# Patient Record
Sex: MALE | Race: WHITE | Employment: OTHER | URBAN - NONMETROPOLITAN AREA
[De-identification: names, ages, dates, MRNs, and addresses within clinical notes are randomized per-mention and may not be internally consistent; named-entity substitution may affect disease eponyms.]

---

## 2017-01-06 ENCOUNTER — ANESTHESIA EVENT (OUTPATIENT)
Dept: OPERATING ROOM | Age: 77
DRG: 467 | End: 2017-01-06
Payer: MEDICARE

## 2017-01-06 ENCOUNTER — ANESTHESIA (OUTPATIENT)
Dept: OPERATING ROOM | Age: 77
DRG: 467 | End: 2017-01-06
Payer: MEDICARE

## 2017-01-06 ENCOUNTER — APPOINTMENT (OUTPATIENT)
Dept: GENERAL RADIOLOGY | Age: 77
DRG: 467 | End: 2017-01-06
Attending: ORTHOPAEDIC SURGERY
Payer: MEDICARE

## 2017-01-06 VITALS
OXYGEN SATURATION: 91 % | SYSTOLIC BLOOD PRESSURE: 167 MMHG | RESPIRATION RATE: 2 BRPM | TEMPERATURE: 98 F | DIASTOLIC BLOOD PRESSURE: 68 MMHG

## 2017-01-06 PROCEDURE — 2580000003 HC RX 258: Performed by: ORTHOPAEDIC SURGERY

## 2017-01-06 PROCEDURE — 6360000002 HC RX W HCPCS: Performed by: NURSE ANESTHETIST, CERTIFIED REGISTERED

## 2017-01-06 PROCEDURE — 6360000002 HC RX W HCPCS: Performed by: ORTHOPAEDIC SURGERY

## 2017-01-06 PROCEDURE — 2500000003 HC RX 250 WO HCPCS: Performed by: NURSE ANESTHETIST, CERTIFIED REGISTERED

## 2017-01-06 PROCEDURE — 73560 X-RAY EXAM OF KNEE 1 OR 2: CPT

## 2017-01-06 RX ORDER — FENTANYL CITRATE 50 UG/ML
INJECTION, SOLUTION INTRAMUSCULAR; INTRAVENOUS PRN
Status: DISCONTINUED | OUTPATIENT
Start: 2017-01-06 | End: 2017-01-06 | Stop reason: SDUPTHER

## 2017-01-06 RX ORDER — ONDANSETRON 2 MG/ML
INJECTION INTRAMUSCULAR; INTRAVENOUS PRN
Status: DISCONTINUED | OUTPATIENT
Start: 2017-01-06 | End: 2017-01-06 | Stop reason: SDUPTHER

## 2017-01-06 RX ORDER — ROCURONIUM BROMIDE 10 MG/ML
INJECTION, SOLUTION INTRAVENOUS PRN
Status: DISCONTINUED | OUTPATIENT
Start: 2017-01-06 | End: 2017-01-06 | Stop reason: SDUPTHER

## 2017-01-06 RX ORDER — MIDAZOLAM HYDROCHLORIDE 1 MG/ML
INJECTION INTRAMUSCULAR; INTRAVENOUS PRN
Status: DISCONTINUED | OUTPATIENT
Start: 2017-01-06 | End: 2017-01-06 | Stop reason: SDUPTHER

## 2017-01-06 RX ORDER — LIDOCAINE HYDROCHLORIDE 10 MG/ML
INJECTION, SOLUTION INFILTRATION; PERINEURAL PRN
Status: DISCONTINUED | OUTPATIENT
Start: 2017-01-06 | End: 2017-01-06 | Stop reason: SDUPTHER

## 2017-01-06 RX ORDER — GLYCOPYRROLATE 0.2 MG/ML
INJECTION INTRAMUSCULAR; INTRAVENOUS PRN
Status: DISCONTINUED | OUTPATIENT
Start: 2017-01-06 | End: 2017-01-06 | Stop reason: SDUPTHER

## 2017-01-06 RX ORDER — PROPOFOL 10 MG/ML
INJECTION, EMULSION INTRAVENOUS PRN
Status: DISCONTINUED | OUTPATIENT
Start: 2017-01-06 | End: 2017-01-06 | Stop reason: SDUPTHER

## 2017-01-06 RX ORDER — TRANEXAMIC ACID 100 MG/ML
INJECTION, SOLUTION INTRAVENOUS PRN
Status: DISCONTINUED | OUTPATIENT
Start: 2017-01-06 | End: 2017-01-06 | Stop reason: SDUPTHER

## 2017-01-06 RX ADMIN — VANCOMYCIN HYDROCHLORIDE 1 G: 1 INJECTION, SOLUTION INTRAVENOUS at 08:03

## 2017-01-06 RX ADMIN — SODIUM CHLORIDE, SODIUM LACTATE, POTASSIUM CHLORIDE, AND CALCIUM CHLORIDE: 600; 310; 30; 20 INJECTION, SOLUTION INTRAVENOUS at 09:30

## 2017-01-06 RX ADMIN — FENTANYL CITRATE 50 MCG: 50 INJECTION INTRAMUSCULAR; INTRAVENOUS at 10:00

## 2017-01-06 RX ADMIN — NEOSTIGMINE METHYLSULFATE 2.5 MG: 1 INJECTION, SOLUTION INTRAMUSCULAR; INTRAVENOUS; SUBCUTANEOUS at 11:37

## 2017-01-06 RX ADMIN — HYDROMORPHONE HYDROCHLORIDE 0.25 MG: 1 INJECTION, SOLUTION INTRAMUSCULAR; INTRAVENOUS; SUBCUTANEOUS at 11:28

## 2017-01-06 RX ADMIN — LIDOCAINE HYDROCHLORIDE 5 ML: 10 INJECTION, SOLUTION INFILTRATION; PERINEURAL at 08:12

## 2017-01-06 RX ADMIN — ROCURONIUM BROMIDE 50 MG: 10 INJECTION INTRAVENOUS at 08:12

## 2017-01-06 RX ADMIN — HYDROMORPHONE HYDROCHLORIDE 0.25 MG: 1 INJECTION, SOLUTION INTRAMUSCULAR; INTRAVENOUS; SUBCUTANEOUS at 11:31

## 2017-01-06 RX ADMIN — MIDAZOLAM HYDROCHLORIDE 1 MG: 1 INJECTION, SOLUTION INTRAMUSCULAR; INTRAVENOUS at 08:04

## 2017-01-06 RX ADMIN — GLYCOPYRROLATE 0.4 MG: 0.2 INJECTION, SOLUTION INTRAMUSCULAR; INTRAVENOUS at 11:37

## 2017-01-06 RX ADMIN — TRANEXAMIC ACID 1000 MG: 100 INJECTION, SOLUTION INTRAVENOUS at 10:43

## 2017-01-06 RX ADMIN — ROCURONIUM BROMIDE 10 MG: 10 INJECTION INTRAVENOUS at 08:55

## 2017-01-06 RX ADMIN — TRANEXAMIC ACID 1000 MG: 100 INJECTION, SOLUTION INTRAVENOUS at 11:10

## 2017-01-06 RX ADMIN — FENTANYL CITRATE 50 MCG: 50 INJECTION INTRAMUSCULAR; INTRAVENOUS at 10:25

## 2017-01-06 RX ADMIN — ONDANSETRON HYDROCHLORIDE 4 MG: 2 INJECTION, SOLUTION INTRAVENOUS at 11:12

## 2017-01-06 RX ADMIN — PROPOFOL 140 MG: 10 INJECTION, EMULSION INTRAVENOUS at 08:12

## 2017-01-06 RX ADMIN — FENTANYL CITRATE 75 MCG: 50 INJECTION INTRAMUSCULAR; INTRAVENOUS at 08:11

## 2017-01-06 RX ADMIN — ROCURONIUM BROMIDE 10 MG: 10 INJECTION INTRAVENOUS at 10:01

## 2017-01-06 RX ADMIN — ROCURONIUM BROMIDE 10 MG: 10 INJECTION INTRAVENOUS at 10:40

## 2017-01-06 RX ADMIN — FENTANYL CITRATE 25 MCG: 50 INJECTION INTRAMUSCULAR; INTRAVENOUS at 09:13

## 2017-01-08 PROBLEM — J40 BRONCHITIS: Status: ACTIVE | Noted: 2017-01-08

## 2017-01-09 ENCOUNTER — APPOINTMENT (OUTPATIENT)
Dept: GENERAL RADIOLOGY | Age: 77
DRG: 467 | End: 2017-01-09
Attending: ORTHOPAEDIC SURGERY
Payer: MEDICARE

## 2017-01-09 PROCEDURE — 71020 XR CHEST STANDARD TWO VW: CPT

## 2017-03-06 RX ORDER — NITROGLYCERIN 0.4 MG/1
0.4 TABLET SUBLINGUAL EVERY 5 MIN PRN
Qty: 25 TABLET | Refills: 3 | Status: SHIPPED | OUTPATIENT
Start: 2017-03-06

## 2017-10-16 ENCOUNTER — HOSPITAL ENCOUNTER (EMERGENCY)
Age: 77
Discharge: LEFT BEFORE DISCHARGE | End: 2017-10-16
Payer: MEDICARE

## 2017-10-16 VITALS
BODY MASS INDEX: 27.4 KG/M2 | RESPIRATION RATE: 18 BRPM | HEART RATE: 65 BPM | SYSTOLIC BLOOD PRESSURE: 170 MMHG | TEMPERATURE: 98.2 F | DIASTOLIC BLOOD PRESSURE: 62 MMHG | WEIGHT: 185 LBS | HEIGHT: 69 IN | OXYGEN SATURATION: 96 %

## 2017-10-16 PROCEDURE — 4500000002 HC ER NO CHARGE

## 2017-10-16 ASSESSMENT — PAIN DESCRIPTION - LOCATION: LOCATION: ABDOMEN

## 2017-10-16 ASSESSMENT — PAIN SCALES - GENERAL: PAINLEVEL_OUTOF10: 7

## 2017-11-04 ENCOUNTER — APPOINTMENT (OUTPATIENT)
Dept: CT IMAGING | Age: 77
End: 2017-11-04
Payer: MEDICARE

## 2017-11-04 ENCOUNTER — HOSPITAL ENCOUNTER (EMERGENCY)
Age: 77
Discharge: HOME | End: 2017-11-04
Attending: EMERGENCY MEDICINE
Payer: MEDICARE

## 2017-11-04 VITALS
BODY MASS INDEX: 28.14 KG/M2 | OXYGEN SATURATION: 96 % | HEIGHT: 69 IN | HEART RATE: 61 BPM | SYSTOLIC BLOOD PRESSURE: 145 MMHG | RESPIRATION RATE: 15 BRPM | WEIGHT: 190 LBS | TEMPERATURE: 97.9 F | DIASTOLIC BLOOD PRESSURE: 61 MMHG

## 2017-11-04 DIAGNOSIS — L89.159 DECUBITUS ULCER OF SACRAL REGION, UNSPECIFIED ULCER STAGE: ICD-10-CM

## 2017-11-04 DIAGNOSIS — K52.9 COLITIS: ICD-10-CM

## 2017-11-04 DIAGNOSIS — K55.9 ISCHEMIC COLITIS (HCC): Primary | ICD-10-CM

## 2017-11-04 DIAGNOSIS — Z95.1 HX OF CABG: ICD-10-CM

## 2017-11-04 DIAGNOSIS — N18.9 CHRONIC RENAL IMPAIRMENT, UNSPECIFIED CKD STAGE: ICD-10-CM

## 2017-11-04 DIAGNOSIS — I71.40 ABDOMINAL AORTIC ANEURYSM (AAA) WITHOUT RUPTURE: ICD-10-CM

## 2017-11-04 LAB
ALBUMIN SERPL-MCNC: 3.1 G/DL (ref 3.5–5.2)
ALP BLD-CCNC: 86 U/L (ref 40–130)
ALT SERPL-CCNC: 11 U/L (ref 5–41)
ANION GAP SERPL CALCULATED.3IONS-SCNC: 9 MMOL/L (ref 7–19)
AST SERPL-CCNC: 18 U/L (ref 5–40)
BASOPHILS ABSOLUTE: 0.1 K/UL (ref 0–0.2)
BASOPHILS RELATIVE PERCENT: 0.5 % (ref 0–1)
BILIRUB SERPL-MCNC: <0.2 MG/DL (ref 0.2–1.2)
BUN BLDV-MCNC: 36 MG/DL (ref 8–23)
CALCIUM SERPL-MCNC: 8.7 MG/DL (ref 8.8–10.2)
CHLORIDE BLD-SCNC: 106 MMOL/L (ref 98–111)
CO2: 28 MMOL/L (ref 22–29)
CREAT SERPL-MCNC: 1.9 MG/DL (ref 0.5–1.2)
EOSINOPHILS ABSOLUTE: 0.7 K/UL (ref 0–0.6)
EOSINOPHILS RELATIVE PERCENT: 4.7 % (ref 0–5)
GFR NON-AFRICAN AMERICAN: 34
GLUCOSE BLD-MCNC: 132 MG/DL (ref 74–109)
HCT VFR BLD CALC: 38.8 % (ref 42–52)
HEMOGLOBIN: 12.6 G/DL (ref 14–18)
LACTIC ACID: 0.7 MMOL/L (ref 0.5–1.9)
LIPASE: 16 U/L (ref 13–60)
LYMPHOCYTES ABSOLUTE: 4.8 K/UL (ref 1.1–4.5)
LYMPHOCYTES RELATIVE PERCENT: 30.3 % (ref 20–40)
MCH RBC QN AUTO: 32.8 PG (ref 27–31)
MCHC RBC AUTO-ENTMCNC: 32.5 G/DL (ref 33–37)
MCV RBC AUTO: 101 FL (ref 80–94)
MONOCYTES ABSOLUTE: 1.5 K/UL (ref 0–0.9)
MONOCYTES RELATIVE PERCENT: 9.2 % (ref 0–10)
NEUTROPHILS ABSOLUTE: 8.7 K/UL (ref 1.5–7.5)
NEUTROPHILS RELATIVE PERCENT: 54.9 % (ref 50–65)
PDW BLD-RTO: 14.4 % (ref 11.5–14.5)
PLATELET # BLD: 250 K/UL (ref 130–400)
PMV BLD AUTO: 10.9 FL (ref 9.4–12.4)
POTASSIUM SERPL-SCNC: 5.3 MMOL/L (ref 3.5–5)
RBC # BLD: 3.84 M/UL (ref 4.7–6.1)
SODIUM BLD-SCNC: 143 MMOL/L (ref 136–145)
TOTAL PROTEIN: 7.5 G/DL (ref 6.6–8.7)
WBC # BLD: 15.9 K/UL (ref 4.8–10.8)

## 2017-11-04 PROCEDURE — 36415 COLL VENOUS BLD VENIPUNCTURE: CPT

## 2017-11-04 PROCEDURE — 99284 EMERGENCY DEPT VISIT MOD MDM: CPT

## 2017-11-04 PROCEDURE — 2580000003 HC RX 258: Performed by: EMERGENCY MEDICINE

## 2017-11-04 PROCEDURE — 80053 COMPREHEN METABOLIC PANEL: CPT

## 2017-11-04 PROCEDURE — 99284 EMERGENCY DEPT VISIT MOD MDM: CPT | Performed by: EMERGENCY MEDICINE

## 2017-11-04 PROCEDURE — 85025 COMPLETE CBC W/AUTO DIFF WBC: CPT

## 2017-11-04 PROCEDURE — 83690 ASSAY OF LIPASE: CPT

## 2017-11-04 PROCEDURE — 74176 CT ABD & PELVIS W/O CONTRAST: CPT

## 2017-11-04 PROCEDURE — 93005 ELECTROCARDIOGRAM TRACING: CPT

## 2017-11-04 PROCEDURE — 83605 ASSAY OF LACTIC ACID: CPT

## 2017-11-04 RX ORDER — TACROLIMUS 1 MG/G
OINTMENT TOPICAL 2 TIMES DAILY
COMMUNITY
End: 2019-07-19

## 2017-11-04 RX ORDER — METRONIDAZOLE 500 MG/1
500 TABLET ORAL 3 TIMES DAILY
Qty: 30 TABLET | Refills: 0 | Status: SHIPPED | OUTPATIENT
Start: 2017-11-04 | End: 2017-11-14

## 2017-11-04 RX ORDER — CIPROFLOXACIN 500 MG/1
500 TABLET, FILM COATED ORAL 2 TIMES DAILY
Qty: 20 TABLET | Refills: 0 | Status: SHIPPED | OUTPATIENT
Start: 2017-11-04 | End: 2017-11-14

## 2017-11-04 RX ORDER — 0.9 % SODIUM CHLORIDE 0.9 %
1000 INTRAVENOUS SOLUTION INTRAVENOUS ONCE
Status: COMPLETED | OUTPATIENT
Start: 2017-11-04 | End: 2017-11-04

## 2017-11-04 RX ORDER — HYOSCYAMINE SULFATE 0.125 MG
125 TABLET ORAL EVERY 4 HOURS PRN
Status: ON HOLD | COMMUNITY
End: 2022-07-14 | Stop reason: HOSPADM

## 2017-11-04 RX ADMIN — SODIUM CHLORIDE 1000 ML: 9 INJECTION, SOLUTION INTRAVENOUS at 15:00

## 2017-11-04 ASSESSMENT — ENCOUNTER SYMPTOMS
SHORTNESS OF BREATH: 0
VOMITING: 0
COUGH: 0
ABDOMINAL PAIN: 1
ABDOMINAL DISTENTION: 1

## 2017-11-04 ASSESSMENT — PAIN DESCRIPTION - PAIN TYPE: TYPE: ACUTE PAIN

## 2017-11-04 ASSESSMENT — PAIN DESCRIPTION - LOCATION: LOCATION: ABDOMEN

## 2017-11-04 ASSESSMENT — PAIN SCALES - GENERAL
PAINLEVEL_OUTOF10: 5
PAINLEVEL_OUTOF10: 5
PAINLEVEL_OUTOF10: 9

## 2017-11-04 NOTE — ED NOTES
Patient is resting comfortably with no distress noted at this time. Patient has call light within reach and is encouraged to ask any questions or call for any assistance. All questions are entertained, encouraged, and answered to the best of my ability. Patient is watching TV.      Anjel Goldsmith RN  11/04/17 5291

## 2017-11-04 NOTE — ED NOTES
Patient states that he had gotten up to use bathroom, but did not obtain urine specimen. Will try again when patient states he can again.      Tex Noriega RN  11/04/17 1132

## 2017-11-04 NOTE — ED NOTES
Assessment:  Airway intact. Pt respirations even and unlabored, skin color within normal limits. Skin is warm and dry. Pt has stage 2 skin sore to coccyx. Bowel sounds within normal limits. Abdomen soft and tender to epigastric region  Alert and oriented x 4. Pt is in no acute distress. No edema noted to extremities. Call light within reach, pt gowned. Pt on monitor and alarms on.        Verónica Canas RN  11/04/17 2131

## 2017-11-04 NOTE — ED PROVIDER NOTES
[facility]  eMERGENCY dEPARTMENT eNCOUnter      Pt Name: Alesha Ochoa  MRN: [id number]  [place] 1940  Date of evaluation: 11/4/2017  Provider: Nelson Garcia MD    CHIEF COMPLAINT       Chief Complaint   Patient presents with    Abdominal Pain     patient states that he has epigastric pain. Onset has been intermittent for one month. He states that he has a hiatal hernia. Pain rated 9 out of 10    Skin Ulcer     above gluteal fold         HISTORY OF PRESENT ILLNESS   (Location/Symptom, Timing/Onset, Context/Setting, Quality, Duration, Modifying Factors, Severity)  Note limiting factors. Alesha Ochoa is a 76 y.o. male who presents to the emergency department With abdominal pain. He states it's 9 out of 10 intermittent and has been ongoing on and off times one month. He has a hiatal hernia per prior report. The patient states he came to the ER about 2-3 weeks ago while he was waiting to be seen in the waiting room his pain went away so he went home. He has not had a workup for his abdominal pain. 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 The patient feels bloated. He complains of epigastric pain. The pain comes and goes. Does not seem to affect his eating. The patient also complains of a skin ulcer in his gluteal cleft where he's had issues with this times one year is been putting ointment on it however it bleeds sometimes. The patient is not bedbound. Patient denies any fevers. He has no blood in his stool. He has normal bowel movements today. His pain came on suddenly this morning around 5 AM. In his abdomen. The history is provided by the patient. Nursing Notes were reviewed. REVIEW OF SYSTEMS    (2-9 systems for level 4, 10 or more for level 5)     Review of Systems   Constitutional: Negative for fever. Respiratory: Negative for cough and shortness of breath. Cardiovascular: Negative for chest pain.    Gastrointestinal: Left cath   [facility]ul [facility] DIAGNOSTIC CARDIAC CATH LAB PROCEDURE  05/15/91, [address]    Left cath, SARABIA/Martiniquais left ventric., selective coronary arteriography.  PTCA  05/16/91, [address]    PTCA to circumflex marginal branch.  REVISION TOTAL KNEE ARTHROPLASTY Right 1/6/2017    KNEE TOTAL ARTHROPLASTY REVISION performed by Valentino Bouche, MD at [facility] ARTHROPLASTY  4/2012    Left  knee          CURRENT MEDICATIONS       Previous Medications    AMLODIPINE (NORVASC) 5 MG TABLET    Take 5 mg by mouth daily. ASPIRIN 81 MG EC TABLET    Take 81 mg by mouth nightly     CLOPIDOGREL (PLAVIX) 75 MG TABLET    Take 1 tablet by mouth daily Start after Xarelto done    FUROSEMIDE (LASIX) 40 MG TABLET    Take 40 mg by mouth daily    GABAPENTIN (NEURONTIN) 300 MG CAPSULE    Take 300 mg by mouth nightly    HYOSCYAMINE (ANASPAZ;LEVSIN) 125 MCG TABLET    Take 125 mcg by mouth every 4 hours as needed for Cramping    LEVOTHYROXINE (SYNTHROID) 125 MCG TABLET    Take 125 mcg by mouth nightly     LOSARTAN (COZAAR) 100 MG TABLET    Take 100 mg by mouth daily. MAGNESIUM CITRATE PO    Take by mouth    MULTIPLE VITAMINS-MINERALS (CENTRUM SILVER PO)    Take  by mouth. MUPIROCIN (BACTROBAN) 2 % NASAL OINTMENT    by Nasal route as needed Take by Nasal route 2 times daily. NITROGLYCERIN (NITROSTAT) 0.4 MG SL TABLET    Place 1 tablet under the tongue every 5 minutes as needed for Chest pain    NYSTATIN-TRIAMCINOLONE (MYCOLOG II) 325146-9.1 UNIT/GM-% CREAM    Apply topically 4 times daily Apply topically 4 times daily. OMEPRAZOLE (PRILOSEC) 40 MG CAPSULE    Take 40 mg by mouth daily    RIVAROXABAN (XARELTO) 10 MG TABS TABLET    Take 1 tablet by mouth daily for 10 days    SIMVASTATIN (ZOCOR) 20 MG TABLET    Take 20 mg by mouth nightly. TACROLIMUS (PROTOPIC) 0.1 % OINTMENT    Apply topically 2 times daily Apply topically 2 times daily.     TAMSULOSIN (FLOMAX) 0.4 MG CAPSULE    Take 0.4 mg by mouth RESULTS     EKG: All EKG's are interpreted by the Emergency Department Physician who either signs or Co-signs this chart in the absence of a cardiologist.    EKG sinus rhythm rate 59 acute changes. RADIOLOGY:   Non-plain film images such as CT, Ultrasound and MRI are read by the radiologist. Plain radiographic images are visualized and preliminarily interpreted by the emergency physician with the below findings:        Interpretation per the Radiologist below, if available at the time of this note:    CT ABDOMEN PELVIS WO IV CONTRAST Additional Contrast? Oral   Final Result   1. Circumferential wall thickening is identified along a fairly long   segment of colon from the mid descending colon down to the mixed   sigmoid colon. There is limited evaluation for bowel ischemia without   IV contrast although there does appear to be a mild pericolonic fat   stranding along this segment. This could reflect infectious or perhaps   ischemic colitis. An SMA bypass is poorly evaluated on this   noncontrast exam. Please correlate for additional evidence of ischemic   colitis. 2. Fusiform aneurysmal dilation of the infrarenal aorta up to 4 cm.    The results of this exam were discussed with Dr. Yanely Meyers on 11/4/2017   at 1421 hours   Signed by Dr Guanako Grider on 11/4/2017 2:26 PM            ED BEDSIDE ULTRASOUND:   Performed by ED Physician - none    LABS:  Labs Reviewed   CBC WITH AUTO DIFFERENTIAL - Abnormal; Notable for the following:        Result Value    WBC 15.9 (*)     RBC 3.84 (*)     Hemoglobin 12.6 (*)     Hematocrit 38.8 (*)     .0 (*)     MCH 32.8 (*)     MCHC 32.5 (*)     Neutrophils # 8.7 (*)     Lymphocytes # 4.8 (*)     Monocytes # 1.50 (*)     Eosinophils # 0.70 (*)     All other components within normal limits   COMPREHENSIVE METABOLIC PANEL - Abnormal; Notable for the following:     Potassium 5.3 (*)     Glucose 132 (*)     BUN 36 (*)     CREATININE 1.9 (*)     GFR Non- 34 (*) CT scan was concerning. I discussed this at length with Dr. Elizabeth Ruiz of vascular surgery and Dr. Demond Antonio of GI. They both agree that this likely is some ischemic colitis treated with anabiotic's. I started Cipro and Flagyl. The patient is pain-free. If his pain worsens and comes back he should return to the ER which I discussed with him the patient if worsens with just need a colectomy. There really is no other treatment from the vascular surgery perspective aced on my discussions with Dr. Elizabeth Ruiz. He started on aspirin and Plavix. The patient and I discussed this. He would like to go home he does not have any pain issues he feels better there is no reason for admission at this time. His creatinine appears that his baseline which was 1.9 in January. The patient's case was discussed with on-call for his primary care physician Dr. Umang Baker. Dr. Kiarra Pacheco will leave a message so the patient in follow-up next week in the office. Again the patient has only had one other episode of abdominal pain 2 weeks ago he does not appear in with eating postprandial or otherwise. The patient has no bloody stool or diarrhea. The patient really was most concerned about his sacral wound as well but been ongoing for some time I think he can follow up with wound care and his primary care physician. There is no bleeding from it. Based on all conversations there is no utility in doing a CT scan with IV contrast to worsen his kidney function because it would not change the plan based on vascular surgery recommendations. Again case discussed at length with multiple subspecialists. Patient pain-free would like to go home started on anabiotic's. As above. Return  precautions discussed.        Amount and/or Complexity of Data Reviewed  Clinical lab tests: ordered and reviewed  Tests in the radiology section of CPT®: ordered and reviewed  Discuss the patient with other providers: yes  Independent visualization of images, tracings, or specimens: yes    Patient Progress  Patient progress: improved        CONSULTS:  None    PROCEDURES:  Unless otherwise noted below, none     Procedures    FINAL IMPRESSION      1. Ischemic colitis ([address]    2. Colitis    3. Decubitus ulcer of sacral region, unspecified ulcer stage    4. Chronic renal impairment, unspecified CKD stage    5. Hx of CABG    6.  Abdominal aortic aneurysm (AAA) without rupture [facility])          DISPOSITION/PLAN   DISPOSITION Decision to Discharge    PATIENT REFERRED TO:  Isaias Loza, [address]  [phone]    Schedule an appointment as soon as possible for a visit   call on MON to schedule an appointment    [facility]  [address] [facility] [address] [phone]  Schedule an appointment as soon as possible for a visit in 1 week  for eval of you buttocks wounds, also discuss with your doctor      DISCHARGE MEDICATIONS:  New Prescriptions    CIPROFLOXACIN (CIPRO) 500 MG TABLET    Take 1 tablet by mouth 2 times daily for 10 days    METRONIDAZOLE (FLAGYL) 500 MG TABLET    Take 1 tablet by mouth 3 times daily for 10 days          (Please note that portions of this note were completed with a voice recognition program.  Efforts were made to edit the dictations but occasionally words are mis-transcribed.)    Bess Allen MD (electronically signed)  Attending Emergency Physician          Bess Allen MD  11/04/17 3105

## 2017-11-06 LAB
EKG P AXIS: 6 DEGREES
EKG P-R INTERVAL: 144 MS
EKG Q-T INTERVAL: 442 MS
EKG QRS DURATION: 100 MS
EKG QTC CALCULATION (BAZETT): 441 MS
EKG T AXIS: 65 DEGREES

## 2017-11-13 RX ORDER — NITROGLYCERIN 0.4 MG/1
0.4 TABLET SUBLINGUAL
COMMUNITY

## 2017-11-13 RX ORDER — SIMVASTATIN 20 MG
20 TABLET ORAL NIGHTLY
COMMUNITY

## 2017-11-13 RX ORDER — TAMSULOSIN HYDROCHLORIDE 0.4 MG/1
1 CAPSULE ORAL NIGHTLY
COMMUNITY

## 2017-11-13 RX ORDER — MULTIPLE VITAMINS W/ MINERALS TAB 9MG-400MCG
1 TAB ORAL DAILY
COMMUNITY

## 2017-11-13 RX ORDER — GABAPENTIN 300 MG/1
300 CAPSULE ORAL NIGHTLY
COMMUNITY

## 2017-11-13 RX ORDER — LOSARTAN POTASSIUM 100 MG/1
100 TABLET ORAL DAILY
COMMUNITY

## 2017-11-13 RX ORDER — OMEPRAZOLE 40 MG/1
40 CAPSULE, DELAYED RELEASE ORAL DAILY
COMMUNITY
End: 2018-01-10 | Stop reason: ALTCHOICE

## 2017-11-13 RX ORDER — AMLODIPINE BESYLATE 5 MG/1
5 TABLET ORAL DAILY
COMMUNITY

## 2017-11-13 RX ORDER — CLOPIDOGREL BISULFATE 75 MG/1
75 TABLET ORAL DAILY
COMMUNITY

## 2017-11-13 RX ORDER — ASPIRIN 81 MG/1
81 TABLET ORAL DAILY
COMMUNITY

## 2017-11-13 RX ORDER — LEVOTHYROXINE SODIUM 0.12 MG/1
125 TABLET ORAL DAILY
COMMUNITY

## 2017-11-14 ENCOUNTER — HOSPITAL ENCOUNTER (OUTPATIENT)
Dept: WOUND CARE | Age: 77
Discharge: HOME | End: 2017-11-14
Payer: MEDICARE

## 2017-11-14 VITALS
BODY MASS INDEX: 28.14 KG/M2 | RESPIRATION RATE: 16 BRPM | DIASTOLIC BLOOD PRESSURE: 60 MMHG | SYSTOLIC BLOOD PRESSURE: 130 MMHG | TEMPERATURE: 97.8 F | HEART RATE: 56 BPM | WEIGHT: 190 LBS | HEIGHT: 69 IN

## 2017-11-14 DIAGNOSIS — L89.152 DECUBITUS ULCER OF SACRAL REGION, STAGE 2 (HCC): Chronic | ICD-10-CM

## 2017-11-14 PROCEDURE — 99203 OFFICE O/P NEW LOW 30 MIN: CPT | Performed by: SURGERY

## 2017-11-14 PROCEDURE — 99213 OFFICE O/P EST LOW 20 MIN: CPT

## 2017-11-14 NOTE — PROGRESS NOTES
Review of Systems    Constitutional - no significant activity change, appetite change, or unexpected weight change. No fever or chills. HENT - no significant rhinorrhea or epistaxis. Eyes - no sudden vision change or amaurosis. Respiratory - no significant shortness of breath, wheezing, or cough  Cardiovascular - no chest pain or palpitations. He has had some dizziness recently for which he has been going to the chiropracter  Gastrointestinal - no abdominal swelling or pain. No blood in stool. Genitourinary - No difficulty urinating, dysuria or hematuria  Musculoskeletal - denies back pain, gait disturbance, or myalgia. Skin - no color change, rash, pallor; see HPI for wound  Neurologic - no facial asymmetry, or light headedness. No seizures. Hematologic - no easy bruising or excessive bleeding. Psychiatric - no severe anxiety or nervousness. No confusion. Elvia Larios Physical Exam    /60   Pulse 56   Temp 97.8 °F (36.6 °C) (Temporal)   Resp 16   Ht 5' 9\" (1.753 m)   Wt 190 lb (86.2 kg)   BMI 28.06 kg/m²     Constitutional - well developed, well nourished. No diaphoresis or acute distress. HENT - head normocephalic, atraumatic  Eyes - conjunctiva normal.  EOMS normal.   Neck- ROM appears normal, no tracheal deviation. Cardiovascular - Regular rate and rhythm. Heart sounds are normal.  Extremities - Radial pulses are 2+ to palpation bilaterally. No cyanosis or clubbing. Trace LE edema bilaterally  Pulmonary - effort appears normal.  No respiratory distress. GI - Abdomen - soft, non tender, non-distended. Genitourinary - deferred. Musculoskeletal - ROM appears normal. Moves all 4 extremities equally  Neurologic - alert and oriented X 3. Physiologic. Skin - warm and dry. See nurses's notes and photos for further description  Psychiatric - mood, affect, and behavior appear normal.  Judgment and thought processes appear normal.       Assessment    1.  Decubitus ulcer of sacral region, stage 2          Plan    Discussed with the patient that this appears to be quite superficial. I recommended that he not use the other creams, and we will have him use a barrier cream such as ariel cream. I have also recommended that he off load as much as possible, and would recommend that he use a memory foam cushion to sit in any of his chairs, and avoid any cushions with cutouts.       Recommend no smoking  Offloading instructions given

## 2017-11-28 ENCOUNTER — HOSPITAL ENCOUNTER (OUTPATIENT)
Dept: WOUND CARE | Age: 77
Discharge: HOME | End: 2017-11-28
Payer: MEDICARE

## 2017-11-28 VITALS
SYSTOLIC BLOOD PRESSURE: 132 MMHG | RESPIRATION RATE: 16 BRPM | BODY MASS INDEX: 28.14 KG/M2 | TEMPERATURE: 97.2 F | HEIGHT: 69 IN | WEIGHT: 190 LBS | HEART RATE: 54 BPM | DIASTOLIC BLOOD PRESSURE: 65 MMHG

## 2017-11-28 PROCEDURE — 99213 OFFICE O/P EST LOW 20 MIN: CPT

## 2017-11-28 PROCEDURE — 99212 OFFICE O/P EST SF 10 MIN: CPT | Performed by: SURGERY

## 2017-11-28 RX ORDER — DICLOFENAC SODIUM 75 MG/1
75 TABLET, DELAYED RELEASE ORAL 2 TIMES DAILY
COMMUNITY
End: 2017-12-01

## 2017-11-28 RX ORDER — GLYCOPYRROLATE 1 MG/1
1 TABLET ORAL 3 TIMES DAILY
Status: ON HOLD | COMMUNITY
End: 2022-07-14 | Stop reason: HOSPADM

## 2017-11-28 ASSESSMENT — PAIN DESCRIPTION - ORIENTATION: ORIENTATION: LEFT

## 2017-11-28 ASSESSMENT — PAIN DESCRIPTION - LOCATION: LOCATION: LEG

## 2017-11-28 ASSESSMENT — PAIN DESCRIPTION - PROGRESSION: CLINICAL_PROGRESSION: NOT CHANGED

## 2017-11-28 ASSESSMENT — PAIN DESCRIPTION - DESCRIPTORS: DESCRIPTORS: ACHING

## 2017-11-28 ASSESSMENT — PAIN SCALES - GENERAL: PAINLEVEL_OUTOF10: 2

## 2017-11-28 ASSESSMENT — PAIN DESCRIPTION - PAIN TYPE: TYPE: ACUTE PAIN

## 2017-11-28 ASSESSMENT — PAIN DESCRIPTION - ONSET: ONSET: ON-GOING

## 2017-11-28 NOTE — PROGRESS NOTES
Wound Care Center   Progress Note and Procedure Note      Atul Peoples  AGE: 76 y.o. GENDER: male  : 1940  TODAY'S DATE:  2017    Subjective:        HISTORY of PRESENT ILLNESS GRACIELA Turcios is a 76 y.o. male who presents today for wound evaluation. Wound Type:pressure  Wound Location:coccyx  Modifying factors:diabetes, chronic pressure and shear force    Patient Active Problem List   Diagnosis Code    ASHD (arteriosclerotic heart disease) I25.10    Hypertension I10    Hyperlipidemia E78.5    COPD (chronic obstructive pulmonary disease) ([facility]) J44.9    History of cerebrovascular disease Z86.79    Obesity E66.9    AAA (abdominal aortic aneurysm) ([facility]) I71.4    S/P TKR (total knee replacement) Z96.659    Diabetes mellitus ([facility]) E11.9    GERD (gastroesophageal reflux disease) K21.9    Primary osteoarthritis of knee M17.10    HH (hiatus hernia) K44.9    Renal insufficiency N28.9    CAD (coronary artery disease) I25.10    S/P CABG x 4 Z95.1    Status post coronary artery stent placement Z95.5    Bronchitis J40    Decubitus ulcer of sacral region, stage 2 L89.152       ALLERGIES    Allergies   Allergen Reactions    Pcn [Penicillins]     Tetracyclines & Related     Vibramycin [Doxycycline Calcium]            Objective:      /65   Pulse 54   Temp 97.2 °F (36.2 °C) (Temporal)   Resp 16   Ht 5' 9\" (1.753 m)   Wt 190 lb (86.2 kg)   BMI 28.06 kg/m²     Post Debridement Measurements and Assessment:  Pressure Ulcer 17 Coccyx Mid Wound 1.  coccyx (Active)   Mariajsoe-wound Assessment Dry; Intact 2017 10:51 AM   Mariajose-Wound Texture Localized edema 2017 10:51 AM   Mariajose-Wound Moisture Dry 2017 10:51 AM   Mariajose-Wound Color Pink 2017 10:51 AM   Pressure Ulcer Staging Stage II 2017 10:51 AM   Non-staged Wound Description Not applicable  36:27 AM   Wound Assessment Pink;Red 2017 10:51 AM   Shape irregular 2017 10:51 AM   Wound

## 2017-12-01 ENCOUNTER — HOSPITAL ENCOUNTER (OUTPATIENT)
Dept: PREADMISSION TESTING | Age: 77
Discharge: HOME | End: 2017-12-01
Payer: MEDICARE

## 2017-12-01 VITALS — HEIGHT: 69 IN | WEIGHT: 195 LBS | BODY MASS INDEX: 28.88 KG/M2

## 2017-12-01 LAB
ANION GAP SERPL CALCULATED.3IONS-SCNC: 13 MMOL/L (ref 7–19)
BASOPHILS ABSOLUTE: 0.1 K/UL (ref 0–0.2)
BASOPHILS RELATIVE PERCENT: 0.6 % (ref 0–1)
BUN BLDV-MCNC: 31 MG/DL (ref 8–23)
CALCIUM SERPL-MCNC: 8.8 MG/DL (ref 8.8–10.2)
CHLORIDE BLD-SCNC: 107 MMOL/L (ref 98–111)
CO2: 24 MMOL/L (ref 22–29)
CREAT SERPL-MCNC: 2.2 MG/DL (ref 0.5–1.2)
EOSINOPHILS ABSOLUTE: 1 K/UL (ref 0–0.6)
EOSINOPHILS RELATIVE PERCENT: 7.6 % (ref 0–5)
GFR NON-AFRICAN AMERICAN: 29
GLUCOSE BLD-MCNC: 78 MG/DL (ref 74–109)
HCT VFR BLD CALC: 34.1 % (ref 42–52)
HEMOGLOBIN: 10.8 G/DL (ref 14–18)
LYMPHOCYTES ABSOLUTE: 3.5 K/UL (ref 1.1–4.5)
LYMPHOCYTES RELATIVE PERCENT: 27.2 % (ref 20–40)
MCH RBC QN AUTO: 32.5 PG (ref 27–31)
MCHC RBC AUTO-ENTMCNC: 31.7 G/DL (ref 33–37)
MCV RBC AUTO: 102.7 FL (ref 80–94)
MONOCYTES ABSOLUTE: 1.3 K/UL (ref 0–0.9)
MONOCYTES RELATIVE PERCENT: 10.3 % (ref 0–10)
NEUTROPHILS ABSOLUTE: 6.8 K/UL (ref 1.5–7.5)
NEUTROPHILS RELATIVE PERCENT: 53 % (ref 50–65)
PDW BLD-RTO: 15.3 % (ref 11.5–14.5)
PLATELET # BLD: 248 K/UL (ref 130–400)
PMV BLD AUTO: 11.1 FL (ref 9.4–12.4)
POTASSIUM SERPL-SCNC: 5.6 MMOL/L (ref 3.5–5)
RBC # BLD: 3.32 M/UL (ref 4.7–6.1)
SODIUM BLD-SCNC: 144 MMOL/L (ref 136–145)
WBC # BLD: 12.8 K/UL (ref 4.8–10.8)

## 2017-12-01 PROCEDURE — 80048 BASIC METABOLIC PNL TOTAL CA: CPT

## 2017-12-01 PROCEDURE — 85025 COMPLETE CBC W/AUTO DIFF WBC: CPT

## 2017-12-12 ENCOUNTER — HOSPITAL ENCOUNTER (OUTPATIENT)
Dept: WOUND CARE | Age: 77
Discharge: HOME | End: 2017-12-12
Payer: MEDICARE

## 2017-12-12 VITALS
HEIGHT: 69 IN | HEART RATE: 55 BPM | RESPIRATION RATE: 16 BRPM | WEIGHT: 195 LBS | TEMPERATURE: 97.5 F | SYSTOLIC BLOOD PRESSURE: 130 MMHG | DIASTOLIC BLOOD PRESSURE: 62 MMHG | BODY MASS INDEX: 28.88 KG/M2

## 2017-12-12 DIAGNOSIS — E66.3 OVERWEIGHT (BMI 25.0-29.9): Chronic | ICD-10-CM

## 2017-12-12 PROCEDURE — 99212 OFFICE O/P EST SF 10 MIN: CPT

## 2017-12-12 PROCEDURE — 99212 OFFICE O/P EST SF 10 MIN: CPT | Performed by: SURGERY

## 2017-12-12 ASSESSMENT — PAIN DESCRIPTION - PAIN TYPE: TYPE: ACUTE PAIN

## 2017-12-12 ASSESSMENT — PAIN DESCRIPTION - DESCRIPTORS: DESCRIPTORS: ACHING

## 2017-12-12 ASSESSMENT — PAIN DESCRIPTION - LOCATION: LOCATION: COCCYX

## 2017-12-12 ASSESSMENT — PAIN SCALES - GENERAL: PAINLEVEL_OUTOF10: 2

## 2017-12-12 ASSESSMENT — PAIN DESCRIPTION - PROGRESSION: CLINICAL_PROGRESSION: NOT CHANGED

## 2017-12-12 ASSESSMENT — PAIN DESCRIPTION - ONSET: ONSET: ON-GOING

## 2017-12-12 NOTE — PLAN OF CARE
Problem: Pain:  Goal: Pain level will decrease  Pain level will decrease   Outcome: Ongoing    Goal: Control of acute pain  Control of acute pain   Outcome: Ongoing    Goal: Control of chronic pain  Control of chronic pain   Outcome: Ongoing      Problem: Wound:  Goal: Will show signs of wound healing; wound closure and no evidence of infection  Will show signs of wound healing; wound closure and no evidence of infection   Outcome: Ongoing      Problem: Pressure Ulcer:  Goal: Signs of wound healing will improve  Signs of wound healing will improve   Outcome: Ongoing    Goal: Absence of new pressure ulcer  Absence of new pressure ulcer   Outcome: Ongoing    Goal: Will show no infection signs and symptoms  Will show no infection signs and symptoms   Outcome: Ongoing

## 2017-12-12 NOTE — PROGRESS NOTES
Wound Care Center   Progress Note and Procedure Note      Atul Peoples  AGE: 76 y.o. GENDER: male  : 1940  TODAY'S DATE:  2017    Subjective:        HISTORY of PRESENT ILLNESS GRACIELA Das is a 76 y.o. male who presents today for wound evaluation. Wound Type:pressure  Wound Location:coccyx  Modifying factors:diabetes, chronic pressure, decreased mobility and shear force    Patient Active Problem List   Diagnosis Code    ASHD (arteriosclerotic heart disease) I25.10    Hypertension I10    Hyperlipidemia E78.5    COPD (chronic obstructive pulmonary disease) ([facility]) J44.9    History of cerebrovascular disease Z80.78    AAA (abdominal aortic aneurysm) ([facility]) I71.4    S/P TKR (total knee replacement) Z96.659    Diabetes mellitus ([facility]) E11.9    GERD (gastroesophageal reflux disease) K21.9    Primary osteoarthritis of knee M17.10    HH (hiatus hernia) K44.9    Renal insufficiency N28.9    CAD (coronary artery disease) I25.10    S/P CABG x 4 Z95.1    Status post coronary artery stent placement Z95.5    Bronchitis J40    Decubitus ulcer of sacral region, stage 2 L89.152    Overweight (BMI 25.0-29. 9) E66.3       ALLERGIES    Allergies   Allergen Reactions    Pcn [Penicillins] Anaphylaxis    Tetracyclines & Related     Vibramycin [Doxycycline Calcium]            Objective:      /62   Pulse 55   Temp 97.5 °F (36.4 °C) (Temporal)   Resp 16   Ht 5' 9\" (1.753 m)   Wt 195 lb (88.5 kg)   BMI 28.80 kg/m²     Post Debridement Measurements and Assessment:  Pressure Ulcer 17 Coccyx Mid Wound 1.  coccyx (Active)   Mariajose-wound Assessment Dry; Intact 2017 10:19 AM   Mariajose-Wound Texture Localized edema;Scarring 2017 10:19 AM   Mariajose-Wound Moisture Dry 2017 10:19 AM   Mariajose-Wound Color Pink 2017 10:19 AM   Pressure Ulcer Staging Stage II 2017 10:19 AM   Non-staged Wound Description Not applicable  27:13 AM   Wound Assessment Pink;Red 12/12/2017 10:19 AM   Shape irregular 12/12/2017 10:19 AM   Wound Length (cm) 2.2 cm 12/12/2017 10:19 AM   Wound Width (cm) 1 cm 12/12/2017 10:19 AM   Wound Depth (cm)  0.1 12/12/2017 10:19 AM   Calculated Wound Size (cm^2) (l*w) 2.2 cm^2 12/12/2017 10:19 AM   Change in Wound Size % (l*w) 78 12/12/2017 10:19 AM   Dressing Changed Changed/New 11/28/2017 11:35 AM   Wound Cleansed Rinsed/Irrigated with saline 12/12/2017 10:19 AM   Necrotic Type Yellow Fibrin/Slough 12/12/2017 10:19 AM   Necrotic Amount Small: 1-33% 12/12/2017 10:19 AM   Granulation Quality Pink 12/12/2017 10:19 AM   Drainage Amount Moderate 12/12/2017 10:19 AM   Drainage Description Serosanguinous 12/12/2017 10:19 AM   Odor None 12/12/2017 10:19 AM   Epithelialization None present 12/12/2017 10:19 AM   Distance Tunneling (cm) 0 cm 12/12/2017 10:19 AM   Tunneling Position ___ O'Clock 0 12/12/2017 10:19 AM   Tunneling Maxium Distance (cm) 0 12/12/2017 10:19 AM   Undermining Starts ___ O'Clock 0 12/12/2017 10:19 AM   Undermining Ends___ O'Clock 0 12/12/2017 10:19 AM   Undermining Maxium Distance (cm) 0 12/12/2017 10:19 AM   [place]%Wound Bed 50 12/12/2017 10:19 AM   Red%Wound Bed 50 12/12/2017 10:19 AM   Yellow%Wound Bed 0 12/12/2017 10:19 AM   Black%Wound Bed 0 12/12/2017 10:19 AM   Purple%Wound Bed 0 12/12/2017 10:19 AM   Margins Attached edges 12/12/2017 10:19 AM   Debridement per physician None 12/12/2017 10:41 AM   Time out No 12/12/2017 10:41 AM   Procedural Pain 0 12/12/2017 10:41 AM   Post procedural Pain 0 12/12/2017 10:41 AM   Number of days: 28      The patients pain isPain Level: 2 Pain Type: Acute pain. Wound is has moderately improved.         Assessment:      Patient Active Problem List   Diagnosis    ASHD (arteriosclerotic heart disease)    Hypertension    Hyperlipidemia    COPD (chronic obstructive pulmonary disease) ([address]    History of cerebrovascular disease    [facility])    S/P TKR (total knee replacement)    [facility]    GERD (gastroesophageal reflux disease)    Primary osteoarthritis of knee    HH (hiatus hernia)    Renal insufficiency    CAD (coronary artery disease)    S/P CABG x 4    Status post coronary artery stent placement    Bronchitis    Decubitus ulcer of sacral region, stage 2    Overweight (BMI 25.0-29. 9)          Plan:          Plan for wound - Dress per physician order  Treatment:     Compression : No   Offloading : Yes   Dressing : see AVS   Additional Therapy : none     1. Discussed appropriate home care of this wound. Wound redressed. 2. Patient instructions were given. 3. Follow up: 2 week(s). Mr. Copeland Reason bottom is looking better. Continue with current dressing, and follow up in 2 weeks. Discharge Instructions       Visit Discharge/Physician Orders    Discharge condition: Stable     Discharge to: Home     Left via:Private automobile     Accompanied by:  n/a     ECF/HHA:      Dressing Orders: Coccyx:  Wash with soap and water. Apply normal saline moistened Aquacell AG to wound bed. Secure with Mepilex border. Change every other day. Treatment Orders:   Avoid Pressure to wound site. Turn frequently (turn at least every two hours when in bed)  While in chair reposition every 30 minutes    Continue Protein rich diet (unless restricted by your physician)  Take Multivitamin daily    Please use memory foam cushion in chair        [facility] followup visit ____2 weeks_________________________  (Please note your next appointment above and if you are unable to keep, kindly give a 24 hour notice. Thank you.)        If you experience any of the following, please call the [facility] [address] during business hours:    * Increase in Pain  * Temperature over 101  * Increase in drainage from your wound  * Drainage with a foul odor  * Bleeding  * Increase in swelling  * Need for compression bandage changes due to slippage, breakthrough drainage.     If you need medical attention outside of the business hours of the [address] [place] [address] Road please contact your PCP or go to the nearest emergency room.              Electronically signed by Anatoly Fraire MD on 12/12/2017 at 10:46 AM

## 2017-12-14 ENCOUNTER — PREP FOR PROCEDURE (OUTPATIENT)
Dept: ORTHOPEDIC SURGERY | Age: 77
End: 2017-12-14

## 2017-12-14 DIAGNOSIS — Z01.818 PRE-OP EVALUATION: Primary | ICD-10-CM

## 2017-12-14 RX ORDER — SODIUM CHLORIDE 0.9 % (FLUSH) 0.9 %
10 SYRINGE (ML) INJECTION EVERY 12 HOURS SCHEDULED
Status: CANCELLED | OUTPATIENT
Start: 2017-12-14

## 2017-12-14 RX ORDER — SODIUM CHLORIDE, SODIUM LACTATE, POTASSIUM CHLORIDE, CALCIUM CHLORIDE 600; 310; 30; 20 MG/100ML; MG/100ML; MG/100ML; MG/100ML
INJECTION, SOLUTION INTRAVENOUS CONTINUOUS
Status: CANCELLED | OUTPATIENT
Start: 2017-12-14

## 2017-12-14 RX ORDER — SODIUM CHLORIDE 0.9 % (FLUSH) 0.9 %
10 SYRINGE (ML) INJECTION PRN
Status: CANCELLED | OUTPATIENT
Start: 2017-12-14

## 2017-12-15 ENCOUNTER — ANESTHESIA (OUTPATIENT)
Dept: OPERATING ROOM | Age: 77
End: 2017-12-15
Payer: MEDICARE

## 2017-12-15 ENCOUNTER — ANESTHESIA EVENT (OUTPATIENT)
Dept: OPERATING ROOM | Age: 77
End: 2017-12-15
Payer: MEDICARE

## 2017-12-15 ENCOUNTER — HOSPITAL ENCOUNTER (OUTPATIENT)
Age: 77
LOS: 1 days | Discharge: HOME | End: 2017-12-15
Attending: ORTHOPAEDIC SURGERY | Admitting: ORTHOPAEDIC SURGERY
Payer: MEDICARE

## 2017-12-15 VITALS
DIASTOLIC BLOOD PRESSURE: 58 MMHG | OXYGEN SATURATION: 100 % | TEMPERATURE: 95.8 F | SYSTOLIC BLOOD PRESSURE: 121 MMHG | RESPIRATION RATE: 16 BRPM

## 2017-12-15 VITALS
BODY MASS INDEX: 28.88 KG/M2 | SYSTOLIC BLOOD PRESSURE: 134 MMHG | HEIGHT: 69 IN | WEIGHT: 195 LBS | RESPIRATION RATE: 18 BRPM | TEMPERATURE: 97.3 F | HEART RATE: 46 BPM | DIASTOLIC BLOOD PRESSURE: 59 MMHG | OXYGEN SATURATION: 95 %

## 2017-12-15 PROBLEM — S83.011A LATERAL SUBLUXATION OF RIGHT PATELLA: Status: ACTIVE | Noted: 2017-12-15

## 2017-12-15 LAB
ABO/RH: NORMAL
ANION GAP SERPL CALCULATED.3IONS-SCNC: 9 MMOL/L (ref 7–19)
ANTIBODY SCREEN: NORMAL
BUN BLDV-MCNC: 33 MG/DL (ref 8–23)
CALCIUM SERPL-MCNC: 8.8 MG/DL (ref 8.8–10.2)
CHLORIDE BLD-SCNC: 105 MMOL/L (ref 98–111)
CO2: 28 MMOL/L (ref 22–29)
CREAT SERPL-MCNC: 1.5 MG/DL (ref 0.5–1.2)
GFR NON-AFRICAN AMERICAN: 45
GLUCOSE BLD-MCNC: 88 MG/DL (ref 74–109)
POTASSIUM SERPL-SCNC: 4.5 MMOL/L (ref 3.5–4.9)
SODIUM BLD-SCNC: 142 MMOL/L (ref 136–145)

## 2017-12-15 PROCEDURE — 93005 ELECTROCARDIOGRAM TRACING: CPT

## 2017-12-15 PROCEDURE — 2720000001 HC MISC SURG SUPPLY STERILE $51-500: Performed by: ORTHOPAEDIC SURGERY

## 2017-12-15 PROCEDURE — 3600000004 HC SURGERY LEVEL 4 BASE: Performed by: ORTHOPAEDIC SURGERY

## 2017-12-15 PROCEDURE — 3700000000 HC ANESTHESIA ATTENDED CARE: Performed by: ORTHOPAEDIC SURGERY

## 2017-12-15 PROCEDURE — 7100000010 HC PHASE II RECOVERY - FIRST 15 MIN: Performed by: ORTHOPAEDIC SURGERY

## 2017-12-15 PROCEDURE — 7100000011 HC PHASE II RECOVERY - ADDTL 15 MIN: Performed by: ORTHOPAEDIC SURGERY

## 2017-12-15 PROCEDURE — 2500000003 HC RX 250 WO HCPCS: Performed by: ORTHOPAEDIC SURGERY

## 2017-12-15 PROCEDURE — 6360000002 HC RX W HCPCS: Performed by: ANESTHESIOLOGY

## 2017-12-15 PROCEDURE — 6370000000 HC RX 637 (ALT 250 FOR IP): Performed by: ORTHOPAEDIC SURGERY

## 2017-12-15 PROCEDURE — 6360000002 HC RX W HCPCS: Performed by: NURSE ANESTHETIST, CERTIFIED REGISTERED

## 2017-12-15 PROCEDURE — 3700000001 HC ADD 15 MINUTES (ANESTHESIA): Performed by: ORTHOPAEDIC SURGERY

## 2017-12-15 PROCEDURE — 86850 RBC ANTIBODY SCREEN: CPT

## 2017-12-15 PROCEDURE — 6360000002 HC RX W HCPCS: Performed by: ORTHOPAEDIC SURGERY

## 2017-12-15 PROCEDURE — 86900 BLOOD TYPING SEROLOGIC ABO: CPT

## 2017-12-15 PROCEDURE — 2500000003 HC RX 250 WO HCPCS: Performed by: NURSE ANESTHETIST, CERTIFIED REGISTERED

## 2017-12-15 PROCEDURE — 7100000001 HC PACU RECOVERY - ADDTL 15 MIN: Performed by: ORTHOPAEDIC SURGERY

## 2017-12-15 PROCEDURE — 86901 BLOOD TYPING SEROLOGIC RH(D): CPT

## 2017-12-15 PROCEDURE — 2720000000 HC MISC SURG SUPPLY STERILE $0-50: Performed by: ORTHOPAEDIC SURGERY

## 2017-12-15 PROCEDURE — 80048 BASIC METABOLIC PNL TOTAL CA: CPT

## 2017-12-15 PROCEDURE — 2580000003 HC RX 258: Performed by: ORTHOPAEDIC SURGERY

## 2017-12-15 PROCEDURE — 7100000000 HC PACU RECOVERY - FIRST 15 MIN: Performed by: ORTHOPAEDIC SURGERY

## 2017-12-15 PROCEDURE — 3600000014 HC SURGERY LEVEL 4 ADDTL 15MIN: Performed by: ORTHOPAEDIC SURGERY

## 2017-12-15 RX ORDER — SODIUM CHLORIDE 0.9 % (FLUSH) 0.9 %
10 SYRINGE (ML) INJECTION EVERY 12 HOURS SCHEDULED
Status: DISCONTINUED | OUTPATIENT
Start: 2017-12-15 | End: 2017-12-15 | Stop reason: HOSPADM

## 2017-12-15 RX ORDER — MORPHINE SULFATE 1 MG/ML
2 INJECTION, SOLUTION EPIDURAL; INTRATHECAL; INTRAVENOUS EVERY 5 MIN PRN
Status: DISCONTINUED | OUTPATIENT
Start: 2017-12-15 | End: 2017-12-15 | Stop reason: HOSPADM

## 2017-12-15 RX ORDER — FENTANYL CITRATE 50 UG/ML
50 INJECTION, SOLUTION INTRAMUSCULAR; INTRAVENOUS
Status: DISCONTINUED | OUTPATIENT
Start: 2017-12-15 | End: 2017-12-15 | Stop reason: HOSPADM

## 2017-12-15 RX ORDER — SODIUM CHLORIDE 0.9 % (FLUSH) 0.9 %
10 SYRINGE (ML) INJECTION PRN
Status: DISCONTINUED | OUTPATIENT
Start: 2017-12-15 | End: 2017-12-15 | Stop reason: HOSPADM

## 2017-12-15 RX ORDER — METOCLOPRAMIDE HYDROCHLORIDE 5 MG/ML
10 INJECTION INTRAMUSCULAR; INTRAVENOUS
Status: DISCONTINUED | OUTPATIENT
Start: 2017-12-15 | End: 2017-12-15 | Stop reason: HOSPADM

## 2017-12-15 RX ORDER — MEPERIDINE HYDROCHLORIDE 50 MG/ML
12.5 INJECTION INTRAMUSCULAR; INTRAVENOUS; SUBCUTANEOUS EVERY 5 MIN PRN
Status: DISCONTINUED | OUTPATIENT
Start: 2017-12-15 | End: 2017-12-15 | Stop reason: HOSPADM

## 2017-12-15 RX ORDER — LIDOCAINE HYDROCHLORIDE 10 MG/ML
INJECTION, SOLUTION INFILTRATION; PERINEURAL PRN
Status: DISCONTINUED | OUTPATIENT
Start: 2017-12-15 | End: 2017-12-15 | Stop reason: SDUPTHER

## 2017-12-15 RX ORDER — SUCCINYLCHOLINE CHLORIDE 20 MG/ML
INJECTION INTRAMUSCULAR; INTRAVENOUS PRN
Status: DISCONTINUED | OUTPATIENT
Start: 2017-12-15 | End: 2017-12-15 | Stop reason: SDUPTHER

## 2017-12-15 RX ORDER — ONDANSETRON 2 MG/ML
INJECTION INTRAMUSCULAR; INTRAVENOUS PRN
Status: DISCONTINUED | OUTPATIENT
Start: 2017-12-15 | End: 2017-12-15 | Stop reason: SDUPTHER

## 2017-12-15 RX ORDER — SODIUM CHLORIDE, SODIUM LACTATE, POTASSIUM CHLORIDE, CALCIUM CHLORIDE 600; 310; 30; 20 MG/100ML; MG/100ML; MG/100ML; MG/100ML
INJECTION, SOLUTION INTRAVENOUS CONTINUOUS
Status: DISCONTINUED | OUTPATIENT
Start: 2017-12-15 | End: 2017-12-15 | Stop reason: HOSPADM

## 2017-12-15 RX ORDER — EPHEDRINE SULFATE 50 MG/ML
INJECTION, SOLUTION INTRAVENOUS PRN
Status: DISCONTINUED | OUTPATIENT
Start: 2017-12-15 | End: 2017-12-15 | Stop reason: SDUPTHER

## 2017-12-15 RX ORDER — HYDROCODONE BITARTRATE AND ACETAMINOPHEN 5; 325 MG/1; MG/1
2 TABLET ORAL PRN
Status: COMPLETED | OUTPATIENT
Start: 2017-12-15 | End: 2017-12-15

## 2017-12-15 RX ORDER — LABETALOL HYDROCHLORIDE 5 MG/ML
5 INJECTION, SOLUTION INTRAVENOUS EVERY 10 MIN PRN
Status: DISCONTINUED | OUTPATIENT
Start: 2017-12-15 | End: 2017-12-15 | Stop reason: HOSPADM

## 2017-12-15 RX ORDER — ROCURONIUM BROMIDE 10 MG/ML
INJECTION, SOLUTION INTRAVENOUS PRN
Status: DISCONTINUED | OUTPATIENT
Start: 2017-12-15 | End: 2017-12-15 | Stop reason: SDUPTHER

## 2017-12-15 RX ORDER — MIDAZOLAM HYDROCHLORIDE 1 MG/ML
2 INJECTION INTRAMUSCULAR; INTRAVENOUS
Status: DISCONTINUED | OUTPATIENT
Start: 2017-12-15 | End: 2017-12-15 | Stop reason: HOSPADM

## 2017-12-15 RX ORDER — GLYCOPYRROLATE 0.2 MG/ML
INJECTION INTRAMUSCULAR; INTRAVENOUS PRN
Status: DISCONTINUED | OUTPATIENT
Start: 2017-12-15 | End: 2017-12-15 | Stop reason: SDUPTHER

## 2017-12-15 RX ORDER — HYDROCODONE BITARTRATE AND ACETAMINOPHEN 5; 325 MG/1; MG/1
1 TABLET ORAL PRN
Status: COMPLETED | OUTPATIENT
Start: 2017-12-15 | End: 2017-12-15

## 2017-12-15 RX ORDER — SODIUM CHLORIDE 9 MG/ML
INJECTION, SOLUTION INTRAVENOUS CONTINUOUS
Status: DISCONTINUED | OUTPATIENT
Start: 2017-12-15 | End: 2017-12-15 | Stop reason: HOSPADM

## 2017-12-15 RX ORDER — PROPOFOL 10 MG/ML
INJECTION, EMULSION INTRAVENOUS PRN
Status: DISCONTINUED | OUTPATIENT
Start: 2017-12-15 | End: 2017-12-15 | Stop reason: SDUPTHER

## 2017-12-15 RX ORDER — HYDRALAZINE HYDROCHLORIDE 20 MG/ML
5 INJECTION INTRAMUSCULAR; INTRAVENOUS EVERY 10 MIN PRN
Status: DISCONTINUED | OUTPATIENT
Start: 2017-12-15 | End: 2017-12-15 | Stop reason: HOSPADM

## 2017-12-15 RX ORDER — ENALAPRILAT 2.5 MG/2ML
1.25 INJECTION INTRAVENOUS
Status: DISCONTINUED | OUTPATIENT
Start: 2017-12-15 | End: 2017-12-15 | Stop reason: HOSPADM

## 2017-12-15 RX ORDER — LIDOCAINE HYDROCHLORIDE 10 MG/ML
1 INJECTION, SOLUTION EPIDURAL; INFILTRATION; INTRACAUDAL; PERINEURAL
Status: DISCONTINUED | OUTPATIENT
Start: 2017-12-15 | End: 2017-12-15 | Stop reason: HOSPADM

## 2017-12-15 RX ORDER — HYDROCODONE BITARTRATE AND ACETAMINOPHEN 10; 325 MG/1; MG/1
1 TABLET ORAL EVERY 4 HOURS PRN
Qty: 90 TABLET | Refills: 0 | Status: SHIPPED | OUTPATIENT
Start: 2017-12-15 | End: 2017-12-22

## 2017-12-15 RX ORDER — MORPHINE SULFATE 1 MG/ML
4 INJECTION, SOLUTION EPIDURAL; INTRATHECAL; INTRAVENOUS EVERY 5 MIN PRN
Status: DISCONTINUED | OUTPATIENT
Start: 2017-12-15 | End: 2017-12-15 | Stop reason: HOSPADM

## 2017-12-15 RX ORDER — DIPHENHYDRAMINE HYDROCHLORIDE 50 MG/ML
12.5 INJECTION INTRAMUSCULAR; INTRAVENOUS
Status: DISCONTINUED | OUTPATIENT
Start: 2017-12-15 | End: 2017-12-15 | Stop reason: HOSPADM

## 2017-12-15 RX ORDER — PROMETHAZINE HYDROCHLORIDE 25 MG/ML
6.25 INJECTION, SOLUTION INTRAMUSCULAR; INTRAVENOUS
Status: DISCONTINUED | OUTPATIENT
Start: 2017-12-15 | End: 2017-12-15 | Stop reason: HOSPADM

## 2017-12-15 RX ORDER — BUPIVACAINE HYDROCHLORIDE 2.5 MG/ML
INJECTION, SOLUTION INFILTRATION; PERINEURAL PRN
Status: DISCONTINUED | OUTPATIENT
Start: 2017-12-15 | End: 2017-12-15 | Stop reason: HOSPADM

## 2017-12-15 RX ORDER — DEXAMETHASONE SODIUM PHOSPHATE 10 MG/ML
INJECTION INTRAMUSCULAR; INTRAVENOUS PRN
Status: DISCONTINUED | OUTPATIENT
Start: 2017-12-15 | End: 2017-12-15 | Stop reason: SDUPTHER

## 2017-12-15 RX ORDER — FENTANYL CITRATE 50 UG/ML
25 INJECTION, SOLUTION INTRAMUSCULAR; INTRAVENOUS
Status: DISCONTINUED | OUTPATIENT
Start: 2017-12-15 | End: 2017-12-15 | Stop reason: HOSPADM

## 2017-12-15 RX ADMIN — LIDOCAINE HYDROCHLORIDE 50 MG: 10 INJECTION, SOLUTION INFILTRATION; PERINEURAL at 10:38

## 2017-12-15 RX ADMIN — FENTANYL CITRATE 50 MCG: 50 INJECTION, SOLUTION INTRAMUSCULAR; INTRAVENOUS at 10:38

## 2017-12-15 RX ADMIN — SUCCINYLCHOLINE CHLORIDE 100 MG: 20 INJECTION, SOLUTION INTRAMUSCULAR; INTRAVENOUS; PARENTERAL at 10:38

## 2017-12-15 RX ADMIN — EPHEDRINE SULFATE 5 MG: 50 INJECTION, SOLUTION INTRAMUSCULAR; INTRAVENOUS; SUBCUTANEOUS at 11:07

## 2017-12-15 RX ADMIN — EPHEDRINE SULFATE 5 MG: 50 INJECTION, SOLUTION INTRAMUSCULAR; INTRAVENOUS; SUBCUTANEOUS at 10:56

## 2017-12-15 RX ADMIN — SODIUM CHLORIDE, SODIUM LACTATE, POTASSIUM CHLORIDE, AND CALCIUM CHLORIDE: 600; 310; 30; 20 INJECTION, SOLUTION INTRAVENOUS at 10:45

## 2017-12-15 RX ADMIN — DEXAMETHASONE SODIUM PHOSPHATE 10 MG: 10 INJECTION INTRAMUSCULAR; INTRAVENOUS at 12:11

## 2017-12-15 RX ADMIN — GLYCOPYRROLATE 0.2 MG: 0.2 INJECTION, SOLUTION INTRAMUSCULAR; INTRAVENOUS at 11:28

## 2017-12-15 RX ADMIN — CEFAZOLIN 2 G: 1 INJECTION, POWDER, FOR SOLUTION INTRAMUSCULAR; INTRAVENOUS; PARENTERAL at 10:53

## 2017-12-15 RX ADMIN — ONDANSETRON HYDROCHLORIDE 4 MG: 2 SOLUTION INTRAMUSCULAR; INTRAVENOUS at 12:11

## 2017-12-15 RX ADMIN — SODIUM CHLORIDE, SODIUM LACTATE, POTASSIUM CHLORIDE, AND CALCIUM CHLORIDE: 600; 310; 30; 20 INJECTION, SOLUTION INTRAVENOUS at 10:34

## 2017-12-15 RX ADMIN — PROPOFOL 100 MG: 10 INJECTION, EMULSION INTRAVENOUS at 10:38

## 2017-12-15 RX ADMIN — SODIUM CHLORIDE, SODIUM LACTATE, POTASSIUM CHLORIDE, AND CALCIUM CHLORIDE: 600; 310; 30; 20 INJECTION, SOLUTION INTRAVENOUS at 08:45

## 2017-12-15 RX ADMIN — ROCURONIUM BROMIDE 10 MG: 10 INJECTION INTRAVENOUS at 10:38

## 2017-12-15 RX ADMIN — DEXAMETHASONE SODIUM PHOSPHATE 10 MG: 10 INJECTION INTRAMUSCULAR; INTRAVENOUS at 10:38

## 2017-12-15 RX ADMIN — HYDROCODONE BITARTRATE AND ACETAMINOPHEN 1 TABLET: 5; 325 TABLET ORAL at 14:05

## 2017-12-15 ASSESSMENT — PAIN SCALES - GENERAL
PAINLEVEL_OUTOF10: 5
PAINLEVEL_OUTOF10: 0
PAINLEVEL_OUTOF10: 0
PAINLEVEL_OUTOF10: 5

## 2017-12-15 ASSESSMENT — PAIN DESCRIPTION - PAIN TYPE
TYPE: SURGICAL PAIN
TYPE: SURGICAL PAIN

## 2017-12-15 ASSESSMENT — ENCOUNTER SYMPTOMS: SHORTNESS OF BREATH: 0

## 2017-12-15 ASSESSMENT — PAIN DESCRIPTION - LOCATION: LOCATION: KNEE

## 2017-12-15 ASSESSMENT — LIFESTYLE VARIABLES: SMOKING_STATUS: 0

## 2017-12-15 ASSESSMENT — PAIN - FUNCTIONAL ASSESSMENT: PAIN_FUNCTIONAL_ASSESSMENT: 0-10

## 2017-12-15 ASSESSMENT — PAIN DESCRIPTION - ORIENTATION: ORIENTATION: RIGHT

## 2017-12-15 NOTE — ANESTHESIA PRE PROCEDURE
Pulmonary: breath sounds clear to auscultation  (+) COPD:      (-) shortness of breath, sleep apnea and not a current smoker                           Cardiovascular:  Exercise tolerance: good (>4 METS),   (+) hypertension:, CAD:, CABG/stent (CABG x 4 in ): no interval change,     (-) pacemaker, valvular problems/murmurs, dysrhythmias and  EAST      Rhythm: regular  Rate: normal           Beta Blocker:  Not on Beta Blocker      ROS comment: EK BPM  Sinus rhythm  Within normal limits as previously  Comparison Summary: No significant change  Summary: Normal ECG     Neuro/Psych:   (+) CVA (Stroke in --> no problems since): no interval change,    (-) seizures           GI/Hepatic/Renal:   (+) hiatal hernia, GERD: well controlled, renal disease (pre op Cr 2.2): CRI and no interval change,      (-) liver disease       Endo/Other:    (+) Type II DM, , blood dyscrasia (ASA and Plavix stopped 1 week ago)::., electrolyte abnormalities (Hyperkalemia 5.6 on pre op labs, repeat this am), .    (-) hypothyroidism               Abdominal:         (-) obese Abdomen: soft. Vascular:   + PVD, aortic or cerebral (AAA open repair  with splenectomy), .  - DVT and PE. Anesthesia Plan      regional and general     ASA 3     (Repeat bmp this am.)  Induction: intravenous. MIPS: Prophylactic antiemetics administered. Anesthetic plan and risks discussed with patient. Use of blood products discussed with patient whom consented to blood products.                    Neri Birmingham DO   12/15/2017

## 2017-12-15 NOTE — OP NOTE
identified. Patient did not have a patellar component in place. Arthroscopic camera was removed from the anterior lateral arthoscopic portal and placed into the anterior medial arthroscopic portal. An arthroscopic electrocautery unit and arthroscopic shaver and arthroscopic high-speed bur were intermittently introduced through the anterior medial arthoscopic portal. Arthroscopic lateral release and arthroplasty of the patellofemoral joint was carried out. Patella was found to be centrally located within the femoral trochlea of the femoral component right knee status post lateral release and arthroplasty. [employer] photographs are taken throughout the procedure to confirmed both diagnosis and completion of procedure. Knee was drained of irrigation fluid. Arthroscopic interest removed in their entirety. Arthroscopic portals reapproximated with surgical staples. Local anesthetic was injected into the arthroscopic portals and into the right knee joint for postoperative pain management. Sterile dressings were applied. Tourniquet was deflated. And patient was transported to the recovery room in stable condition. Plan:  Weightbearing as tolerated right lower extremity type activities with walker and/or crutches assistance. Follow-up in the office proximal May 2 weeks.     Electronically signed by Pan Sun DO on 12/15/2017 at 12:26 PM

## 2017-12-15 NOTE — H&P
[facility]) Pre-Operative History and Physical    Patient Name: Shelton Rubalcava  : 1940        Chief complaint: Right knee pain    History of Present Illness:   76 y.o. male seen and evaluated in the office with complaints of right knee pain status post right total knee replacement arthroplasty    Radiographic and clinical evaluation reveals lateral tracking patella/arthrofibrosis status post right total knee replacement arthroplasty    Past Medical Hisotry:          Diagnosis Date    AAA (abdominal aortic aneurysm) ([address]     ASHD (arteriosclerotic heart disease)     S/P coronary intervention followed by CBG    CAD (coronary artery disease)     Severe triple-vessel    CAD (coronary artery disease) 2015    COPD (chronic obstructive pulmonary disease) (HCC)     With tobacco abuse    GERD (gastroesophageal reflux disease)     HH (hiatus hernia)     History of cerebrovascular disease     Hyperlipidemia     PCP, Dr. Jayce Edwards manages cholesterol.  Hypertension     Obesity     Osteoarthritis     Renal insufficiency     S/P CABG x 3     S/P CABG x 4 2015 by Dr. Alverto Welch S/P TKR (total knee replacement)     RIGHT       Past Surgical History:          Procedure Laterality Date    ABDOMINAL AORTIC ANEURYSM REPAIR, OPEN      ADENOIDECTOMY      APPENDECTOMY      CARDIOVASCULAR STRESS TEST  09, [facility]    Stress ECho    CARDIOVASCULAR STRESS TEST  06, [facility]    Adenosine thallium treadmill    CHOLECYSTECTOMY      CORONARY ARTERY BYPASS GRAFT  06, [place]    CABG x 4 with sequential FAYE to diagonal, LAD, SVG, to obtuse marginal, posterior descending endoscopic vein harvesting.  DIAGNOSTIC CARDIAC CATH LAB PROCEDURE  06, [facility]    Left cath, arteriography of bilateral native internal mammary arteries.  DIAGNOSTIC CARDIAC CATH LAB PROCEDURE  03????, [facility]    Left cath, selective CA, primary stent placement two circumflex marginal branches.    [address] CATH LAB PROCEDURE  02/24/1993, [facility]    Left cath    DIAGNOSTIC CARDIAC CATH LAB PROCEDURE  05/15/91, [facility]    Left cath, SARABIA/[place] left ventric., selective coronary arteriography.  PTCA  05/16/91, [facility]    PTCA to circumflex marginal branch.  REVISION TOTAL KNEE ARTHROPLASTY Right 1/6/2017    KNEE TOTAL ARTHROPLASTY REVISION performed by Jaime Walters MD at [place] [address]  4/2012    Left  knee        Medications:      Prior to Admission medications    Medication Sig Start Date End Date Taking? Authorizing Provider   glycopyrrolate (ROBINUL) 1 MG tablet Take 1 mg by mouth 3 times daily   Yes Historical Provider, MD   hyoscyamine (ANASPAZ;LEVSIN) 125 MCG tablet Take 125 mcg by mouth every 4 hours as needed for Cramping   Yes Historical Provider, MD   nystatin-triamcinolone (MYCOLOG II) 292511-7.9 UNIT/GM-% cream Apply topically 4 times daily Apply topically 4 times daily. Yes Historical Provider, MD   tacrolimus (PROTOPIC) 0.1 % ointment Apply topically 2 times daily Apply topically 2 times daily. Yes Historical Provider, MD   clopidogrel (PLAVIX) 75 MG tablet Take 1 tablet by mouth daily Start after Xarelto done 1/10/17  Yes Tal Cuevas MD   gabapentin (NEURONTIN) 300 MG capsule Take 300 mg by mouth nightly   Yes Historical Provider, MD   omeprazole (PRILOSEC) 40 MG capsule Take 40 mg by mouth daily   Yes Historical Provider, MD   losartan (COZAAR) 100 MG tablet Take 100 mg by mouth daily. 12/9/14  Yes Historical Provider, MD   Multiple Vitamins-Minerals (CENTRUM SILVER PO) Take  by mouth. Yes Historical Provider, MD   amlodipine (NORVASC) 5 MG tablet Take 5 mg by mouth daily. Yes Historical Provider, MD   tamsulosin (FLOMAX) 0.4 MG capsule Take 0.4 mg by mouth daily.      Yes Historical Provider, MD   aspirin 81 MG EC tablet Take 81 mg by mouth nightly    Yes Historical Provider, MD   simvastatin (ZOCOR) 20 MG tablet Take 20 mg by mouth

## 2017-12-15 NOTE — CONSULTS
Consults       Referring Provider: Dr. Eva Chino  Reason for Consultation: Medical management    Patient Care Team:  Vannesa Cleveland MD as PCP - General (Family Medicine)  YA Carpio MD (Cardiology)    Chief complaint knee pain    Subjective . History of present illness: The patient presents to the orthopedic service for TKA. They have failed outpatient conservative treatment of NSAIDS, muscle relaxer, physical therapy and opioid pain meds. The pain is affecting their activities of daily living and they have chosen to undergo surgical correction. We have been consulted in the perioperative period due to the fact their Primary Care Provider does not attend here at [facility]. The postoperative pain is as expected. There are no other participating or relieving factors noted.       REVIEW OF SYSTEMS:    CONSTITUTIONAL:  Negative for anorexia, chills, fevers, night sweats and weight loss  EYES:  negative for eye dryness, icterus and redness  HEENT:   negative for dental problems, epistaxis, facial trauma and thrush  RESPIRATORY:  negative for chest tightness, cough, dyspnea on exertion, pneumonia and sputum  CARDIOVASCULAR: negative for chest pain, dyspnea, exertional chest pressure/discomfort, irregular heart beat, palpitations, paroxysmal nocturnal dyspnea and syncope  GASTROINTESTINAL:  negative for abdominal pain, hematemesis, jaundice, melena and rectal bleeding  MUSCULOSKELETAL:  negative for muscle weakness, myalgias and neck pain  NEUROLOGICAL:   negative for dizziness, headaches, seizures, speech problems, tremors and vertigo  INTEGUMENT: negative for pruritus, rash, skin color change and skin lesion(s)   A Full 14 point review of systems is negative outside those listed above and in the HPI      History    Past Medical History:   Diagnosis Date    AAA (abdominal aortic aneurysm) ([address]     ASHD (arteriosclerotic heart disease)     S/P coronary intervention followed by CBG    CAD (coronary artery disease)     Severe triple-vessel    CAD (coronary artery disease) 2/4/2015    COPD (chronic obstructive pulmonary disease) (HCC)     With tobacco abuse    GERD (gastroesophageal reflux disease)     HH (hiatus hernia)     History of cerebrovascular disease     Hyperlipidemia     PCP, Dr. Rodney Hu manages cholesterol.  Hypertension     Obesity     Osteoarthritis     Renal insufficiency     S/P CABG x 3     S/P CABG x 4 2/4/2015 12/1/06 by Dr. Ric Martinez S/P TKR (total knee replacement)     RIGHT     Past Surgical History:   Procedure Laterality Date    ABDOMINAL AORTIC ANEURYSM REPAIR, OPEN      ADENOIDECTOMY      APPENDECTOMY      CARDIOVASCULAR STRESS TEST  12/07/09, [facility]    Stress ECho    CARDIOVASCULAR STRESS TEST  11/02/06, [facility]    Adenosine thallium treadmill    CHOLECYSTECTOMY      CORONARY ARTERY BYPASS GRAFT  12/01/06, [place]    CABG x 4 with sequential FAYE to diagonal, LAD, SVG, to obtuse marginal, posterior descending endoscopic vein harvesting.  DIAGNOSTIC CARDIAC CATH LAB PROCEDURE  11/14/06, [facility]    Left cath, arteriography of bilateral native internal mammary arteries.  DIAGNOSTIC CARDIAC CATH LAB PROCEDURE  06/27/03????, [facility]    Left cath, selective CA, primary stent placement two circumflex marginal branches.  DIAGNOSTIC CARDIAC CATH LAB PROCEDURE  02/24/1993, [facility]    Left cath    DIAGNOSTIC CARDIAC CATH LAB PROCEDURE  05/15/91, [facility]    Left cath, SARABIA/[place] left ventric., selective coronary arteriography.  PTCA  05/16/91, [facility]    PTCA to circumflex marginal branch.     REVISION TOTAL KNEE ARTHROPLASTY Right 1/6/2017    KNEE TOTAL ARTHROPLASTY REVISION performed by Fabby Bone MD at [facility] TOTAL KNEE ARTHROPLASTY  4/2012    Left  knee      Family History   Problem Relation Age of Onset    Heart Disease Other     Heart Disease Mother      Social History   Substance Use Topics    Smoking status: Former Smoker There is no rebound and no guarding. Musculoskeletal: Normal range of motion. no edema or tenderness. Post-op changes noted  Neurological:  alert and oriented to person, place, and time. normal reflexes. No focal deficits  Skin: Skin is warm and dry. No new rashes appreciated. Results Review:   I reviewed the patient's new imaging results and agree with the interpretation. Principal Problem:    Lateral subluxation of right patella    Anemia post-op expected-check iron, B12,and folate. Replenish as needed. Transfuse at acceptable levels depending on clinical judgement and comorbidities. Recheck in AM    Constipation-start with Miralax 1 capful BID until BM, then decrease to 1 x a day,then can step up to Amitizia 24 mcg po BID which can be used for opiod induced constipation,and ultimately end up with Relistor 12 mcg sub Q q 48 hours to block the effect of narcotics on the gut. GERD-exacerbated by pain meds and anesthesia, will add PPI as needed and can step up to Carafate 1 gm po q AC and qhs. Blood sugars noted. Explained to patient the need for better control to optimize the healing process. Reviewed home medication regimen, IV fluids, and dietary intake over the last 24 hours to help determine the etiology of the hyperglycemia. Adjustments made and discussed with staff, see orders for further details.       I discussed the patients findings and my recommendations with patient/family, staff and the Orthopaedic team.      Milady Soriano  12/15/17  12:55 PM

## 2017-12-26 ENCOUNTER — HOSPITAL ENCOUNTER (OUTPATIENT)
Dept: WOUND CARE | Age: 77
Discharge: HOME | End: 2017-12-26
Payer: MEDICARE

## 2017-12-26 VITALS
RESPIRATION RATE: 18 BRPM | HEIGHT: 69 IN | DIASTOLIC BLOOD PRESSURE: 55 MMHG | HEART RATE: 64 BPM | SYSTOLIC BLOOD PRESSURE: 139 MMHG | WEIGHT: 195 LBS | TEMPERATURE: 99.2 F | BODY MASS INDEX: 28.88 KG/M2

## 2017-12-26 PROCEDURE — 99211 OFF/OP EST MAY X REQ PHY/QHP: CPT | Performed by: SURGERY

## 2017-12-26 PROCEDURE — 99212 OFFICE O/P EST SF 10 MIN: CPT

## 2017-12-26 RX ORDER — BUMETANIDE 1 MG/1
1 TABLET ORAL DAILY
COMMUNITY

## 2017-12-26 ASSESSMENT — PAIN DESCRIPTION - DESCRIPTORS: DESCRIPTORS: ACHING

## 2017-12-26 ASSESSMENT — PAIN DESCRIPTION - PROGRESSION: CLINICAL_PROGRESSION: NOT CHANGED

## 2017-12-26 ASSESSMENT — PAIN DESCRIPTION - PAIN TYPE: TYPE: SURGICAL PAIN

## 2017-12-26 ASSESSMENT — PAIN DESCRIPTION - ORIENTATION: ORIENTATION: RIGHT

## 2017-12-26 ASSESSMENT — PAIN DESCRIPTION - ONSET: ONSET: ON-GOING

## 2017-12-26 ASSESSMENT — PAIN DESCRIPTION - LOCATION: LOCATION: KNEE

## 2017-12-26 ASSESSMENT — PAIN SCALES - GENERAL: PAINLEVEL_OUTOF10: 5

## 2017-12-26 NOTE — PROGRESS NOTES
[facility] Care Center   Progress Note and Procedure Note      Atul Peoples  AGE: 76 y.o. GENDER: male  : 1940  TODAY'S DATE:  2017    Subjective:        HISTORY of PRESENT ILLNESS HPI   Maria C Linares is a 76 y.o. male who presents today for wound evaluation. Wound Type:pressure  Wound Location:coccyx  Modifying factors:diabetes and overweight    Patient Active Problem List   Diagnosis Code    ASHD (arteriosclerotic heart disease) I25.10    Hypertension I10    Hyperlipidemia E78.5    COPD (chronic obstructive pulmonary disease) [address] J44.9    History of cerebrovascular disease Z80.78    AAA (abdominal aortic aneurysm) ([facility]) I71.4    S/P TKR (total knee replacement) Z96.659    Diabetes mellitus ([facility]) E11.9    GERD (gastroesophageal reflux disease) K21.9    Primary osteoarthritis of knee M17.10    HH (hiatus hernia) K44.9    Renal insufficiency N28.9    CAD (coronary artery disease) I25.10    S/P CABG x 4 Z95.1    Status post coronary artery stent placement Z95.5    Bronchitis J40    Decubitus ulcer of sacral region, stage 2 L89.152    Overweight (BMI 25.0-29. 9) E66.3    Lateral subluxation of right patella S83.011A       ALLERGIES    Allergies   Allergen Reactions    Pcn [Penicillins] Anaphylaxis    Tetracyclines & Related     Vibramycin [Doxycycline Calcium]            Objective:      BP (!) 139/55   Pulse 64   Temp 99.2 °F (37.3 °C) (Temporal)   Resp 18   Ht 5' 9\" (1.753 m)   Wt 195 lb (88.5 kg)   BMI 28.80 kg/m²     Post Debridement Measurements and Assessment:  Pressure Ulcer 17 Coccyx Mid Wound 1.  coccyx (Active)   Mariajose-wound Assessment Dry; Intact 2017 10:19 AM   Mariajose-Wound Texture Localized edema;Scarring 2017 10:19 AM   Mariajose-Wound Moisture Dry 2017 10:19 AM   Mariajose-Wound Color Pink 2017  9:01 AM   Pressure Ulcer Staging Stage II 2017 10:19 AM   Non-staged Wound Description Not applicable  33:22 AM   Wound Assessment Epithelialization 12/26/2017  9:01 AM   Shape irregular 12/12/2017 10:19 AM   Wound Length (cm) 0 cm 12/26/2017  9:01 AM   Wound Width (cm) 0 cm 12/26/2017  9:01 AM   Wound Depth (cm)  0 12/26/2017  9:01 AM   Calculated Wound Size (cm^2) (l*w) 0 cm^2 12/26/2017  9:01 AM   Change in Wound Size % (l*w) 100 12/26/2017  9:01 AM   Dressing Changed Changed/New 12/12/2017 11:03 AM   Wound Cleansed Rinsed/Irrigated with saline 12/12/2017 10:19 AM   Necrotic Type Yellow Fibrin/Slough 12/12/2017 10:19 AM   Necrotic Amount Small: 1-33% 12/12/2017 10:19 AM   Granulation Quality Pink 12/12/2017 10:19 AM   Drainage Amount Moderate 12/12/2017 10:19 AM   Drainage Description Serosanguinous 12/12/2017 10:19 AM   Odor None 12/12/2017 10:19 AM   Epithelialization Large:% 12/26/2017  9:01 AM   Distance Tunneling (cm) 0 cm 12/12/2017 10:19 AM   Tunneling Position ___ O'Clock 0 12/12/2017 10:19 AM   Tunneling Maxium Distance (cm) 0 12/12/2017 10:19 AM   Undermining Starts ___ O'Clock 0 12/12/2017 10:19 AM   Undermining Ends___ O'Clock 0 12/12/2017 10:19 AM   Undermining Maxium Distance (cm) 0 12/12/2017 10:19 AM   [place]%Wound Bed 100 12/26/2017  9:01 AM   Red%Wound Bed 50 12/12/2017 10:19 AM   Yellow%Wound Bed 0 12/12/2017 10:19 AM   Black%Wound Bed 0 12/12/2017 10:19 AM   Purple%Wound Bed 0 12/12/2017 10:19 AM   Margins Attached edges 12/12/2017 10:19 AM   Debridement per physician None 12/26/2017  9:38 AM   Time out No 12/26/2017  9:38 AM   Procedural Pain 0 12/26/2017  9:38 AM   Post procedural Pain 0 12/26/2017  9:38 AM   Number of days: 42       Incision 12/15/17 Leg Right (Active)   Drainage Amount None 12/15/2017  1:19 PM   Odor None 12/15/2017 12:43 PM   Dressing/Treatment Ace Wrap 12/15/2017  1:19 PM   Dressing Status Clean;Dry; Intact 12/15/2017  1:19 PM   Number of days: 10      The patients pain isPain Level: 5 Pain Type: Surgical pain. Wound is healed.         Assessment:      Patient Active Problem List   Diagnosis    ASHD (arteriosclerotic heart disease)    Hypertension    Hyperlipidemia    COPD (chronic obstructive pulmonary disease) (HCC)    History of cerebrovascular disease    AAA (abdominal aortic aneurysm) (HCC)    S/P TKR (total knee replacement)    Diabetes mellitus (HCC)    GERD (gastroesophageal reflux disease)    Primary osteoarthritis of knee    HH (hiatus hernia)    Renal insufficiency    CAD (coronary artery disease)    S/P CABG x 4    Status post coronary artery stent placement    Bronchitis    Decubitus ulcer of sacral region, stage 2    Overweight (BMI 25.0-29. 9)    Lateral subluxation of right patella          Plan:          Plan for wound - Dress per physician order  Treatment:     Compression : No   Offloading : Yes   Dressing : see AVS   Additional Therapy : none     1. Discussed appropriate home care of this wound. Wound redressed. 2. Patient instructions were given. 3. Follow up: as needed    Mr. Rosie Du is doing well. His wound is healed up. I discussed with him the need for continuing to care for this new skin as it is very delicate. I encouraged him to continue sitting on his memory foam cushion, and to now use the barrier cream to try and help protect the skin. He is to follow up as needed. Discharge Instructions       Visit Discharge/Physician Orders    Discharge condition: Stable    Discharge to: Home    Left via:Private automobile    Accompanied by:  n/a    ECF/HHA: none    Dressing Orders:   Coccyx:  Wash with soap and water. Apply ANGELA cream wound bed. Apply twice daily and after bowel movements.        Treatment Orders:   Avoid Pressure to wound site.   Turn frequently (turn at least every two hours when in bed)  While in chair reposition every 30 minutes     Continue Protein rich diet (unless restricted by your physician)  Take Multivitamin daily     Please use memory foam cushion in chair      [address] [place] [address],[address] followup visit ____Call if needed_________________________  (Please note your next appointment above and if you are unable to keep, kindly give a 24 hour notice. Thank you.)          If you experience any of the following, please call the [facility] during business hours:    * Increase in Pain  * Temperature over 101  * Increase in drainage from your wound  * Drainage with a foul odor  * Bleeding  * Increase in swelling  * Need for compression bandage changes due to slippage, breakthrough drainage. If you need medical attention outside of the business hours of the [facility] please contact your PCP or go to the nearest emergency room.              Electronically signed by Papi Springer MD on 12/26/2017 at 9:40 AM

## 2017-12-26 NOTE — PLAN OF CARE
Problem: Pain:  Goal: Pain level will decrease  Pain level will decrease   Outcome: Completed Date Met: 12/26/17    Goal: Control of acute pain  Control of acute pain   Outcome: Completed Date Met: 12/26/17    Goal: Control of chronic pain  Control of chronic pain   Outcome: Completed Date Met: 12/26/17

## 2018-01-10 ENCOUNTER — OFFICE VISIT (OUTPATIENT)
Dept: GASTROENTEROLOGY | Facility: CLINIC | Age: 78
End: 2018-01-10

## 2018-01-10 VITALS
HEART RATE: 62 BPM | BODY MASS INDEX: 26.77 KG/M2 | DIASTOLIC BLOOD PRESSURE: 54 MMHG | SYSTOLIC BLOOD PRESSURE: 162 MMHG | HEIGHT: 70 IN | WEIGHT: 187 LBS | TEMPERATURE: 96.5 F | OXYGEN SATURATION: 98 %

## 2018-01-10 DIAGNOSIS — D68.9 COAGULOPATHY (HCC): ICD-10-CM

## 2018-01-10 DIAGNOSIS — Z12.11 ENCOUNTER FOR SCREENING FOR MALIGNANT NEOPLASM OF COLON: Primary | ICD-10-CM

## 2018-01-10 DIAGNOSIS — I10 ESSENTIAL HYPERTENSION: ICD-10-CM

## 2018-01-10 PROCEDURE — S0260 H&P FOR SURGERY: HCPCS | Performed by: NURSE PRACTITIONER

## 2018-01-10 RX ORDER — PANTOPRAZOLE SODIUM 40 MG/1
40 TABLET, DELAYED RELEASE ORAL DAILY
COMMUNITY
End: 2018-06-08

## 2018-01-10 RX ORDER — CLOTRIMAZOLE AND BETAMETHASONE DIPROPIONATE 10; .64 MG/G; MG/G
1 CREAM TOPICAL 2 TIMES DAILY
COMMUNITY

## 2018-01-10 RX ORDER — HYOSCYAMINE SULFATE 0.125 MG
0.12 TABLET ORAL EVERY 4 HOURS PRN
COMMUNITY

## 2018-01-10 RX ORDER — NYSTATIN AND TRIAMCINOLONE ACETONIDE 100000; 1 [USP'U]/G; MG/G
1 OINTMENT TOPICAL 2 TIMES DAILY
COMMUNITY

## 2018-01-10 RX ORDER — GLYCOPYRROLATE 1 MG/1
1 TABLET ORAL 3 TIMES DAILY
COMMUNITY

## 2018-01-10 RX ORDER — POLYETHYLENE GLYCOL 3350, SODIUM CHLORIDE, SODIUM BICARBONATE, POTASSIUM CHLORIDE 420; 11.2; 5.72; 1.48 G/4L; G/4L; G/4L; G/4L
4000 POWDER, FOR SOLUTION ORAL SEE ADMIN INSTRUCTIONS
Qty: 4000 ML | Refills: 0 | Status: SHIPPED | OUTPATIENT
Start: 2018-01-10 | End: 2018-06-08

## 2018-01-10 RX ORDER — BUMETANIDE 1 MG/1
1 TABLET ORAL DAILY
COMMUNITY

## 2018-01-10 NOTE — PROGRESS NOTES
"      [facility]    Primary Physician Dayo Feldman MD    1/10/2018    Сергей Agarwal   1940      Chief Complaint   Patient presents with   • Colonoscopy       Subjective     HPI    Сергей Agarwal is a 77 y.o. male who presents as a referral for preventative maintenance. He has no complaints of nausea or vomiting. No change in bowels. No wt loss. No BRBPR. No melena. No abdominal pain.  Last colonoscopy was \" 2007 \" states no  Colon polyps.  .  The patient does not  have history of colon polyps. The patient does not  have history of colon cancer.   There is no  family history of colon polyps. There  Is no  family history of colon cancer.   He is on plavix.     Past Medical History:   Diagnosis Date   • Arthritis    • CAD (coronary artery disease)    • CKD (chronic kidney disease)    • CVA (cerebral vascular accident)    • CVD (cardiovascular disease)    • Diabetes mellitus    • Disease of thyroid gland    • Hyperlipidemia    • Hypertension    • Peptic ulcer    • Renal insufficiency        Past Surgical History:   Procedure Laterality Date   • APPENDECTOMY     • CHOLECYSTECTOMY     • CORONARY ARTERY BYPASS GRAFT     • OTHER SURGICAL HISTORY      aneurysm x 2   • REPLACEMENT TOTAL KNEE     • SPLENECTOMY     • TONSILLECTOMY         Outpatient Prescriptions Marked as Taking for the 1/10/18 encounter (Office Visit) with KATHE Caldera   Medication Sig Dispense Refill   • amLODIPine (NORVASC) 5 MG tablet Take 5 mg by mouth Daily.     • aspirin 81 MG EC tablet Take 81 mg by mouth Daily.     • bumetanide (BUMEX) 1 MG tablet Take 1 mg by mouth Daily.     • clopidogrel (PLAVIX) 75 MG tablet Take 75 mg by mouth Daily.     • clotrimazole-betamethasone (LOTRISONE) 1-0.05 % cream Apply  topically 2 (Two) Times a Day.     • gabapentin (NEURONTIN) 300 MG capsule Take 300 mg by mouth Every Night.     • glycopyrrolate (ROBINUL) 1 MG tablet Take 1 mg by mouth 3 (Three) Times a Day.     • " hyoscyamine (ANASPAZ,LEVSIN) 0.125 MG tablet Take 0.125 mg by mouth Every 4 (Four) Hours As Needed for Cramping.     • levothyroxine (SYNTHROID, LEVOTHROID) 125 MCG tablet Take 125 mcg by mouth Daily.     • losartan (COZAAR) 100 MG tablet Take 100 mg by mouth Daily.     • Multiple Vitamins-Minerals (MULTIVITAMIN WITH MINERALS) tablet tablet Take 1 tablet by mouth Daily.     • nitroglycerin (NITROSTAT) 0.4 MG SL tablet Place 0.4 mg under the tongue Every 5 (Five) Minutes As Needed for Chest Pain. Take no more than 3 doses in 15 minutes.     • nystatin-triamcinolone (MYCOLOG) 232139-6.1 UNIT/GM-% ointment Apply  topically 2 (Two) Times a Day.     • pantoprazole (PROTONIX) 40 MG EC tablet Take 40 mg by mouth Daily.     • simvastatin (ZOCOR) 20 MG tablet Take 20 mg by mouth Every Night.     • tamsulosin (FLOMAX) 0.4 MG capsule 24 hr capsule Take 1 capsule by mouth Every Night.     • [DISCONTINUED] omeprazole (priLOSEC) 40 MG capsule Take 40 mg by mouth Daily.         Allergies   Allergen Reactions   • Azithromycin      All mycins   • Doxycycline    • Doxycycline Calcium    • Penicillins    • Tetracyclines & Related        Social History     Social History   • Marital status:      Spouse name: N/A   • Number of children: N/A   • Years of education: N/A     Occupational History   • Not on file.     Social History Main Topics   • Smoking status: Former Smoker   • Smokeless tobacco: Never Used   • Alcohol use No   • Drug use: No   • Sexual activity: Defer     Other Topics Concern   • Not on file     Social History Narrative       Family History   Problem Relation Age of Onset   • Colon cancer Neg Hx    • Colon polyps Neg Hx        Review of Systems   Constitutional: Negative for appetite change, chills, fatigue, fever and unexpected weight change.   HENT: Negative for sore throat and trouble swallowing.    Respiratory: Negative for cough, chest tightness, shortness of breath and wheezing.    Cardiovascular: Negative  for chest pain and palpitations.   Gastrointestinal: Negative for abdominal distention, abdominal pain, anal bleeding, blood in stool, constipation, diarrhea, nausea, rectal pain and vomiting.        As mentioned in hpi   Genitourinary: Negative for difficulty urinating and hematuria.   Musculoskeletal: Negative for arthralgias and back pain.        Right knee pain    Skin: Negative for color change and rash.   Neurological: Negative for dizziness, seizures, syncope, light-headedness and headaches.   Psychiatric/Behavioral: Negative for confusion. The patient is not nervous/anxious.        Objective     Vitals:    01/10/18 1436   BP: 162/54   Pulse: 62   Temp: 96.5 °F (35.8 °C)   SpO2: 98%     Last 2 weights    01/10/18  1436   Weight: 84.8 kg (187 lb)     Body mass index is 27.22 kg/(m^2).    Physical Exam   Constitutional: He is oriented to person, place, and time. He appears well-developed and well-nourished. No distress.   HENT:   Head: Normocephalic and atraumatic.   Eyes: EOM are normal. No scleral icterus.   Neck: Neck supple. No JVD present.   Cardiovascular: Normal rate, regular rhythm and normal heart sounds.    Pulmonary/Chest: Effort normal and breath sounds normal. No stridor.   Abdominal: Soft. Bowel sounds are normal. He exhibits no distension and no mass. There is no tenderness. There is no rebound and no guarding.   Musculoskeletal: He exhibits no edema.   Neurological: He is alert and oriented to person, place, and time.   Skin: Skin is warm and dry. No rash noted.   Psychiatric: He has a normal mood and affect. His behavior is normal.   Vitals reviewed.      Imaging Results (most recent)     None          Assessment/Plan     Сергей was seen today for colonoscopy.    Diagnoses and all orders for this visit:    Encounter for screening for malignant neoplasm of colon  -     Case Request; Standing  -     Case Request    Coagulopathy  Comments:  plavix , for h/o cardiac stent.     Essential  hypertension    Other orders  -     Implement Anesthesia Orders Day of Procedure; Standing  -     Obtain Informed Consent; Standing  -     polyethylene glycol-electrolytes (NULYTELY WITH FLAVOR PACKS) 420 g solution; Take 4,000 mL by mouth See Admin Instructions.          Plan for colonoscopy.  The patient was advised to take any blood pressure or heart  medications the morning of  procedure if that is when he/she normally takes.  He is on Plavix for history of coronary stents but has not seen a cardiologist in over 2 years.  Will send coag clearance  sheet to his PCP.  He is still having problems with his right knee and may have to undergo additional surgeries, he would like to schedule his colonoscopy out a couple of months and if he is still not ready at that time then we can postpone.          COLONOSCOPY WITH ANESTHESIA (N/A)  All risks, benefits, alternatives, and indications of colonoscopy procedure have been discussed with the patient. Risks to include perforation of the colon requiring possible surgery or colostomy, risk of bleeding from biopsies or removal of colon tissue, possibility of missing a colon polyp or cancer, or adverse drug reaction.  Benefits to include the diagnosis and management of disease of the colon and rectum. Alternatives to include barium enema, radiographic evaluation, lab testing or no intervention. Pt verbalizes understanding and agrees.       The patient was advised on the risks of stopping blood thinners for a procedure.  The risks discussed included the risk of developing myocardial infarction, CVA, embolus, clogging of the arteries or stents, etc.  We discussed the potential consequences of the risks discussed.  The benefits of stopping as well as the alternatives were discussed as well.   Patient is to hold their anticoagulation medication per the direction of their prescribing physician, Dr Bernal.    A letter will be sent to prescribing This is to prevent any risk or  complication from bleeding intra and post procedure. If they develop bleeding post procedure they are to go the emergency department for further evaluation and treatment immediately.                 KATHE Best      EMR Dragon/transcription disclaimer:  Much of this encounter note is electronic transcription/translation of spoken language to printed text.  The electronic translation of spoken language may be erroneous, or at times, nonsensical words or phrases may be inadvertently transcribed.  Although I have reviewed the note for such errors, some may still exist.

## 2018-01-11 ENCOUNTER — TELEPHONE (OUTPATIENT)
Dept: GASTROENTEROLOGY | Facility: CLINIC | Age: 78
End: 2018-01-11

## 2018-01-11 RX ORDER — HYDROCODONE BITARTRATE AND ACETAMINOPHEN 10; 325 MG/1; MG/1
1 TABLET ORAL EVERY 4 HOURS PRN
COMMUNITY
End: 2018-06-08

## 2018-01-11 NOTE — TELEPHONE ENCOUNTER
PT called with his pain med information.  Hydrocodine-Acetaminophion   One every 4 hours as needed.    Also, inquiring about his last colonoscopy several years ago.

## 2018-01-25 ENCOUNTER — TELEPHONE (OUTPATIENT)
Dept: GASTROENTEROLOGY | Facility: CLINIC | Age: 78
End: 2018-01-25

## 2018-01-26 PROBLEM — Z12.11 ENCOUNTER FOR SCREENING FOR MALIGNANT NEOPLASM OF COLON: Status: ACTIVE | Noted: 2018-01-26

## 2018-03-08 ENCOUNTER — TELEPHONE (OUTPATIENT)
Dept: GASTROENTEROLOGY | Facility: CLINIC | Age: 78
End: 2018-03-08

## 2018-03-08 NOTE — TELEPHONE ENCOUNTER
PT phoned and said he wasn't going to have his procedure done.  Told me to cancel it and be sure and tell you. PT is scheduled for 3-21-18

## 2018-03-15 ENCOUNTER — HOSPITAL ENCOUNTER (OUTPATIENT)
Dept: PREADMISSION TESTING | Age: 78
Discharge: HOME | End: 2018-03-19
Payer: MEDICARE

## 2018-03-15 VITALS — HEIGHT: 70 IN | BODY MASS INDEX: 26.77 KG/M2 | WEIGHT: 187 LBS

## 2018-03-15 LAB
ANION GAP SERPL CALCULATED.3IONS-SCNC: 11 MMOL/L (ref 7–19)
APTT: 30.7 SEC (ref 26–36.2)
BASOPHILS ABSOLUTE: 0.1 K/UL (ref 0–0.2)
BASOPHILS RELATIVE PERCENT: 0.8 % (ref 0–1)
BUN BLDV-MCNC: 23 MG/DL (ref 8–23)
CALCIUM SERPL-MCNC: 8.8 MG/DL (ref 8.8–10.2)
CHLORIDE BLD-SCNC: 104 MMOL/L (ref 98–111)
CO2: 26 MMOL/L (ref 22–29)
CREAT SERPL-MCNC: 1.3 MG/DL (ref 0.5–1.2)
EKG P AXIS: 17 DEGREES
EKG P-R INTERVAL: 182 MS
EKG Q-T INTERVAL: 408 MS
EKG QRS DURATION: 104 MS
EKG QTC CALCULATION (BAZETT): 415 MS
EKG T AXIS: 60 DEGREES
EOSINOPHILS ABSOLUTE: 0.6 K/UL (ref 0–0.6)
EOSINOPHILS RELATIVE PERCENT: 5.4 % (ref 0–5)
GFR NON-AFRICAN AMERICAN: 53
GLUCOSE BLD-MCNC: 97 MG/DL (ref 74–109)
HCT VFR BLD CALC: 38.4 % (ref 42–52)
HEMOGLOBIN: 12.7 G/DL (ref 14–18)
INR BLD: 1.1 (ref 0.88–1.18)
LYMPHOCYTES ABSOLUTE: 4.2 K/UL (ref 1.1–4.5)
LYMPHOCYTES RELATIVE PERCENT: 38.2 % (ref 20–40)
MCH RBC QN AUTO: 31.5 PG (ref 27–31)
MCHC RBC AUTO-ENTMCNC: 33.1 G/DL (ref 33–37)
MCV RBC AUTO: 95.3 FL (ref 80–94)
MONOCYTES ABSOLUTE: 1.3 K/UL (ref 0–0.9)
MONOCYTES RELATIVE PERCENT: 11.8 % (ref 0–10)
NEUTROPHILS ABSOLUTE: 4.8 K/UL (ref 1.5–7.5)
NEUTROPHILS RELATIVE PERCENT: 43.4 % (ref 50–65)
PDW BLD-RTO: 15.3 % (ref 11.5–14.5)
PLATELET # BLD: 208 K/UL (ref 130–400)
PMV BLD AUTO: 11.5 FL (ref 9.4–12.4)
POTASSIUM SERPL-SCNC: 4.3 MMOL/L (ref 3.5–5)
PROTHROMBIN TIME: 14.1 SEC (ref 12–14.6)
RBC # BLD: 4.03 M/UL (ref 4.7–6.1)
SODIUM BLD-SCNC: 141 MMOL/L (ref 136–145)
WBC # BLD: 11 K/UL (ref 4.8–10.8)

## 2018-03-15 PROCEDURE — 87070 CULTURE OTHR SPECIMN AEROBIC: CPT

## 2018-03-15 PROCEDURE — 85610 PROTHROMBIN TIME: CPT

## 2018-03-15 PROCEDURE — 85730 THROMBOPLASTIN TIME PARTIAL: CPT

## 2018-03-15 PROCEDURE — 93005 ELECTROCARDIOGRAM TRACING: CPT

## 2018-03-15 PROCEDURE — 80048 BASIC METABOLIC PNL TOTAL CA: CPT

## 2018-03-15 PROCEDURE — 85025 COMPLETE CBC W/AUTO DIFF WBC: CPT

## 2018-03-16 ENCOUNTER — HOSPITAL ENCOUNTER (OUTPATIENT)
Dept: CT IMAGING | Age: 78
Discharge: HOME | End: 2018-03-16
Payer: MEDICARE

## 2018-03-16 DIAGNOSIS — R10.11 ABDOMINAL PAIN, RIGHT UPPER QUADRANT: ICD-10-CM

## 2018-03-16 LAB
GFR NON-AFRICAN AMERICAN: 45
MRSA CULTURE ONLY: NORMAL
PERFORMED ON: ABNORMAL
POC CREATININE: 1.5 MG/DL (ref 0.3–1.3)
POC SAMPLE TYPE: ABNORMAL

## 2018-03-16 PROCEDURE — 82565 ASSAY OF CREATININE: CPT

## 2018-03-16 PROCEDURE — 6360000004 HC RX CONTRAST MEDICATION: Performed by: FAMILY MEDICINE

## 2018-03-16 PROCEDURE — 74177 CT ABD & PELVIS W/CONTRAST: CPT

## 2018-03-16 RX ADMIN — IOPAMIDOL 90 ML: 755 INJECTION, SOLUTION INTRAVENOUS at 09:22

## 2018-04-05 ENCOUNTER — ANESTHESIA EVENT (OUTPATIENT)
Dept: OPERATING ROOM | Age: 78
DRG: 468 | End: 2018-04-05
Payer: MEDICARE

## 2018-04-06 ENCOUNTER — HOSPITAL ENCOUNTER (INPATIENT)
Age: 78
LOS: 1 days | Discharge: HOME HEALTH | DRG: 468 | End: 2018-04-07
Attending: ORTHOPAEDIC SURGERY | Admitting: ORTHOPAEDIC SURGERY
Payer: MEDICARE

## 2018-04-06 ENCOUNTER — ANESTHESIA (OUTPATIENT)
Dept: OPERATING ROOM | Age: 78
DRG: 468 | End: 2018-04-06
Payer: MEDICARE

## 2018-04-06 VITALS
OXYGEN SATURATION: 100 % | RESPIRATION RATE: 41 BRPM | DIASTOLIC BLOOD PRESSURE: 39 MMHG | SYSTOLIC BLOOD PRESSURE: 121 MMHG | TEMPERATURE: 98 F

## 2018-04-06 PROBLEM — M22.2X9 PATELLOFEMORAL DISORDER: Status: ACTIVE | Noted: 2018-04-06

## 2018-04-06 PROBLEM — M22.2X1 PATELLOFEMORAL DISORDER OF RIGHT KNEE: Status: ACTIVE | Noted: 2018-04-06

## 2018-04-06 LAB
ABO/RH: NORMAL
ANTIBODY SCREEN: NORMAL

## 2018-04-06 PROCEDURE — G8988 SELF CARE GOAL STATUS: HCPCS

## 2018-04-06 PROCEDURE — 86850 RBC ANTIBODY SCREEN: CPT

## 2018-04-06 PROCEDURE — 7100000000 HC PACU RECOVERY - FIRST 15 MIN: Performed by: ORTHOPAEDIC SURGERY

## 2018-04-06 PROCEDURE — 7100000001 HC PACU RECOVERY - ADDTL 15 MIN: Performed by: ORTHOPAEDIC SURGERY

## 2018-04-06 PROCEDURE — 94664 DEMO&/EVAL PT USE INHALER: CPT

## 2018-04-06 PROCEDURE — 6360000002 HC RX W HCPCS: Performed by: ANESTHESIOLOGY

## 2018-04-06 PROCEDURE — 2580000003 HC RX 258: Performed by: ORTHOPAEDIC SURGERY

## 2018-04-06 PROCEDURE — 0SRC0J9 REPLACEMENT OF RIGHT KNEE JOINT WITH SYNTHETIC SUBSTITUTE, CEMENTED, OPEN APPROACH: ICD-10-PCS | Performed by: ORTHOPAEDIC SURGERY

## 2018-04-06 PROCEDURE — 6360000002 HC RX W HCPCS: Performed by: ORTHOPAEDIC SURGERY

## 2018-04-06 PROCEDURE — 3700000000 HC ANESTHESIA ATTENDED CARE: Performed by: ORTHOPAEDIC SURGERY

## 2018-04-06 PROCEDURE — 1210000000 HC MED SURG R&B

## 2018-04-06 PROCEDURE — 97161 PT EVAL LOW COMPLEX 20 MIN: CPT

## 2018-04-06 PROCEDURE — 6370000000 HC RX 637 (ALT 250 FOR IP): Performed by: ORTHOPAEDIC SURGERY

## 2018-04-06 PROCEDURE — 6360000002 HC RX W HCPCS: Performed by: NURSE ANESTHETIST, CERTIFIED REGISTERED

## 2018-04-06 PROCEDURE — C1713 ANCHOR/SCREW BN/BN,TIS/BN: HCPCS | Performed by: ORTHOPAEDIC SURGERY

## 2018-04-06 PROCEDURE — 2700000000 HC OXYGEN THERAPY PER DAY

## 2018-04-06 PROCEDURE — G8978 MOBILITY CURRENT STATUS: HCPCS

## 2018-04-06 PROCEDURE — 2720000003 HC MISC SUTURE/STAPLES/RELOADS/ETC: Performed by: ORTHOPAEDIC SURGERY

## 2018-04-06 PROCEDURE — 86900 BLOOD TYPING SEROLOGIC ABO: CPT

## 2018-04-06 PROCEDURE — 2500000003 HC RX 250 WO HCPCS: Performed by: NURSE ANESTHETIST, CERTIFIED REGISTERED

## 2018-04-06 PROCEDURE — 0SPC0JC REMOVAL OF SYNTHETIC SUBSTITUTE FROM RIGHT KNEE JOINT, PATELLAR SURFACE, OPEN APPROACH: ICD-10-PCS | Performed by: ORTHOPAEDIC SURGERY

## 2018-04-06 PROCEDURE — 36415 COLL VENOUS BLD VENIPUNCTURE: CPT

## 2018-04-06 PROCEDURE — 6360000002 HC RX W HCPCS

## 2018-04-06 PROCEDURE — 2720000001 HC MISC SURG SUPPLY STERILE $51-500: Performed by: ORTHOPAEDIC SURGERY

## 2018-04-06 PROCEDURE — 86901 BLOOD TYPING SEROLOGIC RH(D): CPT

## 2018-04-06 PROCEDURE — G8987 SELF CARE CURRENT STATUS: HCPCS

## 2018-04-06 PROCEDURE — C1776 JOINT DEVICE (IMPLANTABLE): HCPCS | Performed by: ORTHOPAEDIC SURGERY

## 2018-04-06 PROCEDURE — 3700000001 HC ADD 15 MINUTES (ANESTHESIA): Performed by: ORTHOPAEDIC SURGERY

## 2018-04-06 PROCEDURE — 97165 OT EVAL LOW COMPLEX 30 MIN: CPT

## 2018-04-06 PROCEDURE — 3600000005 HC SURGERY LEVEL 5 BASE: Performed by: ORTHOPAEDIC SURGERY

## 2018-04-06 PROCEDURE — 2500000003 HC RX 250 WO HCPCS: Performed by: ORTHOPAEDIC SURGERY

## 2018-04-06 PROCEDURE — 3600000015 HC SURGERY LEVEL 5 ADDTL 15MIN: Performed by: ORTHOPAEDIC SURGERY

## 2018-04-06 PROCEDURE — 76942 ECHO GUIDE FOR BIOPSY: CPT | Performed by: NURSE ANESTHETIST, CERTIFIED REGISTERED

## 2018-04-06 PROCEDURE — G8979 MOBILITY GOAL STATUS: HCPCS

## 2018-04-06 DEVICE — COMPONENT PAT DIA38MM DST POLY OVL DOME 3 PEG NP CEM MOD: Type: IMPLANTABLE DEVICE | Site: KNEE | Status: FUNCTIONAL

## 2018-04-06 DEVICE — DISCONTINUED USE 416978 CEMENT PALACOS R SING DOSE 40GR: Type: IMPLANTABLE DEVICE | Site: KNEE | Status: FUNCTIONAL

## 2018-04-06 RX ORDER — CLINDAMYCIN PHOSPHATE 900 MG/50ML
900 INJECTION INTRAVENOUS EVERY 8 HOURS
Status: COMPLETED | OUTPATIENT
Start: 2018-04-06 | End: 2018-04-06

## 2018-04-06 RX ORDER — LEVOTHYROXINE SODIUM 0.12 MG/1
125 TABLET ORAL NIGHTLY
Status: DISCONTINUED | OUTPATIENT
Start: 2018-04-07 | End: 2018-04-07 | Stop reason: HOSPADM

## 2018-04-06 RX ORDER — LOSARTAN POTASSIUM 100 MG/1
100 TABLET ORAL DAILY
Status: DISCONTINUED | OUTPATIENT
Start: 2018-04-06 | End: 2018-04-07 | Stop reason: HOSPADM

## 2018-04-06 RX ORDER — PROMETHAZINE HYDROCHLORIDE 25 MG/ML
6.25 INJECTION, SOLUTION INTRAMUSCULAR; INTRAVENOUS
Status: DISCONTINUED | OUTPATIENT
Start: 2018-04-06 | End: 2018-04-06 | Stop reason: HOSPADM

## 2018-04-06 RX ORDER — PROPOFOL 10 MG/ML
INJECTION, EMULSION INTRAVENOUS PRN
Status: DISCONTINUED | OUTPATIENT
Start: 2018-04-06 | End: 2018-04-06 | Stop reason: SDUPTHER

## 2018-04-06 RX ORDER — FENTANYL CITRATE 50 UG/ML
25 INJECTION, SOLUTION INTRAMUSCULAR; INTRAVENOUS
Status: DISCONTINUED | OUTPATIENT
Start: 2018-04-06 | End: 2018-04-06 | Stop reason: HOSPADM

## 2018-04-06 RX ORDER — SODIUM CHLORIDE, SODIUM LACTATE, POTASSIUM CHLORIDE, CALCIUM CHLORIDE 600; 310; 30; 20 MG/100ML; MG/100ML; MG/100ML; MG/100ML
INJECTION, SOLUTION INTRAVENOUS CONTINUOUS
Status: DISCONTINUED | OUTPATIENT
Start: 2018-04-06 | End: 2018-04-06

## 2018-04-06 RX ORDER — DIPHENHYDRAMINE HYDROCHLORIDE 50 MG/ML
12.5 INJECTION INTRAMUSCULAR; INTRAVENOUS
Status: DISCONTINUED | OUTPATIENT
Start: 2018-04-06 | End: 2018-04-06 | Stop reason: HOSPADM

## 2018-04-06 RX ORDER — SODIUM CHLORIDE, SODIUM LACTATE, POTASSIUM CHLORIDE, CALCIUM CHLORIDE 600; 310; 30; 20 MG/100ML; MG/100ML; MG/100ML; MG/100ML
INJECTION, SOLUTION INTRAVENOUS CONTINUOUS
Status: DISCONTINUED | OUTPATIENT
Start: 2018-04-06 | End: 2018-04-07 | Stop reason: HOSPADM

## 2018-04-06 RX ORDER — ENALAPRILAT 2.5 MG/2ML
1.25 INJECTION INTRAVENOUS
Status: DISCONTINUED | OUTPATIENT
Start: 2018-04-06 | End: 2018-04-06 | Stop reason: HOSPADM

## 2018-04-06 RX ORDER — ROCURONIUM BROMIDE 10 MG/ML
INJECTION, SOLUTION INTRAVENOUS PRN
Status: DISCONTINUED | OUTPATIENT
Start: 2018-04-06 | End: 2018-04-06 | Stop reason: SDUPTHER

## 2018-04-06 RX ORDER — NITROGLYCERIN 0.4 MG/1
0.4 TABLET SUBLINGUAL EVERY 5 MIN PRN
Status: DISCONTINUED | OUTPATIENT
Start: 2018-04-06 | End: 2018-04-07 | Stop reason: HOSPADM

## 2018-04-06 RX ORDER — LABETALOL HYDROCHLORIDE 5 MG/ML
5 INJECTION, SOLUTION INTRAVENOUS EVERY 10 MIN PRN
Status: DISCONTINUED | OUTPATIENT
Start: 2018-04-06 | End: 2018-04-06 | Stop reason: HOSPADM

## 2018-04-06 RX ORDER — SODIUM CHLORIDE 0.9 % (FLUSH) 0.9 %
10 SYRINGE (ML) INJECTION PRN
Status: DISCONTINUED | OUTPATIENT
Start: 2018-04-06 | End: 2018-04-06 | Stop reason: HOSPADM

## 2018-04-06 RX ORDER — LIDOCAINE HYDROCHLORIDE 10 MG/ML
INJECTION, SOLUTION INFILTRATION; PERINEURAL PRN
Status: DISCONTINUED | OUTPATIENT
Start: 2018-04-06 | End: 2018-04-06 | Stop reason: SDUPTHER

## 2018-04-06 RX ORDER — MEPERIDINE HYDROCHLORIDE 50 MG/ML
12.5 INJECTION INTRAMUSCULAR; INTRAVENOUS; SUBCUTANEOUS EVERY 5 MIN PRN
Status: DISCONTINUED | OUTPATIENT
Start: 2018-04-06 | End: 2018-04-06 | Stop reason: HOSPADM

## 2018-04-06 RX ORDER — BUMETANIDE 1 MG/1
1 TABLET ORAL DAILY
Status: DISCONTINUED | OUTPATIENT
Start: 2018-04-06 | End: 2018-04-07 | Stop reason: HOSPADM

## 2018-04-06 RX ORDER — CEFAZOLIN SODIUM 1 G/3ML
INJECTION, POWDER, FOR SOLUTION INTRAMUSCULAR; INTRAVENOUS PRN
Status: DISCONTINUED | OUTPATIENT
Start: 2018-04-06 | End: 2018-04-06 | Stop reason: SDUPTHER

## 2018-04-06 RX ORDER — GLYCOPYRROLATE 1 MG/1
1 TABLET ORAL 3 TIMES DAILY
Status: DISCONTINUED | OUTPATIENT
Start: 2018-04-06 | End: 2018-04-07 | Stop reason: HOSPADM

## 2018-04-06 RX ORDER — DEXAMETHASONE SODIUM PHOSPHATE 10 MG/ML
INJECTION INTRAMUSCULAR; INTRAVENOUS PRN
Status: DISCONTINUED | OUTPATIENT
Start: 2018-04-06 | End: 2018-04-06 | Stop reason: SDUPTHER

## 2018-04-06 RX ORDER — HYDROCODONE BITARTRATE AND ACETAMINOPHEN 5; 325 MG/1; MG/1
2 TABLET ORAL EVERY 4 HOURS PRN
Status: DISCONTINUED | OUTPATIENT
Start: 2018-04-06 | End: 2018-04-07 | Stop reason: HOSPADM

## 2018-04-06 RX ORDER — LACTULOSE 10 G/15ML
10 SOLUTION ORAL 2 TIMES DAILY
Status: DISCONTINUED | OUTPATIENT
Start: 2018-04-06 | End: 2018-04-07 | Stop reason: HOSPADM

## 2018-04-06 RX ORDER — GABAPENTIN 300 MG/1
300 CAPSULE ORAL NIGHTLY
Status: DISCONTINUED | OUTPATIENT
Start: 2018-04-06 | End: 2018-04-07 | Stop reason: HOSPADM

## 2018-04-06 RX ORDER — SODIUM CHLORIDE 9 MG/ML
INJECTION, SOLUTION INTRAVENOUS CONTINUOUS
Status: DISCONTINUED | OUTPATIENT
Start: 2018-04-06 | End: 2018-04-06

## 2018-04-06 RX ORDER — LIDOCAINE HYDROCHLORIDE 10 MG/ML
1 INJECTION, SOLUTION EPIDURAL; INFILTRATION; INTRACAUDAL; PERINEURAL
Status: DISCONTINUED | OUTPATIENT
Start: 2018-04-06 | End: 2018-04-06 | Stop reason: HOSPADM

## 2018-04-06 RX ORDER — HYDROCODONE BITARTRATE AND ACETAMINOPHEN 5; 325 MG/1; MG/1
1 TABLET ORAL EVERY 4 HOURS PRN
Status: DISCONTINUED | OUTPATIENT
Start: 2018-04-06 | End: 2018-04-07 | Stop reason: HOSPADM

## 2018-04-06 RX ORDER — FAMOTIDINE 20 MG/1
20 TABLET, FILM COATED ORAL 2 TIMES DAILY
Status: DISCONTINUED | OUTPATIENT
Start: 2018-04-06 | End: 2018-04-07 | Stop reason: HOSPADM

## 2018-04-06 RX ORDER — ONDANSETRON 2 MG/ML
INJECTION INTRAMUSCULAR; INTRAVENOUS PRN
Status: DISCONTINUED | OUTPATIENT
Start: 2018-04-06 | End: 2018-04-06 | Stop reason: SDUPTHER

## 2018-04-06 RX ORDER — AMLODIPINE BESYLATE 5 MG/1
5 TABLET ORAL DAILY
Status: DISCONTINUED | OUTPATIENT
Start: 2018-04-06 | End: 2018-04-07 | Stop reason: HOSPADM

## 2018-04-06 RX ORDER — EPHEDRINE SULFATE 50 MG/ML
INJECTION, SOLUTION INTRAVENOUS PRN
Status: DISCONTINUED | OUTPATIENT
Start: 2018-04-06 | End: 2018-04-06 | Stop reason: SDUPTHER

## 2018-04-06 RX ORDER — SIMVASTATIN 20 MG
20 TABLET ORAL NIGHTLY
Status: DISCONTINUED | OUTPATIENT
Start: 2018-04-06 | End: 2018-04-07 | Stop reason: HOSPADM

## 2018-04-06 RX ORDER — SODIUM CHLORIDE 0.9 % (FLUSH) 0.9 %
10 SYRINGE (ML) INJECTION PRN
Status: DISCONTINUED | OUTPATIENT
Start: 2018-04-06 | End: 2018-04-07 | Stop reason: HOSPADM

## 2018-04-06 RX ORDER — HYDRALAZINE HYDROCHLORIDE 20 MG/ML
5 INJECTION INTRAMUSCULAR; INTRAVENOUS EVERY 10 MIN PRN
Status: DISCONTINUED | OUTPATIENT
Start: 2018-04-06 | End: 2018-04-06 | Stop reason: HOSPADM

## 2018-04-06 RX ORDER — FENTANYL CITRATE 50 UG/ML
INJECTION, SOLUTION INTRAMUSCULAR; INTRAVENOUS PRN
Status: DISCONTINUED | OUTPATIENT
Start: 2018-04-06 | End: 2018-04-06 | Stop reason: SDUPTHER

## 2018-04-06 RX ORDER — SODIUM CHLORIDE 0.9 % (FLUSH) 0.9 %
10 SYRINGE (ML) INJECTION EVERY 12 HOURS SCHEDULED
Status: DISCONTINUED | OUTPATIENT
Start: 2018-04-06 | End: 2018-04-06 | Stop reason: HOSPADM

## 2018-04-06 RX ORDER — MORPHINE SULFATE 4 MG/ML
2 INJECTION, SOLUTION INTRAMUSCULAR; INTRAVENOUS
Status: DISCONTINUED | OUTPATIENT
Start: 2018-04-06 | End: 2018-04-07 | Stop reason: SDUPTHER

## 2018-04-06 RX ORDER — METOCLOPRAMIDE HYDROCHLORIDE 5 MG/ML
10 INJECTION INTRAMUSCULAR; INTRAVENOUS
Status: DISCONTINUED | OUTPATIENT
Start: 2018-04-06 | End: 2018-04-06 | Stop reason: HOSPADM

## 2018-04-06 RX ORDER — PANTOPRAZOLE SODIUM 20 MG/1
20 TABLET, DELAYED RELEASE ORAL
Status: DISCONTINUED | OUTPATIENT
Start: 2018-04-06 | End: 2018-04-07 | Stop reason: HOSPADM

## 2018-04-06 RX ORDER — DOCUSATE SODIUM 100 MG/1
100 CAPSULE, LIQUID FILLED ORAL 2 TIMES DAILY
Status: DISCONTINUED | OUTPATIENT
Start: 2018-04-06 | End: 2018-04-07 | Stop reason: HOSPADM

## 2018-04-06 RX ORDER — ONDANSETRON 2 MG/ML
4 INJECTION INTRAMUSCULAR; INTRAVENOUS EVERY 6 HOURS PRN
Status: DISCONTINUED | OUTPATIENT
Start: 2018-04-06 | End: 2018-04-07 | Stop reason: HOSPADM

## 2018-04-06 RX ORDER — ACETAMINOPHEN 325 MG/1
650 TABLET ORAL EVERY 4 HOURS PRN
Status: DISCONTINUED | OUTPATIENT
Start: 2018-04-06 | End: 2018-04-07 | Stop reason: HOSPADM

## 2018-04-06 RX ORDER — MORPHINE SULFATE 4 MG/ML
2 INJECTION, SOLUTION INTRAMUSCULAR; INTRAVENOUS EVERY 5 MIN PRN
Status: DISCONTINUED | OUTPATIENT
Start: 2018-04-06 | End: 2018-04-06 | Stop reason: HOSPADM

## 2018-04-06 RX ORDER — MORPHINE SULFATE 4 MG/ML
4 INJECTION, SOLUTION INTRAMUSCULAR; INTRAVENOUS
Status: DISCONTINUED | OUTPATIENT
Start: 2018-04-06 | End: 2018-04-07 | Stop reason: SDUPTHER

## 2018-04-06 RX ORDER — MIDAZOLAM HYDROCHLORIDE 1 MG/ML
2 INJECTION INTRAMUSCULAR; INTRAVENOUS
Status: COMPLETED | OUTPATIENT
Start: 2018-04-06 | End: 2018-04-06

## 2018-04-06 RX ORDER — ROPIVACAINE HYDROCHLORIDE 5 MG/ML
INJECTION, SOLUTION EPIDURAL; INFILTRATION; PERINEURAL PRN
Status: DISCONTINUED | OUTPATIENT
Start: 2018-04-06 | End: 2018-04-06 | Stop reason: SDUPTHER

## 2018-04-06 RX ORDER — SODIUM CHLORIDE 0.9 % (FLUSH) 0.9 %
10 SYRINGE (ML) INJECTION EVERY 12 HOURS SCHEDULED
Status: DISCONTINUED | OUTPATIENT
Start: 2018-04-06 | End: 2018-04-07 | Stop reason: HOSPADM

## 2018-04-06 RX ORDER — FENTANYL CITRATE 50 UG/ML
50 INJECTION, SOLUTION INTRAMUSCULAR; INTRAVENOUS
Status: DISCONTINUED | OUTPATIENT
Start: 2018-04-06 | End: 2018-04-06 | Stop reason: HOSPADM

## 2018-04-06 RX ORDER — TAMSULOSIN HYDROCHLORIDE 0.4 MG/1
0.4 CAPSULE ORAL DAILY
Status: DISCONTINUED | OUTPATIENT
Start: 2018-04-06 | End: 2018-04-07 | Stop reason: HOSPADM

## 2018-04-06 RX ORDER — MIDAZOLAM HYDROCHLORIDE 1 MG/ML
INJECTION INTRAMUSCULAR; INTRAVENOUS
Status: COMPLETED
Start: 2018-04-06 | End: 2018-04-06

## 2018-04-06 RX ORDER — MORPHINE SULFATE 4 MG/ML
4 INJECTION, SOLUTION INTRAMUSCULAR; INTRAVENOUS EVERY 5 MIN PRN
Status: DISCONTINUED | OUTPATIENT
Start: 2018-04-06 | End: 2018-04-06 | Stop reason: HOSPADM

## 2018-04-06 RX ADMIN — SIMVASTATIN 20 MG: 20 TABLET, FILM COATED ORAL at 21:02

## 2018-04-06 RX ADMIN — SUGAMMADEX 200 MG: 100 INJECTION, SOLUTION INTRAVENOUS at 11:14

## 2018-04-06 RX ADMIN — DOCUSATE SODIUM 100 MG: 100 CAPSULE, LIQUID FILLED ORAL at 21:02

## 2018-04-06 RX ADMIN — FENTANYL CITRATE 50 MCG: 50 INJECTION, SOLUTION INTRAMUSCULAR; INTRAVENOUS at 10:04

## 2018-04-06 RX ADMIN — HYDROCODONE BITARTRATE AND ACETAMINOPHEN 2 TABLET: 5; 325 TABLET ORAL at 21:02

## 2018-04-06 RX ADMIN — SODIUM CHLORIDE, POTASSIUM CHLORIDE, SODIUM LACTATE AND CALCIUM CHLORIDE: 600; 310; 30; 20 INJECTION, SOLUTION INTRAVENOUS at 13:00

## 2018-04-06 RX ADMIN — FENTANYL CITRATE 25 MCG: 50 INJECTION, SOLUTION INTRAMUSCULAR; INTRAVENOUS at 11:15

## 2018-04-06 RX ADMIN — CLINDAMYCIN PHOSPHATE 900 MG: 900 INJECTION INTRAVENOUS at 14:00

## 2018-04-06 RX ADMIN — MIDAZOLAM 2 MG: 1 INJECTION INTRAMUSCULAR; INTRAVENOUS at 09:50

## 2018-04-06 RX ADMIN — BUMETANIDE 1 MG: 1 TABLET ORAL at 13:00

## 2018-04-06 RX ADMIN — DOCUSATE SODIUM 100 MG: 100 CAPSULE, LIQUID FILLED ORAL at 13:00

## 2018-04-06 RX ADMIN — LIDOCAINE HYDROCHLORIDE 5 ML: 10 INJECTION, SOLUTION INFILTRATION; PERINEURAL at 10:04

## 2018-04-06 RX ADMIN — DEXAMETHASONE SODIUM PHOSPHATE 10 MG: 10 INJECTION INTRAMUSCULAR; INTRAVENOUS at 10:13

## 2018-04-06 RX ADMIN — GABAPENTIN 300 MG: 300 CAPSULE ORAL at 21:02

## 2018-04-06 RX ADMIN — FENTANYL CITRATE 50 MCG: 50 INJECTION, SOLUTION INTRAMUSCULAR; INTRAVENOUS at 10:43

## 2018-04-06 RX ADMIN — TAMSULOSIN HYDROCHLORIDE 0.4 MG: 0.4 CAPSULE ORAL at 13:00

## 2018-04-06 RX ADMIN — Medication 10 ML: at 15:16

## 2018-04-06 RX ADMIN — ONDANSETRON HYDROCHLORIDE 4 MG: 2 SOLUTION INTRAMUSCULAR; INTRAVENOUS at 11:03

## 2018-04-06 RX ADMIN — GLYCOPYRROLATE 1 MG: 1 TABLET ORAL at 14:00

## 2018-04-06 RX ADMIN — SODIUM CHLORIDE, SODIUM LACTATE, POTASSIUM CHLORIDE, AND CALCIUM CHLORIDE: 600; 310; 30; 20 INJECTION, SOLUTION INTRAVENOUS at 08:17

## 2018-04-06 RX ADMIN — RIVAROXABAN 10 MG: 10 TABLET, FILM COATED ORAL at 17:05

## 2018-04-06 RX ADMIN — PANTOPRAZOLE SODIUM 20 MG: 20 TABLET, DELAYED RELEASE ORAL at 15:17

## 2018-04-06 RX ADMIN — SODIUM CHLORIDE, SODIUM LACTATE, POTASSIUM CHLORIDE, AND CALCIUM CHLORIDE: 600; 310; 30; 20 INJECTION, SOLUTION INTRAVENOUS at 10:49

## 2018-04-06 RX ADMIN — CEFAZOLIN 2000 MG: 1 INJECTION, POWDER, FOR SOLUTION INTRAMUSCULAR; INTRAVENOUS; PARENTERAL at 10:39

## 2018-04-06 RX ADMIN — PROPOFOL 160 MG: 10 INJECTION, EMULSION INTRAVENOUS at 10:04

## 2018-04-06 RX ADMIN — LACTULOSE 10 G: 20 SOLUTION ORAL at 13:00

## 2018-04-06 RX ADMIN — SODIUM CHLORIDE, POTASSIUM CHLORIDE, SODIUM LACTATE AND CALCIUM CHLORIDE: 600; 310; 30; 20 INJECTION, SOLUTION INTRAVENOUS at 21:03

## 2018-04-06 RX ADMIN — MIDAZOLAM HYDROCHLORIDE 2 MG: 1 INJECTION INTRAMUSCULAR; INTRAVENOUS at 09:50

## 2018-04-06 RX ADMIN — LACTULOSE 10 G: 20 SOLUTION ORAL at 21:02

## 2018-04-06 RX ADMIN — FAMOTIDINE 20 MG: 20 TABLET, FILM COATED ORAL at 13:00

## 2018-04-06 RX ADMIN — EPHEDRINE SULFATE 10 MG: 50 INJECTION, SOLUTION INTRAMUSCULAR; INTRAVENOUS; SUBCUTANEOUS at 10:32

## 2018-04-06 RX ADMIN — ROCURONIUM BROMIDE 40 MG: 10 INJECTION INTRAVENOUS at 10:04

## 2018-04-06 RX ADMIN — GLYCOPYRROLATE 1 MG: 1 TABLET ORAL at 21:02

## 2018-04-06 RX ADMIN — AMLODIPINE BESYLATE 5 MG: 5 TABLET ORAL at 13:00

## 2018-04-06 RX ADMIN — ROPIVACAINE HYDROCHLORIDE 20 ML: 5 INJECTION, SOLUTION EPIDURAL; INFILTRATION; PERINEURAL at 09:51

## 2018-04-06 RX ADMIN — FAMOTIDINE 20 MG: 20 TABLET, FILM COATED ORAL at 21:02

## 2018-04-06 RX ADMIN — CLINDAMYCIN PHOSPHATE 900 MG: 900 INJECTION INTRAVENOUS at 21:02

## 2018-04-06 RX ADMIN — Medication 2 G: at 17:05

## 2018-04-06 RX ADMIN — LOSARTAN POTASSIUM 100 MG: 100 TABLET ORAL at 13:00

## 2018-04-06 ASSESSMENT — PAIN DESCRIPTION - PAIN TYPE
TYPE: SURGICAL PAIN
TYPE: SURGICAL PAIN

## 2018-04-06 ASSESSMENT — PAIN DESCRIPTION - ORIENTATION
ORIENTATION: RIGHT
ORIENTATION: RIGHT

## 2018-04-06 ASSESSMENT — PAIN DESCRIPTION - LOCATION
LOCATION: KNEE
LOCATION: KNEE

## 2018-04-06 ASSESSMENT — PAIN SCALES - GENERAL
PAINLEVEL_OUTOF10: 3
PAINLEVEL_OUTOF10: 3
PAINLEVEL_OUTOF10: 5
PAINLEVEL_OUTOF10: 0

## 2018-04-06 ASSESSMENT — LIFESTYLE VARIABLES: SMOKING_STATUS: 0

## 2018-04-06 ASSESSMENT — PAIN - FUNCTIONAL ASSESSMENT: PAIN_FUNCTIONAL_ASSESSMENT: 0-10

## 2018-04-06 ASSESSMENT — PAIN DESCRIPTION - FREQUENCY: FREQUENCY: CONTINUOUS

## 2018-04-06 ASSESSMENT — ENCOUNTER SYMPTOMS: SHORTNESS OF BREATH: 0

## 2018-04-07 VITALS
OXYGEN SATURATION: 92 % | HEART RATE: 46 BPM | WEIGHT: 187 LBS | SYSTOLIC BLOOD PRESSURE: 121 MMHG | HEIGHT: 70 IN | RESPIRATION RATE: 18 BRPM | DIASTOLIC BLOOD PRESSURE: 57 MMHG | TEMPERATURE: 97.1 F | BODY MASS INDEX: 26.77 KG/M2

## 2018-04-07 LAB
FOLATE: 16.8 NG/ML (ref 4.5–32.2)
HCT VFR BLD CALC: 32 % (ref 42–52)
HEMOGLOBIN: 10.6 G/DL (ref 14–18)
IRON SATURATION: 17 % (ref 14–50)
IRON: 50 UG/DL (ref 59–158)
MCH RBC QN AUTO: 31.8 PG (ref 27–31)
MCHC RBC AUTO-ENTMCNC: 33.1 G/DL (ref 33–37)
MCV RBC AUTO: 96.1 FL (ref 80–94)
PDW BLD-RTO: 15.8 % (ref 11.5–14.5)
PLATELET # BLD: 192 K/UL (ref 130–400)
PMV BLD AUTO: 11.9 FL (ref 9.4–12.4)
RBC # BLD: 3.33 M/UL (ref 4.7–6.1)
TOTAL IRON BINDING CAPACITY: 293 UG/DL (ref 250–400)
VITAMIN B-12: 377 PG/ML (ref 211–946)
WBC # BLD: 12.5 K/UL (ref 4.8–10.8)

## 2018-04-07 PROCEDURE — 6370000000 HC RX 637 (ALT 250 FOR IP): Performed by: ORTHOPAEDIC SURGERY

## 2018-04-07 PROCEDURE — 82607 VITAMIN B-12: CPT

## 2018-04-07 PROCEDURE — 6360000002 HC RX W HCPCS: Performed by: ORTHOPAEDIC SURGERY

## 2018-04-07 PROCEDURE — 6370000000 HC RX 637 (ALT 250 FOR IP): Performed by: FAMILY MEDICINE

## 2018-04-07 PROCEDURE — 97116 GAIT TRAINING THERAPY: CPT

## 2018-04-07 PROCEDURE — 36415 COLL VENOUS BLD VENIPUNCTURE: CPT

## 2018-04-07 PROCEDURE — 83540 ASSAY OF IRON: CPT

## 2018-04-07 PROCEDURE — 85027 COMPLETE CBC AUTOMATED: CPT

## 2018-04-07 PROCEDURE — 83550 IRON BINDING TEST: CPT

## 2018-04-07 PROCEDURE — 82746 ASSAY OF FOLIC ACID SERUM: CPT

## 2018-04-07 RX ORDER — MORPHINE SULFATE 4 MG/ML
2 INJECTION, SOLUTION INTRAMUSCULAR; INTRAVENOUS
Status: DISCONTINUED | OUTPATIENT
Start: 2018-04-07 | End: 2018-04-07 | Stop reason: HOSPADM

## 2018-04-07 RX ORDER — HYDROCODONE BITARTRATE AND ACETAMINOPHEN 10; 325 MG/1; MG/1
1 TABLET ORAL EVERY 4 HOURS PRN
Qty: 90 TABLET | Refills: 0 | Status: SHIPPED | OUTPATIENT
Start: 2018-04-07 | End: 2018-04-14

## 2018-04-07 RX ORDER — MORPHINE SULFATE 4 MG/ML
4 INJECTION, SOLUTION INTRAMUSCULAR; INTRAVENOUS
Status: DISCONTINUED | OUTPATIENT
Start: 2018-04-07 | End: 2018-04-07 | Stop reason: HOSPADM

## 2018-04-07 RX ADMIN — LOSARTAN POTASSIUM 100 MG: 100 TABLET ORAL at 07:47

## 2018-04-07 RX ADMIN — HYDROCODONE BITARTRATE AND ACETAMINOPHEN 2 TABLET: 5; 325 TABLET ORAL at 05:31

## 2018-04-07 RX ADMIN — FAMOTIDINE 20 MG: 20 TABLET, FILM COATED ORAL at 07:48

## 2018-04-07 RX ADMIN — CHLORASEPTIC 1 SPRAY: 1.5 LIQUID ORAL at 01:24

## 2018-04-07 RX ADMIN — GLYCOPYRROLATE 1 MG: 1 TABLET ORAL at 07:47

## 2018-04-07 RX ADMIN — PANTOPRAZOLE SODIUM 20 MG: 20 TABLET, DELAYED RELEASE ORAL at 05:31

## 2018-04-07 RX ADMIN — BUMETANIDE 1 MG: 1 TABLET ORAL at 07:48

## 2018-04-07 RX ADMIN — TAMSULOSIN HYDROCHLORIDE 0.4 MG: 0.4 CAPSULE ORAL at 07:47

## 2018-04-07 RX ADMIN — DOCUSATE SODIUM 100 MG: 100 CAPSULE, LIQUID FILLED ORAL at 07:48

## 2018-04-07 RX ADMIN — LACTULOSE 10 G: 20 SOLUTION ORAL at 07:48

## 2018-04-07 RX ADMIN — AMLODIPINE BESYLATE 5 MG: 5 TABLET ORAL at 07:48

## 2018-04-07 RX ADMIN — Medication 2 G: at 01:24

## 2018-04-07 ASSESSMENT — PAIN SCALES - GENERAL
PAINLEVEL_OUTOF10: 0
PAINLEVEL_OUTOF10: 0
PAINLEVEL_OUTOF10: 8

## 2018-05-11 ENCOUNTER — APPOINTMENT (OUTPATIENT)
Dept: PREADMISSION TESTING | Facility: HOSPITAL | Age: 78
End: 2018-05-11

## 2018-06-08 ENCOUNTER — APPOINTMENT (OUTPATIENT)
Dept: PREADMISSION TESTING | Facility: HOSPITAL | Age: 78
End: 2018-06-08

## 2018-06-08 VITALS
DIASTOLIC BLOOD PRESSURE: 57 MMHG | BODY MASS INDEX: 30.1 KG/M2 | HEIGHT: 67 IN | WEIGHT: 191.8 LBS | HEART RATE: 62 BPM | SYSTOLIC BLOOD PRESSURE: 160 MMHG | OXYGEN SATURATION: 95 %

## 2018-06-08 LAB
ALBUMIN SERPL-MCNC: 3.5 G/DL (ref 3.5–5)
ALBUMIN/GLOB SERPL: 0.8 G/DL (ref 1.1–2.5)
ALP SERPL-CCNC: 94 U/L (ref 24–120)
ALT SERPL W P-5'-P-CCNC: 24 U/L (ref 0–54)
ANION GAP SERPL CALCULATED.3IONS-SCNC: 8 MMOL/L (ref 4–13)
AST SERPL-CCNC: 43 U/L (ref 7–45)
BASOPHILS # BLD AUTO: 0.08 10*3/MM3 (ref 0–0.2)
BASOPHILS NFR BLD AUTO: 0.6 % (ref 0–2)
BILIRUB SERPL-MCNC: 0.5 MG/DL (ref 0.1–1)
BUN BLD-MCNC: 30 MG/DL (ref 5–21)
BUN/CREAT SERPL: 17.1 (ref 7–25)
CALCIUM SPEC-SCNC: 8.9 MG/DL (ref 8.4–10.4)
CHLORIDE SERPL-SCNC: 105 MMOL/L (ref 98–110)
CO2 SERPL-SCNC: 30 MMOL/L (ref 24–31)
CREAT BLD-MCNC: 1.75 MG/DL (ref 0.5–1.4)
DEPRECATED RDW RBC AUTO: 54.8 FL (ref 40–54)
EOSINOPHIL # BLD AUTO: 0.73 10*3/MM3 (ref 0–0.7)
EOSINOPHIL NFR BLD AUTO: 5.5 % (ref 0–4)
ERYTHROCYTE [DISTWIDTH] IN BLOOD BY AUTOMATED COUNT: 15.6 % (ref 12–15)
GFR SERPL CREATININE-BSD FRML MDRD: 38 ML/MIN/1.73
GLOBULIN UR ELPH-MCNC: 4.2 GM/DL
GLUCOSE BLD-MCNC: 85 MG/DL (ref 70–100)
HCT VFR BLD AUTO: 33.1 % (ref 40–52)
HGB BLD-MCNC: 10.8 G/DL (ref 14–18)
IMM GRANULOCYTES # BLD: 0.09 10*3/MM3 (ref 0–0.03)
IMM GRANULOCYTES NFR BLD: 0.7 % (ref 0–5)
LYMPHOCYTES # BLD AUTO: 3.86 10*3/MM3 (ref 0.72–4.86)
LYMPHOCYTES NFR BLD AUTO: 29.2 % (ref 15–45)
MCH RBC QN AUTO: 31.2 PG (ref 28–32)
MCHC RBC AUTO-ENTMCNC: 32.6 G/DL (ref 33–36)
MCV RBC AUTO: 95.7 FL (ref 82–95)
MONOCYTES # BLD AUTO: 1.81 10*3/MM3 (ref 0.19–1.3)
MONOCYTES NFR BLD AUTO: 13.7 % (ref 4–12)
NEUTROPHILS # BLD AUTO: 6.66 10*3/MM3 (ref 1.87–8.4)
NEUTROPHILS NFR BLD AUTO: 50.3 % (ref 39–78)
NRBC BLD MANUAL-RTO: 0.2 /100 WBC (ref 0–0)
PLATELET # BLD AUTO: 316 10*3/MM3 (ref 130–400)
PMV BLD AUTO: 10.7 FL (ref 6–12)
POTASSIUM BLD-SCNC: 5.1 MMOL/L (ref 3.5–5.3)
PROT SERPL-MCNC: 7.7 G/DL (ref 6.3–8.7)
RBC # BLD AUTO: 3.46 10*6/MM3 (ref 4.8–5.9)
SODIUM BLD-SCNC: 143 MMOL/L (ref 135–145)
WBC NRBC COR # BLD: 13.23 10*3/MM3 (ref 4.8–10.8)

## 2018-06-08 PROCEDURE — 36415 COLL VENOUS BLD VENIPUNCTURE: CPT

## 2018-06-08 PROCEDURE — 93010 ELECTROCARDIOGRAM REPORT: CPT | Performed by: INTERNAL MEDICINE

## 2018-06-08 PROCEDURE — 80053 COMPREHEN METABOLIC PANEL: CPT | Performed by: SPECIALIST

## 2018-06-08 PROCEDURE — 93005 ELECTROCARDIOGRAM TRACING: CPT

## 2018-06-08 PROCEDURE — 85025 COMPLETE CBC W/AUTO DIFF WBC: CPT | Performed by: SPECIALIST

## 2018-06-08 RX ORDER — DIAPER,BRIEF,INFANT-TODD,DISP
1 EACH MISCELLANEOUS 2 TIMES DAILY PRN
COMMUNITY

## 2018-06-08 RX ORDER — OMEPRAZOLE 40 MG/1
40 CAPSULE, DELAYED RELEASE ORAL DAILY
COMMUNITY

## 2018-06-08 RX ORDER — ACETAMINOPHEN 500 MG
500 TABLET ORAL EVERY 6 HOURS PRN
COMMUNITY

## 2018-06-08 RX ORDER — TACROLIMUS 1 MG/G
1 OINTMENT TOPICAL 2 TIMES DAILY
COMMUNITY

## 2018-06-08 NOTE — DISCHARGE INSTRUCTIONS
DAY OF SURGERY INSTRUCTIONS      Attending:   Ruby Rogers MD  Open Incisional Hernia Repair With Mesh    DATE OF SURGERY: Kelin 15 2018    ARRIVAL TIME: AS DIRECTED BY OFFICE    DAY OF SURGERY TAKE ONLY THESE MEDICATIONS UNLESS OTHERWISE INSTRUCTED BY YOUR PHYSICIAN: AMLODIPINE        MANAGING PAIN AFTER SURGERY    We know you are probably wondering what your pain will be like after surgery.  Following surgery it is unrealistic to expect you will not have pain.   Pain is how our bodies let us know that something is wrong or cautions us to be careful.  That said, our goal is to make your pain tolerable.    Methods we may use to treat your pain include (oral or IV medications, PCAs, epidurals, nerve blocks, etc.)   While some procedures require IV pain medications for a short time after surgery, transitioning to pain medications by mouth allows for better management of pain.   Your nurse will encourage you to take oral pain medications whenever possible.  IV medications work almost immediately, but only last a short while.  Taking medications by mouth allows for a more constant level of medication in your blood stream for a longer period of time.      Once your pain is out of control it is harder to get back under control.  It is important you are aware when your next dose of pain medication is due.  If you are admitted, your nurse may write the time of your next dose on the white board in your room to help you remember.      We are interested in your pain and encourage you to inform us about aggravating factors during your visit.   Many times a simple repositioning every few hours can make a big difference.    If your physician says it is okay, do not let your pain prevent you from getting out of bed. Be sure to call your nurse for assistance prior to getting up so you do not fall.      Before surgery, please decide your tolerable pain goal.  These faces help describe the pain ratings we use on a 0-10 scale.    Be prepared to tell us your goal and whether or not you take pain or anxiety medications at home.          BEFORE YOU COME TO THE HOSPITAL  (Pre-op instructions)  • Do not eat, drink, smoke or chew gum after midnight the night before surgery.  This also includes no mints.  • Morning of surgery take only the medicines you have been instructed with a sip of water unless otherwise instructed  by your physician.  • Do not shave, wear makeup or dark nail polish.  • Remove all jewelry including rings.  • Leave anything you consider valuable at home.  • Leave your suitcase in the car until after your surgery.  • Bring the following with you if applicable:  o Picture ID and insurance, Medicare or Medicaid cards  o Co-pay/deductible required by insurance (cash, check, credit card)  o Copy of advance directive, living will or power-of- documents if not brought to PAT  o CPAP or BIPAP mask and tubing  o Relaxation aids (MP3 player, book, magazine)  • On the day of surgery check in at registration located at the main entrance of the hospital.       Outpatient Surgery Guidelines, Adult  Outpatient procedures are those for which the person having the procedure is allowed to go home the same day as the procedure. Various procedures are done on an outpatient basis. You should follow some general guidelines if you will be having an outpatient procedure.  LET YOUR HEALTH CARE PROVIDER KNOW ABOUT:  · Any allergies you have.  · All medicines you are taking, including vitamins, herbs, eye drops, creams, and over-the-counter medicines.  · Previous problems you or members of your family have had with the use of anesthetics.  · Any blood disorders you have.  · Previous surgeries you have had.  · Medical conditions you have.  RISKS AND COMPLICATIONS  Your health care provider will discuss possible risks and complications with you before surgery. Common risks and complications include:    · Problems due to the use of  anesthetics.  · Blood loss and replacement (does not apply to minor surgical procedures).  · Temporary increase in pain due to surgery.  · Uncorrected pain or problems that the surgery was meant to correct.  · Infection.  · New damage.  BEFORE THE PROCEDURE  · Ask your health care provider about changing or stopping your regular medicines. You may need to stop taking certain medicines in the days or weeks before the procedure.  · Stop smoking at least 2 weeks before surgery. This lowers your risk for complications during and after surgery. Ask your health care provider for help with this if needed.  · Eat your usual meals and a light supper the day before surgery. Continue fluid intake. Do not drink alcohol.  · Do not eat or drink after midnight the night before your surgery.   · Arrange for someone to take you home and to stay with you for 24 hours after the procedure. Medicine given for your procedure may affect your ability to drive or to care for yourself.  · Call your health care provider's office if you develop an illness or problem that may prevent you from safely having your procedure.  AFTER THE PROCEDURE  After surgery, you will be taken to a recovery area, where your progress will be monitored. If there are no complications, you will be allowed to go home when you are awake, stable, and taking fluids well. You may have numbness around the surgical site. Healing will take some time. You will have tenderness at the surgical site and may have some swelling and bruising. You may also have some nausea.  HOME CARE INSTRUCTIONS  · Do not drive for 24 hours, or as directed by your health care provider. Do not drive while taking prescription pain medicines.  · Do not drink alcohol for 24 hours.  · Do not make important decisions or sign legal documents for 24 hours.  · You may resume a normal diet and activities as directed.  · Do not lift anything heavier than 10 pounds (4.5 kg) or play contact sports until your  health care provider says it is okay.  · Change your bandages (dressings) as directed.  · Only take over-the-counter or prescription medicines as directed by your health care provider.  · Follow up with your health care provider as directed.  SEEK MEDICAL CARE IF:  · You have increased bleeding (more than a small spot) from the surgical site.  · You have redness, swelling, or increasing pain in the wound.  · You see pus coming from the wound.  · You have a fever.  · You notice a bad smell coming from the wound or dressing.  · You feel lightheaded or faint.  · You develop a rash.  · You have trouble breathing.  · You develop allergies.  MAKE SURE YOU:  · Understand these instructions.  · Will watch your condition.  · Will get help right away if you are not doing well or get worse.     This information is not intended to replace advice given to you by your health care provider. Make sure you discuss any questions you have with your health care provider.     Document Released: 09/12/2002 Document Revised: 05/03/2016 Document Reviewed: 05/22/2014  [employer] Interactive Patient Education ©2016 [employer]       Fall Prevention in Hospitals, Adult  As a hospital patient, your condition and the treatments you receive can increase your risk for falls. Some additional risk factors for falls in a hospital include:  · Being in an unfamiliar environment.  · Being on bed rest.  · Your surgery.  · Taking certain medicines.  · Your tubing requirements, such as intravenous (IV) therapy or catheters.  It is important that you learn how to decrease fall risks while at the hospital. Below are important tips that can help prevent falls.  SAFETY TIPS FOR PREVENTING FALLS  Talk about your risk of falling.  · Ask your health care provider why you are at risk for falling. Is it your medicine, illness, tubing placement, or something else?  · Make a plan with your health care provider to keep you safe from falls.  · Ask your health care  provider or pharmacist about side effects of your medicines. Some medicines can make you dizzy or affect your coordination.  Ask for help.  · Ask for help before getting out of bed. You may need to press your call button.  · Ask for assistance in getting safely to the toilet.  · Ask for a walker or cane to be put at your bedside. Ask that most of the side rails on your bed be placed up before your health care provider leaves the room.  · Ask family or friends to sit with you.  · Ask for things that are out of your reach, such as your glasses, hearing aids, telephone, bedside table, or call button.  Follow these tips to avoid falling:  · Stay lying or seated, rather than standing, while waiting for help.  · Wear rubber-soled slippers or shoes whenever you walk in the hospital.  · Avoid quick, sudden movements.  ¨ Change positions slowly.  ¨ Sit on the side of your bed before standing.  ¨ Stand up slowly and wait before you start to walk.  · Let your health care provider know if there is a spill on the floor.  · Pay careful attention to the medical equipment, electrical cords, and tubes around you.  · When you need help, use your call button by your bed or in the bathroom. Wait for one of your health care providers to help you.  · If you feel dizzy or unsure of your footing, return to bed and wait for assistance.  · Avoid being distracted by the TV, telephone, or another person in your room.  · Do not lean or support yourself on rolling objects, such as IV poles or bedside tables.     This information is not intended to replace advice given to you by your health care provider. Make sure you discuss any questions you have with your health care provider.     Document Released: 12/15/2001 Document Revised: 01/08/2016 Document Reviewed: 08/25/2013  [employer] Interactive Patient Education ©2016 [employer]       Surgical Site Infections FAQs  What is a Surgical Site Infection (SSI)?  A surgical site infection is an  infection that occurs after surgery in the part of the body where the surgery took place. Most patients who have surgery do not develop an infection. However, infections develop in about 1 to 3 out of every 100 patients who have surgery.  Some of the common symptoms of a surgical site infection are:  · Redness and pain around the area where you had surgery  · Drainage of cloudy fluid from your surgical wound  · Fever  Can SSIs be treated?  Yes. Most surgical site infections can be treated with antibiotics. The antibiotic given to you depends on the bacteria (germs) causing the infection. Sometimes patients with SSIs also need another surgery to treat the infection.  What are some of the things that hospitals are doing to prevent SSIs?  To prevent SSIs, doctors, nurses, and other healthcare providers:  · Clean their hands and arms up to their elbows with an antiseptic agent just before the surgery.  · Clean their hands with soap and water or an alcohol-based hand rub before and after caring for each patient.  · May remove some of your hair immediately before your surgery using electric clippers if the hair is in the same area where the procedure will occur. They should not shave you with a razor.  · Wear special hair covers, masks, gowns, and gloves during surgery to keep the surgery area clean.  · Give you antibiotics before your surgery starts. In most cases, you should get antibiotics within 60 minutes before the surgery starts and the antibiotics should be stopped within 24 hours after surgery.  · Clean the skin at the site of your surgery with a special soap that kills germs.  What can I do to help prevent SSIs?  Before your surgery:  · Tell your doctor about other medical problems you may have. Health problems such as allergies, diabetes, and obesity could affect your surgery and your treatment.  · Quit smoking. Patients who smoke get more infections. Talk to your doctor about how you can quit before your  surgery.  · Do not shave near where you will have surgery. Shaving with a razor can irritate your skin and make it easier to develop an infection.  At the time of your surgery:  · Speak up if someone tries to shave you with a razor before surgery. Ask why you need to be shaved and talk with your surgeon if you have any concerns.  · Ask if you will get antibiotics before surgery.  After your surgery:  · Make sure that your healthcare providers clean their hands before examining you, either with soap and water or an alcohol-based hand rub.  · If you do not see your providers clean their hands, please ask them to do so.  · Family and friends who visit you should not touch the surgical wound or dressings.  · Family and friends should clean their hands with soap and water or an alcohol-based hand rub before and after visiting you. If you do not see them clean their hands, ask them to clean their hands.  What do I need to do when I go home from the hospital?  · Before you go home, your doctor or nurse should explain everything you need to know about taking care of your wound. Make sure you understand how to care for your wound before you leave the hospital.  · Always clean your hands before and after caring for your wound.  · Before you go home, make sure you know who to contact if you have questions or problems after you get home.  · If you have any symptoms of an infection, such as redness and pain at the surgery site, drainage, or fever, call your doctor immediately.  If you have additional questions, please ask your doctor or nurse.  Developed and co-sponsored by The Society for Healthcare Epidemiology of [place] (SHEA); Infectious Diseases Society of [place] (IDSA); American Hospital Association; Association for Professionals in Infection Control and Epidemiology (APIC); Centers for Disease Control and Prevention (CDC); and The Joint Commission.     This information is not intended to replace advice given to you by  your health care provider. Make sure you discuss any questions you have with your health care provider.     Document Released: 12/23/2014 Document Revised: 01/08/2016 Document Reviewed: 03/02/2016  [employer] Interactive Patient Education ©2016 [employer]       [facility]  CHG 4% Patient Instruction Sheet    Preparing the Skin Before Surgery  Preparing or “prepping” skin before surgery can reduce the risk of infection at the surgical site. To make the process easier,[facility] has chosen 4% Chlorhexidine Gluconate (CHG) antiseptic solution.   The steps below outline the prepping process and should be carefully followed.                                                                                                                                                      Prep the skin at the following time(s):                                                      We recommend you shower the night before surgery, and again the morning of surgery with the 4% CHG antiseptic solution using half of the bottle and a cloth each time.  Dress in clean clothes/sleepwear after showering.  See instructions below for application.          Do not apply any lotions or moisturizers.       Do not shave the area to be prepped for at least 2 days prior to surgery.    Clipping the hair may be done immediately prior to your surgery at the hospital    if needed.    Directions:  Thoroughly rinse your body with water.  Apply 4% CHG to a cloth and wash skin gently, paying special attention to the operative site.  Rinse again thoroughly.  Once you have begun using this product do not apply anything else to your skin. If itching or redness persists, rinse affected areas and discontinue use.    When using this product:  • Keep out of eyes, ears, and mouth.  • If solution should contact these areas, rinse out promptly and thoroughly with water.  • For external use only.  • Do not use in genital area, on your face or  head.      PATIENT/FAMILY/RESPONSIBLE PARTY VERBALIZES UNDERSTANDING OF ABOVE EDUCATION.  COPY OF PAIN SCALE GIVEN AND REVIEWED WITH VERBALIZED UNDERSTANDING.

## 2018-06-15 ENCOUNTER — HOSPITAL ENCOUNTER (EMERGENCY)
Age: 78
Discharge: HOME | End: 2018-06-15
Attending: EMERGENCY MEDICINE
Payer: MEDICARE

## 2018-06-15 ENCOUNTER — ANESTHESIA (OUTPATIENT)
Dept: PERIOP | Facility: HOSPITAL | Age: 78
End: 2018-06-15

## 2018-06-15 ENCOUNTER — ANESTHESIA EVENT (OUTPATIENT)
Dept: PERIOP | Facility: HOSPITAL | Age: 78
End: 2018-06-15

## 2018-06-15 ENCOUNTER — HOSPITAL ENCOUNTER (OUTPATIENT)
Facility: HOSPITAL | Age: 78
Setting detail: HOSPITAL OUTPATIENT SURGERY
Discharge: HOME | End: 2018-06-15
Attending: SPECIALIST | Admitting: SPECIALIST

## 2018-06-15 VITALS
TEMPERATURE: 97.7 F | HEART RATE: 60 BPM | RESPIRATION RATE: 18 BRPM | SYSTOLIC BLOOD PRESSURE: 110 MMHG | DIASTOLIC BLOOD PRESSURE: 87 MMHG | OXYGEN SATURATION: 90 %

## 2018-06-15 VITALS
SYSTOLIC BLOOD PRESSURE: 137 MMHG | TEMPERATURE: 97.1 F | DIASTOLIC BLOOD PRESSURE: 81 MMHG | HEART RATE: 52 BPM | RESPIRATION RATE: 16 BRPM | OXYGEN SATURATION: 94 %

## 2018-06-15 DIAGNOSIS — R33.9 URINARY RETENTION: Primary | ICD-10-CM

## 2018-06-15 DIAGNOSIS — K43.2 INCISIONAL HERNIA: ICD-10-CM

## 2018-06-15 LAB
ALBUMIN SERPL-MCNC: 3.6 G/DL (ref 3.5–5.2)
ALP BLD-CCNC: 86 U/L (ref 40–130)
ALT SERPL-CCNC: 10 U/L (ref 5–41)
ANION GAP SERPL CALCULATED.3IONS-SCNC: 12 MMOL/L (ref 7–19)
AST SERPL-CCNC: 16 U/L (ref 5–40)
BACTERIA: NEGATIVE /HPF
BILIRUB SERPL-MCNC: <0.2 MG/DL (ref 0.2–1.2)
BILIRUBIN URINE: NEGATIVE
BLOOD, URINE: ABNORMAL
BUN BLDV-MCNC: 24 MG/DL (ref 8–23)
CALCIUM SERPL-MCNC: 9 MG/DL (ref 8.8–10.2)
CHLORIDE BLD-SCNC: 97 MMOL/L (ref 98–111)
CLARITY: CLEAR
CO2: 24 MMOL/L (ref 22–29)
COLOR: YELLOW
CREAT SERPL-MCNC: 1.4 MG/DL (ref 0.5–1.2)
EPITHELIAL CELLS, UA: 1 /HPF (ref 0–5)
GFR NON-AFRICAN AMERICAN: 49
GLUCOSE BLD-MCNC: 158 MG/DL (ref 74–109)
GLUCOSE URINE: NEGATIVE MG/DL
HYALINE CASTS: 2 /HPF (ref 0–8)
KETONES, URINE: NEGATIVE MG/DL
LEUKOCYTE ESTERASE, URINE: NEGATIVE
LIPASE: 14 U/L (ref 13–60)
NITRITE, URINE: NEGATIVE
PH UA: 5.5
POTASSIUM SERPL-SCNC: 5.1 MMOL/L (ref 3.5–5)
PROTEIN UA: 300 MG/DL
RBC UA: 6 /HPF (ref 0–4)
SODIUM BLD-SCNC: 133 MMOL/L (ref 136–145)
SPECIFIC GRAVITY UA: 1.02
TOTAL PROTEIN: 8.4 G/DL (ref 6.6–8.7)
URINE REFLEX TO CULTURE: ABNORMAL
UROBILINOGEN, URINE: 0.2 E.U./DL
WBC UA: 1 /HPF (ref 0–5)

## 2018-06-15 PROCEDURE — 25010000002 FENTANYL CITRATE (PF) 100 MCG/2ML SOLUTION: Performed by: NURSE ANESTHETIST, CERTIFIED REGISTERED

## 2018-06-15 PROCEDURE — 96374 THER/PROPH/DIAG INJ IV PUSH: CPT

## 2018-06-15 PROCEDURE — 99283 EMERGENCY DEPT VISIT LOW MDM: CPT

## 2018-06-15 PROCEDURE — 83690 ASSAY OF LIPASE: CPT

## 2018-06-15 PROCEDURE — 25010000002 ONDANSETRON PER 1 MG: Performed by: NURSE ANESTHETIST, CERTIFIED REGISTERED

## 2018-06-15 PROCEDURE — 80053 COMPREHEN METABOLIC PANEL: CPT

## 2018-06-15 PROCEDURE — 25010000002 SUCCINYLCHOLINE PER 20 MG: Performed by: NURSE ANESTHETIST, CERTIFIED REGISTERED

## 2018-06-15 PROCEDURE — 6360000002 HC RX W HCPCS: Performed by: EMERGENCY MEDICINE

## 2018-06-15 PROCEDURE — 88302 TISSUE EXAM BY PATHOLOGIST: CPT | Performed by: SPECIALIST

## 2018-06-15 PROCEDURE — 25010000002 VANCOMYCIN PER 500 MG: Performed by: SPECIALIST

## 2018-06-15 PROCEDURE — 81001 URINALYSIS AUTO W/SCOPE: CPT

## 2018-06-15 PROCEDURE — 25010000002 FENTANYL CITRATE (PF) 100 MCG/2ML SOLUTION: Performed by: ANESTHESIOLOGY

## 2018-06-15 PROCEDURE — 51701 INSERT BLADDER CATHETER: CPT | Performed by: EMERGENCY MEDICINE

## 2018-06-15 PROCEDURE — 25010000002 DEXAMETHASONE PER 1 MG: Performed by: NURSE ANESTHETIST, CERTIFIED REGISTERED

## 2018-06-15 PROCEDURE — 36415 COLL VENOUS BLD VENIPUNCTURE: CPT

## 2018-06-15 PROCEDURE — C1781 MESH (IMPLANTABLE): HCPCS | Performed by: SPECIALIST

## 2018-06-15 PROCEDURE — 99283 EMERGENCY DEPT VISIT LOW MDM: CPT | Performed by: EMERGENCY MEDICINE

## 2018-06-15 PROCEDURE — 25010000002 PROPOFOL 10 MG/ML EMULSION: Performed by: NURSE ANESTHETIST, CERTIFIED REGISTERED

## 2018-06-15 DEVICE — BARD VENTRALEX HERNIA PATCH, 4.3 CM (1.7"), SMALL CIRCLE WITH STRAP
Type: IMPLANTABLE DEVICE | Status: FUNCTIONAL
Brand: VENTRALEX

## 2018-06-15 RX ORDER — LIDOCAINE HYDROCHLORIDE 20 MG/ML
INJECTION, SOLUTION INFILTRATION; PERINEURAL AS NEEDED
Status: DISCONTINUED | OUTPATIENT
Start: 2018-06-15 | End: 2018-06-15 | Stop reason: SURG

## 2018-06-15 RX ORDER — METOCLOPRAMIDE HYDROCHLORIDE 5 MG/ML
5 INJECTION INTRAMUSCULAR; INTRAVENOUS
Status: DISCONTINUED | OUTPATIENT
Start: 2018-06-15 | End: 2018-06-15 | Stop reason: HOSPADM

## 2018-06-15 RX ORDER — MEPERIDINE HYDROCHLORIDE 50 MG/ML
12.5 INJECTION INTRAMUSCULAR; INTRAVENOUS; SUBCUTANEOUS
Status: DISCONTINUED | OUTPATIENT
Start: 2018-06-15 | End: 2018-06-15 | Stop reason: HOSPADM

## 2018-06-15 RX ORDER — MIDAZOLAM HYDROCHLORIDE 1 MG/ML
2 INJECTION INTRAMUSCULAR; INTRAVENOUS
Status: DISCONTINUED | OUTPATIENT
Start: 2018-06-15 | End: 2018-06-15 | Stop reason: HOSPADM

## 2018-06-15 RX ORDER — LABETALOL HYDROCHLORIDE 5 MG/ML
5 INJECTION, SOLUTION INTRAVENOUS
Status: DISCONTINUED | OUTPATIENT
Start: 2018-06-15 | End: 2018-06-15 | Stop reason: HOSPADM

## 2018-06-15 RX ORDER — BUPIVACAINE HYDROCHLORIDE AND EPINEPHRINE 5; 5 MG/ML; UG/ML
INJECTION, SOLUTION PERINEURAL AS NEEDED
Status: DISCONTINUED | OUTPATIENT
Start: 2018-06-15 | End: 2018-06-15 | Stop reason: HOSPADM

## 2018-06-15 RX ORDER — SODIUM CHLORIDE 0.9 % (FLUSH) 0.9 %
1-10 SYRINGE (ML) INJECTION AS NEEDED
Status: DISCONTINUED | OUTPATIENT
Start: 2018-06-15 | End: 2018-06-15 | Stop reason: HOSPADM

## 2018-06-15 RX ORDER — DEXAMETHASONE SODIUM PHOSPHATE 4 MG/ML
INJECTION, SOLUTION INTRA-ARTICULAR; INTRALESIONAL; INTRAMUSCULAR; INTRAVENOUS; SOFT TISSUE AS NEEDED
Status: DISCONTINUED | OUTPATIENT
Start: 2018-06-15 | End: 2018-06-15 | Stop reason: SURG

## 2018-06-15 RX ORDER — GLYCOPYRROLATE 0.2 MG/ML
INJECTION INTRAMUSCULAR; INTRAVENOUS AS NEEDED
Status: DISCONTINUED | OUTPATIENT
Start: 2018-06-15 | End: 2018-06-15 | Stop reason: SURG

## 2018-06-15 RX ORDER — SUCCINYLCHOLINE CHLORIDE 20 MG/ML
INJECTION INTRAMUSCULAR; INTRAVENOUS AS NEEDED
Status: DISCONTINUED | OUTPATIENT
Start: 2018-06-15 | End: 2018-06-15 | Stop reason: SURG

## 2018-06-15 RX ORDER — SODIUM CHLORIDE 0.9 % (FLUSH) 0.9 %
3 SYRINGE (ML) INJECTION AS NEEDED
Status: DISCONTINUED | OUTPATIENT
Start: 2018-06-15 | End: 2018-06-15 | Stop reason: HOSPADM

## 2018-06-15 RX ORDER — ONDANSETRON 2 MG/ML
INJECTION INTRAMUSCULAR; INTRAVENOUS AS NEEDED
Status: DISCONTINUED | OUTPATIENT
Start: 2018-06-15 | End: 2018-06-15 | Stop reason: SURG

## 2018-06-15 RX ORDER — LIDOCAINE HYDROCHLORIDE 40 MG/ML
SOLUTION TOPICAL AS NEEDED
Status: DISCONTINUED | OUTPATIENT
Start: 2018-06-15 | End: 2018-06-15 | Stop reason: SURG

## 2018-06-15 RX ORDER — NALOXONE HCL 0.4 MG/ML
0.4 VIAL (ML) INJECTION AS NEEDED
Status: DISCONTINUED | OUTPATIENT
Start: 2018-06-15 | End: 2018-06-15 | Stop reason: HOSPADM

## 2018-06-15 RX ORDER — IPRATROPIUM BROMIDE AND ALBUTEROL SULFATE 2.5; .5 MG/3ML; MG/3ML
3 SOLUTION RESPIRATORY (INHALATION) ONCE AS NEEDED
Status: DISCONTINUED | OUTPATIENT
Start: 2018-06-15 | End: 2018-06-15 | Stop reason: HOSPADM

## 2018-06-15 RX ORDER — IBUPROFEN 600 MG/1
600 TABLET ORAL ONCE AS NEEDED
Status: DISCONTINUED | OUTPATIENT
Start: 2018-06-15 | End: 2018-06-15 | Stop reason: HOSPADM

## 2018-06-15 RX ORDER — SODIUM CHLORIDE, SODIUM LACTATE, POTASSIUM CHLORIDE, CALCIUM CHLORIDE 600; 310; 30; 20 MG/100ML; MG/100ML; MG/100ML; MG/100ML
100 INJECTION, SOLUTION INTRAVENOUS CONTINUOUS
Status: DISCONTINUED | OUTPATIENT
Start: 2018-06-15 | End: 2018-06-15 | Stop reason: HOSPADM

## 2018-06-15 RX ORDER — OXYCODONE AND ACETAMINOPHEN 10; 325 MG/1; MG/1
1 TABLET ORAL ONCE AS NEEDED
Status: COMPLETED | OUTPATIENT
Start: 2018-06-15 | End: 2018-06-15

## 2018-06-15 RX ORDER — SODIUM CHLORIDE, SODIUM LACTATE, POTASSIUM CHLORIDE, CALCIUM CHLORIDE 600; 310; 30; 20 MG/100ML; MG/100ML; MG/100ML; MG/100ML
1000 INJECTION, SOLUTION INTRAVENOUS CONTINUOUS
Status: DISCONTINUED | OUTPATIENT
Start: 2018-06-15 | End: 2018-06-15 | Stop reason: HOSPADM

## 2018-06-15 RX ORDER — FENTANYL CITRATE 50 UG/ML
INJECTION, SOLUTION INTRAMUSCULAR; INTRAVENOUS AS NEEDED
Status: DISCONTINUED | OUTPATIENT
Start: 2018-06-15 | End: 2018-06-15 | Stop reason: SURG

## 2018-06-15 RX ORDER — PROPOFOL 10 MG/ML
VIAL (ML) INTRAVENOUS AS NEEDED
Status: DISCONTINUED | OUTPATIENT
Start: 2018-06-15 | End: 2018-06-15 | Stop reason: SURG

## 2018-06-15 RX ORDER — ONDANSETRON 4 MG/1
4 TABLET, ORALLY DISINTEGRATING ORAL EVERY 8 HOURS PRN
Qty: 15 TABLET | Refills: 0 | Status: SHIPPED | OUTPATIENT
Start: 2018-06-15 | End: 2018-06-18

## 2018-06-15 RX ORDER — HYDROCODONE BITARTRATE AND ACETAMINOPHEN 7.5; 325 MG/1; MG/1
1-2 TABLET ORAL EVERY 4 HOURS PRN
Qty: 30 TABLET | Refills: 0 | Status: SHIPPED | OUTPATIENT
Start: 2018-06-15

## 2018-06-15 RX ORDER — FAMOTIDINE 10 MG/ML
20 INJECTION, SOLUTION INTRAVENOUS
Status: DISCONTINUED | OUTPATIENT
Start: 2018-06-15 | End: 2018-06-15 | Stop reason: HOSPADM

## 2018-06-15 RX ORDER — HYDROCODONE BITARTRATE AND ACETAMINOPHEN 7.5; 325 MG/1; MG/1
1 TABLET ORAL ONCE AS NEEDED
Status: CANCELLED | OUTPATIENT
Start: 2018-06-15 | End: 2018-06-25

## 2018-06-15 RX ORDER — FENTANYL CITRATE 50 UG/ML
25 INJECTION, SOLUTION INTRAMUSCULAR; INTRAVENOUS AS NEEDED
Status: DISCONTINUED | OUTPATIENT
Start: 2018-06-15 | End: 2018-06-15 | Stop reason: HOSPADM

## 2018-06-15 RX ORDER — ONDANSETRON 2 MG/ML
4 INJECTION INTRAMUSCULAR; INTRAVENOUS ONCE AS NEEDED
Status: DISCONTINUED | OUTPATIENT
Start: 2018-06-15 | End: 2018-06-15 | Stop reason: HOSPADM

## 2018-06-15 RX ORDER — OXYCODONE AND ACETAMINOPHEN 7.5; 325 MG/1; MG/1
2 TABLET ORAL ONCE AS NEEDED
Status: DISCONTINUED | OUTPATIENT
Start: 2018-06-15 | End: 2018-06-15 | Stop reason: HOSPADM

## 2018-06-15 RX ORDER — ROCURONIUM BROMIDE 10 MG/ML
INJECTION, SOLUTION INTRAVENOUS AS NEEDED
Status: DISCONTINUED | OUTPATIENT
Start: 2018-06-15 | End: 2018-06-15 | Stop reason: SURG

## 2018-06-15 RX ORDER — MIDAZOLAM HYDROCHLORIDE 1 MG/ML
1 INJECTION INTRAMUSCULAR; INTRAVENOUS
Status: DISCONTINUED | OUTPATIENT
Start: 2018-06-15 | End: 2018-06-15 | Stop reason: HOSPADM

## 2018-06-15 RX ORDER — ONDANSETRON 2 MG/ML
4 INJECTION INTRAMUSCULAR; INTRAVENOUS ONCE
Status: COMPLETED | OUTPATIENT
Start: 2018-06-15 | End: 2018-06-15

## 2018-06-15 RX ORDER — ACETAMINOPHEN 500 MG
1000 TABLET ORAL ONCE
Status: COMPLETED | OUTPATIENT
Start: 2018-06-15 | End: 2018-06-15

## 2018-06-15 RX ADMIN — PROPOFOL 110 MG: 10 INJECTION, EMULSION INTRAVENOUS at 08:20

## 2018-06-15 RX ADMIN — VANCOMYCIN HYDROCHLORIDE 1000 MG: 1 INJECTION, POWDER, LYOPHILIZED, FOR SOLUTION INTRAVENOUS at 07:22

## 2018-06-15 RX ADMIN — ONDANSETRON HYDROCHLORIDE 4 MG: 2 INJECTION, SOLUTION INTRAMUSCULAR; INTRAVENOUS at 22:27

## 2018-06-15 RX ADMIN — SUGAMMADEX 200 MG: 100 INJECTION, SOLUTION INTRAVENOUS at 08:50

## 2018-06-15 RX ADMIN — LIDOCAINE HYDROCHLORIDE 1 EACH: 40 SOLUTION TOPICAL at 08:21

## 2018-06-15 RX ADMIN — GLYCOPYRROLATE 0.2 MG: 0.2 INJECTION, SOLUTION INTRAMUSCULAR; INTRAVENOUS at 08:29

## 2018-06-15 RX ADMIN — OXYCODONE HYDROCHLORIDE AND ACETAMINOPHEN 1 TABLET: 10; 325 TABLET ORAL at 09:41

## 2018-06-15 RX ADMIN — SODIUM CHLORIDE, POTASSIUM CHLORIDE, SODIUM LACTATE AND CALCIUM CHLORIDE 100 ML/HR: 600; 310; 30; 20 INJECTION, SOLUTION INTRAVENOUS at 09:24

## 2018-06-15 RX ADMIN — FENTANYL CITRATE 25 MCG: 50 INJECTION, SOLUTION INTRAMUSCULAR; INTRAVENOUS at 09:41

## 2018-06-15 RX ADMIN — DEXAMETHASONE SODIUM PHOSPHATE 4 MG: 4 INJECTION, SOLUTION INTRAMUSCULAR; INTRAVENOUS at 08:25

## 2018-06-15 RX ADMIN — ROCURONIUM BROMIDE 10 MG: 10 INJECTION INTRAVENOUS at 08:20

## 2018-06-15 RX ADMIN — FENTANYL CITRATE 25 MCG: 50 INJECTION, SOLUTION INTRAMUSCULAR; INTRAVENOUS at 09:45

## 2018-06-15 RX ADMIN — ACETAMINOPHEN 1000 MG: 500 TABLET, FILM COATED ORAL at 07:39

## 2018-06-15 RX ADMIN — LIDOCAINE HYDROCHLORIDE 100 MG: 20 INJECTION, SOLUTION INFILTRATION; PERINEURAL at 08:20

## 2018-06-15 RX ADMIN — SODIUM CHLORIDE, POTASSIUM CHLORIDE, SODIUM LACTATE AND CALCIUM CHLORIDE 1000 ML: 600; 310; 30; 20 INJECTION, SOLUTION INTRAVENOUS at 06:45

## 2018-06-15 RX ADMIN — SUCCINYLCHOLINE CHLORIDE 100 MG: 20 INJECTION, SOLUTION INTRAMUSCULAR; INTRAVENOUS at 08:20

## 2018-06-15 RX ADMIN — ONDANSETRON HYDROCHLORIDE 4 MG: 2 SOLUTION INTRAMUSCULAR; INTRAVENOUS at 08:25

## 2018-06-15 RX ADMIN — FENTANYL CITRATE 50 MCG: 50 INJECTION, SOLUTION INTRAMUSCULAR; INTRAVENOUS at 08:30

## 2018-06-15 RX ADMIN — FENTANYL CITRATE 50 MCG: 50 INJECTION, SOLUTION INTRAMUSCULAR; INTRAVENOUS at 08:20

## 2018-06-15 RX ADMIN — FAMOTIDINE 20 MG: 10 INJECTION INTRAVENOUS at 07:39

## 2018-06-15 ASSESSMENT — ENCOUNTER SYMPTOMS
COUGH: 0
VOMITING: 0
DIARRHEA: 0
NAUSEA: 0
SHORTNESS OF BREATH: 0

## 2018-06-15 NOTE — OP NOTE
Ruby Rogers MD Operative Note    Сергей Agarwal  6/15/2018    Pre-op Diagnosis:   INCISIONAL HERNIA     Post-op Diagnosis:     same    Procedure/CPT® Codes:      Procedure(s):  OPEN INCISIONAL HERNIA REPAIR WITH MESH    Surgeon(s):  Ruby Rogers MD    Anesthesia: General    Staff:   Circulator: Elijah Gomez RN  Scrub Person: Yeu Beyer  Assistant: Carroll Avelar    Estimated Blood Loss: minimal    Specimens:                None      Drains:      Indications: Incisional hernia and chest tube site    Findings: As above    Complications: none    Procedure: The patient was brought to the operating room and placed in the supine position.  After induction of general anesthesia and infusion of IV antibiotics, the patient was prepped and draped in the usual sterile fashion.  Horizontal incision was made, bringing both chest tube sites.  Was a moderate sized hernia with preprandial fat.  The sac was empty at the level of the fascia excising the fatty tissue as well.  We placed a small ventral ex mesh after having soaked in antibiotics underneath the muscle and closed the fascia with interrupted 0 Prolene incorporating bites of the tabs of the mesh.  Wound was irrigated with about solution; was injected in the fascia.  Wounds were closed with interrupted 20 and running 4-0 Vicryl.  Dressing placed patient awakened transferred to the cover him stable condition tolerated the procedure well.  At the end of procedure CORRECT.    Ruby Rogers MD     Date: 6/15/2018  Time: 8:55 AM

## 2018-06-15 NOTE — H&P
Ruby Rogers MD  H&P    Patient Care Team:  Dayo Feldman MD as PCP - General  Dayo Feldman MD as PCP - Family Medicine    Chief complaint incisional hernia    Subjective     Сергей Agarwal  is a 78 y.o. male presents with incisional hernia and chest tube site in the epigastrium.  In the office May 3.  There have been no changes since.      Review of Systems   Pertinent items are noted in HPI, all other systems reviewed and negative    History  Past Medical History:   Diagnosis Date   • Arthritis    • Bradycardia    • CAD (coronary artery disease)    • CKD (chronic kidney disease)    • CVA (cerebral vascular accident)    • CVD (cardiovascular disease)    • Diabetes mellitus     RESOLVED   • Disease of thyroid gland    • Hyperlipidemia    • Hypertension    • Peptic ulcer    • Renal insufficiency      Past Surgical History:   Procedure Laterality Date   • APPENDECTOMY     • CHOLECYSTECTOMY     • CORONARY ANGIOPLASTY WITH STENT PLACEMENT     • CORONARY ARTERY BYPASS GRAFT  2006   • JOINT REPLACEMENT Bilateral     KNEE; R KNEE X 3 SURGERIES 1/2017, 12/2017, 4/2018   • SPLENECTOMY      ANEURYSM X2 REPAIRED   • TONSILLECTOMY       Family History   Problem Relation Age of Onset   • Colon cancer Neg Hx    • Colon polyps Neg Hx      Social History   Substance Use Topics   • Smoking status: Former Smoker     Quit date: 1989   • Smokeless tobacco: Never Used   • Alcohol use No     Prescriptions Prior to Admission   Medication Sig Dispense Refill Last Dose   • acetaminophen (TYLENOL) 500 MG tablet Take 500 mg by mouth Every 6 (Six) Hours As Needed for Mild Pain .   6/14/2018 at 0800   • amLODIPine (NORVASC) 5 MG tablet Take 5 mg by mouth Daily.   6/15/2018 at 0400   • bumetanide (BUMEX) 1 MG tablet Take 1 mg by mouth Daily.   6/14/2018 at 0800   • gabapentin (NEURONTIN) 300 MG capsule Take 300 mg by mouth Every Night.   6/14/2018 at 2200   • hydrocortisone 1 % cream Apply 1 application topically 2 (Two) Times a  Day As Needed for Irritation.   6/14/2018 at 2000   • levothyroxine (SYNTHROID, LEVOTHROID) 125 MCG tablet Take 125 mcg by mouth Daily.   6/14/2018 at 0800   • losartan (COZAAR) 100 MG tablet Take 100 mg by mouth Daily.   6/14/2018 at 0800   • omeprazole (priLOSEC) 40 MG capsule Take 40 mg by mouth Daily.   6/14/2018 at 0800   • simvastatin (ZOCOR) 20 MG tablet Take 20 mg by mouth Every Night.   6/14/2018 at 2200   • tamsulosin (FLOMAX) 0.4 MG capsule 24 hr capsule Take 1 capsule by mouth Every Night.   6/14/2018 at 2200   • aspirin 81 MG EC tablet Take 81 mg by mouth Daily.   6/9/2018   • clopidogrel (PLAVIX) 75 MG tablet Take 75 mg by mouth Daily. ON HOLD FOR SURGERY SINCE 6-10-18   6/9/2018   • clotrimazole-betamethasone (LOTRISONE) 1-0.05 % cream Apply 1 application topically 2 (Two) Times a Day.   Taking   • glycopyrrolate (ROBINUL) 1 MG tablet Take 1 mg by mouth 3 (Three) Times a Day.   Taking   • hyoscyamine (ANASPAZ,LEVSIN) 0.125 MG tablet Take 0.125 mg by mouth Every 4 (Four) Hours As Needed for Cramping.   More than a month at Unknown time   • Multiple Vitamins-Minerals (MULTIVITAMIN WITH MINERALS) tablet tablet Take 1 tablet by mouth Daily.   6/9/2018   • nitroglycerin (NITROSTAT) 0.4 MG SL tablet Place 0.4 mg under the tongue Every 5 (Five) Minutes As Needed for Chest Pain. Take no more than 3 doses in 15 minutes.   More than a month at Unknown time   • nystatin-triamcinolone (MYCOLOG) 652533-0.1 UNIT/GM-% ointment Apply 1 application topically 2 (Two) Times a Day.   Taking   • tacrolimus (PROTOPIC) 0.1 % ointment Apply 1 application topically 2 (Two) Times a Day.        Allergies:  Penicillins; Azithromycin; Doxycycline; Doxycycline calcium; and Tetracyclines & related    Objective     Vital Signs  Temp:  [97.8 °F (36.6 °C)] 97.8 °F (36.6 °C)  Heart Rate:  [60] 60  Resp:  [16] 16  BP: (151)/(54) 151/54    Physical Exam:      General Appearance:    Alert, cooperative, in no acute distress   Head:     Normocephalic, without obvious abnormality, atraumatic   Eyes:            Lids and lashes normal, conjunctivae and sclerae normal, no   icterus, no pallor, corneas clear, PERRLA   Ears:    Ears appear intact with no abnormalities noted   Neck:   No adenopathy, supple, trachea midline   Back:     No kyphosis present, no scoliosis present, no skin lesions,      erythema or scars, no tenderness to percussion or                   palpation,   range of motion normal   Lungs:     Clear to auscultation,respirations regular, even and                  unlabored    Heart:    Regular rhythm and normal rate, normal S1 and S2, no            murmur, no gallop, no rub, no click   Chest Wall:    No abnormalities observed   Abdomen:   Reducible incisional hernias epigastrium    Rectal:     Deferred   Extremities:   Moves all extremities well, no edema, no cyanosis, no             redness   Pulses:   Pulses palpable and equal bilaterally   Skin:   No bleeding, bruising or rash   Lymph nodes:   No palpable adenopathy   Neurologic:   No focal deficits       Results Review:      Lab Results (last 72 hours)     ** No results found for the last 72 hours. **        Imaging Results (last 72 hours)     ** No results found for the last 72 hours. **          Assessment/Plan     Active Problems:    * No active hospital problems. *      We will proceed with repair of incisional hernia with mesh.  The risks of bleeding infection injury and risks of general anesthesia previously discussed in the office.  No further questions.      Ruby Rogers MD  06/15/18  7:04 AM

## 2018-06-15 NOTE — ANESTHESIA POSTPROCEDURE EVALUATION
Patient: Сергей Agarwal    Procedure Summary     Date:  06/15/18 Room / Location:   [facility] OR  /  [facility] OR    Anesthesia Start:  0815 Anesthesia Stop:  0904    Procedure:  OPEN INCISIONAL HERNIA REPAIR WITH MESH (N/A Abdomen) Diagnosis:  (INCISIONAL HERNIA )    Surgeon:  Ruby Rogers MD Provider:  Les Mena CRNA    Anesthesia Type:  general ASA Status:  3          Anesthesia Type: general  Last vitals  BP   140/53 (06/15/18 1008)   Temp   97.1 °F (36.2 °C) (06/15/18 1008)   Pulse   58 (06/15/18 1008)   Resp   18 (06/15/18 1008)     SpO2   98 % (06/15/18 1008)     Post Anesthesia Care and Evaluation    Patient location during evaluation: PACU  Patient participation: complete - patient participated  Level of consciousness: awake and alert  Pain management: adequate  Airway patency: patent  Anesthetic complications: No anesthetic complications  PONV Status: none  Cardiovascular status: acceptable and hemodynamically stable  Respiratory status: acceptable  Hydration status: acceptable    Comments: Blood pressure 140/53, pulse 58, temperature 97.1 °F (36.2 °C), temperature source Temporal Artery , resp. rate 18, SpO2 98 %.    Patient discharged from PACU based upon Keith score. Please see RN notes for further details

## 2018-06-15 NOTE — ANESTHESIA PREPROCEDURE EVALUATION
Anesthesia Evaluation     Patient summary reviewed and Nursing notes reviewed   NPO Solid Status: > 8 hours  NPO Liquid Status: > 8 hours           Airway   Mallampati: I  TM distance: >3 FB  Neck ROM: full  No difficulty expected  Dental      Pulmonary    (+) a smoker Former,   Cardiovascular   Exercise tolerance: good (4-7 METS)    ECG reviewed    (+) hypertension, CAD, CABG (2006), cardiac stents more than 12 months ago hyperlipidemia,       Neuro/Psych  (+) CVA (1983, no residual symptoms),     GI/Hepatic/Renal/Endo    (+)   renal disease CRI, hypothyroidism,     Musculoskeletal     Abdominal    Substance History      OB/GYN          Other   (+) arthritis                     Anesthesia Plan    ASA 3     general     intravenous induction   Anesthetic plan and risks discussed with patient.

## 2018-06-15 NOTE — DISCHARGE INSTRUCTIONS

## 2018-06-18 ENCOUNTER — TELEPHONE (OUTPATIENT)
Dept: UROLOGY | Age: 78
End: 2018-06-18

## 2018-06-18 ENCOUNTER — OFFICE VISIT (OUTPATIENT)
Dept: UROLOGY | Age: 78
End: 2018-06-18
Payer: MEDICARE

## 2018-06-18 VITALS
TEMPERATURE: 99.4 F | HEIGHT: 67 IN | WEIGHT: 196 LBS | SYSTOLIC BLOOD PRESSURE: 136 MMHG | HEART RATE: 61 BPM | BODY MASS INDEX: 30.76 KG/M2 | DIASTOLIC BLOOD PRESSURE: 66 MMHG

## 2018-06-18 DIAGNOSIS — N99.89 POSTOPERATIVE URINARY RETENTION: Primary | ICD-10-CM

## 2018-06-18 DIAGNOSIS — R33.8 POSTOPERATIVE URINARY RETENTION: Primary | ICD-10-CM

## 2018-06-18 LAB
CYTO UR: NORMAL
LAB AP CASE REPORT: NORMAL
PATH REPORT.FINAL DX SPEC: NORMAL
PATH REPORT.GROSS SPEC: NORMAL

## 2018-06-18 PROCEDURE — 99202 OFFICE O/P NEW SF 15 MIN: CPT | Performed by: PHYSICIAN ASSISTANT

## 2018-06-18 PROCEDURE — G8598 ASA/ANTIPLAT THER USED: HCPCS | Performed by: PHYSICIAN ASSISTANT

## 2018-06-18 PROCEDURE — G8427 DOCREV CUR MEDS BY ELIG CLIN: HCPCS | Performed by: PHYSICIAN ASSISTANT

## 2018-06-18 PROCEDURE — 4040F PNEUMOC VAC/ADMIN/RCVD: CPT | Performed by: PHYSICIAN ASSISTANT

## 2018-06-18 PROCEDURE — 1036F TOBACCO NON-USER: CPT | Performed by: PHYSICIAN ASSISTANT

## 2018-06-18 PROCEDURE — G8417 CALC BMI ABV UP PARAM F/U: HCPCS | Performed by: PHYSICIAN ASSISTANT

## 2018-06-18 PROCEDURE — 1123F ACP DISCUSS/DSCN MKR DOCD: CPT | Performed by: PHYSICIAN ASSISTANT

## 2018-06-18 ASSESSMENT — ENCOUNTER SYMPTOMS
DOUBLE VISION: 0
SORE THROAT: 0
WHEEZING: 0
SHORTNESS OF BREATH: 0
HEARTBURN: 0
BLURRED VISION: 0
NAUSEA: 0

## 2018-08-29 ENCOUNTER — HOSPITAL ENCOUNTER (OUTPATIENT)
Dept: NON INVASIVE DIAGNOSTICS | Age: 78
Discharge: HOME | End: 2018-08-29
Payer: MEDICARE

## 2018-08-29 VITALS
HEART RATE: 61 BPM | DIASTOLIC BLOOD PRESSURE: 65 MMHG | BODY MASS INDEX: 29 KG/M2 | SYSTOLIC BLOOD PRESSURE: 170 MMHG | WEIGHT: 185.19 LBS

## 2018-08-29 DIAGNOSIS — Z95.1 S/P CABG X 4: ICD-10-CM

## 2018-08-29 DIAGNOSIS — I25.10 ASHD (ARTERIOSCLEROTIC HEART DISEASE): Primary | ICD-10-CM

## 2018-08-29 PROCEDURE — C8928 TTE W OR W/O FOL W/CON,STRES: HCPCS

## 2018-08-29 PROCEDURE — 2580000003 HC RX 258: Performed by: INTERNAL MEDICINE

## 2018-08-29 PROCEDURE — 6360000002 HC RX W HCPCS: Performed by: INTERNAL MEDICINE

## 2018-08-29 PROCEDURE — 6360000004 HC RX CONTRAST MEDICATION: Performed by: INTERNAL MEDICINE

## 2018-08-29 RX ORDER — DOBUTAMINE HYDROCHLORIDE 200 MG/100ML
10 INJECTION INTRAVENOUS CONTINUOUS PRN
Status: ACTIVE | OUTPATIENT
Start: 2018-08-29 | End: 2018-08-30

## 2018-08-29 RX ORDER — SODIUM CHLORIDE 9 MG/ML
INJECTION, SOLUTION INTRAVENOUS
Status: COMPLETED | OUTPATIENT
Start: 2018-08-29 | End: 2018-08-29

## 2018-08-29 RX ADMIN — DOBUTAMINE HYDROCHLORIDE 10 MCG/KG/MIN: 200 INJECTION INTRAVENOUS at 08:30

## 2018-08-29 RX ADMIN — SODIUM CHLORIDE: 9 INJECTION, SOLUTION INTRAVENOUS at 08:30

## 2018-08-29 RX ADMIN — PERFLUTREN 2.2 MG: 6.52 INJECTION, SUSPENSION INTRAVENOUS at 08:25

## 2019-04-15 ENCOUNTER — HOSPITAL ENCOUNTER (OUTPATIENT)
Dept: GENERAL RADIOLOGY | Age: 79
Discharge: HOME | End: 2019-04-15
Payer: MEDICARE

## 2019-04-15 DIAGNOSIS — M54.5 LOW BACK PAIN, UNSPECIFIED BACK PAIN LATERALITY, UNSPECIFIED CHRONICITY, WITH SCIATICA PRESENCE UNSPECIFIED: ICD-10-CM

## 2019-04-15 PROCEDURE — 72100 X-RAY EXAM L-S SPINE 2/3 VWS: CPT

## 2019-07-05 ENCOUNTER — HOSPITAL ENCOUNTER (OUTPATIENT)
Dept: NON INVASIVE DIAGNOSTICS | Age: 79
Discharge: HOME | End: 2019-07-05
Payer: MEDICARE

## 2019-07-05 PROCEDURE — 93350 STRESS TTE ONLY: CPT

## 2019-07-19 ENCOUNTER — HOSPITAL ENCOUNTER (EMERGENCY)
Age: 79
Discharge: HOME | End: 2019-07-19
Attending: EMERGENCY MEDICINE
Payer: MEDICARE

## 2019-07-19 VITALS
WEIGHT: 190 LBS | DIASTOLIC BLOOD PRESSURE: 80 MMHG | TEMPERATURE: 97.7 F | SYSTOLIC BLOOD PRESSURE: 128 MMHG | HEIGHT: 69 IN | OXYGEN SATURATION: 98 % | BODY MASS INDEX: 28.14 KG/M2 | RESPIRATION RATE: 16 BRPM | HEART RATE: 56 BPM

## 2019-07-19 DIAGNOSIS — T63.463A WASP STING, ASSAULT, INITIAL ENCOUNTER: Primary | ICD-10-CM

## 2019-07-19 PROCEDURE — 6360000002 HC RX W HCPCS: Performed by: EMERGENCY MEDICINE

## 2019-07-19 PROCEDURE — 99282 EMERGENCY DEPT VISIT SF MDM: CPT

## 2019-07-19 PROCEDURE — 99282 EMERGENCY DEPT VISIT SF MDM: CPT | Performed by: EMERGENCY MEDICINE

## 2019-07-19 RX ORDER — DEXAMETHASONE SODIUM PHOSPHATE 10 MG/ML
4 INJECTION, SOLUTION INTRAMUSCULAR; INTRAVENOUS ONCE
Status: COMPLETED | OUTPATIENT
Start: 2019-07-19 | End: 2019-07-19

## 2019-07-19 RX ORDER — DIAPER,BRIEF,INFANT-TODD,DISP
EACH MISCELLANEOUS
Qty: 1 TUBE | Refills: 1 | Status: SHIPPED | OUTPATIENT
Start: 2019-07-19 | End: 2019-07-26

## 2019-07-19 RX ORDER — CARVEDILOL 6.25 MG/1
6.25 TABLET ORAL 2 TIMES DAILY
Status: ON HOLD | COMMUNITY
End: 2022-07-14 | Stop reason: HOSPADM

## 2019-07-19 RX ADMIN — DEXAMETHASONE SODIUM PHOSPHATE 4 MG: 10 INJECTION, SOLUTION INTRAMUSCULAR; INTRAVENOUS at 11:11

## 2019-07-19 ASSESSMENT — ENCOUNTER SYMPTOMS
DIARRHEA: 0
SHORTNESS OF BREATH: 0
VOMITING: 0
NAUSEA: 0
WHEEZING: 0
ABDOMINAL PAIN: 0
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
## 2019-09-03 ENCOUNTER — HOSPITAL ENCOUNTER (EMERGENCY)
Age: 79
Discharge: HOME | End: 2019-09-03
Payer: MEDICARE

## 2019-09-03 ENCOUNTER — APPOINTMENT (OUTPATIENT)
Dept: GENERAL RADIOLOGY | Age: 79
End: 2019-09-03
Payer: MEDICARE

## 2019-09-03 VITALS
DIASTOLIC BLOOD PRESSURE: 60 MMHG | SYSTOLIC BLOOD PRESSURE: 148 MMHG | WEIGHT: 190 LBS | TEMPERATURE: 97.9 F | HEART RATE: 94 BPM | HEIGHT: 69 IN | RESPIRATION RATE: 16 BRPM | OXYGEN SATURATION: 91 % | BODY MASS INDEX: 28.14 KG/M2

## 2019-09-03 DIAGNOSIS — J02.8 ACUTE PHARYNGITIS DUE TO OTHER SPECIFIED ORGANISMS: ICD-10-CM

## 2019-09-03 DIAGNOSIS — J44.1 COPD WITH ACUTE EXACERBATION (HCC): Primary | ICD-10-CM

## 2019-09-03 LAB
ALBUMIN SERPL-MCNC: 4 G/DL (ref 3.5–5.2)
ALP BLD-CCNC: 94 U/L (ref 40–130)
ALT SERPL-CCNC: 9 U/L (ref 5–41)
ANION GAP SERPL CALCULATED.3IONS-SCNC: 11 MMOL/L (ref 7–19)
AST SERPL-CCNC: 14 U/L (ref 5–40)
BACTERIA: NORMAL /HPF
BASOPHILS ABSOLUTE: 0.1 K/UL (ref 0–0.2)
BASOPHILS RELATIVE PERCENT: 0.4 % (ref 0–1)
BILIRUB SERPL-MCNC: 0.3 MG/DL (ref 0.2–1.2)
BILIRUBIN URINE: NEGATIVE
BLOOD, URINE: NEGATIVE
BUN BLDV-MCNC: 37 MG/DL (ref 8–23)
CALCIUM SERPL-MCNC: 9.2 MG/DL (ref 8.8–10.2)
CHLORIDE BLD-SCNC: 101 MMOL/L (ref 98–111)
CLARITY: CLEAR
CO2: 29 MMOL/L (ref 22–29)
COLOR: YELLOW
CREAT SERPL-MCNC: 1.7 MG/DL (ref 0.5–1.2)
EOSINOPHILS ABSOLUTE: 0.5 K/UL (ref 0–0.6)
EOSINOPHILS RELATIVE PERCENT: 3 % (ref 0–5)
EPITHELIAL CELLS, UA: 1 /HPF (ref 0–5)
GFR NON-AFRICAN AMERICAN: 39
GLUCOSE BLD-MCNC: 101 MG/DL (ref 74–109)
GLUCOSE URINE: NEGATIVE MG/DL
HCT VFR BLD CALC: 41.7 % (ref 42–52)
HEMOGLOBIN: 13.5 G/DL (ref 14–18)
HYALINE CASTS: 1 /HPF (ref 0–8)
IMMATURE GRANULOCYTES #: 0.1 K/UL
KETONES, URINE: NEGATIVE MG/DL
LEUKOCYTE ESTERASE, URINE: NEGATIVE
LYMPHOCYTES ABSOLUTE: 2.8 K/UL (ref 1.1–4.5)
LYMPHOCYTES RELATIVE PERCENT: 17 % (ref 20–40)
MCH RBC QN AUTO: 32.5 PG (ref 27–31)
MCHC RBC AUTO-ENTMCNC: 32.4 G/DL (ref 33–37)
MCV RBC AUTO: 100.5 FL (ref 80–94)
MONO TEST: NEGATIVE
MONOCYTES ABSOLUTE: 1.9 K/UL (ref 0–0.9)
MONOCYTES RELATIVE PERCENT: 11.3 % (ref 0–10)
NEUTROPHILS ABSOLUTE: 11.2 K/UL (ref 1.5–7.5)
NEUTROPHILS RELATIVE PERCENT: 67.8 % (ref 50–65)
NITRITE, URINE: NEGATIVE
PDW BLD-RTO: 14.7 % (ref 11.5–14.5)
PH UA: 6 (ref 5–8)
PLATELET # BLD: 150 K/UL (ref 130–400)
PMV BLD AUTO: 11.9 FL (ref 9.4–12.4)
POTASSIUM REFLEX MAGNESIUM: 5.1 MMOL/L (ref 3.5–5)
PROTEIN UA: 100 MG/DL
RBC # BLD: 4.15 M/UL (ref 4.7–6.1)
RBC UA: 1 /HPF (ref 0–4)
S PYO AG THROAT QL: NEGATIVE
SODIUM BLD-SCNC: 141 MMOL/L (ref 136–145)
SPECIFIC GRAVITY UA: 1.02 (ref 1–1.03)
TOTAL PROTEIN: 8 G/DL (ref 6.6–8.7)
URINE REFLEX TO CULTURE: ABNORMAL
UROBILINOGEN, URINE: 0.2 E.U./DL
WBC # BLD: 16.5 K/UL (ref 4.8–10.8)
WBC UA: 0 /HPF (ref 0–5)

## 2019-09-03 PROCEDURE — 94640 AIRWAY INHALATION TREATMENT: CPT

## 2019-09-03 PROCEDURE — 85025 COMPLETE CBC W/AUTO DIFF WBC: CPT

## 2019-09-03 PROCEDURE — 99283 EMERGENCY DEPT VISIT LOW MDM: CPT

## 2019-09-03 PROCEDURE — 71046 X-RAY EXAM CHEST 2 VIEWS: CPT

## 2019-09-03 PROCEDURE — 86308 HETEROPHILE ANTIBODY SCREEN: CPT

## 2019-09-03 PROCEDURE — 87081 CULTURE SCREEN ONLY: CPT

## 2019-09-03 PROCEDURE — 81001 URINALYSIS AUTO W/SCOPE: CPT

## 2019-09-03 PROCEDURE — 6370000000 HC RX 637 (ALT 250 FOR IP): Performed by: NURSE PRACTITIONER

## 2019-09-03 PROCEDURE — 80053 COMPREHEN METABOLIC PANEL: CPT

## 2019-09-03 PROCEDURE — 87880 STREP A ASSAY W/OPTIC: CPT

## 2019-09-03 PROCEDURE — 2580000003 HC RX 258: Performed by: NURSE PRACTITIONER

## 2019-09-03 PROCEDURE — 36415 COLL VENOUS BLD VENIPUNCTURE: CPT

## 2019-09-03 RX ORDER — SULFAMETHOXAZOLE AND TRIMETHOPRIM 800; 160 MG/1; MG/1
1 TABLET ORAL 2 TIMES DAILY
Qty: 20 TABLET | Refills: 0 | Status: SHIPPED | OUTPATIENT
Start: 2019-09-03 | End: 2019-09-03 | Stop reason: CLARIF

## 2019-09-03 RX ORDER — PREDNISONE 20 MG/1
20 TABLET ORAL 2 TIMES DAILY
Qty: 10 TABLET | Refills: 0 | Status: SHIPPED | OUTPATIENT
Start: 2019-09-03 | End: 2019-09-08

## 2019-09-03 RX ORDER — 0.9 % SODIUM CHLORIDE 0.9 %
500 INTRAVENOUS SOLUTION INTRAVENOUS ONCE
Status: COMPLETED | OUTPATIENT
Start: 2019-09-03 | End: 2019-09-03

## 2019-09-03 RX ORDER — CIPROFLOXACIN 500 MG/1
500 TABLET, FILM COATED ORAL 2 TIMES DAILY
Qty: 20 TABLET | Refills: 0 | Status: SHIPPED | OUTPATIENT
Start: 2019-09-03 | End: 2019-09-13

## 2019-09-03 RX ORDER — ALBUTEROL SULFATE 90 UG/1
2 AEROSOL, METERED RESPIRATORY (INHALATION) 4 TIMES DAILY PRN
Qty: 1 INHALER | Refills: 0 | Status: SHIPPED | OUTPATIENT
Start: 2019-09-03 | End: 2021-04-26

## 2019-09-03 RX ORDER — IPRATROPIUM BROMIDE AND ALBUTEROL SULFATE 2.5; .5 MG/3ML; MG/3ML
1 SOLUTION RESPIRATORY (INHALATION)
Status: DISCONTINUED | OUTPATIENT
Start: 2019-09-03 | End: 2019-09-03 | Stop reason: HOSPADM

## 2019-09-03 RX ADMIN — SODIUM CHLORIDE 500 ML: 9 INJECTION, SOLUTION INTRAVENOUS at 16:22

## 2019-09-03 RX ADMIN — IPRATROPIUM BROMIDE AND ALBUTEROL SULFATE 1 AMPULE: .5; 3 SOLUTION RESPIRATORY (INHALATION) at 15:07

## 2019-09-03 ASSESSMENT — ENCOUNTER SYMPTOMS
WHEEZING: 0
ABDOMINAL PAIN: 0
DIARRHEA: 1
SINUS PRESSURE: 1
BLOOD IN STOOL: 0
PHOTOPHOBIA: 0
EYE PAIN: 0
VOMITING: 0
STRIDOR: 0
COUGH: 1
EYE DISCHARGE: 0
ABDOMINAL DISTENTION: 0
EYE REDNESS: 0
COLOR CHANGE: 0
SORE THROAT: 1
APNEA: 0
SHORTNESS OF BREATH: 0
RECTAL PAIN: 0
NAUSEA: 0
SINUS PAIN: 0
CHEST TIGHTNESS: 0
BACK PAIN: 0
CONSTIPATION: 0

## 2019-09-03 NOTE — ED PROVIDER NOTES
[facility] EMERGENCY DEPT  eMERGENCYdEPARTMENT eNCOUnter      Pt Name: Kirby Delong  MRN: [id number]  [place] 1940  Date of evaluation: 9/3/2019  JENNY South NP    CHIEF COMPLAINT       Chief Complaint   Patient presents with    Pharyngitis    Dizziness         HISTORY OF PRESENT ILLNESS  (Location/Symptom, Timing/Onset, Context/Setting, Quality, Duration, Modifying Factors, Severity.)   Kirby Delong is a 78 y.o. male who presents to the emergency department for evaluation of sore throat, dizziness, and cough for the past day. Denies any fevers or chills. Says he has had a nonproductive cough that has increased in amount as he always has a cough from COPD. Denies any chest pain or shortness of breath. Complains of some diarrhea that started today but denies any blood in stool. Denies any nausea, vomiting, or abdominal pain. Denies any dysuria. Says he has had dizziness upon standing or changing position. Complains of sinus pressure behind eyes and a dull ache. The history is provided by the patient. Nursing Notes were reviewed and I agree. REVIEW OF SYSTEMS    (2-9 systems for level 4, 10 or more for level 5)     Review of Systems   Constitutional: Positive for fatigue. Negative for activity change, appetite change, chills, diaphoresis and fever. HENT: Positive for sinus pressure and sore throat. Negative for congestion, ear pain, nosebleeds and sinus pain. Eyes: Negative for photophobia, pain, discharge and redness. Respiratory: Positive for cough. Negative for apnea, chest tightness, shortness of breath, wheezing and stridor. Cardiovascular: Negative for chest pain, palpitations and leg swelling. Gastrointestinal: Positive for diarrhea. Negative for abdominal distention, abdominal pain, blood in stool, constipation, nausea, rectal pain and vomiting. Endocrine: Negative for cold intolerance, heat intolerance, polydipsia, polyphagia and polyuria. tablet, R-3Normal      clopidogrel (PLAVIX) 75 MG tablet Take 1 tablet by mouth daily Start after Xarelto done, Disp-30 tablet, R-3      gabapentin (NEURONTIN) 300 MG capsule Take 300 mg by mouth nightly      losartan (COZAAR) 100 MG tablet Take 50 mg by mouth daily Historical Med      Multiple Vitamins-Minerals (CENTRUM SILVER PO) Take  by mouth. amlodipine (NORVASC) 5 MG tablet Take 5 mg by mouth daily. tamsulosin (FLOMAX) 0.4 MG capsule Take 0.4 mg by mouth daily. aspirin 81 MG EC tablet Take 81 mg by mouth nightly       simvastatin (ZOCOR) 20 MG tablet Take 20 mg by mouth nightly. levothyroxine (SYNTHROID) 125 MCG tablet Take 125 mcg by mouth nightly              ALLERGIES     Pcn [penicillins]; Azithromycin; Doxycycline; Tetracyclines & related; and Vibramycin [doxycycline calcium]    FAMILY HISTORY       Family History   Problem Relation Age of Onset    Heart Disease Other     Heart Disease Mother           SOCIAL HISTORY       Social History     Socioeconomic History    Marital status:       Spouse name: None    Number of children: None    Years of education: None    Highest education level: None   Occupational History    None   Social Needs    Financial resource strain: None    Food insecurity:     Worry: None     Inability: None    Transportation needs:     Medical: None     Non-medical: None   Tobacco Use    Smoking status: Former Smoker     Types: Cigarettes     Last attempt to quit: 1989     Years since quittin.6    Smokeless tobacco: Never Used   Substance and Sexual Activity    Alcohol use: Yes     Comment: RARELY    Drug use: Never    Sexual activity: None   Lifestyle    Physical activity:     Days per week: None     Minutes per session: None    Stress: None   Relationships    Social connections:     Talks on phone: None     Gets together: None     Attends Christian service: None     Active member of club or organization: None     Attends meetings of clubs or organizations: None     Relationship status: None    Intimate partner violence:     Fear of current or ex partner: None     Emotionally abused: None     Physically abused: None     Forced sexual activity: None   Other Topics Concern    None   Social History Narrative    None       SCREENINGS    Rosalba Coma Scale  Eye Opening: Spontaneous  Best Verbal Response: Oriented  Best Motor Response: Obeys commands  Rsoalba Coma Scale Score: 15      PHYSICAL EXAM    (up to 7 forlevel 4, 8 or more for level 5)     ED Triage Vitals [09/03/19 1425]   BP Temp Temp src Pulse Resp SpO2 Height Weight   (!) 154/65 97.9 °F (36.6 °C) -- 92 17 91 % 5' 9\" (1.753 m) 190 lb (86.2 kg)       Physical Exam   Constitutional: He is oriented to person, place, and time. He appears well-developed and well-nourished. No distress. HENT:   Head: Normocephalic and atraumatic. Right Ear: Tympanic membrane, external ear and ear canal normal.   Left Ear: Tympanic membrane, external ear and ear canal normal.   Nose: Right sinus exhibits frontal sinus tenderness. Right sinus exhibits no maxillary sinus tenderness. Left sinus exhibits frontal sinus tenderness. Left sinus exhibits no maxillary sinus tenderness. Mouth/Throat: Uvula is midline and mucous membranes are normal. Oropharyngeal exudate and posterior oropharyngeal erythema present. No posterior oropharyngeal edema or tonsillar abscesses. Eyes: Pupils are equal, round, and reactive to light. Conjunctivae and EOM are normal. Right eye exhibits no discharge. Left eye exhibits no discharge. No scleral icterus. Neck: Trachea normal, normal range of motion, full passive range of motion without pain and phonation normal. Neck supple. No JVD present. No tracheal deviation present. No thyroid mass and no thyromegaly present. Bilateral anterior cervical lymphadenopathy   Cardiovascular: Normal rate, regular rhythm, normal heart sounds and intact distal pulses.  Exam reveals no gallop and no friction rub. No murmur heard. Pulmonary/Chest: Effort normal and breath sounds normal. No stridor. No respiratory distress. He has no wheezes. He has no rales. He exhibits no tenderness. Abdominal: Soft. Bowel sounds are normal. He exhibits no distension and no mass. There is no tenderness. There is no rebound and no guarding. No hernia. Musculoskeletal: Normal range of motion. He exhibits no edema, tenderness or deformity. Neurological: He is alert and oriented to person, place, and time. No cranial nerve deficit. Coordination normal.   Skin: Skin is warm and dry. Capillary refill takes less than 2 seconds. No rash noted. He is not diaphoretic. No erythema. Psychiatric: He has a normal mood and affect. His behavior is normal.   Nursing note and vitals reviewed. DIAGNOSTIC RESULTS     RADIOLOGY:   Non-plain film images such as CT, Ultrasound and MRI are read by the radiologist. Plain radiographic images are visualized and preliminarilyinterpreted by No att. providers found with the below findings:        Interpretation per the Radiologist below, if available at the time of this note:    XR CHEST STANDARD (2 VW)   Final Result   1. Chronic lung changes.    Signed by Dr Sade Suazo on 9/3/2019 3:29 PM          LABS:  Labs Reviewed   CBC WITH AUTO DIFFERENTIAL - Abnormal; Notable for the following components:       Result Value    WBC 16.5 (*)     RBC 4.15 (*)     Hemoglobin 13.5 (*)     Hematocrit 41.7 (*)     .5 (*)     MCH 32.5 (*)     MCHC 32.4 (*)     RDW 14.7 (*)     Neutrophils % 67.8 (*)     Lymphocytes % 17.0 (*)     Monocytes % 11.3 (*)     Neutrophils Absolute 11.2 (*)     Monocytes Absolute 1.90 (*)     All other components within normal limits   COMPREHENSIVE METABOLIC PANEL W/ REFLEX TO MG FOR LOW K - Abnormal; Notable for the following components:    Potassium reflex Magnesium 5.1 (*)     BUN 37 (*)     CREATININE 1.7 (*)     GFR Non- 39

## 2019-09-03 NOTE — ED PROVIDER NOTES
Attending Supervisory Note/Shared Visit    I have reviewed the mid-levels findings and agree. We have discussed the case and reviewed the diagnostic data together. I agree with the plan and disposition.     Lesley Elizabeth MD  Attending Emergency Physician        Samuel Kaufman MD  09/03/19 3320

## 2019-09-05 LAB
ORGANISM: ABNORMAL
S PYO THROAT QL CULT: ABNORMAL
S PYO THROAT QL CULT: ABNORMAL

## 2019-10-25 ENCOUNTER — HOSPITAL ENCOUNTER (EMERGENCY)
Age: 79
Discharge: HOME | End: 2019-10-25
Attending: EMERGENCY MEDICINE
Payer: MEDICARE

## 2019-10-25 ENCOUNTER — APPOINTMENT (OUTPATIENT)
Dept: CT IMAGING | Age: 79
End: 2019-10-25
Payer: MEDICARE

## 2019-10-25 ENCOUNTER — APPOINTMENT (OUTPATIENT)
Dept: GENERAL RADIOLOGY | Age: 79
End: 2019-10-25
Payer: MEDICARE

## 2019-10-25 VITALS
OXYGEN SATURATION: 92 % | DIASTOLIC BLOOD PRESSURE: 62 MMHG | HEART RATE: 72 BPM | BODY MASS INDEX: 28.14 KG/M2 | RESPIRATION RATE: 18 BRPM | HEIGHT: 69 IN | SYSTOLIC BLOOD PRESSURE: 144 MMHG | WEIGHT: 190 LBS | TEMPERATURE: 98.1 F

## 2019-10-25 DIAGNOSIS — S82.001A CLOSED DISPLACED FRACTURE OF RIGHT PATELLA, UNSPECIFIED FRACTURE MORPHOLOGY, INITIAL ENCOUNTER: Primary | ICD-10-CM

## 2019-10-25 PROCEDURE — 72170 X-RAY EXAM OF PELVIS: CPT

## 2019-10-25 PROCEDURE — 99284 EMERGENCY DEPT VISIT MOD MDM: CPT

## 2019-10-25 PROCEDURE — 73560 X-RAY EXAM OF KNEE 1 OR 2: CPT

## 2019-10-25 PROCEDURE — 73552 X-RAY EXAM OF FEMUR 2/>: CPT

## 2019-10-25 ASSESSMENT — ENCOUNTER SYMPTOMS
EYE ITCHING: 0
APNEA: 0
ABDOMINAL PAIN: 0
EYE DISCHARGE: 0
SHORTNESS OF BREATH: 0
PHOTOPHOBIA: 0
COLOR CHANGE: 0
COUGH: 0
BACK PAIN: 0

## 2019-10-25 ASSESSMENT — PAIN SCALES - GENERAL: PAINLEVEL_OUTOF10: 4

## 2019-11-13 ENCOUNTER — HOSPITAL ENCOUNTER (OUTPATIENT)
Dept: NON INVASIVE DIAGNOSTICS | Age: 79
Discharge: HOME | End: 2019-11-13
Payer: MEDICARE

## 2019-11-13 DIAGNOSIS — M15.9 POLYARTHROSIS: ICD-10-CM

## 2019-11-13 DIAGNOSIS — L03.116 CELLULITIS OF LEFT LEG: ICD-10-CM

## 2019-11-13 PROCEDURE — 93971 EXTREMITY STUDY: CPT

## 2021-04-23 ENCOUNTER — HOSPITAL ENCOUNTER (EMERGENCY)
Age: 81
Discharge: HOME | End: 2021-04-23
Payer: MEDICARE

## 2021-04-23 VITALS
SYSTOLIC BLOOD PRESSURE: 169 MMHG | WEIGHT: 205 LBS | RESPIRATION RATE: 24 BRPM | HEIGHT: 69 IN | HEART RATE: 62 BPM | BODY MASS INDEX: 30.36 KG/M2 | DIASTOLIC BLOOD PRESSURE: 54 MMHG | TEMPERATURE: 98 F | OXYGEN SATURATION: 92 %

## 2021-04-23 DIAGNOSIS — I87.2 VENOUS STASIS DERMATITIS OF BOTH LOWER EXTREMITIES: Primary | ICD-10-CM

## 2021-04-23 DIAGNOSIS — T14.8XXA OPEN WOUND: ICD-10-CM

## 2021-04-23 LAB
ALBUMIN SERPL-MCNC: 3.5 G/DL (ref 3.5–5.2)
ALP BLD-CCNC: 80 U/L (ref 40–130)
ALT SERPL-CCNC: 8 U/L (ref 5–41)
ANION GAP SERPL CALCULATED.3IONS-SCNC: 6 MMOL/L (ref 7–19)
AST SERPL-CCNC: 15 U/L (ref 5–40)
BASOPHILS ABSOLUTE: 0.1 K/UL (ref 0–0.2)
BASOPHILS RELATIVE PERCENT: 0.8 % (ref 0–1)
BILIRUB SERPL-MCNC: 0.4 MG/DL (ref 0.2–1.2)
BUN BLDV-MCNC: 30 MG/DL (ref 8–23)
CALCIUM SERPL-MCNC: 8.7 MG/DL (ref 8.8–10.2)
CHLORIDE BLD-SCNC: 104 MMOL/L (ref 98–111)
CO2: 32 MMOL/L (ref 22–29)
CREAT SERPL-MCNC: 1.4 MG/DL (ref 0.5–1.2)
EOSINOPHILS ABSOLUTE: 0.7 K/UL (ref 0–0.6)
EOSINOPHILS RELATIVE PERCENT: 8.9 % (ref 0–5)
GFR AFRICAN AMERICAN: 59
GFR NON-AFRICAN AMERICAN: 48
GLUCOSE BLD-MCNC: 115 MG/DL (ref 74–109)
HBA1C MFR BLD: 5.5 % (ref 4–6)
HCT VFR BLD CALC: 43.6 % (ref 42–52)
HEMOGLOBIN: 13.8 G/DL (ref 14–18)
IMMATURE GRANULOCYTES #: 0 K/UL
LYMPHOCYTES ABSOLUTE: 2.6 K/UL (ref 1.1–4.5)
LYMPHOCYTES RELATIVE PERCENT: 35.5 % (ref 20–40)
MCH RBC QN AUTO: 33.7 PG (ref 27–31)
MCHC RBC AUTO-ENTMCNC: 31.7 G/DL (ref 33–37)
MCV RBC AUTO: 106.6 FL (ref 80–94)
MONOCYTES ABSOLUTE: 0.9 K/UL (ref 0–0.9)
MONOCYTES RELATIVE PERCENT: 11.4 % (ref 0–10)
NEUTROPHILS ABSOLUTE: 3.2 K/UL (ref 1.5–7.5)
NEUTROPHILS RELATIVE PERCENT: 43.3 % (ref 50–65)
PDW BLD-RTO: 14.5 % (ref 11.5–14.5)
PLATELET # BLD: 152 K/UL (ref 130–400)
PMV BLD AUTO: 11.6 FL (ref 9.4–12.4)
POTASSIUM REFLEX MAGNESIUM: 4.5 MMOL/L (ref 3.5–5)
RBC # BLD: 4.09 M/UL (ref 4.7–6.1)
SODIUM BLD-SCNC: 142 MMOL/L (ref 136–145)
TOTAL PROTEIN: 7.1 G/DL (ref 6.6–8.7)
WBC # BLD: 7.4 K/UL (ref 4.8–10.8)

## 2021-04-23 PROCEDURE — 85025 COMPLETE CBC W/AUTO DIFF WBC: CPT

## 2021-04-23 PROCEDURE — 36415 COLL VENOUS BLD VENIPUNCTURE: CPT

## 2021-04-23 PROCEDURE — 83036 HEMOGLOBIN GLYCOSYLATED A1C: CPT

## 2021-04-23 PROCEDURE — 93971 EXTREMITY STUDY: CPT

## 2021-04-23 PROCEDURE — 80053 COMPREHEN METABOLIC PANEL: CPT

## 2021-04-23 PROCEDURE — 99282 EMERGENCY DEPT VISIT SF MDM: CPT

## 2021-04-23 RX ORDER — SULFAMETHOXAZOLE AND TRIMETHOPRIM 800; 160 MG/1; MG/1
1 TABLET ORAL 2 TIMES DAILY
Qty: 20 TABLET | Refills: 0 | Status: SHIPPED | OUTPATIENT
Start: 2021-04-23 | End: 2021-05-03

## 2021-04-23 ASSESSMENT — PAIN SCALES - GENERAL: PAINLEVEL_OUTOF10: 6

## 2021-04-23 NOTE — ED PROVIDER NOTES
Laterality Date    ABDOMINAL AORTIC ANEURYSM REPAIR, OPEN      ADENOIDECTOMY      APPENDECTOMY      CARDIAC SURGERY      CARDIOVASCULAR STRESS TEST  12/07/09, [facility]    Stress ECho    CARDIOVASCULAR STRESS TEST  11/02/06, [facility]    Adenosine thallium treadmill    CHOLECYSTECTOMY      CORONARY ARTERY BYPASS GRAFT  12/01/06, [place]    CABG x 4 with sequential FAYE to diagonal, LAD, SVG, to obtuse marginal, posterior descending endoscopic vein harvesting.  DIAGNOSTIC CARDIAC CATH LAB PROCEDURE  11/14/06, [facility]    Left cath, arteriography of bilateral native internal mammary arteries.  DIAGNOSTIC CARDIAC CATH LAB PROCEDURE  06/27/03????, [facility]    Left cath, selective CA, primary stent placement two circumflex marginal branches.  DIAGNOSTIC CARDIAC CATH LAB PROCEDURE  02/24/1993, [facility]    Left cath    DIAGNOSTIC CARDIAC CATH LAB PROCEDURE  05/15/91, [facility]    Left cath, SARABIA/Jordanian left ventric., selective coronary arteriography.  JOINT REPLACEMENT      OK KNEE SCOPE, W/LATERAL RELEASE Right 12/15/2017    KNEE ARTHROSCOPY LATERAL PATELLA REALIGNMENT performed by Sariah Loera DO at [address] PART Right 4/6/2018    KNEE TOTAL ARTHROPLASTY PATELLOFEMORALREVISION performed by Sariah Loera DO at [address] [place] [address] PTCA  05/16/91, [facility]    PTCA to circumflex marginal branch.     REVISION TOTAL KNEE ARTHROPLASTY Right 1/6/2017    KNEE TOTAL ARTHROPLASTY REVISION performed by Noelle Betancourt MD at [place] [address]  4/2012    Left  knee          CURRENT MEDICATIONS       Discharge Medication List as of 4/23/2021  1:32 PM      CONTINUE these medications which have NOT CHANGED    Details   albuterol (PROVENTIL) (5 MG/ML) 0.5% nebulizer solution Take 0.5 mLs by nebulization every 6 hours as needed for Wheezing, Disp-120 each, R-0Print      albuterol sulfate  (90 Base) MCG/ACT inhaler Inhale 2 puffs into the lungs 4 times daily as needed History    None   Social Needs    Financial resource strain: None    Food insecurity     Worry: None     Inability: None    Transportation needs     Medical: None     Non-medical: None   Tobacco Use    Smoking status: Former Smoker     Types: Cigarettes     Quit date: 1989     Years since quittin.3    Smokeless tobacco: Never Used   Substance and Sexual Activity    Alcohol use: Yes     Comment: RARELY    Drug use: Never    Sexual activity: None   Lifestyle    Physical activity     Days per week: None     Minutes per session: None    Stress: None   Relationships    Social connections     Talks on phone: None     Gets together: None     Attends Sabianism service: None     Active member of club or organization: None     Attends meetings of clubs or organizations: None     Relationship status: None    Intimate partner violence     Fear of current or ex partner: None     Emotionally abused: None     Physically abused: None     Forced sexual activity: None   Other Topics Concern    None   Social History Narrative    None       SCREENINGS      @FLOW([id number])@      PHYSICAL EXAM    (up to 7 for level 4, 8 or more for level 5)     ED Triage Vitals [21 1002]   BP Temp Temp src Pulse Resp SpO2 Height Weight   (!) 169/54 98 °F (36.7 °C) -- 62 24 92 % 5' 9\" (1.753 m) 205 lb (93 kg)       Physical Exam  Vitals signs and nursing note reviewed. Constitutional:       Appearance: He is well-developed. HENT:      Head: Normocephalic and atraumatic. Eyes:      General: No scleral icterus. Right eye: No discharge. Left eye: No discharge. Neck:      Musculoskeletal: Normal range of motion and neck supple. Cardiovascular:      Rate and Rhythm: Normal rate. Pulmonary:      Effort: No respiratory distress. Skin:            Comments: Dry skin and venous statsis changes bilaterally lower legs   Neurological:      Mental Status: He is alert.    Psychiatric:         Behavior: Behavior dermatitis of both lower extremities    2.  Open wound        DISPOSITION/PLAN   DISPOSITION        PATIENT REFERRED TO:  Kellen Thibodeaux MD  [facility] [id number] [place] [address]  [phone]    Schedule an appointment as soon as possible for a visit   schedule with wound care    MD Amisha Lui [address]  [phone]            DISCHARGE MEDICATIONS:  Discharge Medication List as of 4/23/2021  1:32 PM      START taking these medications    Details   sulfamethoxazole-trimethoprim (BACTRIM DS;SEPTRA DS) 800-160 MG per tablet Take 1 tablet by mouth 2 times daily for 10 days, Disp-20 tablet, R-0Normal                (Please note that portions of this note were completed with a voice recognitionprogram.  Efforts were made to edit the dictations but occasionally words are mis-transcribed.)    JENNY Felipe (electronically signed)          JENNY Felipe  04/24/21 1019

## 2021-04-23 NOTE — PROGRESS NOTES
Vascular Preliminary Report      Left Lower Extremity Venous Duplex Completed. No evidence of DVT, SVT, or Reflux noted at this time. Final Report Pending.

## 2021-04-26 ENCOUNTER — HOSPITAL ENCOUNTER (OUTPATIENT)
Dept: WOUND CARE | Age: 81
Discharge: HOME | End: 2021-04-26
Payer: MEDICARE

## 2021-04-26 VITALS
WEIGHT: 205 LBS | HEIGHT: 69 IN | HEART RATE: 51 BPM | BODY MASS INDEX: 30.36 KG/M2 | SYSTOLIC BLOOD PRESSURE: 155 MMHG | TEMPERATURE: 98 F | RESPIRATION RATE: 24 BRPM | DIASTOLIC BLOOD PRESSURE: 71 MMHG

## 2021-04-26 DIAGNOSIS — E66.09 CLASS 1 OBESITY DUE TO EXCESS CALORIES WITH SERIOUS COMORBIDITY AND BODY MASS INDEX (BMI) OF 30.0 TO 30.9 IN ADULT: Primary | ICD-10-CM

## 2021-04-26 DIAGNOSIS — L97.922 CHRONIC ULCER OF LEFT LEG WITH FAT LAYER EXPOSED (HCC): ICD-10-CM

## 2021-04-26 DIAGNOSIS — I83.93 VARICOSE VEINS OF BOTH LOWER EXTREMITIES, UNSPECIFIED WHETHER COMPLICATED: ICD-10-CM

## 2021-04-26 DIAGNOSIS — I73.9 PVD (PERIPHERAL VASCULAR DISEASE) (HCC): ICD-10-CM

## 2021-04-26 PROBLEM — E66.3 OVERWEIGHT (BMI 25.0-29.9): Chronic | Status: RESOLVED | Noted: 2017-12-12 | Resolved: 2021-04-26

## 2021-04-26 PROBLEM — L89.152 DECUBITUS ULCER OF SACRAL REGION, STAGE 2 (HCC): Chronic | Status: RESOLVED | Noted: 2017-11-14 | Resolved: 2021-04-26

## 2021-04-26 PROCEDURE — 29580 STRAPPING UNNA BOOT: CPT

## 2021-04-26 PROCEDURE — 99205 OFFICE O/P NEW HI 60 MIN: CPT | Performed by: NURSE PRACTITIONER

## 2021-04-26 RX ORDER — ACETAMINOPHEN 500 MG
500 TABLET ORAL EVERY 6 HOURS PRN
COMMUNITY

## 2021-04-26 RX ORDER — ASPIRIN 81 MG/1
81 TABLET ORAL DAILY
COMMUNITY

## 2021-04-26 RX ORDER — METOPROLOL SUCCINATE 50 MG/1
50 TABLET, EXTENDED RELEASE ORAL DAILY
COMMUNITY
End: 2022-07-11

## 2021-04-26 ASSESSMENT — VISUAL ACUITY: OU: 1

## 2021-04-26 NOTE — PLAN OF CARE
Problem: Wound:  Goal: Will show signs of wound healing; wound closure and no evidence of infection  Description: Will show signs of wound healing; wound closure and no evidence of infection  Outcome: Ongoing     Problem: Venous:  Goal: Signs of wound healing will improve  Description: Signs of wound healing will improve  Outcome: Ongoing     Problem: Compression therapy:  Goal: Will be free from complications associated with compression therapy  Description: Will be free from complications associated with compression therapy  Outcome: Ongoing     Problem: Blood Glucose:  Goal: Ability to maintain appropriate glucose levels will improve  Description: Ability to maintain appropriate glucose levels will improve  Outcome: Ongoing

## 2021-04-26 NOTE — PROGRESS NOTES
Patient Care Team:  Blu Paulson MD as PCP - General (Family Medicine)  Blu Paulson MD as PCP - [facility]  YA Pratt MD (Cardiology)    TODAY'S DATE:  4/26/2021     HISTORY of PRESENTILLNESS HPI   Rowena Caceres is a 78 y.o. male who presents today for wound evaluation. He reports he developed a wound on left leg. This started 1 year(s) ago. He believes this is not healing. He has been applying hydrocortisone cream. He has not had  fever orchills. He has a history of venous disease.     Wound Type:venous  Wound Location:left leg  Modifying factors:edema, venous stasis and obesity    Patient Active Problem List   Diagnosis Code    ASHD (arteriosclerotic heart disease) I25.10    Hypertension I10    Hyperlipidemia E78.5    COPD (chronic obstructive pulmonary disease) ([facility]) J44.9    History of cerebrovascular disease Z86.79    Class 1 obesity due to excess calories with serious comorbidity and body mass index (BMI) of 30.0 to 30.9 in adult E66.09, Z68.30    AAA (abdominal aortic aneurysm) ([facility]) I71.4    S/P TKR (total knee replacement) Z96.659    Diabetes mellitus ([facility]) E11.9    GERD (gastroesophageal reflux disease) K21.9    Primary osteoarthritis of knee M17.10    HH (hiatus hernia) K44.9    Renal insufficiency N28.9    S/P CABG x 4 Z95.1    Status post coronary artery stent placement Z95.5    Bronchitis J40    Lateral subluxation of right patella S83.011A    Patellofemoral disorder of right knee M22.2X1    Patellofemoral disorder M22.2X9    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 male with the following history reviewed and recorded in [facility]:    Current Outpatient Medications   Medication Sig Dispense Refill    aspirin 81 MG EC tablet Take 81 mg by mouth daily      metoprolol succinate (TOPROL XL) 50 MG extended release tablet Take 50 mg by mouth daily      acetaminophen (TYLENOL) 500 MG tablet Take 500 mg by mouth every 6 hours as needed for Pain      sulfamethoxazole-trimethoprim (BACTRIM DS;SEPTRA DS) 800-160 MG per tablet Take 1 tablet by mouth 2 times daily for 10 days 20 tablet 0    carvedilol (COREG) 6.25 MG tablet Take 6.25 mg by mouth 2 times daily      Magnesium 100 MG CAPS Take by mouth daily      bumetanide (BUMEX) 1 MG tablet Take 1 mg by mouth daily      glycopyrrolate (ROBINUL) 1 MG tablet Take 1 mg by mouth 3 times daily      hyoscyamine (ANASPAZ;LEVSIN) 125 MCG tablet Take 125 mcg by mouth every 4 hours as needed for Cramping      nitroGLYCERIN (NITROSTAT) 0.4 MG SL tablet Place 1 tablet under the tongue every 5 minutes as needed for Chest pain 25 tablet 3    clopidogrel (PLAVIX) 75 MG tablet Take 1 tablet by mouth daily Start after Xarelto done 30 tablet 3    losartan (COZAAR) 100 MG tablet Take 50 mg by mouth daily       Multiple Vitamins-Minerals (CENTRUM SILVER PO) Take  by mouth.  tamsulosin (FLOMAX) 0.4 MG capsule Take 0.4 mg by mouth daily.  simvastatin (ZOCOR) 20 MG tablet Take 20 mg by mouth nightly.  levothyroxine (SYNTHROID) 125 MCG tablet Take 125 mcg by mouth nightly        No current facility-administered medications for this encounter.       Allergies: Pcn [penicillins], Azithromycin, Doxycycline, Tetracyclines & related, and Vibramycin [doxycycline calcium]  Past Medical History:   Diagnosis Date    AAA [facility]ab[facility] aortic aneurysm) ([facility] [address]     ASHD (arteriosclerotic heart disease)     S/P coronary intervention followed by CBG    CAD (coronary artery disease)     Severe triple-vessel    CAD (coronary artery disease) 2/4/2015    COPD (chronic obstructive pulmonary disease) (HCC)     With tobacco abuse    GERD (gastroesophageal reflux disease)     HH (hiatus hernia)     History of cerebrovascular disease     Hyperlipidemia     PCP, Dr. Locke Late manages cholesterol.  Hypertension     Obesity     Osteoarthritis     Renal insufficiency     S/P CABG x 3     S/P CABG x 4 2/4/2015 12/1/06 by Dr. Alessandra Gonzalez S/P TKR (total knee replacement)     RIGHT    Thyroid disease        Past Surgical History:   Procedure Laterality Date    ABDOMINAL AORTIC ANEURYSM REPAIR, OPEN      ADENOIDECTOMY      APPENDECTOMY      CARDIAC SURGERY      CARDIOVASCULAR STRESS TEST  12/07/09, [facility]    Stress ECho    CARDIOVASCULAR STRESS TEST  11/02/06, [facility]    Adenosine thallium treadmill    CHOLECYSTECTOMY      CORONARY ARTERY BYPASS GRAFT  12/01/06, [place]    CABG x 4 with sequential FAYE to diagonal, LAD, SVG, to obtuse marginal, posterior descending endoscopic vein harvesting.  DIAGNOSTIC CARDIAC CATH LAB PROCEDURE  11/14/06, [facility]    Left cath, arteriography of bilateral native internal mammary arteries.  DIAGNOSTIC CARDIAC CATH LAB PROCEDURE  06/27/03????, [facility]    Left cath, selective CA, primary stent placement two circumflex marginal branches.  DIAGNOSTIC CARDIAC CATH LAB PROCEDURE  02/24/1993, [facility]    Left cath    DIAGNOSTIC CARDIAC CATH LAB PROCEDURE  05/15/91, [facility]    Left cath, SARABIA/Malaysian left ventric., selective coronary arteriography.  JOINT REPLACEMENT      [place] KNEE SCOPE, W/LATERAL RELEASE Right 12/15/2017    KNEE ARTHROSCOPY LATERAL PATELLA REALIGNMENT performed by Scot Orta DO at [address] PART Right 4/6/2018    KNEE TOTAL ARTHROPLASTY PATELLOFEMORALREVISION performed by Scot Orta DO at [address] [facility] PTCA  05/16/91, [facility]    PTCA to circumflex marginal branch.     REVISION TOTAL KNEE ARTHROPLASTY Right 1/6/2017    KNEE TOTAL ARTHROPLASTY REVISION performed by Paul Fuller MD at [address] [facility] TOTAL KNEE ARTHROPLASTY  4/2012    Left  knee      Family History   Problem Relation Age of Onset    Heart Disease Other     Heart Disease Mother      Social History     Tobacco Use  Smoking status: Former Smoker     Types: Cigarettes     Quit date: 1989     Years since quittin.3    Smokeless tobacco: Never Used   Substance Use Topics    Alcohol use: Yes     Comment: RARELY         Review of Systems    Review of Systems   Skin: Positive for wound. All other systems reviewed and are negative. All other review of systems are negative. Physical Exam    BP (!) 155/71   Pulse 51   Temp 98 °F (36.7 °C) (Temporal)   Resp 24   Ht 5' 9\" (1.753 m)   Wt 205 lb (93 kg)   BMI 30.27 kg/m²     Physical Exam  Vitals signs reviewed. Constitutional:       Appearance: Normal appearance. He is obese. HENT:      Head: Normocephalic and atraumatic. Right Ear: External ear normal.      Left Ear: External ear normal.   Eyes:      General: Lids are normal. Lids are everted, no foreign bodies appreciated. Vision grossly intact. Gaze aligned appropriately. Cardiovascular:      Rate and Rhythm: Normal rate and regular rhythm. Pulses: Normal pulses. Heart sounds: Normal heart sounds. Pulmonary:      Effort: Pulmonary effort is normal.      Breath sounds: Normal breath sounds. Musculoskeletal: Normal range of motion. Skin:     General: Skin is warm and dry. Capillary Refill: Capillary refill takes 2 to 3 seconds. Findings: Wound present. Neurological:      Mental Status: He is alert and oriented to person, place, and time. Psychiatric:         Mood and Affect: Mood normal.         Behavior: Behavior normal.         Thought Content: Thought content normal.         Judgment: Judgment normal.             Post Debridement Measurements and Assessment:    The patientspain isPain Level: 6 Pain Type: Acute pain. Please refer to nursing measurements and assessment regarding wound pre and postdebridement.     Incision 18 Knee Right (Active)   Number of days: 5032       Wound 21 Tibial Distal;Right;Posterior wound 1- left posterior venous smoking  Offloading instructions given

## 2021-05-03 ENCOUNTER — HOSPITAL ENCOUNTER (OUTPATIENT)
Dept: WOUND CARE | Age: 81
Discharge: HOME | End: 2021-05-03
Payer: MEDICARE

## 2021-05-03 VITALS
DIASTOLIC BLOOD PRESSURE: 57 MMHG | SYSTOLIC BLOOD PRESSURE: 132 MMHG | RESPIRATION RATE: 18 BRPM | TEMPERATURE: 97.7 F | HEART RATE: 50 BPM

## 2021-05-03 DIAGNOSIS — L97.922 CHRONIC ULCER OF LEFT LEG WITH FAT LAYER EXPOSED (HCC): ICD-10-CM

## 2021-05-03 DIAGNOSIS — I83.93 VARICOSE VEINS OF BOTH LOWER EXTREMITIES, UNSPECIFIED WHETHER COMPLICATED: ICD-10-CM

## 2021-05-03 DIAGNOSIS — I73.9 PVD (PERIPHERAL VASCULAR DISEASE) (HCC): ICD-10-CM

## 2021-05-03 PROCEDURE — 99212 OFFICE O/P EST SF 10 MIN: CPT | Performed by: SURGERY

## 2021-05-03 PROCEDURE — 99212 OFFICE O/P EST SF 10 MIN: CPT

## 2021-05-03 NOTE — PROGRESS NOTES
[address]   Progress Note and Procedure Note      Nadia García  MEDICAL RECORD NUMBER:  [id number]  AGE: 78 y.o. GENDER: male  : 1940  EPISODE DATE:  5/3/2021    Subjective:     Chief Complaint   Patient presents with    Wound Check     wound, took unna boot off as was hurting leg         HISTORY of PRESENT ILLNESS HPI     Nadia García is a 78 y.o. male who presents today for wound/ulcer evaluation. History of Wound Context: Pt with LLE wound here for eval/treat  Wound/Ulcer Pain Timing/Severity: none  Quality of pain: N/A  Severity:  0 / 10   Modifying Factors: None  Associated Signs/Symptoms: edema    Ulcer Identification:  Ulcer Type: venous  Contributing Factors: edema and venous stasis    Wound: venous        PAST MEDICAL HISTORY        Diagnosis Date    AAA (abdominal aortic aneurysm) (HCC)     ASHD (arteriosclerotic heart disease)     S/P coronary intervention followed by CBG    CAD (coronary artery disease)     Severe triple-vessel    CAD (coronary artery disease) 2015    COPD [facility]ch[facility] obstructive [facility] disease) ([facility]     With tobacco abuse    GERD (gastroesophageal reflux disease)     HH (hiatus hernia)     History of cerebrovascular disease     Hyperlipidemia     PCP, Dr. Melinda Martinez manages cholesterol.     Hypertension     Obesity     Osteoarthritis     Renal insufficiency     S/P CABG x 3     S/P CABG x 4 2015 by Dr. Susanna Urban S/P TKR (total knee replacement)     RIGHT    Thyroid disease        PAST SURGICAL HISTORY    Past Surgical History:   Procedure Laterality Date    ABDOMINAL AORTIC ANEURYSM REPAIR, OPEN      ADENOIDECTOMY      APPENDECTOMY      CARDIAC SURGERY      CARD[facility]ULAR [facility]ESS TEST  09, [facility]    Stress ECho    CARDIOVASCULAR STRESS TEST  06, [facility]    Adenosine thallium treadmill    CHOLECYSTECTOMY      CORONARY ARTERY BYPASS GRAFT  06, [place]    CABG x 4 with sequential FAYE to diagonal, LAD, SVG, to obtuse marginal, posterior descending endoscopic vein harvesting.  DIAGNOSTIC CARDIAC CATH LAB PROCEDURE  06, [facility]    Left cath, arteriography of bilateral native internal mammary arteries.  DIAGNOSTIC CARDIAC CATH LAB PROCEDURE  03????, [facility]    Left cath, selective CA, primary stent placement two circumflex marginal branches.  DIAGNOSTIC CARDIAC CATH LAB PROCEDURE  1993, [facility]    Left cath    DIAGNOSTIC CARDIAC CATH LAB PROCEDURE  05/15/91, [facility]    Left cath, SARABIA/SHERON left ventric., selective coronary arteriography.  JOINT REPLACEMENT      [place] KNEE SCOPE, W/LATERAL RELEASE Right 12/15/2017    KNEE ARTHROSCOPY LATERAL PATELLA REALIGNMENT performed by Aydin Ellis DO at [address] PART Right 2018    KNEE TOTAL ARTHROPLASTY PATELLOFEMORALREVISION performed by Aydin Ellis DO at [address] [facility] PTCA  91, [facility]    PTCA to circumflex marginal branch.  REVISION TOTAL KNEE ARTHROPLASTY Right 2017    KNEE TOTAL ARTHROPLASTY REVISION performed by Mane Ny MD at [address] [facility] TOTAL KNEE ARTHROPLASTY  2012    Left  knee        FAMILY HISTORY    Family History   Problem Relation Age of Onset    Heart Disease Other     Heart Disease Mother        SOCIAL HISTORY    Social History     Tobacco Use    Smoking status: Former Smoker     Types: Cigarettes     Quit date: 1989     Years since quittin.3    Smokeless tobacco: Never Used   Substance Use Topics    Alcohol use: Yes     Comment: RARELY    Drug use: Never       ALLERGIES    Allergies   Allergen Reactions    Pcn [Penicillins] Anaphylaxis    Azithromycin      Other reaction(s): Other (See Comments)  UNKNOWN     Doxycycline Other (See Comments)     UNKNOWN REACTION    Tetracyclines & Related      Other reaction(s):  Other (See Comments)  UNKNOWN REACTION  Not sure    Vibramycin [Doxycycline Calcium]      Not sure MEDICATIONS    Current Outpatient Medications on File Prior to Encounter   Medication Sig Dispense Refill    aspirin 81 MG EC tablet Take 81 mg by mouth daily      metoprolol succinate (TOPROL XL) 50 MG extended release tablet Take 50 mg by mouth daily      carvedilol (COREG) 6.25 MG tablet Take 6.25 mg by mouth 2 times daily      Magnesium 100 MG CAPS Take by mouth daily      bumetanide (BUMEX) 1 MG tablet Take 1 mg by mouth daily      glycopyrrolate (ROBINUL) 1 MG tablet Take 1 mg by mouth 3 times daily      hyoscyamine (ANASPAZ;LEVSIN) 125 MCG tablet Take 125 mcg by mouth every 4 hours as needed for Cramping      clopidogrel (PLAVIX) 75 MG tablet Take 1 tablet by mouth daily Start after Xarelto done 30 tablet 3    losartan (COZAAR) 100 MG tablet Take 50 mg by mouth daily       Multiple Vitamins-Minerals (CENTRUM SILVER PO) Take  by mouth.  tamsulosin (FLOMAX) 0.4 MG capsule Take 0.4 mg by mouth daily.  simvastatin (ZOCOR) 20 MG tablet Take 20 mg by mouth nightly.  levothyroxine (SYNTHROID) 125 MCG tablet Take 125 mcg by mouth nightly       acetaminophen (TYLENOL) 500 MG tablet Take 500 mg by mouth every 6 hours as needed for Pain      sulfamethoxazole-trimethoprim (BACTRIM DS;SEPTRA DS) 800-160 MG per tablet Take 1 tablet by mouth 2 times daily for 10 days (Patient taking differently: Take 1 tablet by mouth 2 times daily Indications: stopped taking after 2 days r/t dizziness ) 20 tablet 0    nitroGLYCERIN (NITROSTAT) 0.4 MG SL tablet Place 1 tablet under the tongue every 5 minutes as needed for Chest pain 25 tablet 3     No current facility-administered medications on file prior to encounter. REVIEW OF SYSTEMS    A comprehensive review of systems was negative.     Objective:      BP (!) 132/57   Pulse 50   Temp 97.7 °F (36.5 °C) (Temporal)   Resp 18     Wt Readings from Last 3 Encounters:   04/26/21 205 lb (93 kg)   04/23/21 205 lb (93 kg)   10/25/19 190 lb (86.2 kg) PHYSICAL EXAM    General Appearance: alert and oriented to person, place and time, well developed and well- nourished, in no acute distress  Skin: warm and dry, no rash or erythema  Head: normocephalic and atraumatic  Eyes: pupils equal, round, and reactive to light, extraocular eye movements intact, conjunctivae normal  ENT: tympanic membrane, external ear and ear canal normal bilaterally, nose without deformity, nasal mucosa and turbinates normal without polyps, lips teeth and gums normal  Neck: supple and non-tender without mass, no thyromegaly or thyroid nodules, no cervical lymphadenopathy  Pulmonary/Chest: clear to auscultation bilaterally- no wheezes, rales or rhonchi, normal air movement, no respiratory distress  Cardiovascular: normal rate, regular rhythm, normal S1 and S2, no murmurs, rubs, clicks, or gallops, distal pulses intact, no carotid bruits  Abdomen: soft, non-tender, non-distended, normal bowel sounds, no masses or organomegaly  Extremities: no cyanosis, clubbing or edema  Musculoskeletal: normal range of motion, no joint swelling, deformity or tenderness  Neurologic: reflexes normal and symmetric, no cranial nerve deficit, gait, coordination and speech normal, sensation of skin normal      Assessment:      Patient Active Problem List   Diagnosis Code    ASHD (arteriosclerotic heart disease) I25.10    Hypertension I10    Hyperlipidemia E78.5    COPD (chronic obstructive pulmonary disease) ([facility] J44.9    History of cerebrovascular disease Z86.79    Class 1 obesity due to excess calories with serious comorbidity and body mass index (BMI) of 30.0 to 30.9 in adult E66.09, Z68.30    AAA (abdominal aortic aneurysm) ([facility]) I71.4    S/P TKR (total knee replacement) Z96.659    Diabetes mellitus ([facility]) E11.9    GERD (gastroesophageal reflux disease) K21.9    Primary osteoarthritis of knee M17.10    HH (hiatus hernia) K44.9    Renal insufficiency N28.9    S/P CABG x 4 Z95.1    Status post coronary artery stent placement Z95.5    Bronchitis J40    Lateral subluxation of right patella S83.011A    Patellofemoral disorder of right knee M22.2X1    Patellofemoral disorder M22.2X9    Chronic ulcer of left leg with fat layer exposed ([facility] [address] L97.922    PVD (peripheral vascular disease) ([facility]) I73.9    Varicose veins of both lower extremities I83.93             Incision 04/06/18 Knee Right (Active)   Number of days: 1123       Wound 04/26/21 Tibial Distal;Right;Posterior wound 1- left posterior venous (Active)   Wound Image   05/03/21 1201   Wound Etiology Venous 05/03/21 1201   Dressing Status Dry; Intact 05/03/21 1201   Wound Cleansed Not Cleansed 05/03/21 1201   Dressing/Treatment Other (comment) 04/26/21 1429   Wound Length (cm) 0 cm 05/03/21 1201   Wound Width (cm) 0 cm 05/03/21 1201   Wound Depth (cm) 0 cm 05/03/21 1201   Wound Surface Area (cm^2) 0 cm^2 05/03/21 1201   Change in Wound Size % (l*w) 100 05/03/21 1201   Wound Volume (cm^3) 0 cm^3 05/03/21 1201   Wound Healing % 100 05/03/21 1201   Distance Tunneling (cm) 0 cm 05/03/21 1201   Tunneling Position ___ O'Clock 0 05/03/21 1201   Undermining Starts ___ O'Clock 0 05/03/21 1201   Undermining Ends___ O'Clock 0 05/03/21 1201   Undermining Maxium Distance (cm) 0 05/03/21 1201   Wound Assessment Dry 05/03/21 1201   Drainage Amount None 05/03/21 1201   Drainage Description Serosanguinous 04/26/21 1338   Odor None 05/03/21 1201   Mariajose-wound Assessment Dry/flaky 05/03/21 1201   Margins Attached edges 05/03/21 1201   Wound Thickness Description not for Pressure Injury Full thickness 05/03/21 1201   Number of days: 6             Plan:     Problem List Items Addressed This Visit     * (Principal) 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 Obtain compression stockings for daily wear.   RTO prn      Treatment Note please see attached Discharge Instructions    In my professional opinion this patient would benefit from HBO Therapy: No    Written patient dismissal instructions given to patient and signed by patient or POA. Discharge [address] and Hyperbaric Oxygen Therapy   Physician Orders and Discharge Instructions  [address] [place] [address] [facility]  Telephone: [phone]    NAME:  Jessika Zambranoash:  1940  MEDICAL RECORD NUMBER:  [id number]  DATE:  5/3/2021    Discharge condition: Stable    Discharge to: Home    Left via:Private automobile    Accompanied by: Self    Dressing Orders: Left leg Wound:  Wash with soap and water. Apply restore to wound bed then secure with optilock, apply Calamine Coflex Unnaboot  from toes to knee. Change once weekly    [facility] follow up visit _____________________________  (Please note your next appointment above and if you are unable to keep, kindly give a 24 hour notice. Thank you.)    If you experience any of the following, please call the [facility] during business hours:    * Increase in Pain  * Temperature over 101  * Increase in drainage from your wound  * Drainage with a foul odor  * Bleeding  * Increase in swelling  * Need for compression bandage changes due to slippage, breakthrough drainage. If you need medical attention outside of the business hours of the [facility] please contact your PCP or go to the nearest emergency room.         Electronically signed by Rebel Linares MD on 5/3/2021 at 12:15 PM

## 2021-05-03 NOTE — PLAN OF CARE
Problem: Wound:  Goal: Will show signs of wound healing; wound closure and no evidence of infection  Description: Will show signs of wound healing; wound closure and no evidence of infection  Outcome: Met This Shift     Problem: Venous:  Goal: Signs of wound healing will improve  Description: Signs of wound healing will improve  Outcome: Met This Shift     Problem: Compression therapy:  Goal: Will be free from complications associated with compression therapy  Description: Will be free from complications associated with compression therapy  Outcome: Met This Shift     Problem: Blood Glucose:  Goal: Ability to maintain appropriate glucose levels will improve  Description: Ability to maintain appropriate glucose levels will improve  Outcome: Met This Shift

## 2022-07-11 ENCOUNTER — APPOINTMENT (OUTPATIENT)
Dept: GENERAL RADIOLOGY | Age: 82
DRG: 177 | End: 2022-07-11
Payer: MEDICARE

## 2022-07-11 ENCOUNTER — HOSPITAL ENCOUNTER (INPATIENT)
Age: 82
LOS: 3 days | Discharge: HOME HEALTH | DRG: 177 | End: 2022-07-14
Attending: EMERGENCY MEDICINE | Admitting: STUDENT IN AN ORGANIZED HEALTH CARE EDUCATION/TRAINING PROGRAM
Payer: MEDICARE

## 2022-07-11 DIAGNOSIS — G93.49 INFECTIOUS ENCEPHALOPATHY: ICD-10-CM

## 2022-07-11 DIAGNOSIS — B99.9 INFECTIOUS ENCEPHALOPATHY: ICD-10-CM

## 2022-07-11 DIAGNOSIS — N18.31 STAGE 3A CHRONIC KIDNEY DISEASE (HCC): ICD-10-CM

## 2022-07-11 DIAGNOSIS — U07.1 COVID-19 VIRUS INFECTION: ICD-10-CM

## 2022-07-11 DIAGNOSIS — J96.01 ACUTE RESPIRATORY FAILURE WITH HYPOXEMIA (HCC): Primary | ICD-10-CM

## 2022-07-11 PROBLEM — J12.82 PNEUMONIA DUE TO COVID-19 VIRUS: Status: ACTIVE | Noted: 2022-07-11

## 2022-07-11 LAB
ADENOVIRUS BY PCR: NOT DETECTED
ALBUMIN SERPL-MCNC: 3.2 G/DL (ref 3.5–5.2)
ALP BLD-CCNC: 82 U/L (ref 40–130)
ALT SERPL-CCNC: 12 U/L (ref 5–41)
ANION GAP SERPL CALCULATED.3IONS-SCNC: 8 MMOL/L (ref 7–19)
AST SERPL-CCNC: 31 U/L (ref 5–40)
BACTERIA: NEGATIVE /HPF
BASE EXCESS ARTERIAL: 2.2 MMOL/L (ref -2–2)
BASOPHILS ABSOLUTE: 0.1 K/UL (ref 0–0.2)
BASOPHILS RELATIVE PERCENT: 0.9 % (ref 0–1)
BILIRUB SERPL-MCNC: <0.2 MG/DL (ref 0.2–1.2)
BILIRUBIN URINE: NEGATIVE
BLOOD, URINE: NEGATIVE
BORDETELLA PARAPERTUSSIS BY PCR: NOT DETECTED
BORDETELLA PERTUSSIS BY PCR: NOT DETECTED
BUN BLDV-MCNC: 23 MG/DL (ref 8–23)
C-REACTIVE PROTEIN: 2.02 MG/DL (ref 0–0.5)
CALCIUM SERPL-MCNC: 8.7 MG/DL (ref 8.8–10.2)
CARBOXYHEMOGLOBIN ARTERIAL: 2.2 % (ref 0–5)
CHLAMYDOPHILIA PNEUMONIAE BY PCR: NOT DETECTED
CHLORIDE BLD-SCNC: 104 MMOL/L (ref 98–111)
CLARITY: CLEAR
CO2: 26 MMOL/L (ref 22–29)
COLOR: YELLOW
CORONAVIRUS 229E BY PCR: NOT DETECTED
CORONAVIRUS HKU1 BY PCR: NOT DETECTED
CORONAVIRUS NL63 BY PCR: NOT DETECTED
CORONAVIRUS OC43 BY PCR: NOT DETECTED
CREAT SERPL-MCNC: 1.5 MG/DL (ref 0.5–1.2)
CRYSTALS, UA: ABNORMAL /HPF
D DIMER: 4.31 UG/ML FEU (ref 0–0.48)
EOSINOPHILS ABSOLUTE: 0.1 K/UL (ref 0–0.6)
EOSINOPHILS RELATIVE PERCENT: 1.3 % (ref 0–5)
EPITHELIAL CELLS, UA: 1 /HPF (ref 0–5)
FIBRINOGEN: 370 MG/DL (ref 238–505)
FIO2: 32 %
GFR AFRICAN AMERICAN: 54
GFR NON-AFRICAN AMERICAN: 45
GLUCOSE BLD-MCNC: 81 MG/DL (ref 74–109)
GLUCOSE URINE: NEGATIVE MG/DL
HCO3 ARTERIAL: 29.1 MMOL/L (ref 22–26)
HCT VFR BLD CALC: 40.4 % (ref 42–52)
HEMOGLOBIN, ART, EXTENDED: 13 G/DL (ref 14–18)
HEMOGLOBIN: 12.6 G/DL (ref 14–18)
HUMAN METAPNEUMOVIRUS BY PCR: NOT DETECTED
HUMAN RHINOVIRUS/ENTEROVIRUS BY PCR: NOT DETECTED
HYALINE CASTS: 1 /HPF (ref 0–8)
IMMATURE GRANULOCYTES #: 0.1 K/UL
INFLUENZA A BY PCR: NOT DETECTED
INFLUENZA B BY PCR: NOT DETECTED
KETONES, URINE: NEGATIVE MG/DL
LACTIC ACID, SEPSIS: 0.9 MG/DL (ref 0.5–1.9)
LACTIC ACID, SEPSIS: 1.1 MG/DL (ref 0.5–1.9)
LEUKOCYTE ESTERASE, URINE: NEGATIVE
LYMPHOCYTES ABSOLUTE: 1.7 K/UL (ref 1.1–4.5)
LYMPHOCYTES RELATIVE PERCENT: 22.2 % (ref 20–40)
MCH RBC QN AUTO: 34.1 PG (ref 27–31)
MCHC RBC AUTO-ENTMCNC: 31.2 G/DL (ref 33–37)
MCV RBC AUTO: 109.2 FL (ref 80–94)
METHEMOGLOBIN ARTERIAL: 1.1 %
MONOCYTES ABSOLUTE: 1.6 K/UL (ref 0–0.9)
MONOCYTES RELATIVE PERCENT: 20.9 % (ref 0–10)
MYCOPLASMA PNEUMONIAE BY PCR: NOT DETECTED
NEUTROPHILS ABSOLUTE: 4.2 K/UL (ref 1.5–7.5)
NEUTROPHILS RELATIVE PERCENT: 54 % (ref 50–65)
NITRITE, URINE: NEGATIVE
O2 CONTENT ARTERIAL: 16.2 ML/DL
O2 SAT, ARTERIAL: 88.7 %
O2 THERAPY: ABNORMAL
OXYGEN FLOW: 3
PARAINFLUENZA VIRUS 1 BY PCR: NOT DETECTED
PARAINFLUENZA VIRUS 2 BY PCR: NOT DETECTED
PARAINFLUENZA VIRUS 3 BY PCR: NOT DETECTED
PARAINFLUENZA VIRUS 4 BY PCR: NOT DETECTED
PCO2 ARTERIAL: 54 MMHG (ref 35–45)
PDW BLD-RTO: 15.2 % (ref 11.5–14.5)
PH ARTERIAL: 7.34 (ref 7.35–7.45)
PH UA: 5.5 (ref 5–8)
PLATELET # BLD: 184 K/UL (ref 130–400)
PMV BLD AUTO: 11.3 FL (ref 9.4–12.4)
PO2 ARTERIAL: 54 MMHG (ref 80–100)
POTASSIUM REFLEX MAGNESIUM: 5.5 MMOL/L (ref 3.5–5)
POTASSIUM, WHOLE BLOOD: 5
PRO-BNP: 6719 PG/ML (ref 0–1800)
PROTEIN UA: =>1000 MG/DL
RBC # BLD: 3.7 M/UL (ref 4.7–6.1)
RBC UA: 7 /HPF (ref 0–4)
RESPIRATORY SYNCYTIAL VIRUS BY PCR: NOT DETECTED
SAMPLE SOURCE: ABNORMAL
SARS-COV-2, PCR: DETECTED
SODIUM BLD-SCNC: 138 MMOL/L (ref 136–145)
SPECIFIC GRAVITY UA: 1.02 (ref 1–1.03)
SPONT RATE(BPM): 16
TOTAL PROTEIN: 6.7 G/DL (ref 6.6–8.7)
TROPONIN: 0.07 NG/ML (ref 0–0.03)
UROBILINOGEN, URINE: 0.2 E.U./DL
WBC # BLD: 7.7 K/UL (ref 4.8–10.8)
WBC UA: 1 /HPF (ref 0–5)

## 2022-07-11 PROCEDURE — 36600 WITHDRAWAL OF ARTERIAL BLOOD: CPT

## 2022-07-11 PROCEDURE — 84145 PROCALCITONIN (PCT): CPT

## 2022-07-11 PROCEDURE — 87040 BLOOD CULTURE FOR BACTERIA: CPT

## 2022-07-11 PROCEDURE — 82306 VITAMIN D 25 HYDROXY: CPT

## 2022-07-11 PROCEDURE — 6370000000 HC RX 637 (ALT 250 FOR IP): Performed by: EMERGENCY MEDICINE

## 2022-07-11 PROCEDURE — 1210000000 HC MED SURG R&B

## 2022-07-11 PROCEDURE — 6360000002 HC RX W HCPCS: Performed by: EMERGENCY MEDICINE

## 2022-07-11 PROCEDURE — 3E0333Z INTRODUCTION OF ANTI-INFLAMMATORY INTO PERIPHERAL VEIN, PERCUTANEOUS APPROACH: ICD-10-PCS | Performed by: FAMILY MEDICINE

## 2022-07-11 PROCEDURE — 36415 COLL VENOUS BLD VENIPUNCTURE: CPT

## 2022-07-11 PROCEDURE — 0202U NFCT DS 22 TRGT SARS-COV-2: CPT

## 2022-07-11 PROCEDURE — 82728 ASSAY OF FERRITIN: CPT

## 2022-07-11 PROCEDURE — 84484 ASSAY OF TROPONIN QUANT: CPT

## 2022-07-11 PROCEDURE — 96374 THER/PROPH/DIAG INJ IV PUSH: CPT

## 2022-07-11 PROCEDURE — 85384 FIBRINOGEN ACTIVITY: CPT

## 2022-07-11 PROCEDURE — 83880 ASSAY OF NATRIURETIC PEPTIDE: CPT

## 2022-07-11 PROCEDURE — 85025 COMPLETE CBC W/AUTO DIFF WBC: CPT

## 2022-07-11 PROCEDURE — 71045 X-RAY EXAM CHEST 1 VIEW: CPT

## 2022-07-11 PROCEDURE — 86140 C-REACTIVE PROTEIN: CPT

## 2022-07-11 PROCEDURE — 6370000000 HC RX 637 (ALT 250 FOR IP): Performed by: STUDENT IN AN ORGANIZED HEALTH CARE EDUCATION/TRAINING PROGRAM

## 2022-07-11 PROCEDURE — 80053 COMPREHEN METABOLIC PANEL: CPT

## 2022-07-11 PROCEDURE — 71045 X-RAY EXAM CHEST 1 VIEW: CPT | Performed by: RADIOLOGY

## 2022-07-11 PROCEDURE — 85379 FIBRIN DEGRADATION QUANT: CPT

## 2022-07-11 PROCEDURE — 82803 BLOOD GASES ANY COMBINATION: CPT

## 2022-07-11 PROCEDURE — 93005 ELECTROCARDIOGRAM TRACING: CPT | Performed by: EMERGENCY MEDICINE

## 2022-07-11 PROCEDURE — 83615 LACTATE (LD) (LDH) ENZYME: CPT

## 2022-07-11 PROCEDURE — 99285 EMERGENCY DEPT VISIT HI MDM: CPT

## 2022-07-11 PROCEDURE — 2580000003 HC RX 258: Performed by: EMERGENCY MEDICINE

## 2022-07-11 PROCEDURE — 83605 ASSAY OF LACTIC ACID: CPT

## 2022-07-11 PROCEDURE — 81001 URINALYSIS AUTO W/SCOPE: CPT

## 2022-07-11 RX ORDER — CLOPIDOGREL BISULFATE 75 MG/1
75 TABLET ORAL DAILY
Status: DISCONTINUED | OUTPATIENT
Start: 2022-07-12 | End: 2022-07-14 | Stop reason: HOSPADM

## 2022-07-11 RX ORDER — ONDANSETRON 2 MG/ML
4 INJECTION INTRAMUSCULAR; INTRAVENOUS EVERY 6 HOURS PRN
Status: DISCONTINUED | OUTPATIENT
Start: 2022-07-11 | End: 2022-07-14 | Stop reason: HOSPADM

## 2022-07-11 RX ORDER — ACETAMINOPHEN 325 MG/1
650 TABLET ORAL ONCE
Status: COMPLETED | OUTPATIENT
Start: 2022-07-11 | End: 2022-07-11

## 2022-07-11 RX ORDER — LOSARTAN POTASSIUM 50 MG/1
50 TABLET ORAL DAILY
Status: CANCELLED | OUTPATIENT
Start: 2022-07-12

## 2022-07-11 RX ORDER — TAMSULOSIN HYDROCHLORIDE 0.4 MG/1
0.4 CAPSULE ORAL DAILY
Status: DISCONTINUED | OUTPATIENT
Start: 2022-07-12 | End: 2022-07-14 | Stop reason: HOSPADM

## 2022-07-11 RX ORDER — ONDANSETRON 4 MG/1
4 TABLET, ORALLY DISINTEGRATING ORAL EVERY 8 HOURS PRN
Status: DISCONTINUED | OUTPATIENT
Start: 2022-07-11 | End: 2022-07-14 | Stop reason: HOSPADM

## 2022-07-11 RX ORDER — DEXAMETHASONE SODIUM PHOSPHATE 10 MG/ML
6 INJECTION, SOLUTION INTRAMUSCULAR; INTRAVENOUS ONCE
Status: COMPLETED | OUTPATIENT
Start: 2022-07-11 | End: 2022-07-11

## 2022-07-11 RX ORDER — DEXAMETHASONE SODIUM PHOSPHATE 10 MG/ML
6 INJECTION, SOLUTION INTRAMUSCULAR; INTRAVENOUS DAILY
Status: DISCONTINUED | OUTPATIENT
Start: 2022-07-12 | End: 2022-07-14

## 2022-07-11 RX ORDER — ACETAMINOPHEN 325 MG/1
650 TABLET ORAL EVERY 6 HOURS PRN
Status: DISCONTINUED | OUTPATIENT
Start: 2022-07-11 | End: 2022-07-14 | Stop reason: HOSPADM

## 2022-07-11 RX ORDER — ACETAMINOPHEN 650 MG/1
650 SUPPOSITORY RECTAL EVERY 6 HOURS PRN
Status: DISCONTINUED | OUTPATIENT
Start: 2022-07-11 | End: 2022-07-14 | Stop reason: HOSPADM

## 2022-07-11 RX ORDER — BUMETANIDE 1 MG/1
1 TABLET ORAL DAILY
Status: DISCONTINUED | OUTPATIENT
Start: 2022-07-12 | End: 2022-07-14 | Stop reason: HOSPADM

## 2022-07-11 RX ORDER — GLYCOPYRROLATE 1 MG/1
1 TABLET ORAL 3 TIMES DAILY
Status: DISCONTINUED | OUTPATIENT
Start: 2022-07-12 | End: 2022-07-14

## 2022-07-11 RX ORDER — ASPIRIN 81 MG/1
81 TABLET ORAL DAILY
Status: DISCONTINUED | OUTPATIENT
Start: 2022-07-12 | End: 2022-07-14 | Stop reason: HOSPADM

## 2022-07-11 RX ORDER — SODIUM CHLORIDE 9 MG/ML
INJECTION, SOLUTION INTRAVENOUS PRN
Status: DISCONTINUED | OUTPATIENT
Start: 2022-07-11 | End: 2022-07-14 | Stop reason: HOSPADM

## 2022-07-11 RX ORDER — SODIUM CHLORIDE 0.9 % (FLUSH) 0.9 %
5-40 SYRINGE (ML) INJECTION PRN
Status: DISCONTINUED | OUTPATIENT
Start: 2022-07-11 | End: 2022-07-14 | Stop reason: HOSPADM

## 2022-07-11 RX ORDER — ENOXAPARIN SODIUM 100 MG/ML
30 INJECTION SUBCUTANEOUS DAILY
Status: DISCONTINUED | OUTPATIENT
Start: 2022-07-12 | End: 2022-07-12

## 2022-07-11 RX ORDER — ATORVASTATIN CALCIUM 40 MG/1
20 TABLET, FILM COATED ORAL DAILY
Status: DISCONTINUED | OUTPATIENT
Start: 2022-07-12 | End: 2022-07-14 | Stop reason: HOSPADM

## 2022-07-11 RX ORDER — SODIUM CHLORIDE, SODIUM LACTATE, POTASSIUM CHLORIDE, AND CALCIUM CHLORIDE .6; .31; .03; .02 G/100ML; G/100ML; G/100ML; G/100ML
1000 INJECTION, SOLUTION INTRAVENOUS ONCE
Status: COMPLETED | OUTPATIENT
Start: 2022-07-11 | End: 2022-07-11

## 2022-07-11 RX ORDER — POLYETHYLENE GLYCOL 3350 17 G/17G
17 POWDER, FOR SOLUTION ORAL DAILY PRN
Status: DISCONTINUED | OUTPATIENT
Start: 2022-07-11 | End: 2022-07-14 | Stop reason: HOSPADM

## 2022-07-11 RX ORDER — SODIUM CHLORIDE 0.9 % (FLUSH) 0.9 %
5-40 SYRINGE (ML) INJECTION EVERY 12 HOURS SCHEDULED
Status: DISCONTINUED | OUTPATIENT
Start: 2022-07-12 | End: 2022-07-14 | Stop reason: HOSPADM

## 2022-07-11 RX ORDER — CARVEDILOL 3.12 MG/1
6.25 TABLET ORAL 2 TIMES DAILY
Status: DISCONTINUED | OUTPATIENT
Start: 2022-07-11 | End: 2022-07-13

## 2022-07-11 RX ADMIN — DEXAMETHASONE SODIUM PHOSPHATE 6 MG: 10 INJECTION, SOLUTION INTRAMUSCULAR; INTRAVENOUS at 20:38

## 2022-07-11 RX ADMIN — LEVOTHYROXINE SODIUM 125 MCG: 25 TABLET ORAL at 23:45

## 2022-07-11 RX ADMIN — CEFEPIME HYDROCHLORIDE 2000 MG: 2 INJECTION, POWDER, FOR SOLUTION INTRAVENOUS at 17:55

## 2022-07-11 RX ADMIN — ACETAMINOPHEN 650 MG: 325 TABLET ORAL at 17:56

## 2022-07-11 RX ADMIN — CARVEDILOL 6.25 MG: 3.12 TABLET, FILM COATED ORAL at 23:45

## 2022-07-11 RX ADMIN — SODIUM CHLORIDE, POTASSIUM CHLORIDE, SODIUM LACTATE AND CALCIUM CHLORIDE 1000 ML: 600; 310; 30; 20 INJECTION, SOLUTION INTRAVENOUS at 18:04

## 2022-07-11 ASSESSMENT — PAIN - FUNCTIONAL ASSESSMENT: PAIN_FUNCTIONAL_ASSESSMENT: NONE - DENIES PAIN

## 2022-07-11 ASSESSMENT — LIFESTYLE VARIABLES: HOW OFTEN DO YOU HAVE A DRINK CONTAINING ALCOHOL: NEVER

## 2022-07-11 NOTE — ED NOTES
Called pt's daughter to give her an update on the patient. Daughter appreciative.      Analilia Arguello RN  07/11/22 0680

## 2022-07-11 NOTE — ED PROVIDER NOTES
[facility] [address]      Pt Name: Jacob Young  MRN: [id number]  [place] 1940  Date of evaluation: 7/11/2022  Provider: Kevin Ruggiero MD    CHIEF COMPLAINT       Chief Complaint   Patient presents with    Altered Mental Status     Pt wandered in to a dr's office today. Confused, had been sitting in his vehicle in the parking lot for unknown time. Low oxygen saturation levels    Fever         HISTORY OF PRESENT ILLNESS   (Location/Symptom, Timing/Onset,Context/Setting, Quality, Duration, Modifying Factors, Severity)  Note limiting factors. Jacob Young is a 78 y.o. male who presents to the emergency department by EMS for evaluation after he presented to his primary care doctor's office today. He was found by staff wandering around trying to figure out how to get into the building. Was noted to be confused. They took him into the building and checked her vitals and noted him to be hypoxic with oxygen saturation in the 80s. Was started on nasal cannula oxygen in route. Has history of COPD. Was noted to be febrile as well. HPI    NursingNotes were reviewed. REVIEW OF SYSTEMS    (2-9 systems for level 4, 10 or more for level 5)     Review of Systems   Unable to perform ROS: Mental status change       A complete review of systems was performed and is negative except as noted above in the HPI. PAST MEDICAL HISTORY     Past Medical History:   Diagnosis Date    AAA (abdominal aortic aneurysm) ([facility] [address]     ASHD (arteriosclerotic heart disease)     S/P coronary intervention followed by CBG    CAD (coronary artery disease)     Severe triple-vessel    CAD (coronary artery disease) 2/4/2015    COPD (chronic obstructive pulmonary disease) (HCC)     With tobacco abuse    GERD (gastroesophageal reflux disease)     HH (hiatus hernia)     History of cerebrovascular disease     Hyperlipidemia     PCP, Dr. Bruno Roe manages cholesterol.     Hypertension     Obesity     Osteoarthritis     Renal insufficiency     S/P CABG x 3     S/P CABG x 4 2/4/2015 12/1/06 by Dr. Lilly Zaidi S/P TKR (total knee replacement)     RIGHT    Thyroid disease          SURGICAL HISTORY       Past Surgical History:   Procedure Laterality Date    ABDOMINAL AORTIC ANEURYSM REPAIR, OPEN      ADENOIDECTOMY      APPENDECTOMY      CARDIAC SURGERY      CARDIOVASCULAR STRESS TEST  12[place]07[place]09, [place]    Stress ECho    CARDIOVASCULAR STRESS TEST  11/02/06, [facility]    Adenosine thallium treadmill    CHOLECYSTECTOMY      CORONARY ARTERY BYPASS GRAFT  12/01/06, [place]    CABG x 4 with sequential FAYE to diagonal, LAD, SVG, to obtuse marginal, posterior descending endoscopic vein harvesting.  DIAGNOSTIC CARDIAC CATH LAB PROCEDURE  11/14/06, [place]    Left cath, arteriography of bilateral native internal mammary arteries.  DIAGNOSTIC CARDIAC CATH LAB PROCEDURE  06/27/03????, [place]    Left cath, selective CA, primary stent placement two circumflex marginal branches.  DIAGNOSTIC CARDIAC CATH LAB PROCEDURE  02/24/1993, [place]    Left cath    DIAGNOSTIC CARDIAC CATH LAB PROCEDURE  05/15/91, [place]    Left cath, SARABIA/SHERON left ventric., selective coronary arteriography.  JOINT REPLACEMENT      RI KNEE SCOPE, W/LATERAL RELEASE Right 12/15/2017    KNEE ARTHROSCOPY LATERAL PATELLA REALIGNMENT performed by Maxwell De Luna DO at [address] PART Right 4/6/2018    KNEE TOTAL ARTHROPLASTY PATELLOFEMORALREVISION performed by Maxwell De Luna DO at [address] [place] [address] PTCA  05/16/91, [place]    PTCA to circumflex marginal branch.     REVISION TOTAL KNEE ARTHROPLASTY Right 1/6/2017    KNEE TOTAL ARTHROPLASTY REVISION performed by Pretty Felix MD at [place] [address]  4/2012    Left  knee          CURRENT MEDICATIONS       Current Discharge Medication List      CONTINUE these medications which have NOT CHANGED    Details   aspirin 81 MG EC tablet Take 81 mg by mouth daily      acetaminophen (TYLENOL) 500 MG tablet Take 500 mg by mouth every 6 hours as needed for Pain      carvedilol (COREG) 6.25 MG tablet Take 6.25 mg by mouth 2 times daily      Magnesium 100 MG CAPS Take by mouth daily      bumetanide (BUMEX) 1 MG tablet Take 1 mg by mouth daily      glycopyrrolate (ROBINUL) 1 MG tablet Take 1 mg by mouth 3 times daily      hyoscyamine (ANASPAZ;LEVSIN) 125 MCG tablet Take 125 mcg by mouth every 4 hours as needed for Cramping      nitroGLYCERIN (NITROSTAT) 0.4 MG SL tablet Place 1 tablet under the tongue every 5 minutes as needed for Chest pain  Qty: 25 tablet, Refills: 3      clopidogrel (PLAVIX) 75 MG tablet Take 1 tablet by mouth daily Start after Xarelto done  Qty: 30 tablet, Refills: 3      losartan (COZAAR) 100 MG tablet Take 50 mg by mouth daily       tamsulosin (FLOMAX) 0.4 MG capsule Take 0.4 mg by mouth daily. simvastatin (ZOCOR) 20 MG tablet Take 20 mg by mouth nightly. levothyroxine (SYNTHROID) 125 MCG tablet Take 125 mcg by mouth nightly              ALLERGIES     Pcn [penicillins], Azithromycin, Doxycycline, Tetracyclines & related, and Vibramycin [doxycycline calcium]    FAMILY HISTORY       Family History   Problem Relation Age of Onset    Heart Disease Other     Heart Disease Mother           SOCIAL HISTORY       Social History     Socioeconomic History    Marital status:       Spouse name: None    Number of children: None    Years of education: None    Highest education level: None   Occupational History    None   Tobacco Use    Smoking status: Former Smoker     Types: Cigarettes     Quit date: 1989     Years since quittin.5    Smokeless tobacco: Never Used   Vaping Use    Vaping Use: Never used   Substance and Sexual Activity    Alcohol use: Not Currently     Comment: RARELY    Drug use: Never    Sexual activity: None   Other Topics Concern    None   Social History Narrative    None Social Determinants of Health     Financial Resource Strain:     Difficulty of Paying Living Expenses: Not on file   Food Insecurity:     Worried About Running Out of Food in the Last Year: Not on file    Severiano of Food in the Last Year: Not on file   Transportation Needs:     Lack of Transportation (Medical): Not on file    Lack of Transportation (Non-Medical): Not on file   Physical Activity:     Days of Exercise per Week: Not on file    Minutes of Exercise per Session: Not on file   Stress:     Feeling of Stress : Not on file   Social Connections:     Frequency of Communication with Friends and Family: Not on file    Frequency of Social Gatherings with Friends and Family: Not on file    Attends [employer] Services: Not on file    Active Member of [address] [place] [address] [employer] or Organizations: Not on file    Attends Club or Organization Meetings: Not on file    Marital Status: Not on file   Intimate Partner Violence:     Fear of Current or Ex-Partner: Not on file    Emotionally Abused: Not on file    Physically Abused: Not on file    Sexually Abused: Not on file   Housing Stability:     Unable to Pay for Housing in the Last Year: Not on file    Number of [place] in the Last Year: Not on file    Unstable Housing in the Last Year: Not on file       SCREENINGS    Rosalba Coma Scale  Eye Opening: Spontaneous  Best Verbal Response: Confused  Best Motor Response: Obeys commands  Rosalba Coma Scale Score: 14        PHYSICAL EXAM    (up to 7 for level 4, 8 or more for level 5)     ED Triage Vitals [07/11/22 1701]   BP Temp Temp Source Heart Rate Resp SpO2 Height Weight   (!) 152/102 (!) 102.7 °F (39.3 °C) Oral 66 23 (!) 82 % 5' 9\" (1.753 m) 180 lb (81.6 kg)       Physical Exam  Vitals and nursing note reviewed. Constitutional:       Appearance: He is well-developed. He is ill-appearing. He is not diaphoretic. Interventions: Nasal cannula in place. HENT:      Head: Normocephalic and atraumatic.    Eyes: General: No scleral icterus. Right eye: No discharge. Left eye: No discharge. Pupils: Pupils are equal, round, and reactive to light. Cardiovascular:      Rate and Rhythm: Normal rate and regular rhythm. Pulmonary:      Effort: Pulmonary effort is normal. No respiratory distress. Breath sounds: No stridor. Abdominal:      General: There is no distension. Musculoskeletal:         General: No deformity. Normal range of motion. Cervical back: Normal range of motion. Skin:     General: Skin is warm and dry. Neurological:      Mental Status: He is alert. He is disoriented and confused. GCS: GCS eye subscore is 4. GCS verbal subscore is 4. GCS motor subscore is 6. Cranial Nerves: No cranial nerve deficit. Motor: No abnormal muscle tone. Psychiatric:         Cognition and Memory: Cognition is impaired. Memory is impaired. DIAGNOSTIC RESULTS     EKG: All EKG's are interpreted by the Emergency Department Physician who either signs or Co-signs this chart in the absence of a cardiologist.        RADIOLOGY:   Non-plain film images such as CT, Ultrasound and MRI are read by the radiologist. Unice Leak images are visualized and preliminarily interpreted by the emergency physician with the below findings:      Interpretation per the Radiologist below, if available at the time of this note:    XR CHEST PORTABLE   Final Result   Diffuse patchy infiltrates are noted at both lower lung zones. Recommendation: Follow up as clinically indicated. Electronically Signed by Jai Cleveland MD at 11-Jul-2022 06:54:46 PM                     ED BEDSIDE ULTRASOUND:   Performed by ED Physician - none    LABS:  Labs Reviewed   RESPIRATORY PANEL, MOLECULAR, WITH COVID-19 - Abnormal; Notable for the following components:       Result Value    SARS-CoV-2, PCR DETECTED (*)     All other components within normal limits    Narrative:     CALL  [facility]  [facility] tel. ,  called result to Marquis Nasreen [facility] ED, 07/11/2022 18:59, by Mansi Solomon   CBC WITH AUTO DIFFERENTIAL - Abnormal; Notable for the following components:    RBC 3.70 (*)     Hemoglobin 12.6 (*)     Hematocrit 40.4 (*)     .2 (*)     MCH 34.1 (*)     MCHC 31.2 (*)     RDW 15.2 (*)     Monocytes % 20.9 (*)     Monocytes Absolute 1.60 (*)     All other components within normal limits   COMPREHENSIVE METABOLIC PANEL W/ REFLEX TO MG FOR LOW K - Abnormal; Notable for the following components:    Potassium reflex Magnesium 5.5 (*)     CREATININE 1.5 (*)     GFR Non- 45 (*)     GFR  54 (*)     Calcium 8.7 (*)     Albumin 3.2 (*)     All other components within normal limits   BLOOD GAS, ARTERIAL - Abnormal; Notable for the following components:    pH, Arterial 7.340 (*)     pCO2, Arterial 54.0 (*)     pO2, Arterial 54.0 (*)     HCO3, Arterial 29.1 (*)     Base Excess, Arterial 2.2 (*)     Hemoglobin, Art, Extended 13.0 (*)     O2 Sat, Arterial 88.7 (*)     All other components within normal limits   BRAIN NATRIURETIC PEPTIDE - Abnormal; Notable for the following components:    Pro-BNP 6,719 (*)     All other components within normal limits   TROPONIN - Abnormal; Notable for the following components:    Troponin 0.07 (*)     All other components within normal limits   MICROSCOPIC URINALYSIS - Abnormal; Notable for the following components:    Bacteria, UA NEGATIVE (*)     Crystals, UA NEG (*)     RBC, UA 7 (*)     All other components within normal limits   D-DIMER, QUANTITATIVE - Abnormal; Notable for the following components:    D-Dimer, Quant 4.31 (*)     All other components within normal limits   C-REACTIVE PROTEIN - Abnormal; Notable for the following components:    CRP 2.02 (*)     All other components within normal limits   CULTURE, BLOOD 1   CULTURE, BLOOD 2   URINALYSIS WITH REFLEX TO CULTURE   LACTATE, SEPSIS   LACTATE, SEPSIS   FIBRINOGEN   VITAMIN D 25 HYDROXY   PROCALCITONIN LACTATE DEHYDROGENASE   FERRITIN   VITAMIN D 25 HYDROXY   CBC WITH AUTO DIFFERENTIAL   COMPREHENSIVE METABOLIC PANEL   C-REACTIVE PROTEIN       All other labs were within normal range or not returned as of this dictation.     Medications   cefepime (MAXIPIME) 2,000 mg in sterile water 20 mL IV syringe (2,000 mg IntraVENous Given 7/11/22 1755)   aspirin EC tablet 81 mg (has no administration in time range)   bumetanide (BUMEX) tablet 1 mg (has no administration in time range)   carvedilol (COREG) tablet 6.25 mg (has no administration in time range)   clopidogrel (PLAVIX) tablet 75 mg (has no administration in time range)   glycopyrrolate (ROBINUL) tablet 1 mg (has no administration in time range)   hyoscyamine (LEVSIN/SL) sublingual tablet 125 mcg (has no administration in time range)   levothyroxine (SYNTHROID) tablet 125 mcg (has no administration in time range)   atorvastatin (LIPITOR) tablet 20 mg (has no administration in time range)   tamsulosin (FLOMAX) capsule 0.4 mg (has no administration in time range)   sodium chloride flush 0.9 % injection 5-40 mL (has no administration in time range)   sodium chloride flush 0.9 % injection 5-40 mL (has no administration in time range)   0.9 % sodium chloride infusion (has no administration in time range)   enoxaparin Sodium (LOVENOX) injection 30 mg (has no administration in time range)   ondansetron (ZOFRAN-ODT) disintegrating tablet 4 mg (has no administration in time range)     Or   ondansetron (ZOFRAN) injection 4 mg (has no administration in time range)   polyethylene glycol (GLYCOLAX) packet 17 g (has no administration in time range)   acetaminophen (TYLENOL) tablet 650 mg (has no administration in time range)     Or   acetaminophen (TYLENOL) suppository 650 mg (has no administration in time range)   dexamethasone (PF) (DECADRON) injection 6 mg (has no administration in time range)   acetaminophen (TYLENOL) tablet 650 mg (650 mg Oral Given 7/11/22 1756)   lactated ringers bolus (0 mLs IntraVENous Stopped 7/11/22 1912)   dexamethasone (PF) (DECADRON) injection 6 mg (6 mg IntraVENous Given 7/11/22 2038)       EMERGENCY DEPARTMENT COURSE and DIFFERENTIALDIAGNOSIS/MDM:   Vitals:    Vitals:    07/11/22 2100 07/11/22 2200 07/11/22 2215 07/11/22 2239   BP: (!) 146/74 (!) 153/47 (!) 153/47 (!) 182/55   Pulse: 54 (!) 43 50 55   Resp: 16 16 16 20   Temp:   99 °F (37.2 °C) 97.7 °F (36.5 °C)   TempSrc:    Temporal   SpO2: 96% 96% 95% 94%   Weight:    191 lb 9 oz (86.9 kg)   Height:    5' 9\" (1.753 m)       [facility]    ED Course as of 07/11/22 2339 Mon Jul 11, 2022   1834 WBC: 7.7 [CYRIL]   1859 SARS-CoV-2, PCR(!!): DETECTED [CYRIL]   1859 Troponin(!): 0.07 [CYRIL]   1859 Pro-BNP(!): 6,719 [CYRIL]   1900 EKG shows sinus rhythm with a rate of 58. Read by computer as complete AV block with wide QRS, however there are clear P waves. There is significant underlying baseline artifact that I believe is the reason for this misinterpretation. No findings of acute ischemia or infarction. Limited by underlying baseline artifact [CYRIL]      ED Course User Index  [CYRIL] Joe Valdivia MD       Repeat ekg shows sinus bradycardia with a rate of 44. No new changes otherwise. Hypoxia resolved with nasal cannula. Mental status improved with improvement of oxygenation. covid positive. CXR clear./   Gave decadron. Based on the evaluation and work-up here patient is felt to require further monitoring, work-up, or treatment that is available in the emergency department. Case was discussed with Dr. Bhavesh Madden who agrees for observation or admission for further management. Treatment and stabilization as necessary were provided in the emergency department prior to transfer of care to the family medicine service. CONSULTS:  None    PROCEDURES:  Unless otherwise notedbelow, none     Procedures      FINAL IMPRESSION     1.  Acute respiratory failure with hypoxemia (HCC)    2. COVID-19 virus infection 3. Stage 3a chronic kidney disease ([facility] [address]    4. Infectious encephalopathy          DISPOSITION/PLAN   DISPOSITION Admitted 07/11/2022 09:18:13 PM      No notes of EC Admission Criteria type on file. PATIENT REFERRED TO:  No follow-up provider specified.     DISCHARGE MEDICATIONS:  Current Discharge Medication List             (Please note that portions of this note were completed with a voice recognition program.  Efforts were made to edit the dictations butoccasionally words are mis-transcribed.)    Mayco Penny MD (electronically signed)  AttendingEmergency Physician          Mayco Irvin MD  07/11/22 3844

## 2022-07-11 NOTE — ED NOTES
Pt arrives to ED via EMS, after wandering in to a dr's office in [facility] after sitting in his car outside for an unknown amt of time. EMS states that when the pt arrived to the dr's office he was acutely confused, low oxygen saturations, and was febrile.        Melanie Villarreal RN  07/11/22 3845

## 2022-07-12 LAB
ALBUMIN SERPL-MCNC: 3.3 G/DL (ref 3.5–5.2)
ALP BLD-CCNC: 87 U/L (ref 40–130)
ALT SERPL-CCNC: 21 U/L (ref 5–41)
ANION GAP SERPL CALCULATED.3IONS-SCNC: 10 MMOL/L (ref 7–19)
AST SERPL-CCNC: 37 U/L (ref 5–40)
BASOPHILS ABSOLUTE: 0 K/UL (ref 0–0.2)
BASOPHILS RELATIVE PERCENT: 0.8 % (ref 0–1)
BILIRUB SERPL-MCNC: <0.2 MG/DL (ref 0.2–1.2)
BUN BLDV-MCNC: 24 MG/DL (ref 8–23)
C-REACTIVE PROTEIN: 3.24 MG/DL (ref 0–0.5)
CALCIUM SERPL-MCNC: 8.8 MG/DL (ref 8.8–10.2)
CHLORIDE BLD-SCNC: 103 MMOL/L (ref 98–111)
CO2: 26 MMOL/L (ref 22–29)
CREAT SERPL-MCNC: 1.5 MG/DL (ref 0.5–1.2)
EKG P AXIS: 0 DEGREES
EKG P AXIS: 13 DEGREES
EKG P AXIS: NORMAL DEGREES
EKG P-R INTERVAL: 162 MS
EKG P-R INTERVAL: 186 MS
EKG P-R INTERVAL: NORMAL MS
EKG Q-T INTERVAL: 448 MS
EKG Q-T INTERVAL: 464 MS
EKG Q-T INTERVAL: 484 MS
EKG QRS DURATION: 106 MS
EKG QRS DURATION: 146 MS
EKG QRS DURATION: 150 MS
EKG QTC CALCULATION (BAZETT): 445 MS
EKG QTC CALCULATION (BAZETT): 454 MS
EKG QTC CALCULATION (BAZETT): 456 MS
EKG T AXIS: -175 DEGREES
EKG T AXIS: 35 DEGREES
EKG T AXIS: 53 DEGREES
EOSINOPHILS ABSOLUTE: 0 K/UL (ref 0–0.6)
EOSINOPHILS RELATIVE PERCENT: 0 % (ref 0–5)
FERRITIN: 109.8 NG/ML (ref 30–400)
GFR AFRICAN AMERICAN: 54
GFR NON-AFRICAN AMERICAN: 45
GLUCOSE BLD-MCNC: 100 MG/DL (ref 74–109)
GLUCOSE BLD-MCNC: 128 MG/DL (ref 70–99)
HBA1C MFR BLD: 5.3 % (ref 4–6)
HCT VFR BLD CALC: 41.2 % (ref 42–52)
HEMOGLOBIN: 12.8 G/DL (ref 14–18)
IMMATURE GRANULOCYTES #: 0.1 K/UL
LACTATE DEHYDROGENASE: 331 U/L (ref 91–215)
LYMPHOCYTES ABSOLUTE: 1.6 K/UL (ref 1.1–4.5)
LYMPHOCYTES RELATIVE PERCENT: 31 % (ref 20–40)
MCH RBC QN AUTO: 33.8 PG (ref 27–31)
MCHC RBC AUTO-ENTMCNC: 31.1 G/DL (ref 33–37)
MCV RBC AUTO: 108.7 FL (ref 80–94)
MONOCYTES ABSOLUTE: 0.3 K/UL (ref 0–0.9)
MONOCYTES RELATIVE PERCENT: 5.9 % (ref 0–10)
NEUTROPHILS ABSOLUTE: 3.1 K/UL (ref 1.5–7.5)
NEUTROPHILS RELATIVE PERCENT: 61.1 % (ref 50–65)
PDW BLD-RTO: 15.4 % (ref 11.5–14.5)
PERFORMED ON: ABNORMAL
PLATELET # BLD: 165 K/UL (ref 130–400)
PMV BLD AUTO: 11.1 FL (ref 9.4–12.4)
POTASSIUM SERPL-SCNC: 5.8 MMOL/L (ref 3.5–5)
PROCALCITONIN: 0.05 NG/ML (ref 0–0.09)
RBC # BLD: 3.79 M/UL (ref 4.7–6.1)
SODIUM BLD-SCNC: 139 MMOL/L (ref 136–145)
TOTAL PROTEIN: 6.3 G/DL (ref 6.6–8.7)
VITAMIN D 25-HYDROXY: 33.4 NG/ML
VITAMIN D 25-HYDROXY: 35.1 NG/ML
WBC # BLD: 5.1 K/UL (ref 4.8–10.8)

## 2022-07-12 PROCEDURE — 93010 ELECTROCARDIOGRAM REPORT: CPT | Performed by: INTERNAL MEDICINE

## 2022-07-12 PROCEDURE — 93005 ELECTROCARDIOGRAM TRACING: CPT | Performed by: STUDENT IN AN ORGANIZED HEALTH CARE EDUCATION/TRAINING PROGRAM

## 2022-07-12 PROCEDURE — 83036 HEMOGLOBIN GLYCOSYLATED A1C: CPT

## 2022-07-12 PROCEDURE — 1210000000 HC MED SURG R&B

## 2022-07-12 PROCEDURE — 86140 C-REACTIVE PROTEIN: CPT

## 2022-07-12 PROCEDURE — 6360000002 HC RX W HCPCS: Performed by: EMERGENCY MEDICINE

## 2022-07-12 PROCEDURE — 2580000003 HC RX 258: Performed by: EMERGENCY MEDICINE

## 2022-07-12 PROCEDURE — 85025 COMPLETE CBC W/AUTO DIFF WBC: CPT

## 2022-07-12 PROCEDURE — 6370000000 HC RX 637 (ALT 250 FOR IP): Performed by: STUDENT IN AN ORGANIZED HEALTH CARE EDUCATION/TRAINING PROGRAM

## 2022-07-12 PROCEDURE — 36415 COLL VENOUS BLD VENIPUNCTURE: CPT

## 2022-07-12 PROCEDURE — 2580000003 HC RX 258: Performed by: STUDENT IN AN ORGANIZED HEALTH CARE EDUCATION/TRAINING PROGRAM

## 2022-07-12 PROCEDURE — 80053 COMPREHEN METABOLIC PANEL: CPT

## 2022-07-12 PROCEDURE — 6360000002 HC RX W HCPCS: Performed by: STUDENT IN AN ORGANIZED HEALTH CARE EDUCATION/TRAINING PROGRAM

## 2022-07-12 PROCEDURE — 82306 VITAMIN D 25 HYDROXY: CPT

## 2022-07-12 PROCEDURE — 82947 ASSAY GLUCOSE BLOOD QUANT: CPT

## 2022-07-12 RX ORDER — INSULIN LISPRO 100 [IU]/ML
0-3 INJECTION, SOLUTION INTRAVENOUS; SUBCUTANEOUS NIGHTLY
Status: DISCONTINUED | OUTPATIENT
Start: 2022-07-12 | End: 2022-07-14 | Stop reason: HOSPADM

## 2022-07-12 RX ORDER — INSULIN LISPRO 100 [IU]/ML
0-6 INJECTION, SOLUTION INTRAVENOUS; SUBCUTANEOUS
Status: DISCONTINUED | OUTPATIENT
Start: 2022-07-12 | End: 2022-07-14 | Stop reason: HOSPADM

## 2022-07-12 RX ORDER — ENOXAPARIN SODIUM 100 MG/ML
40 INJECTION SUBCUTANEOUS DAILY
Status: DISCONTINUED | OUTPATIENT
Start: 2022-07-13 | End: 2022-07-14 | Stop reason: HOSPADM

## 2022-07-12 RX ORDER — DEXTROSE MONOHYDRATE 50 MG/ML
100 INJECTION, SOLUTION INTRAVENOUS PRN
Status: DISCONTINUED | OUTPATIENT
Start: 2022-07-12 | End: 2022-07-14 | Stop reason: HOSPADM

## 2022-07-12 RX ADMIN — ACETAMINOPHEN 325MG 650 MG: 325 TABLET ORAL at 10:50

## 2022-07-12 RX ADMIN — BUMETANIDE 1 MG: 1 TABLET ORAL at 10:50

## 2022-07-12 RX ADMIN — CARVEDILOL 6.25 MG: 3.12 TABLET, FILM COATED ORAL at 20:45

## 2022-07-12 RX ADMIN — CLOPIDOGREL BISULFATE 75 MG: 75 TABLET ORAL at 10:50

## 2022-07-12 RX ADMIN — SODIUM CHLORIDE, PRESERVATIVE FREE 10 ML: 5 INJECTION INTRAVENOUS at 10:54

## 2022-07-12 RX ADMIN — ENOXAPARIN SODIUM 30 MG: 100 INJECTION SUBCUTANEOUS at 10:49

## 2022-07-12 RX ADMIN — DEXAMETHASONE SODIUM PHOSPHATE 6 MG: 10 INJECTION, SOLUTION INTRAMUSCULAR; INTRAVENOUS at 18:32

## 2022-07-12 RX ADMIN — ACETAMINOPHEN 325MG 650 MG: 325 TABLET ORAL at 20:52

## 2022-07-12 RX ADMIN — CEFEPIME 2000 MG: 2 INJECTION, POWDER, FOR SOLUTION INTRAVENOUS at 20:44

## 2022-07-12 RX ADMIN — ASPIRIN 81 MG: 81 TABLET, COATED ORAL at 10:50

## 2022-07-12 RX ADMIN — TAMSULOSIN HYDROCHLORIDE 0.4 MG: 0.4 CAPSULE ORAL at 10:50

## 2022-07-12 RX ADMIN — ATORVASTATIN CALCIUM 20 MG: 40 TABLET, FILM COATED ORAL at 10:50

## 2022-07-12 RX ADMIN — SODIUM CHLORIDE, PRESERVATIVE FREE 10 ML: 5 INJECTION INTRAVENOUS at 20:45

## 2022-07-12 RX ADMIN — CEFEPIME 2000 MG: 2 INJECTION, POWDER, FOR SOLUTION INTRAVENOUS at 10:51

## 2022-07-12 RX ADMIN — LEVOTHYROXINE SODIUM 125 MCG: 25 TABLET ORAL at 20:44

## 2022-07-12 ASSESSMENT — PAIN DESCRIPTION - LOCATION: LOCATION: BACK

## 2022-07-12 ASSESSMENT — PAIN SCALES - GENERAL
PAINLEVEL_OUTOF10: 3
PAINLEVEL_OUTOF10: 3

## 2022-07-12 ASSESSMENT — ENCOUNTER SYMPTOMS: SHORTNESS OF BREATH: 1

## 2022-07-12 NOTE — CARE COORDINATION
Patient Contact Information:  [address] [id number] (home)   Above information verified? [x]   Yes  []   No    Had HOME OXYGEN prior to admission:    [address]:     If patient qualifies for oxygen at discharge, patient will decline home oxygen. His response was, \"no, no, no\". He stated that [facility] attempted to send him home previously on home oxygen. He declined at that time also. This CM explained the importance of oxygen. Patient continued to say, \"no, no, no\". Has a pulse oximetry unit at home:   []   Yes  [x]   No - patient provided Pulse Ox meter through [address] you been vaccinated for COVID-19 (SARS-CoV-2)? [x]   Yes  []   No                   If so, when? Which :  []   Pfizer-BioNTech  [x]   Moderna  []   Pat Flynn  []   Other:       Pharmacy:    [facility], [address] [place] [address] [address]  [address] [place] [address]  Phone: [phone] Fax: [phone]    [address], [address] [place] [address] [phone] Dominic Godinez [phone] [address] [facility]  [address] [place] [address]  Phone: [phone] Fax: [phone]      Active with HD/PD prior to admission:           []   Yes  [x]   No  HD Center:       Financial:  Payor: Danitza Foreman / Plan: MEDICARE PART A AND B / Product Type: *No Product type* /     Pre-Cert required for SNF:   []   Yes  [x]   No    Patient Deficits:  []   Yes   [x]   No    If yes:  []   Confusion/Memory  []   Visual  []   Motor/Sensory         []   Right arm         []   Right leg         []   Left arm         []   Left leg  []   Language/Speech         []   Aphasia         []   Dysarthria         []   Swallow         [place] Coma Scale  Eye Opening: Spontaneous  Best Verbal Response: Oriented  Best Motor Response: Obeys commands  Rosalba Coma Scale Score: 15    Patient Deficit Notes:        Additional CM/SW Notes: was provided with a Choice of Provider? Patient   The Patient and/Or Patient Representative agree with the Discharge Plan?  Yes

## 2022-07-12 NOTE — H&P
S/P CABG x 4 2/4/2015 12/1/06 by Dr. Janine Gonzalez S/P TKR (total knee replacement)     RIGHT    Thyroid disease        Past Surgical History:    Past Surgical History:   Procedure Laterality Date    ABDOMINAL AORTIC ANEURYSM REPAIR, OPEN      ADENOIDECTOMY      APPENDECTOMY      CARDIAC SURGERY      CARDIOVASCULAR STRESS TEST  12/07/09, [facility]    Stress ECho    CARDIOVASCULAR STRESS TEST  11/02/06, [facility]    Adenosine thallium treadmill    CHOLECYSTECTOMY      CORONARY ARTERY BYPASS GRAFT  12/01/06, [facility]    CABG x 4 with sequential FAYE to diagonal, LAD, SVG, to obtuse marginal, posterior descending endoscopic vein harvesting.  DIAGNOSTIC CARDIAC CATH LAB PROCEDURE  11/14/06, [facility]    Left cath, arteriography of bilateral native internal mammary arteries.  DIAGNOSTIC CARDIAC CATH LAB PROCEDURE  06/27/03????, [facility]    Left cath, selective CA, primary stent placement two circumflex marginal branches.  DIAGNOSTIC CARDIAC CATH LAB PROCEDURE  02/24/1993, [facility]    Left cath    DIAGNOSTIC CARDIAC CATH LAB PROCEDURE  05/15/91, [facility]    Left cath, SARABIA/SHERON left ventric., selective coronary arteriography.  JOINT REPLACEMENT      [place] KNEE SCOPE, W/LATERAL RELEASE Right 12/15/2017    KNEE ARTHROSCOPY LATERAL PATELLA REALIGNMENT performed by Megan Carter DO at [address] PART Right 4/6/2018    KNEE TOTAL ARTHROPLASTY PATELLOFEMORALREVISION performed by Megan Carter DO at [address] [place] [address] PTCA  05/16/91, [facility]    PTCA to circumflex marginal branch.  REVISION TOTAL KNEE ARTHROPLASTY Right 1/6/2017    KNEE TOTAL ARTHROPLASTY REVISION performed by Didi Hardwick MD at [place] [address]  4/2012    Left  knee        Medications Prior to Admission:    Not in a hospital admission.     Allergies:  Pcn [penicillins], Azithromycin, Doxycycline, Tetracyclines & related, and Vibramycin [doxycycline calcium]    Social History:   Social History Socioeconomic History    Marital status:      Spouse name: Not on file    Number of children: Not on file    Years of education: Not on file    Highest education level: Not on file   Occupational History    Not on file   Tobacco Use    Smoking status: Former Smoker     Types: Cigarettes     Quit date: 1989     Years since quittin.5    Smokeless tobacco: Never Used   Vaping Use    Vaping Use: Never used   Substance and Sexual Activity    Alcohol use: Not Currently     Comment: RARELY    Drug use: Never    Sexual activity: Not on file   Other Topics Concern    Not on file   Social History Narrative    Not on file     Social Determinants of Health     Financial Resource Strain:     Difficulty of Paying Living Expenses: Not on file   Food Insecurity:     Worried About [address] in the Last Year: Not on file    [address] Car Clubs in the Last Year: Not on file   Transportation Needs:     Lack of Transportation (Medical): Not on file    Lack of Transportation (Non-Medical):  Not on file   Physical Activity:     Days of Exercise per Week: Not on file    Minutes of Exercise per Session: Not on file   Stress:     Feeling of Stress : Not on file   Social Connections:     Frequency of Communication with Friends and Family: Not on file    Frequency of Social Gatherings with Friends and Family: Not on file    Attends Bahai Services: Not on file    Active Member of [address] [place] [address] or Organizations: Not on file    Attends Club or Organization Meetings: Not on file    Marital Status: Not on file   Intimate Partner Violence:     Fear of Current or Ex-Partner: Not on file    Emotionally Abused: Not on file    Physically Abused: Not on file    Sexually Abused: Not on file   Housing Stability:     Unable to Pay for Housing in the Last Year: Not on file    Number of [place] in the Last Year: Not on file    Unstable Housing in the Last Year: Not on file       Family History: Family History   Problem Relation Age of Onset    Heart Disease Other     Heart Disease Mother        REVIEW OF SYSTEMS:    Unable to obtain secondary to encephalopathy      Physical Exam:  Constitutional: appears ill and lethargic  HEENT: Grossly normal sitting in chair  Head: Normocephalic and atraumatic. Eyes: Pupils are equal, round, and reactive to light. Extraocular muscles appear to be intact  Neck: Neck supple without masses or carotid bruit. Cardiovascular: Regular rhythm and normal heart sounds. No extra or lift rub or murmur appreciated  Pulmonary/Chest: Hypoxic. Not in respiratory distress. Abdominal: Soft. Bowel sounds are normal. There is  no distension or tenderness appreciated. There is no rebound and no guarding. Musculoskeletal: Normal range of motion. There is  no edema or tenderness. No significant joint swelling  Neurological: Pt is lethargic and only oriented to person. Confused on where he is and the date. Moving extremity spontaneously. Skin: Skin is warm and dry without significant rashes     Results Review:   I reviewed the patient's new imaging results and agree with the interpretation. ASSESSMENT AND PLAN:      1. Principal Problem:    Pneumonia due to COVID-19 virus  Active Problems:    ASHD (arteriosclerotic heart disease)    Hypertension    Hyperlipidemia    COPD (chronic obstructive pulmonary disease) (HCC)    History of cerebrovascular disease    Diabetes mellitus (HCC)    GERD (gastroesophageal reflux disease)    PVD (peripheral vascular disease) ([facility]  Resolved Problems:    * No resolved hospital problems. *    Assessment: 77-year-old male with multiple comorbidities who presents with acute epoxy respiratory failure secondary to COVID-19    Plan:    Acute hypoxic respiratory failure secondary to COVID-19  Starting steroids for COVID-19, day 1 will be 7/11.   We will see how he does but I doubt he will need other treatments given his fully vaccinated status which will likely provide more risk than benefit. Hyperkalemia  Potassium 5.5 the emergency department. Likely related to mild dehydration. We will give small amount fluid (1 Liter LR) over the course the night.   Hold home losartan, but anticipate restarting on discharge given its benefit with his cardiac history.    -Restart other home meds without change      Cristela Royal MD  9:00 PM 7/11/2022

## 2022-07-12 NOTE — ED NOTES
Report to [facility] on 4th floor     Santhosh Hernandez, [facility]  07/11/22 [id number]

## 2022-07-12 NOTE — PLAN OF CARE
Problem: Safety - Adult  Goal: Free from fall injury  Outcome: Progressing     Problem: Discharge Planning  Goal: Discharge to home or other facility with appropriate resources  Outcome: Progressing     Problem: Confusion  Goal: Confusion, delirium, dementia, or psychosis is improved or at baseline  Description: INTERVENTIONS:  1. Assess for possible contributors to thought disturbance, including medications, impaired vision or hearing, underlying metabolic abnormalities, dehydration, psychiatric diagnoses, and notify attending LIP  2. [place] high risk fall precautions, as indicated  3. Provide frequent short contacts to provide reality reorientation, refocusing and direction  4. Decrease environmental stimuli, including noise as appropriate  5. Monitor and intervene to maintain adequate nutrition, hydration, elimination, sleep and activity  6. If unable to ensure safety without constant attention obtain sitter and review sitter guidelines with assigned personnel  7.  Initiate Psychosocial CNS and Spiritual Care consult, as indicated  Outcome: Progressing

## 2022-07-12 NOTE — PROGRESS NOTES
Progress Note  Date:2022       Room:0433/433-02  Patient Name:Atul Peoples     YOB: 1940     Age:82 y.o. Seems more alert today. Extended discussion with patient that he has pneumonia related to the COVID-19 virus and he is contagious. Currently he is not wearing a mask. Patient is on the [facility] unit. Subjective    Subjective:  Symptoms:  Stable. He reports shortness of breath, weakness and anxiety. Diet:  Poor intake. Activity level: Impaired due to weakness. Pain:  He reports no pain. Review of Systems   Respiratory: Positive for shortness of breath. Neurological: Positive for weakness. Objective         Vitals Last 24 Hours:  TEMPERATURE:  Temp  Av.3 °F (37.4 °C)  Min: 97.7 °F (36.5 °C)  Max: 102.7 °F (39.3 °C)  RESPIRATIONS RANGE: Resp  Av.4  Min: 16  Max: 24  PULSE OXIMETRY RANGE: SpO2  Av.4 %  Min: 72 %  Max: 99 %  PULSE RANGE: Pulse  Av.1  Min: 43  Max: 67  BLOOD PRESSURE RANGE: Systolic (97UDL), XBL:706 , Min:146 , GID:142   ; Diastolic (11OKY), ZDL:05, Min:47, Max:102    I/O (24Hr): Intake/Output Summary (Last 24 hours) at 2022 7379  Last data filed at 2022 0640  Gross per 24 hour   Intake --   Output 500 ml   Net -500 ml     Objective:  General Appearance:  Comfortable and well-appearing. Vital signs: (most recent): Blood pressure (!) 162/95, pulse 52, temperature 97.7 °F (36.5 °C), resp. rate 18, height 5' 9\" (1.753 m), weight 191 lb 9 oz (86.9 kg), SpO2 98 %. Vital signs are normal.    Output: Producing urine and minimal stool output. HEENT: Normal HEENT exam.    Lungs:  Normal effort and normal respiratory rate. Heart: Normal rate. Regular rhythm. Abdomen: Abdomen is soft. Bowel sounds are normal.   There is no abdominal tenderness. Extremities: Decreased range of motion.     Neurological: (Seems more alert today than yesterday in the office, still has some underlying dementia at 78years of age with superimposed delirium related to hypoxemia and COVID-19 infection). Skin:  Warm and dry. Labs/Imaging/Diagnostics    Labs:  CBC:  Recent Labs     07/11/22 1715 07/12/22 0413   WBC 7.7 5.1   RBC 3.70* 3.79*   HGB 12.6* 12.8*   HCT 40.4* 41.2*   .2* 108.7*   RDW 15.2* 15.4*    165     CHEMISTRIES:  Recent Labs     07/11/22 1715 07/11/22  1740 07/12/22 0413     --  139   K 5.5* 5.0 5.8*     --  103   CO2 26  --  26   BUN 23  --  24*   CREATININE 1.5*  --  1.5*   GLUCOSE 81  --  100     PT/INR:No results for input(s): PROTIME, INR in the last 72 hours. APTT:No results for input(s): APTT in the last 72 hours. LIVER PROFILE:  Recent Labs     07/11/22 1715 07/12/22 0413   AST 31 37   ALT 12 21   BILITOT <0.2 <0.2   ALKPHOS 82 87       Imaging Last 24 Hours:  XR CHEST PORTABLE    Result Date: 7/11/2022  NO PRIOR REPORT AVAILABLE Exam: X-RAY OF THECHEST Clinical data:Sepsis and hypoxia. Technique:Single view of the chest. Prior studies: Radiographs of the chest dated 09/03/2019 images. Findings:Diffuse patchy infiltrates are noted at both lower lung zones. Median sternotomy sutures are noted. Noevidence of pleural effusion. Cardiac silhouette is within normal limits. No acute osseous abnormality is detected. Diffuse patchy infiltrates are noted at both lower lung zones. Recommendation: Follow up as clinically indicated.           Electronically Signed by Aixa Maharaj MD at 11-Jul-2022 06:54:46 PM             Assessment//Plan           Hospital Problems           Last Modified POA    * (Principal) Pneumonia due to COVID-19 virus 7/11/2022 Yes    COVID-19 7/11/2022 Yes    ASHD (arteriosclerotic heart disease) 7/11/2022 Yes    Hypertension 7/11/2022 Yes    Hyperlipidemia 7/11/2022 Yes    COPD (chronic obstructive pulmonary disease) ([facility] 7/11/2022 Yes    History of cerebrovascular disease 7/11/2022 Yes    Diabetes mellitus ([facility] 7/11/2022 Yes    GERD (gastroesophageal reflux disease) 7/11/2022 Yes    PVD (peripheral vascular disease) ([facility] [address] 7/11/2022 Yes        Assessment:    Condition: In stable condition. Improving. Plan:   Encourage ambulation and per physical therapy. Regular diet. (COVID-19 infection-patient is maintaining saturations in the mid 90s with minimal oxygen. Risk versus benefit of Paxlovid discussed with patient. We will continue current regimen for now. Patient remarkably improved from yesterday. EMS was called to the office and patient brought to the emergency room, he left his cane and car keys in the office which I will bring to the hospital tomorrow. Type 2 diabetes-restart home medications with Accu-Cheks and sliding scale insulin since he is on the steroids for his COVID-19 infection. COPD/emphysema-maintaining sats with minimal oxygen support. Patient has been fairly independent in the past living alone and taking care of his activities of daily living without assistance, he is concerned that his dog is at home alone and trying to find someone to go check on his animal.  Will notify  as well. ).        Electronically signed by Raymundo Esparza MD on 7/12/22 at 7:12 AM CDT

## 2022-07-12 NOTE — ED NOTES
All pt's belongings, including clothes and jewelry placed in pt belonging bag with pt per his request.     Mike Tucker RN  07/11/22 1912

## 2022-07-12 NOTE — PROGRESS NOTES
Palliative Care/Spiritual Care: Met with pt to initiate palliative care. Pt is hospitalized with Covid and Covid pneumonia. Pt says he passed out sitting in front of his dr's office. He says he had a cough and a headache. Pt has past medical history with other pertinent diagnoses. Advance Directives: Pt has a LW, copy in pt's EMR, with his son Meera Booth listed as his decision maker. Pt is a full code and currently wants CPR and Ventilator. SEE ACP NOTE. Pain/other symptoms: Pt says he had a headache and this  informed Ned Rodriguez, pt's nurse. Ned Rodriguez says pt was just given Tylenol. Social/Spiritual: Pt says she goes to [employer]. Pt/family discussion r/t goals: Pt lives at home alone with his dogs. He has a son and a daughter. Pt is fully independent; he drives, and performs daily living skills to care for himself at home. Pt's goal is to return home with previous quality of life and previous daily living skills. Provided spiritual care with sustaining presence, nurtured hope, and prayer. Pt expressed gratitude for spiritual care.      Electronically signed by Concepción Haddad on 7/12/2022 at 11:05 AM

## 2022-07-12 NOTE — PROGRESS NOTES
Automatic Dose Adjustment of                Subcutaneous Anticoagulant for Prophylaxis    Dionicio Mayer is a 78 y.o. male. Recent Labs     07/11/22  1715 07/12/22  0413   CREATININE 1.5* 1.5*       Estimated Creatinine Clearance: 40 mL/min (A) (based on SCr of 1.5 mg/dL (H)). Weight:  Wt Readings from Last 1 Encounters:   07/11/22 191 lb 9 oz (86.9 kg)           Pharmacy has adjusted subcutaneous anticoagulant for prophylaxis to Enoxaparin 40 mg SC daily based on the patient's weight and estimated CrCl per [facility] policy.              MASON BRAY, PHARM D, 7/12/2022, 1:30 PM

## 2022-07-13 PROBLEM — Z51.5 PALLIATIVE CARE PATIENT: Status: ACTIVE | Noted: 2022-07-13

## 2022-07-13 LAB
ALBUMIN SERPL-MCNC: 3.1 G/DL (ref 3.5–5.2)
ALP BLD-CCNC: 75 U/L (ref 40–130)
ALT SERPL-CCNC: 17 U/L (ref 5–41)
ANION GAP SERPL CALCULATED.3IONS-SCNC: 7 MMOL/L (ref 7–19)
AST SERPL-CCNC: 28 U/L (ref 5–40)
BASOPHILS ABSOLUTE: 0 K/UL (ref 0–0.2)
BASOPHILS RELATIVE PERCENT: 0.5 % (ref 0–1)
BILIRUB SERPL-MCNC: <0.2 MG/DL (ref 0.2–1.2)
BUN BLDV-MCNC: 31 MG/DL (ref 8–23)
C-REACTIVE PROTEIN: 2.12 MG/DL (ref 0–0.5)
CALCIUM SERPL-MCNC: 8.7 MG/DL (ref 8.8–10.2)
CHLORIDE BLD-SCNC: 106 MMOL/L (ref 98–111)
CO2: 27 MMOL/L (ref 22–29)
CREAT SERPL-MCNC: 1.5 MG/DL (ref 0.5–1.2)
EOSINOPHILS ABSOLUTE: 0.1 K/UL (ref 0–0.6)
EOSINOPHILS RELATIVE PERCENT: 0.6 % (ref 0–5)
GFR AFRICAN AMERICAN: 54
GFR NON-AFRICAN AMERICAN: 45
GLUCOSE BLD-MCNC: 100 MG/DL (ref 70–99)
GLUCOSE BLD-MCNC: 105 MG/DL (ref 70–99)
GLUCOSE BLD-MCNC: 71 MG/DL (ref 70–99)
GLUCOSE BLD-MCNC: 80 MG/DL (ref 74–109)
GLUCOSE BLD-MCNC: 88 MG/DL (ref 70–99)
HCT VFR BLD CALC: 41.3 % (ref 42–52)
HEMOGLOBIN: 12.8 G/DL (ref 14–18)
IMMATURE GRANULOCYTES #: 0 K/UL
LYMPHOCYTES ABSOLUTE: 3.1 K/UL (ref 1.1–4.5)
LYMPHOCYTES RELATIVE PERCENT: 36.7 % (ref 20–40)
MCH RBC QN AUTO: 34.2 PG (ref 27–31)
MCHC RBC AUTO-ENTMCNC: 31 G/DL (ref 33–37)
MCV RBC AUTO: 110.4 FL (ref 80–94)
MONOCYTES ABSOLUTE: 1.5 K/UL (ref 0–0.9)
MONOCYTES RELATIVE PERCENT: 17.4 % (ref 0–10)
NEUTROPHILS ABSOLUTE: 3.7 K/UL (ref 1.5–7.5)
NEUTROPHILS RELATIVE PERCENT: 44.4 % (ref 50–65)
PDW BLD-RTO: 15.3 % (ref 11.5–14.5)
PERFORMED ON: ABNORMAL
PERFORMED ON: ABNORMAL
PERFORMED ON: NORMAL
PERFORMED ON: NORMAL
PLATELET # BLD: 165 K/UL (ref 130–400)
PMV BLD AUTO: 11.7 FL (ref 9.4–12.4)
POTASSIUM SERPL-SCNC: 5.3 MMOL/L (ref 3.5–5)
RBC # BLD: 3.74 M/UL (ref 4.7–6.1)
SODIUM BLD-SCNC: 140 MMOL/L (ref 136–145)
TOTAL PROTEIN: 5.8 G/DL (ref 6.6–8.7)
WBC # BLD: 8.4 K/UL (ref 4.8–10.8)

## 2022-07-13 PROCEDURE — 80053 COMPREHEN METABOLIC PANEL: CPT

## 2022-07-13 PROCEDURE — 2580000003 HC RX 258: Performed by: EMERGENCY MEDICINE

## 2022-07-13 PROCEDURE — 6370000000 HC RX 637 (ALT 250 FOR IP): Performed by: STUDENT IN AN ORGANIZED HEALTH CARE EDUCATION/TRAINING PROGRAM

## 2022-07-13 PROCEDURE — 6370000000 HC RX 637 (ALT 250 FOR IP): Performed by: FAMILY MEDICINE

## 2022-07-13 PROCEDURE — 2580000003 HC RX 258: Performed by: STUDENT IN AN ORGANIZED HEALTH CARE EDUCATION/TRAINING PROGRAM

## 2022-07-13 PROCEDURE — 97165 OT EVAL LOW COMPLEX 30 MIN: CPT

## 2022-07-13 PROCEDURE — 1210000000 HC MED SURG R&B

## 2022-07-13 PROCEDURE — 6360000002 HC RX W HCPCS: Performed by: EMERGENCY MEDICINE

## 2022-07-13 PROCEDURE — 97116 GAIT TRAINING THERAPY: CPT

## 2022-07-13 PROCEDURE — 97530 THERAPEUTIC ACTIVITIES: CPT

## 2022-07-13 PROCEDURE — 6360000002 HC RX W HCPCS: Performed by: STUDENT IN AN ORGANIZED HEALTH CARE EDUCATION/TRAINING PROGRAM

## 2022-07-13 PROCEDURE — 36415 COLL VENOUS BLD VENIPUNCTURE: CPT

## 2022-07-13 PROCEDURE — 82947 ASSAY GLUCOSE BLOOD QUANT: CPT

## 2022-07-13 PROCEDURE — 97161 PT EVAL LOW COMPLEX 20 MIN: CPT

## 2022-07-13 PROCEDURE — 86140 C-REACTIVE PROTEIN: CPT

## 2022-07-13 PROCEDURE — 85025 COMPLETE CBC W/AUTO DIFF WBC: CPT

## 2022-07-13 RX ORDER — CARVEDILOL 12.5 MG/1
12.5 TABLET ORAL 2 TIMES DAILY
Status: DISCONTINUED | OUTPATIENT
Start: 2022-07-13 | End: 2022-07-14 | Stop reason: HOSPADM

## 2022-07-13 RX ORDER — AMLODIPINE BESYLATE 5 MG/1
5 TABLET ORAL DAILY
Status: DISCONTINUED | OUTPATIENT
Start: 2022-07-13 | End: 2022-07-14 | Stop reason: HOSPADM

## 2022-07-13 RX ADMIN — CARVEDILOL 12.5 MG: 12.5 TABLET, FILM COATED ORAL at 20:45

## 2022-07-13 RX ADMIN — AMLODIPINE BESYLATE 5 MG: 5 TABLET ORAL at 08:43

## 2022-07-13 RX ADMIN — GLYCOPYRROLATE 1 MG: 1 TABLET ORAL at 14:45

## 2022-07-13 RX ADMIN — BUMETANIDE 1 MG: 1 TABLET ORAL at 08:43

## 2022-07-13 RX ADMIN — CARVEDILOL 12.5 MG: 12.5 TABLET, FILM COATED ORAL at 08:42

## 2022-07-13 RX ADMIN — SODIUM CHLORIDE, PRESERVATIVE FREE 10 ML: 5 INJECTION INTRAVENOUS at 20:45

## 2022-07-13 RX ADMIN — ATORVASTATIN CALCIUM 20 MG: 40 TABLET, FILM COATED ORAL at 08:42

## 2022-07-13 RX ADMIN — TAMSULOSIN HYDROCHLORIDE 0.4 MG: 0.4 CAPSULE ORAL at 08:43

## 2022-07-13 RX ADMIN — ACETAMINOPHEN 325MG 650 MG: 325 TABLET ORAL at 11:01

## 2022-07-13 RX ADMIN — CEFEPIME 2000 MG: 2 INJECTION, POWDER, FOR SOLUTION INTRAVENOUS at 08:47

## 2022-07-13 RX ADMIN — GLYCOPYRROLATE 1 MG: 1 TABLET ORAL at 20:44

## 2022-07-13 RX ADMIN — CLOPIDOGREL BISULFATE 75 MG: 75 TABLET ORAL at 08:43

## 2022-07-13 RX ADMIN — CEFEPIME 2000 MG: 2 INJECTION, POWDER, FOR SOLUTION INTRAVENOUS at 20:44

## 2022-07-13 RX ADMIN — LEVOTHYROXINE SODIUM 125 MCG: 25 TABLET ORAL at 20:44

## 2022-07-13 RX ADMIN — SODIUM CHLORIDE, PRESERVATIVE FREE 10 ML: 5 INJECTION INTRAVENOUS at 08:46

## 2022-07-13 RX ADMIN — ENOXAPARIN SODIUM 40 MG: 100 INJECTION SUBCUTANEOUS at 08:42

## 2022-07-13 RX ADMIN — DEXAMETHASONE SODIUM PHOSPHATE 6 MG: 10 INJECTION, SOLUTION INTRAMUSCULAR; INTRAVENOUS at 18:39

## 2022-07-13 RX ADMIN — ACETAMINOPHEN 325MG 650 MG: 325 TABLET ORAL at 20:45

## 2022-07-13 RX ADMIN — ASPIRIN 81 MG: 81 TABLET, COATED ORAL at 08:42

## 2022-07-13 ASSESSMENT — PAIN SCALES - GENERAL
PAINLEVEL_OUTOF10: 5
PAINLEVEL_OUTOF10: 3

## 2022-07-13 ASSESSMENT — PAIN DESCRIPTION - DESCRIPTORS: DESCRIPTORS: ACHING

## 2022-07-13 ASSESSMENT — PAIN DESCRIPTION - LOCATION: LOCATION: HEAD

## 2022-07-13 NOTE — PLAN OF CARE
Problem: Safety - Adult  Goal: Free from fall injury  7/12/2022 2156 by Frank Simmons RN  Outcome: Progressing  Flowsheets (Taken 7/12/2022 2155)  Free From Fall Injury: Instruct family/caregiver on patient safety  7/12/2022 1837 by Flor Leonard RN  Outcome: Progressing     Problem: Discharge Planning  Goal: Discharge to home or other facility with appropriate resources  7/12/2022 2156 by Frank Simmons RN  Outcome: Progressing  Flowsheets (Taken 7/12/2022 2045)  Discharge to home or other facility with appropriate resources: Identify barriers to discharge with patient and caregiver  7/12/2022 1837 by Flor Leonard RN  Outcome: Progressing     Problem: Confusion  Goal: Confusion, delirium, dementia, or psychosis is improved or at baseline  Description: INTERVENTIONS:  1. Assess for possible contributors to thought disturbance, including medications, impaired vision or hearing, underlying metabolic abnormalities, dehydration, psychiatric diagnoses, and notify attending LIP  2. [place] high risk fall precautions, as indicated  3. Provide frequent short contacts to provide reality reorientation, refocusing and direction  4. Decrease environmental stimuli, including noise as appropriate  5. Monitor and intervene to maintain adequate nutrition, hydration, elimination, sleep and activity  6. If unable to ensure safety without constant attention obtain sitter and review sitter guidelines with assigned personnel  7.  Initiate Psychosocial CNS and Spiritual Care consult, as indicated  7/12/2022 2156 by Frank Simmons RN  Outcome: Progressing  Flowsheets (Taken 7/12/2022 2045)  Effect of thought disturbance (confusion, delirium, dementia, or psychosis) are managed with adequate functional status: Assess for contributors to thought disturbance, including medications, impaired vision or hearing, underlying metabolic abnormalities, dehydration, psychiatric diagnoses, notify LIP  7/12/2022 1837 by Flor Leonard RN  Outcome: Progressing

## 2022-07-13 NOTE — PROGRESS NOTES
Therapeutic Intervention: Decreased functional mobility ; Decreased ADL status; Decreased strength;Decreased balance  Assessment: Pt AMB IN ROOM WITH RW WITHOUT DIFFICULTY. WILL PROGRESS AS TOLERATED. WOULD BE SAFE TO RETURN HOME WHEN MEDICALLY STABLE.   Requires PT Follow-Up: Yes  Activity Tolerance  Activity Tolerance: Patient tolerated evaluation without incident  Activity Tolerance Comments: SPO2 DROPS ON RA     Plan   Plan  Plan: 3-5 times per week  Current Treatment Recommendations: Strengthening,Balance training,Functional mobility training,Transfer training,Gait training,Safety education & training,Patient/Caregiver education & training,Endurance training  Safety Devices  Type of Devices: Call light within reach     Restrictions  Restrictions/Precautions  Restrictions/Precautions: Fall Risk,Isolation     Subjective   Pain: DENIES  General  Patient assessed for rehabilitation services?: Yes  Diagnosis: PNA, COVID  Subjective  Subjective: Pt HOPING FOR HOME SOON         Social/Functional History  Social/Functional History  Lives With: Alone  Type of Home: House  Home Layout: Two level,Able to Live on Main level with bedroom/bathroom  Home Access: Stairs to enter with rails  Entrance Stairs - Number of Steps: 4  Bathroom Shower/Tub: Tub/Shower unit  Home Equipment: Cane,Walker, rolling  Receives Help From: Family,Friend(s)  ADL Assistance: Independent  Homemaking Assistance: Independent  Ambulation Assistance: Independent  Transfer Assistance: Independent  Active : Yes  Vision/Hearing  Vision  Vision: Impaired  Vision Exceptions: Wears glasses at all times    Cognition         Objective   Heart Rate: 69  Heart Rate Source: Monitor  BP: (!) 157/62  BP Location: Right upper arm  Patient Position: Supine  MAP (Calculated): 93.67  Resp: 18  SpO2: (!) 78 % (WITH ACTIVITY ON RA, INC TO 90% ON 2L WITHIN 2 MIN)  O2 Device: Nasal cannula        Gross Assessment  AROM: Within functional limits  Strength: Generally decreased, functional                    Bed mobility  Supine to Sit: Independent  Sit to Supine: Independent  Transfers  Sit to Stand: Stand by assistance;Contact guard assistance  Stand to sit: Stand by assistance;Contact guard assistance  Bed to Chair: Contact guard assistance  Ambulation  Device: Rolling Walker  Assistance: Contact guard assistance  Quality of Gait: SLIGHTLY FLEXED POSTURE, DEC SAFETY WITH WALKER BUT IMPROVES WITH CUES  Gait Deviations: Slow Marian;Decreased step length  Distance: 25'     Balance  Sitting - Dynamic: Good  Standing - Dynamic: Fair           OutComes Score                                                  AM-PAC Score             Goals  Short Term Goals  Time Frame for Short term goals: 14 DAYS  Short term goal 1: TRANSFERS MOD IND  Short term goal 2: ' RW MOD IND       Education  Patient Education  Education Given To: Patient  Education Provided: Role of Therapy;Plan of Care      Therapy Time   Individual Concurrent Group Co-treatment   Time In           Time Out           Minutes                   Sharla Douglass, PT

## 2022-07-13 NOTE — PROGRESS NOTES
Occupational Therapy  Facility/Department: [facility] 4 ONCOLOGY UNIT  Occupational Therapy Initial Assessment    Name: Earna Blizzard  : 1940  MRN: [id number]  Date of Service: 2022    Discharge Recommendations:             Patient Diagnosis(es): The primary encounter diagnosis was Acute respiratory failure with hypoxemia ([facility] [address] Diagnoses of COVID-19 virus infection, Stage 3a chronic kidney disease ([facility] [address] and Infectious encephalopathy were also pertinent to this visit. Past Medical History:  has a past medical history of AAA (abdominal aortic aneurysm) ([facility] [address] ASHD (arteriosclerotic heart disease), CAD (coronary artery disease), CAD (coronary artery disease), COPD (chronic obstructive pulmonary disease) ([facility] [address] GERD (gastroesophageal reflux disease), HH (hiatus hernia), History of cerebrovascular disease, Hyperlipidemia, Hypertension, Obesity, Osteoarthritis, Palliative care patient, Renal insufficiency, S/P CABG x 3, S/P CABG x 4, S/P TKR (total knee replacement), and Thyroid disease. Past Surgical History:  has a past surgical history that includes cardiovascular stress test (09, [facility]); cardiovascular stress test (06, [facility]); Tonsillectomy; Adenoidectomy; Appendectomy; Cholecystectomy; Coronary artery bypass graft (06, [place]); Diagnostic Cardiac Cath Lab Procedure (06, [facility]); Diagnostic Cardiac Cath Lab Procedure (03????, [facility]); Diagnostic Cardiac Cath Lab Procedure (1993, [facility]); Diagnostic Cardiac Cath Lab Procedure (05/15/91, [facility]); Percutaneous Transluminal Coronary Angio (91, [facility]); Total knee arthroplasty (2012); Splenectomy; AAA repair, open; Revision total knee arthroplasty (Right, 2017); pr knee scope, w/lateral release (Right, 12/15/2017); Cardiac surgery; joint replacement; and pr revise knee joint replace,1 part (Right, 2018).     Treatment Diagnosis: Pneumonia, Covid      Assessment   Performance deficits / Impairments: Decreased endurance;Decreased ADL status; Decreased balance;Decreased functional mobility   Assessment: Will progress as tolerated. Lives alone independently  Treatment Diagnosis: Pneumonia, Covid  Prognosis: Good  Decision Making: Low Complexity  REQUIRES OT FOLLOW-UP: Yes  Activity Tolerance  Activity Tolerance: Patient limited by fatigue  Activity Tolerance Comments: Pt. walked in room without 2 liters O2. Desats to 79%. With one minute rest and reapply O2, sats back up to 89%        Plan   Plan  Times per Week: 3-5x/week  Times per Day: Daily     Restrictions       Subjective   General  Chart Reviewed: Yes  Patient assessed for rehabilitation services?: Yes  Family / Caregiver Present: No  Diagnosis: Covid, Pneumonia  General Comment  Comments: Pt. currently on 2 liters O2     Social/Functional History  Social/Functional History  Lives With: Alone  Type of Home: House  Home Layout: Two level,Able to Live on Main level with bedroom/bathroom  Home Access: Stairs to enter with rails  Entrance Stairs - Number of Steps: 4  Bathroom Shower/Tub: Tub/Shower unit  Home Equipment: Cane,Walker, rolling  Receives Help From: Family,Friend(s)  ADL Assistance: Independent  Homemaking Assistance: Independent  Ambulation Assistance: Independent  Transfer Assistance: Independent  Active : Yes       Objective   Heart Rate: 69  Heart Rate Source: Monitor  BP: (!) 157/62  BP Location: Right upper arm  Patient Position: Supine  MAP (Calculated): 93.67  Resp: 18  SpO2: 94 %  O2 Device: Nasal cannula                      AROM: Within functional limits  Strength:  Within functional limits  Coordination: Within functional limits  ADL  Feeding: Independent  Grooming: Independent  UE Bathing: Supervision  LE Bathing: Supervision  UE Dressing: Supervision  LE Dressing: Minimal assistance  Toileting: Contact guard assistance           Transfers  Stand Step Transfers: Contact guard assistance  Sit to stand: Contact guard assistance;Stand by assistance  Transfer Comments: RW     Cognition  Overall Cognitive Status: WFL  Orientation  Overall Orientation Status: Within Functional Limits                                           G-Code     OutComes Score                                                  AM-PAC Score             Goals  Short Term Goals  Time Frame for Short term goals: 1 week  Short Term Goal 1: Modified I with toilet tfers  Short Term Goal 2: Modified I with LB dsg  Short Term Goal 3: Modified I with light ambulatory ADLs in room without fatigue  Long Term Goals  Long Term Goal 1: Return to PLOF       Therapy Time   Individual Concurrent Group Co-treatment   Time In           Time Out           Minutes                   Elissa Baker, OT

## 2022-07-13 NOTE — ACP (ADVANCE CARE PLANNING)
Advance Care Planning     Advance Care Planning Activator (Inpatient)  Conversation Note      Date of ACP Conversation: 7/13/2022     Conversation Conducted with: Patient on 7/12/2022    ACP Activator: Timothy Sheehan      Current Designated Health Care Decision Maker:     Primary Decision Maker: Juan José Escobar Child    Secondary Decision Maker: Damián Merirll Child - [phone]      Care Preferences    Ventilation: \"If you were in your present state of health and suddenly became very ill and were unable to breathe on your own, what would your preference be about the use of a ventilator (breathing machine) if it were available to you? \"      Would the patient desire the use of ventilator (breathing machine)?: Yes    \"If your health worsens and it becomes clear that your chance of recovery is unlikely, what would your preference be about the use of a ventilator (breathing machine) if it were available to you? \"     Would the patient desire the use of ventilator (breathing machine)?: Yes      Resuscitation  \"CPR works best to restart the heart when there is a sudden event, like a heart attack, in someone who is otherwise healthy. Unfortunately, CPR does not typically restart the heart for people who have serious health conditions or who are very sick. \"    \"In the event your heart stopped as a result of an underlying serious health condition, would you want attempts to be made to restart your heart (answer \"yes\" for attempt to resuscitate) or would you prefer a natural death (answer \"no\" for do not attempt to resuscitate)? \" Yes       [] Yes   [x] No   Educated Patient / [facility] regarding differences between Advance Directives and portable DNR orders.       Conversation Outcomes:  [x] ACP discussion completed  [x] Existing advance directive reviewed with patient; no changes to patient's previously recorded wishes      Electronically signed by Timothy Sheehan on 7/13/2022 at 7:55 AM

## 2022-07-14 ENCOUNTER — HOSPITAL ENCOUNTER (EMERGENCY)
Age: 82
Discharge: HOME | End: 2022-07-15
Attending: EMERGENCY MEDICINE
Payer: MEDICARE

## 2022-07-14 VITALS
HEART RATE: 51 BPM | BODY MASS INDEX: 28.37 KG/M2 | SYSTOLIC BLOOD PRESSURE: 170 MMHG | RESPIRATION RATE: 18 BRPM | WEIGHT: 191.56 LBS | TEMPERATURE: 97.2 F | DIASTOLIC BLOOD PRESSURE: 65 MMHG | OXYGEN SATURATION: 95 % | HEIGHT: 69 IN

## 2022-07-14 VITALS
HEIGHT: 69 IN | TEMPERATURE: 98.3 F | DIASTOLIC BLOOD PRESSURE: 77 MMHG | HEART RATE: 91 BPM | SYSTOLIC BLOOD PRESSURE: 152 MMHG | BODY MASS INDEX: 28.14 KG/M2 | OXYGEN SATURATION: 96 % | WEIGHT: 190 LBS | RESPIRATION RATE: 20 BRPM

## 2022-07-14 DIAGNOSIS — U07.1 COVID-19 VIRUS INFECTION: Primary | ICD-10-CM

## 2022-07-14 LAB
ALBUMIN SERPL-MCNC: 2.8 G/DL (ref 3.5–5.2)
ALBUMIN SERPL-MCNC: 3.2 G/DL (ref 3.5–5.2)
ALP BLD-CCNC: 73 U/L (ref 40–130)
ALP BLD-CCNC: 74 U/L (ref 40–130)
ALT SERPL-CCNC: 15 U/L (ref 5–41)
ALT SERPL-CCNC: 17 U/L (ref 5–41)
ANION GAP SERPL CALCULATED.3IONS-SCNC: 7 MMOL/L (ref 7–19)
ANION GAP SERPL CALCULATED.3IONS-SCNC: 8 MMOL/L (ref 7–19)
AST SERPL-CCNC: 27 U/L (ref 5–40)
AST SERPL-CCNC: 33 U/L (ref 5–40)
BASOPHILS ABSOLUTE: 0 K/UL (ref 0–0.2)
BASOPHILS RELATIVE PERCENT: 0.3 % (ref 0–1)
BILIRUB SERPL-MCNC: 0.4 MG/DL (ref 0.2–1.2)
BILIRUB SERPL-MCNC: <0.2 MG/DL (ref 0.2–1.2)
BUN BLDV-MCNC: 28 MG/DL (ref 8–23)
BUN BLDV-MCNC: 30 MG/DL (ref 8–23)
C-REACTIVE PROTEIN: 2.86 MG/DL (ref 0–0.5)
CALCIUM SERPL-MCNC: 8.9 MG/DL (ref 8.8–10.2)
CALCIUM SERPL-MCNC: 9.3 MG/DL (ref 8.8–10.2)
CHLORIDE BLD-SCNC: 101 MMOL/L (ref 98–111)
CHLORIDE BLD-SCNC: 96 MMOL/L (ref 98–111)
CO2: 30 MMOL/L (ref 22–29)
CO2: 31 MMOL/L (ref 22–29)
CREAT SERPL-MCNC: 1.3 MG/DL (ref 0.5–1.2)
CREAT SERPL-MCNC: 1.3 MG/DL (ref 0.5–1.2)
EOSINOPHILS ABSOLUTE: 0 K/UL (ref 0–0.6)
EOSINOPHILS RELATIVE PERCENT: 0.1 % (ref 0–5)
GFR AFRICAN AMERICAN: >59
GFR AFRICAN AMERICAN: >59
GFR NON-AFRICAN AMERICAN: 53
GFR NON-AFRICAN AMERICAN: 53
GLUCOSE BLD-MCNC: 107 MG/DL (ref 70–99)
GLUCOSE BLD-MCNC: 109 MG/DL (ref 70–99)
GLUCOSE BLD-MCNC: 129 MG/DL (ref 74–109)
GLUCOSE BLD-MCNC: 96 MG/DL (ref 74–109)
HCT VFR BLD CALC: 42.7 % (ref 42–52)
HEMOGLOBIN: 14.4 G/DL (ref 14–18)
IMMATURE GRANULOCYTES #: 0 K/UL
LYMPHOCYTES ABSOLUTE: 2.6 K/UL (ref 1.1–4.5)
LYMPHOCYTES RELATIVE PERCENT: 33.5 % (ref 20–40)
MCH RBC QN AUTO: 34.2 PG (ref 27–31)
MCHC RBC AUTO-ENTMCNC: 33.7 G/DL (ref 33–37)
MCV RBC AUTO: 101.4 FL (ref 80–94)
MONOCYTES ABSOLUTE: 1.6 K/UL (ref 0–0.9)
MONOCYTES RELATIVE PERCENT: 20 % (ref 0–10)
NEUTROPHILS ABSOLUTE: 3.6 K/UL (ref 1.5–7.5)
NEUTROPHILS RELATIVE PERCENT: 45.7 % (ref 50–65)
PDW BLD-RTO: 14.1 % (ref 11.5–14.5)
PERFORMED ON: ABNORMAL
PERFORMED ON: ABNORMAL
PLATELET # BLD: 158 K/UL (ref 130–400)
PMV BLD AUTO: 12 FL (ref 9.4–12.4)
POTASSIUM SERPL-SCNC: 5.2 MMOL/L (ref 3.5–5)
POTASSIUM SERPL-SCNC: 5.5 MMOL/L (ref 3.5–5)
RBC # BLD: 4.21 M/UL (ref 4.7–6.1)
SODIUM BLD-SCNC: 134 MMOL/L (ref 136–145)
SODIUM BLD-SCNC: 139 MMOL/L (ref 136–145)
TOTAL PROTEIN: 6.3 G/DL (ref 6.6–8.7)
TOTAL PROTEIN: 7.1 G/DL (ref 6.6–8.7)
WBC # BLD: 7.8 K/UL (ref 4.8–10.8)

## 2022-07-14 PROCEDURE — 82947 ASSAY GLUCOSE BLOOD QUANT: CPT

## 2022-07-14 PROCEDURE — 6360000002 HC RX W HCPCS: Performed by: STUDENT IN AN ORGANIZED HEALTH CARE EDUCATION/TRAINING PROGRAM

## 2022-07-14 PROCEDURE — 86140 C-REACTIVE PROTEIN: CPT

## 2022-07-14 PROCEDURE — 80053 COMPREHEN METABOLIC PANEL: CPT

## 2022-07-14 PROCEDURE — 85025 COMPLETE CBC W/AUTO DIFF WBC: CPT

## 2022-07-14 PROCEDURE — 6370000000 HC RX 637 (ALT 250 FOR IP): Performed by: STUDENT IN AN ORGANIZED HEALTH CARE EDUCATION/TRAINING PROGRAM

## 2022-07-14 PROCEDURE — 36415 COLL VENOUS BLD VENIPUNCTURE: CPT

## 2022-07-14 PROCEDURE — 2580000003 HC RX 258: Performed by: STUDENT IN AN ORGANIZED HEALTH CARE EDUCATION/TRAINING PROGRAM

## 2022-07-14 PROCEDURE — 99284 EMERGENCY DEPT VISIT MOD MDM: CPT

## 2022-07-14 PROCEDURE — 97116 GAIT TRAINING THERAPY: CPT

## 2022-07-14 PROCEDURE — 97530 THERAPEUTIC ACTIVITIES: CPT

## 2022-07-14 PROCEDURE — 94761 N-INVAS EAR/PLS OXIMETRY MLT: CPT

## 2022-07-14 PROCEDURE — 6370000000 HC RX 637 (ALT 250 FOR IP): Performed by: FAMILY MEDICINE

## 2022-07-14 RX ORDER — CARVEDILOL 12.5 MG/1
12.5 TABLET ORAL 2 TIMES DAILY
Qty: 60 TABLET | Refills: 3 | Status: SHIPPED | OUTPATIENT
Start: 2022-07-14

## 2022-07-14 RX ORDER — CEFDINIR 300 MG/1
300 CAPSULE ORAL 2 TIMES DAILY
Qty: 14 CAPSULE | Refills: 0 | Status: SHIPPED | OUTPATIENT
Start: 2022-07-14 | End: 2022-07-21

## 2022-07-14 RX ORDER — PREDNISONE 10 MG/1
10 TABLET ORAL DAILY
Qty: 10 TABLET | Refills: 0 | Status: SHIPPED | OUTPATIENT
Start: 2022-07-14 | End: 2022-07-24

## 2022-07-14 RX ORDER — AMLODIPINE BESYLATE 5 MG/1
5 TABLET ORAL DAILY
Qty: 30 TABLET | Refills: 3 | Status: SHIPPED | OUTPATIENT
Start: 2022-07-14

## 2022-07-14 RX ADMIN — SODIUM CHLORIDE, PRESERVATIVE FREE 10 ML: 5 INJECTION INTRAVENOUS at 08:51

## 2022-07-14 RX ADMIN — ENOXAPARIN SODIUM 40 MG: 100 INJECTION SUBCUTANEOUS at 08:50

## 2022-07-14 RX ADMIN — BUMETANIDE 1 MG: 1 TABLET ORAL at 08:51

## 2022-07-14 RX ADMIN — TAMSULOSIN HYDROCHLORIDE 0.4 MG: 0.4 CAPSULE ORAL at 08:51

## 2022-07-14 RX ADMIN — CLOPIDOGREL BISULFATE 75 MG: 75 TABLET ORAL at 08:51

## 2022-07-14 RX ADMIN — CARVEDILOL 12.5 MG: 12.5 TABLET, FILM COATED ORAL at 08:51

## 2022-07-14 RX ADMIN — AMLODIPINE BESYLATE 5 MG: 5 TABLET ORAL at 08:50

## 2022-07-14 RX ADMIN — ASPIRIN 81 MG: 81 TABLET, COATED ORAL at 08:51

## 2022-07-14 RX ADMIN — ATORVASTATIN CALCIUM 20 MG: 40 TABLET, FILM COATED ORAL at 08:50

## 2022-07-14 ASSESSMENT — PAIN - FUNCTIONAL ASSESSMENT: PAIN_FUNCTIONAL_ASSESSMENT: NONE - DENIES PAIN

## 2022-07-14 NOTE — PLAN OF CARE
Problem: Safety - Adult  Goal: Free from fall injury  Outcome: Progressing  Flowsheets (Taken 7/13/2022 2332)  Free From Fall Injury: Instruct family/caregiver on patient safety     Problem: Confusion  Goal: Confusion, delirium, dementia, or psychosis is improved or at baseline  Description: INTERVENTIONS:  1. Assess for possible contributors to thought disturbance, including medications, impaired vision or hearing, underlying metabolic abnormalities, dehydration, psychiatric diagnoses, and notify attending LIP  2. [place] high risk fall precautions, as indicated  3. Provide frequent short contacts to provide reality reorientation, refocusing and direction  4. Decrease environmental stimuli, including noise as appropriate  5. Monitor and intervene to maintain adequate nutrition, hydration, elimination, sleep and activity  6. If unable to ensure safety without constant attention obtain sitter and review sitter guidelines with assigned personnel  7.  Initiate Psychosocial CNS and Spiritual Care consult, as indicated  Outcome: Progressing  Flowsheets (Taken 7/13/2022 2041)  Effect of thought disturbance (confusion, delirium, dementia, or psychosis) are managed with adequate functional status: Assess for contributors to thought disturbance, including medications, impaired vision or hearing, underlying metabolic abnormalities, dehydration, psychiatric diagnoses, notify LIP     Problem: Chronic Conditions and Co-morbidities  Goal: Patient's chronic conditions and co-morbidity symptoms are monitored and maintained or improved  Outcome: Progressing  Flowsheets (Taken 7/13/2022 2041)  Care Plan - Patient's Chronic Conditions and Co-Morbidity Symptoms are Monitored and Maintained or Improved: Monitor and assess patient's chronic conditions and comorbid symptoms for stability, deterioration, or improvement

## 2022-07-14 NOTE — CARE COORDINATION
Spoke with patient regarding MD orders for [facility] services. Patient agreeable and has chosen [facility]. Referral Faxed. [address] [place] [address] [phone]. [phone]. Please notify [address] [place] [address] when patient discharges and fax DC Summary,  DC med list and any new [facility] orders. The Patient and/or patient representative was provided with a choice of provider and agrees   with the discharge plan. [x] Yes [] No    Freedom of choice list was provided with basic dialogue that supports the patient's individualized plan of care/goals, treatment preferences and shares the quality data associated with the providers.  [x] Yes [] No  Electronically signed by Michelle Bass on 7/14/2022 at 11:00 AM

## 2022-07-14 NOTE — PROGRESS NOTES
Physical Therapy  Name: Earna Blizzard  MRN:  [id number]  Date of service:  7/14/2022 07/14/22 1311   Restrictions/Precautions   Restrictions/Precautions Fall Risk;Isolation   Subjective   Subjective Pt ready to go home, agrees to walk with therapy. Pain Assessment   Pain Assessment None - Denies Pain   Oxygen Therapy   O2 Device Nasal cannula   O2 Flow Rate (L/min) 2 L/min   Bed Mobility   Supine to Sit Stand by assistance   Transfers   Sit to Stand Contact guard assistance   Stand to sit Contact guard assistance   Comment stood from bedside and from toilet with CGA   Ambulation   Device Rolling Walker   Assistance Contact guard assistance   Quality of Gait decreased safety with rwx but improves with vc's   Gait Deviations Slow Marian;Decreased step length   Distance 10', 30'   Short Term Goals   Time Frame for Short term goals 14 DAYS   Short term goal 1 TRANSFERS MOD IND   Short term goal 2 ' RW MOD IND   Conditions Requiring Skilled Therapeutic Intervention   Body Structures, Functions, Activity Limitations Requiring Skilled Therapeutic Intervention Decreased functional mobility ; Decreased ADL status; Decreased strength;Decreased balance   Assessment Assisted pt to/from the BR then amb in room with rwx. Pt slightly unsteady at times but no LOB noted. Pt able to perform toileting and hygiene tasks with SBA. Pt declined up to the chair, returned to supine with all needs in reach.    Activity Tolerance   Activity Tolerance Patient tolerated treatment well   PT Plan of Care   Thursday X   Safety Devices   Type of Devices Bed alarm in place;Call light within reach;Gait belt;Left in bed         Electronically signed by Clem Grey PTA on 7/14/2022 at 1:13 PM

## 2022-07-14 NOTE — PROGRESS NOTES
[facility]  Daily Progress Note  Marne Aschoff  MRN: [id number] LOS: 3    Admit Date: 2022 7:48 AM          Chief Complaint:  Chief Complaint   Patient presents with    Altered Mental Status     Pt wandered in to a dr's office today. Confused, had been sitting in his vehicle in the parking lot for unknown time. Low oxygen saturation levels    Fever       Interval History:    Reviewed overnight events and nursing notes  Denies chest pain  Rested better last night  Remains short of breath with exertion and slight cough nonproductive  Fatigue improved  No new complaints voiced. DIET:  ADULT DIET; Regular; Low Sodium (2 gm); Safety Tray; Safety Tray (Disposables)    Medications:      dextrose      sodium chloride        carvedilol  12.5 mg Oral BID    amLODIPine  5 mg Oral Daily    insulin lispro  0-6 Units SubCUTAneous TID WC    insulin lispro  0-3 Units SubCUTAneous Nightly    enoxaparin  40 mg SubCUTAneous Daily    aspirin  81 mg Oral Daily    bumetanide  1 mg Oral Daily    clopidogrel  75 mg Oral Daily    glycopyrrolate  1 mg Oral TID    levothyroxine  125 mcg Oral Nightly    atorvastatin  20 mg Oral Daily    tamsulosin  0.4 mg Oral Daily    sodium chloride flush  5-40 mL IntraVENous 2 times per day    dexamethasone  6 mg IntraVENous Daily    cefepime  2,000 mg IntraVENous Q12H       Data:     Code Status: Full Code    Family History   Problem Relation Age of Onset    Heart Disease Other     Heart Disease Mother      Social History     Socioeconomic History    Marital status:       Spouse name: Not on file    Number of children: Not on file    Years of education: Not on file    Highest education level: Not on file   Occupational History    Not on file   Tobacco Use    Smoking status: Former Smoker     Types: Cigarettes     Quit date: 1989     Years since quittin.5    Smokeless tobacco: Never Used   Vaping Use    Vaping Use: Never used Substance and Sexual Activity    Alcohol use: Not Currently     Comment: RARELY    Drug use: Never    Sexual activity: Not on file   Other Topics Concern    Not on file   Social History Narrative    Not on file     Social Determinants of Health     Financial Resource Strain:     Difficulty of Paying Living Expenses: Not on file   Food Insecurity:     Worried About Running Out of Food in the Last Year: Not on file    [place] of Food in the Last Year: Not on file   Transportation Needs:     Lack of Transportation (Medical): Not on file    Lack of Transportation (Non-Medical): Not on file   Physical Activity:     Days of Exercise per Week: Not on file    Minutes of Exercise per Session: Not on file   Stress:     Feeling of Stress : Not on file   Social Connections:     Frequency of Communication with Friends and Family: Not on file    Frequency of Social Gatherings with Friends and Family: Not on file    Attends [employer] Services: Not on file    Active Member of [address] [place] [address] or Organizations: Not on file    Attends Club or Organization Meetings: Not on file    Marital Status: Not on file   Intimate Partner Violence:     Fear of Current or Ex-Partner: Not on file    Emotionally Abused: Not on file    Physically Abused: Not on file    Sexually Abused: Not on file   Housing Stability:     Unable to Pay for Housing in the Last Year: Not on file    Number of [place] in the Last Year: Not on file    Unstable Housing in the Last Year: Not on file       Labs:  Hematology:  Recent Labs     07/11/22 1715 07/11/22 1715 07/12/22 0413 07/13/22  0505 07/14/22  0604   WBC 7.7  --  5.1 8.4  --    HGB 12.6*  --  12.8* 12.8*  --    HCT 40.4*  --  41.2* 41.3*  --      --  165 165  --    CRP 2.02*   < > 3.24* 2.12* 2.86*    < > = values in this interval not displayed.      Chemistry:  Recent Labs     07/11/22 1715 07/11/22 1740 07/12/22 0413 07/13/22  0505 07/14/22  0604      < > 139 140 139 K 5.5*   < > 5.8* 5.3* 5.5*      < > 103 106 101   CO2 26   < > 26 27 30*   GLUCOSE 81   < > 100 80 129*   BUN 23   < > 24* 31* 28*   CREATININE 1.5*   < > 1.5* 1.5* 1.3*   ANIONGAP 8   < > 10 7 8   LABGLOM 45*   < > 45* 45* 53*   GFRAA 54*   < > 54* 54* >59   CALCIUM 8.7*   < > 8.8 8.7* 8.9   TROPONINI 0.07*  --   --   --   --     < > = values in this interval not displayed. Recent Labs     22  1715 22  1715 22  0413 22  0505 22  0604   PROT 6.7   < > 6.3* 5.8* 6.3*   LABALBU 3.2*   < > 3.3* 3.1* 2.8*   LABA1C  --   --  5.3  --   --    AST 31   < > 37 28 27   ALT 12   < > 21 17 15   *  --   --   --   --    ALKPHOS 82   < > 87 75 73   BILITOT <0.2   < > <0.2 <0.2 <0.2    < > = values in this interval not displayed. Objective:     Vitals: BP (!) 183/61   Pulse 86   Temp (!) 96.3 °F (35.7 °C)   Resp 18   Ht 5' 9\" (1.753 m)   Wt 191 lb 9 oz (86.9 kg)   SpO2 96%   BMI 28.29 kg/m²      Intake/Output Summary (Last 24 hours) at 2022 0748  Last data filed at 2022 2253  Gross per 24 hour   Intake 457.32 ml   Output 975 ml   Net -517.68 ml    Temp (24hrs), Av.3 °F (36.3 °C), Min:96.3 °F (35.7 °C), Max:98.6 °F (37 °C)    Glucose:  Recent Labs     22  0728 22  1117 22  1621 22   POCGLU 71 105* 88 100*     Physical Examination:   General appearance - alert, well appearing, and in no distress and oriented to person, place, and time  Mouth - mucous membranes moist, pharynx normal without lesions  Neck - supple, no significant adenopathy  Lymphatics - no palpable lymphadenopathy, no hepatosplenomegaly  Chest -diminished bibasilar regions with faint bronchovesicular breath sounds noted. No respiratory distress.   No retractions or use of accessory muscles  Heart - normal rate, regular rhythm, normal S1, S2, no murmurs, rubs, clicks or gallops  Abdomen - soft, nontender, nondistended, no masses or organomegaly bowel sounds normal  Neurological - alert, oriented, normal speech, no focal findings or movement disorder noted  Musculoskeletal - no joint tenderness, deformity or swelling  Extremities - peripheral pulses normal, no pedal edema, no clubbing or cyanosis  Skin - normal coloration and turgor, no rashes, no suspicious skin lesions noted      Assessment and Plan:     Primary Problem:  Pneumonia due to COVID-19 virus    Hospital Problem list/Treatment Plan:  Principal Problem:    Pneumonia due to COVID-19 virus  Active Problems:    COVID-19    ASHD (arteriosclerotic heart disease)    Hypertension    Hyperlipidemia    COPD (chronic obstructive pulmonary disease) (HCC)    History of cerebrovascular disease    Diabetes mellitus    GERD (gastroesophageal reflux disease)    PVD (peripheral vascular disease)    VTE prophylaxis--Lovenox, consider Xarelto upon discharge. Patient is on antiplatelet therapy(Plavix)    Condition: In stable condition. Improving.        COVID-19 infection-respiratory status seems stable. Continue present treatment. Minimal oxygen requirements. O2 weaning trial, monitor pulse oximetry. May need home O2.     Mental status change-related to infection and hypoxemia, improved on oxygen.     Hypertension- BP remains elevated despite increase Coreg and addition of amlodipine. We will continue to monitor closely and continue to adjust medications as indicated    Continue to increase activity with plans for discharge home soon. Plans to DC IV meds-- Decadron and Maxipime    Reviewed treatment plans with the patient and nursing staff  30 minutes spent in face to face interaction and coordination of care. Electronically signed by Kimberly Valadez PA-C on 7/14/2022 at 7:48 AM     Initial plan was to hold for 24 hours but patient was adamant that he was going home today, risk versus benefit of early discharge discussed with patient and he is persistent and discharge home.   See discharge summary for more details  I have discussed the care of Brant Hunt, including pertinent history and exam findings with the ARNP/PA. I have seen and examined the patient and the key elements of all parts of the encounter have been performed by me. I agree with the assessment and plan as outlined by the ARNP/PA. Please refer to my separate note for complete documentation.      Electronically signed by Michael Elizabeth MD on 7/14/2022 at 8:01 AM

## 2022-07-14 NOTE — DISCHARGE SUMMARY
Hospital Discharge Summary    Cherri Louis  :  1940  MRN:  [id number]    Admit date:  2022  Discharge date:  2022    Admitting Physician:    Adelaide Woo MD    Discharge Diagnoses:    Principal Problem:    Pneumonia due to COVID-19 virus  Active Problems:    COVID-19    ASHD (arteriosclerotic heart disease)    Hypertension    Hyperlipidemia    COPD (chronic obstructive pulmonary disease) ([facility]    History of cerebrovascular disease    Diabetes mellitus ([facility]    GERD (gastroesophageal reflux disease)    PVD (peripheral vascular disease) ([facility]  Resolved Problems:    * No resolved hospital problems. [facility] Course: The patient was admitted for the above noted medical/surgical issues. Please see daily progress note for further details concerning their stay. The patient improved throughout their stay and reached maximum medical improvement on the day of discharge. The patient/family agree with the treatment plan as outlined above. All questions concerning their stay were answered prior to discharge. They understand the importance of follow up concerning any abnormal test results. Pleasant 28-year-old gentleman who presented the office with in a confused state. Patient was known to be hypoxemic in the office and was sent directly to the emergency room where he tested positive for COVID-19 and x-ray confirmed bilateral lower lobe infiltrates. Patient's mental status improved with oxygen supplementation. Patient was placed on appropriate medication and [facility].  Patient had remarkably elevated blood pressure and his antihypertensive medications were adjusted. Patient was very anxious to be discharged home to take care of his dog at home. Risk versus benefit of discharge home today versus tomorrow were discussed at length with patient and he was adamant that he is going home today.   He understands the risks associated with going home too early from the hospital.    On the morning of 7/14/2022, we will set up home oxygen, home health care and discharge this young man back to his home to take care of his dog. We will follow-up within 3 to 4 days with Dr. Teena Napier via telemed for a [facility]/hospital follow-up.     Discharge Medications:         Medication List      START taking these medications    amLODIPine 5 MG tablet  Commonly known as: NORVASC  Take 1 tablet by mouth daily     cefdinir 300 MG capsule  Commonly known as: OMNICEF  Take 1 capsule by mouth 2 times daily for 7 days     predniSONE 10 MG tablet  Commonly known as: DELTASONE  Take 1 tablet by mouth daily for 10 days        CHANGE how you take these medications    carvedilol 12.5 MG tablet  Commonly known as: COREG  Take 1 tablet by mouth 2 times daily  What changed:   · medication strength  · how much to take        CONTINUE taking these medications    acetaminophen 500 MG tablet  Commonly known as: TYLENOL     aspirin 81 MG EC tablet     bumetanide 1 MG tablet  Commonly known as: BUMEX     clopidogrel 75 MG tablet  Commonly known as: Plavix  Take 1 tablet by mouth daily Start after Xarelto done     levothyroxine 125 MCG tablet  Commonly known as: SYNTHROID     losartan 100 MG tablet  Commonly known as: COZAAR     Magnesium 100 MG Caps     nitroGLYCERIN 0.4 MG SL tablet  Commonly known as: Nitrostat  Place 1 tablet under the tongue every 5 minutes as needed for Chest pain     simvastatin 20 MG tablet  Commonly known as: ZOCOR     tamsulosin 0.4 MG capsule  Commonly known as: FLOMAX        STOP taking these medications    CENTRUM SILVER PO     glycopyrrolate 1 MG tablet  Commonly known as: ROBINUL     hyoscyamine 125 MCG tablet  Commonly known as: ANASPAZ;LEVSIN     metoprolol succinate 50 MG extended release tablet  Commonly known as: TOPROL XL           Where to Get Your Medications      These medications were sent to [address], [address] [place] [address]    Phone: [phone]   · amLODIPine 5 MG tablet  · carvedilol 12.5 MG tablet  · cefdinir 300 MG capsule  · predniSONE 10 MG tablet         Consults: None    Significant Diagnostic Studies:    See summary of labs/x-rays/path report for further details      Treatments:   Oxygen supplementation and pulmonary toilet    Disposition:   Home with high level home health, would recommend 1 more day in the hospital but patient was fairly adamant he want to go home today. Follow up with Michael Elizabeth MD/Dr. Smith/[facility] in 3 to 4 days via telemed.     Signed:  Michael Elizabeth MD   7/14/2022, 7:55 AM

## 2022-07-14 NOTE — CARE COORDINATION
MIGUEL Dey reached out to [facility] for pt's oxygen. They have filled the order and delivered tank to pt's room for dc.    [address]  [phone] P   F  Electronically signed by Billie Mccall on 7/14/2022 at 2:30 PM

## 2022-07-15 ENCOUNTER — CARE COORDINATION (OUTPATIENT)
Dept: CASE MANAGEMENT | Age: 82
End: 2022-07-15

## 2022-07-15 NOTE — CARE COORDINATION
[facility] Transitions Initial Follow Up Call    Call within 2 business days of discharge: Yes    Patient: Katalina Tao Patient : 1940   MRN: [id number]  Reason for Admission: Pneumonia secondary to COVID 19  Discharge Date: 7/15/22 RARS: Readmission Risk Score: 17 ( )      Last Discharge [facility]       Date Complaint Diagnosis Description Type Department Provider    22 Other COVID-19 virus infection ED (DISCHARGE) L ED Carla Kothari MD             Spoke with: N/A    Facility: [address] [place] [address]    Non-face-to-face services provided:  Reviewed encounter information for continuity of care prior to follow up Care Transitions Coordination phone call - chart notes, consults, progress notes, test results, med list, appointments, AVS, other information. Care Transitions 24 Hour Call    Care Transitions Interventions         Follow Up  No future appointments. Attempted to make contact with patient/caregiver for an initial Care Transitions Coordination follow up call post discharge from the hospital without success. Unable to leave a message regarding intent of call and call back information. Line was busy. CTN will follow up again at a later time. Any previously scheduled hospital follow up appointments as noted.     Gordon Pope RN

## 2022-07-16 LAB
BLOOD CULTURE, ROUTINE: NORMAL
CULTURE, BLOOD 2: NORMAL

## 2022-07-18 ENCOUNTER — CARE COORDINATION (OUTPATIENT)
Dept: CASE MANAGEMENT | Age: 82
End: 2022-07-18

## 2022-07-18 NOTE — CARE COORDINATION
[facility] Transitions Initial Follow Up Call    Call within 2 business days of discharge: Yes    Patient: Ketty Melgar Patient : 1940   MRN: [id number]  Reason for Admission: Pneumonia secondary to COVID 19  Discharge Date: 7/15/22 RARS: Readmission Risk Score: 17 ( )      Last Discharge [facility]       Date Complaint Diagnosis Description Type Department Provider    22 Other COVID-19 virus infection ED (DISCHARGE) L ED Toni Fritz MD             Spoke with: [facility]: [address] [facility]    Non-face-to-face services provided:  Reviewed encounter information for continuity of care prior to follow up Care Transitions Coordination phone call - chart notes, consults, progress notes, test results, med list, appointments, AVS, other information. Care Transitions 24 Hour Call    Schedule Follow Up Appointment with PCP: Completed  Do you have a copy of your discharge instructions?: Yes  Do you have all of your prescriptions and are they filled?: Yes  Have you been contacted by a [facility] [address]?: No  Have you scheduled your follow up appointment?: Yes  How are you going to get to your appointment?: Other  Do you feel like you have everything you need to keep you well at home?: Yes  Care Transitions Interventions         Follow Up  No future appointments. Transitions of Care Initial Call    Was this an external facility discharge? No     Challenges to be reviewed by the provider   Additional needs identified to be addressed with provider: No  none         Method of communication with provider : none    Advance Care Planning:   Does patient have an Advance Directive: reviewed and current. Advance Care Planning   Healthcare Decision Maker:    Primary Decision Maker: Juan José Flowers    Secondary Decision Maker: Damián Merrill Child - [phone]    Care Transition Nurse contacted the patient by telephone to perform post hospital discharge assessment.  Verified name and  with patient as identifiers. Provided introduction to self, and explanation of the CTN role. CTN reviewed discharge instructions, medical action plan and red flags with patient who verbalized understanding. Patient given an opportunity to ask questions and does not have any further questions or concerns at this time. Were discharge instructions available to patient? Yes. Reviewed appropriate site of care based on symptoms and resources available to patient including: PCP  Urgent care clinics  [address] [place] [address]  When to call [address] The patient agrees to contact the PCP office for questions related to their healthcare. Medication reconciliation was not performed with patient, declined. He verbalizes understanding of administration of home medications. Was patient discharged with a pulse oximeter? no  Will try to get one sent to the home for him through case mgmt at the hospital.    CTN provided contact information. Plan for follow-up call in 3-5 days based on severity of symptoms and risk factors.   Plan for next call: Plan for next call - assess changes since last call, assess overall status, pain, appetite/nutritional status, sleep patterns, abnormal signs and symptoms, availability and effectiveness of medications, activity level and tolerance, falls/other unexpected events, findings from follow up appointments, medication/treatment changes, any additional teaching needs, ie education regarding self care mgmt in the home - disease process mgmt, symptom mgmt, diet/hydration, pain control needs, medication mgmt, activity level, fall precautions & home safety needs, infection control, seeking medical attention, who/when to call prn any needs, etc.    Placed a call to the number listed for patient for an initial follow up call for Care Transitions Coordination following discharge from the hospital.  Introduced myself and explained the purpose of my call as well as verifying name, date of birth of patient. Spoke with patient regarding hospitalization, discharge and status thus far. He reported that he is doing fair. Shahida Casanova he is still having some shortness of breath, not too bad. He is using his oxygen most of the time, trying to go without it some when he can. He said he goes without it to go to the bathroom if he can tolerate it and to do other simple activities. He said his concentrator is in the living room and the tubing will not reach into other rooms in the house. He said he uses his portable tanks when he has to have it to go in other rooms, but tries to limit using it this way. He said he cannot position the concentrator anymore central than it is already. He said that he generally sleeps with out the oxygen at night. Informed him that this is probably when he needs it the most.  He said that the home health nurse was out this morning and checked his pulse ox and it was 90%, with no oxygen on at all. He does not know how long he had been without it. Discussed basic oxygen information, usage, parameters for pulse ox readings, weaning when appropriate, fire precautions, falling precautions, infection control measures, skin/nare protection, etc.  Did let patient know that it is ok to use oxygen while in the shower as long as the electrical cord and concentrator are not close by. Did encourage using oxygen when exerting energy, such as when bathing, dressing, ambulating, eating, etc and also at all times while sleeping. Patient voiced understanding. He said that he has some productive cough. He does not know if it is thick or thin or if it has color. He said his appetite is decent. He is eating, drinks plenty. He is sleeping well. He is voiding well, is up and about a lot. He is not aware of any other issues. He was not aware of his hospital follow up appointment, did tell him it is a telehealth visit. He is aware.   He said he has all of his meds, did not want to do a med review. Does have my contact info if needed. Aware of purpose of calls. Will follow up as indicated.       Maria Del Carmen Langley RN

## 2022-07-19 NOTE — PROGRESS NOTES
Physician Progress Note      Emily Estrada  [facility] #:                  [id number]  :                       1940  ADMIT DATE:       2022 4:59 PM  [address] [place] DATE:        2022 3:17 PM  RESPONDING  PROVIDER #:        Monty BAH          QUERY TEXT:    Pt admitted with COVID-19 and noted to have *** (SIRS, other signs of sepsis). If possible, please document in progress notes and discharge summary if you   are evaluating and/or treating: The medical record reflects the following:  Risk Factors: COVID-19 PNA  Clinical Indicators: T: 102.7 on arrival, CRP: 2.02 on arrival, altered mental   status on arrival with SPO2: 82% on RA  Treatment: Steroids, Oxygen Therapy, Maxipime    Thank you in advance,    Vonnie Montgomery, RN-BSN, [facility]  Clinical   [facility], [address]  [email or web address]  Options provided:  -- Sepsis present on admission due to COVID-19 infection  -- Sepsis not present on admission due to COVID-19 infection  -- Sepsis present on admission due to COVID-19 pneumonia  -- Sepsis not present on admission due to COVID-19 pneumonia  -- Covid-19 infection without sepsis  -- Covid-19 pneumonia without sepsis  -- Other - I will add my own diagnosis  -- Disagree - Not applicable / Not valid  -- Disagree - Clinically unable to determine / Unknown  -- Refer to Clinical Documentation Reviewer    PROVIDER RESPONSE TEXT:    This patient has Covid-19 pneumonia without sepsis.     Query created by: Bladimir Willoughby on 2022 8:56 AM      Electronically signed by:  Cheri Rubalcava 2022 10:28 AM

## 2022-07-21 ENCOUNTER — CARE COORDINATION (OUTPATIENT)
Dept: CASE MANAGEMENT | Age: 82
End: 2022-07-21

## 2022-07-21 NOTE — CARE COORDINATION
[facility] Trans[facility] Follow Up Call    2022    Patient: Stacey Montalvo  Patient : 1940   MRN: [id number]  Reason for Admission: COVID  Discharge Date: 7/15/22 RARS: Readmission Risk Score: 17 ( )         Spoke with: [address] [place] Transitions Follow Up Call    Needs to be reviewed by the provider   Additional needs identified to be addressed with provider: Yes  medications-requesting refill of nitro. States he lost his vial of nitro when he was admitted. Method of communication with provider : chart routing      Care Transition Nurse (CTN) contacted the patient by telephone to follow up after admission. Verified name and  with patient as identifiers. Addressed changes since last contact: none  Discussed follow-up appointments. If no appointment was previously scheduled, appointment scheduling offered: No.   Is follow up appointment scheduled within 7 days of discharge? No.    Advance Care Planning:   Does patient have an Advance Directive: reviewed and current. Advance Care Planning   Healthcare Decision Maker:    Primary Decision Maker: Dori Escobar Child    Secondary Decision Maker: Verna Hicks Child - [phone]    CTN reviewed discharge instructions, medical action plan and red flags with patient and discussed any barriers to care and/or understanding of plan of care after discharge. Discussed appropriate site of care based on symptoms and resources available to patient including: PCP. The patient agrees to contact the PCP office for questions related to their healthcare. Patients top risk factors for readmission: medical condition-COVID    Patient verified  and was pleasant and agreeable to transition calls. States he has been alright. Breathing ok. O2 95%. States he has fear of tripping on O2 tubing, but otherwise denies problems with use. Reports some productive cough that has been ongoing for a while. Appetite good.  Denies problems with bowels or bladder. Denies medication changes. Requests refill of nitro that he lost at admission from his pants pocket. Had HFU with PCP. CTN provided contact information for future needs. No further follow-up call indicated based on severity of symptoms and risk factors. Care Transitions Subsequent and Final Call    Subsequent and Final Calls  Do you have any ongoing symptoms?: No  Have your medications changed?: No  Do you have any questions related to your medications?: No  Do you currently have any active services?: No  Do you have any needs or concerns that I can assist you with?: Yes  Patient-reported Needs or Concerns: refill nitro  Identified Barriers: None  Care Transitions Interventions  Other Interventions: Follow Up  No future appointments.     Gilberto Farmer LPN

## 2022-07-29 ASSESSMENT — ENCOUNTER SYMPTOMS
SHORTNESS OF BREATH: 0
VOMITING: 0
DIARRHEA: 0
NAUSEA: 0
COUGH: 0
ABDOMINAL PAIN: 0

## 2022-07-29 NOTE — ED PROVIDER NOTES
disease)     Severe triple-vessel    CAD (coronary artery disease) 02/04/2015    COPD (chronic obstructive pulmonary disease) (HCC)     With tobacco abuse    GERD (gastroesophageal reflux disease)     HH (hiatus hernia)     History of cerebrovascular disease     Hyperlipidemia     PCP, Dr. Ayse Calderon manages cholesterol. 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 Mirna Rodriguez    S/P TKR (total knee replacement)     RIGHT    Thyroid disease          SURGICAL HISTORY       Past Surgical History:   Procedure Laterality Date    ABDOMINAL AORTIC ANEURYSM REPAIR, OPEN      ADENOIDECTOMY      APPENDECTOMY      CARDIAC SURGERY      CARDIOVASCULAR STRESS TEST  12/07/09, [facility]    Stress ECho    CARDIOVASCULAR STRESS TEST  11/02/06, [facility]    Adenosine thallium treadmill    CHOLECYSTECTOMY      CORONARY ARTERY BYPASS GRAFT  12/01/06, [place]    CABG x 4 with sequential FAYE to diagonal, LAD, SVG, to obtuse marginal, posterior descending endoscopic vein harvesting. DIAGNOSTIC CARDIAC CATH LAB PROCEDURE  11/14/06, [facility]    Left cath, arteriography of bilateral native internal mammary arteries. DIAGNOSTIC CARDIAC CATH LAB PROCEDURE  06/27/03????, [facility]    Left cath, selective CA, primary stent placement two circumflex marginal branches. DIAGNOSTIC CARDIAC CATH LAB PROCEDURE  02/24/1993, [facility]    Left cath    DIAGNOSTIC CARDIAC CATH LAB PROCEDURE  05/15/91, [facility]    Left cath, SARABIA/SHERON left ventric., selective coronary arteriography. JOINT REPLACEMENT      NC KNEE SCOPE, W/LATERAL RELEASE Right 12/15/2017    KNEE ARTHROSCOPY LATERAL PATELLA REALIGNMENT performed by Marsha Becerra DO at [facility] Right 4/6/2018    KNEE TOTAL ARTHROPLASTY PATELLOFEMORALREVISION performed by Marsha Becerra DO at [address] [place] [address]    PTCA  05/16/91, [facility]    PTCA to circumflex marginal branch.     REVISION TOTAL KNEE ARTHROPLASTY Right 1/6/2017    KNEE TOTAL ARTHROPLASTY REVISION performed by Chilango Solorzano MD at [email or web address]  4/2012    Left  knee          CURRENT MEDICATIONS     Discharge Medication List as of 7/14/2022 11:47 PM        CONTINUE these medications which have NOT CHANGED    Details   carvedilol (COREG) 12.5 MG tablet Take 1 tablet by mouth 2 times daily, Disp-60 tablet, R-3Normal      amLODIPine (NORVASC) 5 MG tablet Take 1 tablet by mouth daily, Disp-30 tablet, R-3Normal      cefdinir (OMNICEF) 300 MG capsule Take 1 capsule by mouth 2 times daily for 7 days, Disp-14 capsule, R-0Normal      predniSONE (DELTASONE) 10 MG tablet Take 1 tablet by mouth daily for 10 days, Disp-10 tablet, R-0Normal      aspirin 81 MG EC tablet Take 81 mg by mouth dailyHistorical Med      acetaminophen (TYLENOL) 500 MG tablet Take 500 mg by mouth every 6 hours as needed for PainHistorical Med      Magnesium 100 MG CAPS Take by mouth dailyHistorical Med      bumetanide (BUMEX) 1 MG tablet Take 1 mg by mouth dailyHistorical Med      nitroGLYCERIN (NITROSTAT) 0.4 MG SL tablet Place 1 tablet under the tongue every 5 minutes as needed for Chest pain, Disp-25 tablet, R-3Normal      clopidogrel (PLAVIX) 75 MG tablet Take 1 tablet by mouth daily Start after Xarelto done, Disp-30 tablet, R-3      losartan (COZAAR) 100 MG tablet Take 50 mg by mouth daily Historical Med      tamsulosin (FLOMAX) 0.4 MG capsule Take 0.4 mg by mouth daily. simvastatin (ZOCOR) 20 MG tablet Take 20 mg by mouth nightly.         levothyroxine (SYNTHROID) 125 MCG tablet Take 125 mcg by mouth nightly              ALLERGIES     Pcn [penicillins], Azithromycin, Doxycycline, Tetracyclines & related, and Vibramycin [doxycycline calcium]    FAMILY HISTORY       Family History   Problem Relation Age of Onset    Heart Disease Other     Heart Disease Mother           SOCIAL HISTORY       Social History LABS:  Labs Reviewed   COMPREHENSIVE METABOLIC PANEL - Abnormal; Notable for the following components:       Result Value    Sodium 134 (*)     Potassium 5.2 (*)     Chloride 96 (*)     CO2 31 (*)     BUN 30 (*)     Creatinine 1.3 (*)     GFR Non- 53 (*)     Albumin 3.2 (*)     All other components within normal limits   CBC WITH AUTO DIFFERENTIAL - Abnormal; Notable for the following components:    RBC 4.21 (*)     .4 (*)     MCH 34.2 (*)     Neutrophils % 45.7 (*)     Monocytes % 20.0 (*)     Monocytes Absolute 1.60 (*)     All other components within normal limits       All other labs were within normal range or not returned as of this dictation. EMERGENCY DEPARTMENT COURSE and DIFFERENTIAL DIAGNOSIS/MDM:   Vitals:    Vitals:    07/14/22 2122 07/14/22 2300 07/14/22 2315   BP: (!) 184/68 (!) 152/77    Pulse: 91     Resp: 20     Temp: 98.3 °F (36.8 °C)     TempSrc: Oral     SpO2: 93%  96%   Weight: 190 lb (86.2 kg)     Height: 5' 9\" (1.753 m)         MDM    Patient's laboratory studies look okay. His serum creatinine is mildly elevated at 1.3. WBC count is normal.  Serum bicarb is elevated at 31. I do not see where patient would require inpatient admission. His room air oxygen saturation is 93 to 96%. He is in no acute distress. Have discussed discharge home with patient and his family and they are agreeable.     PROCEDURES:  Unless otherwise noted below, none     Procedures    FINAL IMPRESSION      1. COVID-19 virus infection          DISPOSITION/PLAN   DISPOSITION Decision To Discharge 07/14/2022 11:47:09 PM      PATIENT REFERRED TO:  Vincent Banerjee MD  [phone]            DISCHARGE MEDICATIONS:  Discharge Medication List as of 7/14/2022 11:47 PM             (Please note that portions of this note were completed with a voice recognition program.  Efforts were made to edit thedictations but occasionally words are

## 2022-11-13 ENCOUNTER — APPOINTMENT (OUTPATIENT)
Dept: CT IMAGING | Age: 82
DRG: 684 | End: 2022-11-13
Payer: MEDICARE

## 2022-11-13 ENCOUNTER — HOSPITAL ENCOUNTER (INPATIENT)
Age: 82
LOS: 1 days | Discharge: HOME | DRG: 684 | End: 2022-11-14
Attending: FAMILY MEDICINE | Admitting: FAMILY MEDICINE
Payer: MEDICARE

## 2022-11-13 DIAGNOSIS — N17.9 ACUTE KIDNEY INJURY (HCC): ICD-10-CM

## 2022-11-13 DIAGNOSIS — I71.40 DISSECTING AAA (ABDOMINAL AORTIC ANEURYSM) (HCC): Primary | ICD-10-CM

## 2022-11-13 DIAGNOSIS — I71.02 DISSECTING AAA (ABDOMINAL AORTIC ANEURYSM) (HCC): Primary | ICD-10-CM

## 2022-11-13 LAB
ALBUMIN SERPL-MCNC: 3.7 G/DL (ref 3.5–5.2)
ALP BLD-CCNC: 116 U/L (ref 40–130)
ALT SERPL-CCNC: 16 U/L (ref 5–41)
ANION GAP SERPL CALCULATED.3IONS-SCNC: 12 MMOL/L (ref 7–19)
APTT: 29.2 SEC (ref 26–36.2)
AST SERPL-CCNC: 21 U/L (ref 5–40)
BACTERIA: NEGATIVE /HPF
BASOPHILS ABSOLUTE: 0 K/UL (ref 0–0.2)
BASOPHILS RELATIVE PERCENT: 0.3 % (ref 0–1)
BILIRUB SERPL-MCNC: 0.9 MG/DL (ref 0.2–1.2)
BILIRUBIN URINE: NEGATIVE
BLOOD, URINE: NEGATIVE
BUN BLDV-MCNC: 59 MG/DL (ref 8–23)
CALCIUM SERPL-MCNC: 9.4 MG/DL (ref 8.8–10.2)
CHLORIDE BLD-SCNC: 94 MMOL/L (ref 98–111)
CLARITY: ABNORMAL
CO2: 28 MMOL/L (ref 22–29)
COLOR: YELLOW
CREAT SERPL-MCNC: 2 MG/DL (ref 0.5–1.2)
CRYSTALS, UA: ABNORMAL /HPF
EOSINOPHILS ABSOLUTE: 0.2 K/UL (ref 0–0.6)
EOSINOPHILS RELATIVE PERCENT: 1.5 % (ref 0–5)
EPITHELIAL CELLS, UA: 1 /HPF (ref 0–5)
GFR SERPL CREATININE-BSD FRML MDRD: 32 ML/MIN/{1.73_M2}
GLUCOSE BLD-MCNC: 110 MG/DL (ref 74–109)
GLUCOSE URINE: NEGATIVE MG/DL
HCT VFR BLD CALC: 44.7 % (ref 42–52)
HEMOGLOBIN: 14.3 G/DL (ref 14–18)
HYALINE CASTS: 3 /HPF (ref 0–8)
IMMATURE GRANULOCYTES #: 0.1 K/UL
INR BLD: 1.14 (ref 0.88–1.18)
KETONES, URINE: NEGATIVE MG/DL
LEUKOCYTE ESTERASE, URINE: NEGATIVE
LYMPHOCYTES ABSOLUTE: 2.2 K/UL (ref 1.1–4.5)
LYMPHOCYTES RELATIVE PERCENT: 17.9 % (ref 20–40)
MCH RBC QN AUTO: 32.4 PG (ref 27–31)
MCHC RBC AUTO-ENTMCNC: 32 G/DL (ref 33–37)
MCV RBC AUTO: 101.4 FL (ref 80–94)
MONOCYTES ABSOLUTE: 1.2 K/UL (ref 0–0.9)
MONOCYTES RELATIVE PERCENT: 9.5 % (ref 0–10)
NEUTROPHILS ABSOLUTE: 8.5 K/UL (ref 1.5–7.5)
NEUTROPHILS RELATIVE PERCENT: 70.3 % (ref 50–65)
NITRITE, URINE: NEGATIVE
PDW BLD-RTO: 13.8 % (ref 11.5–14.5)
PH UA: 5 (ref 5–8)
PLATELET # BLD: 161 K/UL (ref 130–400)
PMV BLD AUTO: 12.2 FL (ref 9.4–12.4)
POTASSIUM REFLEX MAGNESIUM: 4.5 MMOL/L (ref 3.5–5)
PROTEIN UA: 100 MG/DL
PROTHROMBIN TIME: 14.6 SEC (ref 12–14.6)
RBC # BLD: 4.41 M/UL (ref 4.7–6.1)
RBC UA: 1 /HPF (ref 0–4)
SARS-COV-2, NAAT: NOT DETECTED
SODIUM BLD-SCNC: 134 MMOL/L (ref 136–145)
SPECIFIC GRAVITY UA: 1.01 (ref 1–1.03)
TOTAL PROTEIN: 8.1 G/DL (ref 6.6–8.7)
UROBILINOGEN, URINE: 0.2 E.U./DL
WBC # BLD: 12.2 K/UL (ref 4.8–10.8)
WBC UA: 0 /HPF (ref 0–5)

## 2022-11-13 PROCEDURE — 36415 COLL VENOUS BLD VENIPUNCTURE: CPT

## 2022-11-13 PROCEDURE — 99231 SBSQ HOSP IP/OBS SF/LOW 25: CPT | Performed by: SURGERY

## 2022-11-13 PROCEDURE — 1210000000 HC MED SURG R&B

## 2022-11-13 PROCEDURE — 81001 URINALYSIS AUTO W/SCOPE: CPT

## 2022-11-13 PROCEDURE — 85610 PROTHROMBIN TIME: CPT

## 2022-11-13 PROCEDURE — 6370000000 HC RX 637 (ALT 250 FOR IP): Performed by: PHYSICIAN ASSISTANT

## 2022-11-13 PROCEDURE — 80053 COMPREHEN METABOLIC PANEL: CPT

## 2022-11-13 PROCEDURE — 2580000003 HC RX 258: Performed by: PHYSICIAN ASSISTANT

## 2022-11-13 PROCEDURE — 99285 EMERGENCY DEPT VISIT HI MDM: CPT

## 2022-11-13 PROCEDURE — 87635 SARS-COV-2 COVID-19 AMP PRB: CPT

## 2022-11-13 PROCEDURE — 85025 COMPLETE CBC W/AUTO DIFF WBC: CPT

## 2022-11-13 PROCEDURE — 85730 THROMBOPLASTIN TIME PARTIAL: CPT

## 2022-11-13 PROCEDURE — 74176 CT ABD & PELVIS W/O CONTRAST: CPT

## 2022-11-13 RX ORDER — METOPROLOL SUCCINATE 50 MG/1
50 TABLET, EXTENDED RELEASE ORAL DAILY
Status: ON HOLD | COMMUNITY
End: 2022-11-14 | Stop reason: HOSPADM

## 2022-11-13 RX ORDER — DIAPER,BRIEF,INFANT-TODD,DISP
EACH MISCELLANEOUS 2 TIMES DAILY
COMMUNITY

## 2022-11-13 RX ORDER — ACETAMINOPHEN 325 MG/1
650 TABLET ORAL EVERY 4 HOURS PRN
Status: DISCONTINUED | OUTPATIENT
Start: 2022-11-13 | End: 2022-11-14 | Stop reason: HOSPADM

## 2022-11-13 RX ORDER — SODIUM CHLORIDE 9 MG/ML
INJECTION, SOLUTION INTRAVENOUS PRN
Status: DISCONTINUED | OUTPATIENT
Start: 2022-11-13 | End: 2022-11-14 | Stop reason: HOSPADM

## 2022-11-13 RX ORDER — SODIUM CHLORIDE 0.9 % (FLUSH) 0.9 %
5-40 SYRINGE (ML) INJECTION PRN
Status: DISCONTINUED | OUTPATIENT
Start: 2022-11-13 | End: 2022-11-14 | Stop reason: HOSPADM

## 2022-11-13 RX ORDER — GLYCOPYRROLATE 1 MG/1
1 TABLET ORAL 3 TIMES DAILY
COMMUNITY

## 2022-11-13 RX ORDER — ONDANSETRON 4 MG/1
4 TABLET, ORALLY DISINTEGRATING ORAL EVERY 8 HOURS PRN
Status: DISCONTINUED | OUTPATIENT
Start: 2022-11-13 | End: 2022-11-14 | Stop reason: HOSPADM

## 2022-11-13 RX ORDER — NYSTATIN AND TRIAMCINOLONE ACETONIDE 100000; 1 [USP'U]/G; MG/G
OINTMENT TOPICAL 2 TIMES DAILY
COMMUNITY

## 2022-11-13 RX ORDER — SODIUM PHOSPHATE, DIBASIC AND SODIUM PHOSPHATE, MONOBASIC 7; 19 G/133ML; G/133ML
1 ENEMA RECTAL
Status: COMPLETED | OUTPATIENT
Start: 2022-11-13 | End: 2022-11-13

## 2022-11-13 RX ORDER — 0.9 % SODIUM CHLORIDE 0.9 %
1000 INTRAVENOUS SOLUTION INTRAVENOUS ONCE
Status: COMPLETED | OUTPATIENT
Start: 2022-11-13 | End: 2022-11-13

## 2022-11-13 RX ORDER — HYOSCYAMINE SULFATE 0.125 MG
125 TABLET ORAL EVERY 4 HOURS PRN
COMMUNITY

## 2022-11-13 RX ORDER — ONDANSETRON 2 MG/ML
4 INJECTION INTRAMUSCULAR; INTRAVENOUS EVERY 6 HOURS PRN
Status: DISCONTINUED | OUTPATIENT
Start: 2022-11-13 | End: 2022-11-14 | Stop reason: HOSPADM

## 2022-11-13 RX ORDER — SODIUM CHLORIDE 0.9 % (FLUSH) 0.9 %
5-40 SYRINGE (ML) INJECTION EVERY 12 HOURS SCHEDULED
Status: DISCONTINUED | OUTPATIENT
Start: 2022-11-13 | End: 2022-11-14 | Stop reason: HOSPADM

## 2022-11-13 RX ADMIN — SODIUM CHLORIDE 1000 ML: 9 INJECTION, SOLUTION INTRAVENOUS at 12:36

## 2022-11-13 RX ADMIN — SODIUM PHOSPHATE 1 ENEMA: 7; 19 ENEMA RECTAL at 10:00

## 2022-11-13 ASSESSMENT — ENCOUNTER SYMPTOMS
EYE ITCHING: 0
COUGH: 0
PHOTOPHOBIA: 0
COLOR CHANGE: 0
ABDOMINAL PAIN: 1
BACK PAIN: 0
EYE DISCHARGE: 0
APNEA: 0
CONSTIPATION: 1
SHORTNESS OF BREATH: 0
ABDOMINAL DISTENTION: 1

## 2022-11-13 ASSESSMENT — PAIN SCALES - GENERAL
PAINLEVEL_OUTOF10: 7
PAINLEVEL_OUTOF10: 10
PAINLEVEL_OUTOF10: 5
PAINLEVEL_OUTOF10: 6
PAINLEVEL_OUTOF10: 1
PAINLEVEL_OUTOF10: 4
PAINLEVEL_OUTOF10: 1
PAINLEVEL_OUTOF10: 1

## 2022-11-13 ASSESSMENT — PAIN - FUNCTIONAL ASSESSMENT: PAIN_FUNCTIONAL_ASSESSMENT: 0-10

## 2022-11-13 NOTE — CONSULTS
[facility] Vascular Surgery    CONSULT    Patient: Josie Peterson  YOB: 1940  Date of Service: 11/13/2022  MRN: [id number]   Acct: [de-identified]   Primary Care Physician: Roberth Levy MD    Reason for consult: AAA with dissection    Requesting Physician: Justa Tripp    History Obtained From: Patient, physician and chart review. HISTORY OF PRESENT ILLNESS:  Mr. Josie Peterson is a 78 y.o. male who presented with 5-day history of constipation which had been preceded by a 1 week history of severe diarrhea. He became constipated after taking some the diarrheal agents OTC. He denies any bleeding per rectum. He presented with lower abdominal pain which resolved after having a big bowel movement upon treatment with enemas. 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 It is unclear to me whether or not he is aware of the AAA. He denies abdominal or back pain until recent onset of obstipation symptoms. He denies nausea, vomiting reports having a lot of phlegm/mucus and being uncomfortable from being distended. Vascular surgery was consulted for the incidental finding of the AAA as well as possible dissection in the wall of the aneurysm. Past Medical History:       Diagnosis Date    AAA (abdominal aortic aneurysm) (HCC)     ASHD (arteriosclerotic heart disease)     S/P coronary intervention followed by [facility]    CAD (coronary artery disease)     Severe triple-vessel    CAD (coronary artery disease) 02/04/2015    COPD (chronic obstructive pulmonary disease) (HCC)     With tobacco abuse    GERD (gastroesophageal reflux disease)     HH (hiatus hernia)     History of cerebrovascular disease     Hyperlipidemia     PCP, Dr. Dafne Hutchinson manages cholesterol.     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 Miguelito Otto    S/P TKR (total knee replacement)     RIGHT    Thyroid disease        Past Surgical History:        Procedure Laterality Date    ABDOMINAL AORTIC ANEURYSM REPAIR, OPEN      ADENOIDECTOMY      APPENDECTOMY      CARDIAC SURGERY      CARDIOVASCULAR STRESS TEST  12/07/09, [facility]    Stress ECho    CARDIOVASCULAR STRESS TEST  11/02/06, [facility]    Adenosine thallium treadmill    CHOLECYSTECTOMY      CORONARY ARTERY BYPASS GRAFT  12/01/06, [facility]    CABG x 4 with sequential FAYE to diagonal, LAD, SVG, to obtuse marginal, posterior descending endoscopic vein harvesting. DIAGNOSTIC CARDIAC CATH LAB PROCEDURE  11/14/06, [facility]    Left cath, arteriography of bilateral native internal mammary arteries. DIAGNOSTIC CARDIAC CATH LAB PROCEDURE  06/27/03????, [facility]    Left cath, selective CA, primary stent placement two circumflex marginal branches. DIAGNOSTIC CARDIAC CATH LAB PROCEDURE  02/24/1993, [facility]    Left cath    DIAGNOSTIC CARDIAC CATH LAB PROCEDURE  05/15/91, [facility]    Left cath, SARABIA/SHERON left ventric., selective coronary arteriography. JOINT REPLACEMENT      [place] KNEE SCOPE, W/LATERAL RELEASE Right 12/15/2017    KNEE ARTHROSCOPY LATERAL PATELLA REALIGNMENT performed by Obed Tucker DO at [facility] Right 4/6/2018    KNEE TOTAL ARTHROPLASTY PATELLOFEMORALREVISION performed by Obed Tucker DO at [address] [place] [address]    PTCA  05/16/91, [facility]    PTCA to circumflex marginal branch. REVISION TOTAL KNEE ARTHROPLASTY Right 1/6/2017    KNEE TOTAL ARTHROPLASTY REVISION performed by Noel Quintero MD at [address] [place] [address] Drive  4/2012    Left  knee        Allergies:  Pcn [penicillins], Azithromycin, Doxycycline, Tetracyclines & related, and Vibramycin [doxycycline calcium]    Medications Current:   No current facility-administered medications for this encounter.      Current Outpatient Medications   Medication Sig Dispense Refill    carvedilol (COREG) 12.5 MG tablet Take 1 tablet by mouth 2 times daily 60 tablet 3    amLODIPine (NORVASC) 5 MG tablet Take 1 tablet by mouth daily 30 tablet 3    aspirin 81 MG EC tablet Take 81 mg by mouth daily      acetaminophen (TYLENOL) 500 MG tablet Take 500 mg by mouth every 6 hours as needed for Pain      Magnesium 100 MG CAPS Take by mouth daily      bumetanide (BUMEX) 1 MG tablet Take 1 mg by mouth daily      nitroGLYCERIN (NITROSTAT) 0.4 MG SL tablet Place 1 tablet under the tongue every 5 minutes as needed for Chest pain 25 tablet 3    clopidogrel (PLAVIX) 75 MG tablet Take 1 tablet by mouth daily Start after Xarelto done 30 tablet 3    losartan (COZAAR) 100 MG tablet Take 50 mg by mouth daily       tamsulosin (FLOMAX) 0.4 MG capsule Take 0.4 mg by mouth daily. simvastatin (ZOCOR) 20 MG tablet Take 20 mg by mouth nightly. levothyroxine (SYNTHROID) 125 MCG tablet Take 125 mcg by mouth nightly          Social History:   reports that he quit smoking about 33 years ago. His smoking use included cigarettes. He has never used smokeless tobacco. He reports that he does not currently use alcohol. He reports that he does not use drugs. Family History:      Problem Relation Age of Onset    Heart Disease Other     Heart Disease Mother        REVIEW OF SYSTEMS:  General: Denies any fever or chills. Denies any unexplained weight loss or gain. Denies any change in activity or endurance. HEENT: Denies any headaches or visual changes. Respiratory: Denies any cough or hoarseness. Cardiac: Denies any chest pain or pressure. Denies any palpitations. Denies any presyncope or syncope. Denies any orthopnea or PND. Denies any lower extremity edema. GI: Denies any abdominal pain. Denies any nausea or vomiting. Denies any change in bowel habits. Denies any recent history of GI tract blood loss. : Denies any hematuria, frequency, hesitancy, or dysuria.   Musculoskeletal: Denies any pain or swelling in his joints. Neurological: Denies any paraesthesias. Denies any history of seizure or stroke symptoms. Psychological: Denies any problems with anxiety or depression. All other systems are negative except where stated above. PHYSICAL EXAM:  BP (!) 159/69   Pulse 52   Temp 98 °F (36.7 °C)   Resp 17   Wt 192 lb (87.1 kg)   SpO2 93%   BMI 28.35 kg/m²   General appearance: Demonstrates a well-developed, well-nourished  male who is alert and oriented in no acute distress. NECK: Supple. NO JVD. No carotids bruits auscultated. Chest: Clear to auscultation bilaterally without wheezes or rhonchi. Cardiac: Normal heart tones with regular rate and rhythm, S1, S2 normal. No murmurs, gallops, or rubs auscultated. Abdomen: Soft, non-tender; distended, healed left flank scar and right lower quadrant appendectomy scar , normal bowel sounds no masses, no organomegaly. Extremities: No clubbing or cyanosis. No peripheral edema. Peripheral pulses palpable. Skin: Skin color, texture, turgor normal. No rashes or lesions  Neurologic: Grossly intact.     LABS AND DIAGNOSTICS:    Imaging:    CBC with Differential:   Lab Results   Component Value Date/Time    WBC 12.2 11/13/2022 09:40 AM    RBC 4.41 11/13/2022 09:40 AM    HGB 14.3 11/13/2022 09:40 AM    HCT 44.7 11/13/2022 09:40 AM    HCT 30.4 05/05/2012 11:32 PM     11/13/2022 09:40 AM     05/05/2012 11:32 PM    .4 11/13/2022 09:40 AM    LYMPHOPCT 17.9 11/13/2022 09:40 AM    EOSPCT 1.2 05/05/2012 11:32 PM     BMP:   Lab Results   Component Value Date/Time     11/13/2022 09:40 AM     05/07/2012 02:44 AM    K 4.5 11/13/2022 09:40 AM    K 3.8 05/07/2012 02:44 AM    CL 94 11/13/2022 09:40 AM    CL 95 05/07/2012 02:44 AM    CO2 28 11/13/2022 09:40 AM    BUN 59 11/13/2022 09:40 AM    CREATININE 2.0 11/13/2022 09:40 AM    CREATININE 1.4 05/07/2012 02:44 AM    CALCIUM 9.4 11/13/2022 09:40 AM    GFRAA >59 07/14/2022 10:55 PM    LABGLOM 32 11/13/2022 09:40 AM    GLUCOSE 110 11/13/2022 09:40 AM     PT/INR:  No results found for: PTINR      ASSESSMENT: 78-year-old gentleman presenting with constipation and lower abdominal pain with incidental finding of a 4.5 cm suprarenal AAA with suspicion for a focal dissection over the left side of the aneurysm as well as small bilateral common iliac artery aneurysms. My a detailed review of the CT which was a noncontrast CT with stone protocol did not clearly reveal this to be a dissection but could be a possible bypass from the infrarenal aorta to the SMA as this tubular structure becomes distinct from the aortic wall particularly close to its anastomosis with the SMA. Regardless of this, patient is asymptomatic from the aneurysm standpoint. 1.  Suprarenal AAA with possible dissection. 2.  Small bilateral common iliac artery aneurysms. 3.  History of splenic artery aneurysm status post open repair w/ splenectomy. 4.  BRIAN on CKD. 5.  Constipation. PLAN:  1. Recommend best medical treatment with aspirin, statin and blood pressure control. 2.Obtain dedicated CTA when BRIAN resolves. 3.Continue medical management  4. Vascular surgery will follow.         Louretta Hammans, MD

## 2022-11-13 NOTE — ED NOTES
Pt had formed BM following enema administration. Pt cleaned up, new brief applied and assisted back to bed. Pt still complains of pain in lower abdomen. 5-6/10     Buzz Flores RN  11/13/22 [address] [facility] [address] Korin Monk RN  11/13/22 0632

## 2022-11-13 NOTE — ED PROVIDER NOTES
[facility] SURG SERVICES  eMERGENCYdEPARTMENT eNCOUnter      Pt Name: Floresita Stallworth  MRN: [id number]  [place] 1940  Date of evaluation: 11/13/2022  Provider:TEO Dietz    CHIEF COMPLAINT       Chief Complaint   Patient presents with    Constipation     Pt reports \"I need that thing they stick up your butt\". He states he has been \"stopped up\" since last week, after a bad case of diarrhea. Pt also reports a decrease in urination. HISTORY OF PRESENT ILLNESS  (Location/Symptom, Timing/Onset, Context/Setting, Quality, Duration, Modifying Factors, Severity.)   Floresita Stallworth is a 78 y.o. male who presents to the emergency department with complaints of constipation x 5 days mild lower abdominal pain making flatus had been experiencing diarrhea took anti diarrheal meds OTC now concern for back up. He is here for an enema. Has stasis dermatitis that is new in legs bilaterally. He is on plavix. Very pleasant gentlemen normal vitals. Normal intake. HPI    Nursing Notes were reviewed and I agree. REVIEW OF SYSTEMS    (2-9 systems for level 4, 10 or more for level 5)     Review of Systems   Constitutional:  Negative for activity change, appetite change, chills and fever. HENT:  Negative for congestion and dental problem. Eyes:  Negative for photophobia, discharge and itching. Respiratory:  Negative for apnea, cough and shortness of breath. Cardiovascular:  Negative for chest pain. Gastrointestinal:  Positive for abdominal distention, abdominal pain and constipation. Musculoskeletal:  Negative for arthralgias, back pain, gait problem, myalgias and neck pain. Skin:  Negative for color change, pallor and rash. Neurological:  Negative for dizziness, seizures and syncope. Psychiatric/Behavioral:  Negative for agitation. The patient is not nervous/anxious. Except as noted above the remainder of the review of systems was reviewed and negative.        PAST MEDICAL HISTORY     Past Medical 4/6/2018    KNEE TOTAL ARTHROPLASTY PATELLOFEMORALREVISION performed by Carline Porter DO at [address]    PTCA  05/16/91, [facility]    PTCA to circumflex marginal branch. REVISION TOTAL KNEE ARTHROPLASTY Right 1/6/2017    KNEE TOTAL ARTHROPLASTY REVISION performed by Kevin Khan MD at [address]  4/2012    Left  knee          CURRENT MEDICATIONS       Current Discharge Medication List        CONTINUE these medications which have NOT CHANGED    Details   hyoscyamine (ANASPAZ;LEVSIN) 125 MCG tablet Take 125 mcg by mouth every 4 hours as needed for Cramping      nystatin-triamcinolone (MYCOLOG) 615988-0.1 UNIT/GM-% ointment Apply topically 2 times daily Apply topically 2 times daily. glycopyrrolate (ROBINUL) 1 MG tablet Take 1 mg by mouth 3 times daily      Multiple Vitamins-Minerals (CENTRUM SILVER PO) Take by mouth      magnesium citrate solution Take 296 mLs by mouth once      hydrocortisone 0.5 % cream Apply topically 2 times daily Apply topically 2 times daily. carvedilol (COREG) 12.5 MG tablet Take 1 tablet by mouth 2 times daily  Qty: 60 tablet, Refills: 3      amLODIPine (NORVASC) 5 MG tablet Take 1 tablet by mouth daily  Qty: 30 tablet, Refills: 3      aspirin 81 MG EC tablet Take 81 mg by mouth daily      acetaminophen (TYLENOL) 500 MG tablet Take 500 mg by mouth every 6 hours as needed for Pain      Magnesium 100 MG CAPS Take by mouth daily      nitroGLYCERIN (NITROSTAT) 0.4 MG SL tablet Place 1 tablet under the tongue every 5 minutes as needed for Chest pain  Qty: 25 tablet, Refills: 3      clopidogrel (PLAVIX) 75 MG tablet Take 1 tablet by mouth daily Start after Xarelto done  Qty: 30 tablet, Refills: 3      tamsulosin (FLOMAX) 0.4 MG capsule Take 0.4 mg by mouth daily. simvastatin (ZOCOR) 20 MG tablet Take 20 mg by mouth nightly.         levothyroxine (SYNTHROID) 125 MCG tablet Take 125 mcg by mouth nightly ALLERGIES     Pcn [penicillins], Azithromycin, Doxycycline, Tetracyclines & related, and Vibramycin [doxycycline calcium]    FAMILY HISTORY       Family History   Problem Relation Age of Onset    Heart Disease Other     Heart Disease Mother           SOCIAL HISTORY       Social History     Socioeconomic History    Marital status:    Tobacco Use    Smoking status: Former     Types: Cigarettes     Quit date: 1989     Years since quittin.8    Smokeless tobacco: Never   Vaping Use    Vaping Use: Never used   Substance and Sexual Activity    Alcohol use: Not Currently     Comment: RARELY    Drug use: Never       SCREENINGS    Eden Coma Scale  Eye Opening: Spontaneous  Best Verbal Response: Oriented  Best Motor Response: Obeys commands  Rosalba Coma Scale Score: 15      PHYSICAL EXAM    (up to 7 forlevel 4, 8 or more for level 5)     ED Triage Vitals [22 0932]   BP Temp Temp src Heart Rate Resp SpO2 Height Weight   122/63 98 °F (36.7 °C) -- 58 17 91 % -- 192 lb (87.1 kg)       Physical Exam  Vitals reviewed. Constitutional:       General: He is not in acute distress. Appearance: Normal appearance. He is well-developed. He is not diaphoretic. HENT:      Head: Normocephalic and atraumatic. Right Ear: External ear normal.      Left Ear: External ear normal.   Eyes:      Pupils: Pupils are equal, round, and reactive to light. Neck:      Trachea: No tracheal deviation. Cardiovascular:      Rate and Rhythm: Normal rate and regular rhythm. Pulses: Normal pulses. Heart sounds: Normal heart sounds. No murmur heard. Pulmonary:      Effort: Pulmonary effort is normal.      Breath sounds: Normal breath sounds. No stridor. No wheezing. Chest:      Chest wall: No tenderness. Abdominal:      General: There is no distension. Palpations: Abdomen is soft. There is no mass. Tenderness: There is abdominal tenderness. There is rebound.  There is no right CVA tenderness, left CVA tenderness or guarding. Hernia: No hernia is present. Comments: hyperactive   Musculoskeletal:         General: Normal range of motion. Cervical back: Normal range of motion and neck supple. Skin:     General: Skin is warm and dry. Capillary Refill: Capillary refill takes less than 2 seconds. Neurological:      General: No focal deficit present. Mental Status: He is alert and oriented to person, place, and time. Mental status is at baseline. Psychiatric:         Mood and Affect: Mood normal.         Behavior: Behavior normal.         Thought Content: Thought content normal.         Judgment: Judgment normal.         DIAGNOSTIC RESULTS     RADIOLOGY:   Non-plain film images such as CT, Ultrasound and MRI are read by the radiologist. Plain radiographic images are visualized and preliminarilyinterpreted by Chencho Roldan MD with the below findings:        Interpretation per the Radiologist below, if available at the time of this note:    CT ABDOMEN PELVIS WO CONTRAST Additional Contrast? None   Final Result   Impression:   Aneurysmal dilatation of the abdominal aorta which is most pronounced in the   proximal abdominal aorta measuring 4.5 x 3.4 cm there is also a small focal   dissection on the left side of the proximal abdominal aorta. Other findings as described above.           LABS:  Labs Reviewed   URINALYSIS WITH REFLEX TO CULTURE - Abnormal; Notable for the following components:       Result Value    Clarity, UA CLOUDY (*)     Protein,  (*)     All other components within normal limits   CBC WITH AUTO DIFFERENTIAL - Abnormal; Notable for the following components:    WBC 12.2 (*)     RBC 4.41 (*)     .4 (*)     MCH 32.4 (*)     MCHC 32.0 (*)     Neutrophils % 70.3 (*)     Lymphocytes % 17.9 (*)     Neutrophils Absolute 8.5 (*)     Monocytes Absolute 1.20 (*)     All other components within normal limits   COMPREHENSIVE METABOLIC PANEL W/ REFLEX TO MG FOR LOW K - Abnormal; Notable for the following components:    Sodium 134 (*)     Chloride 94 (*)     Glucose 110 (*)     BUN 59 (*)     Creatinine 2.0 (*)     Est, Glom Filt Rate 32 (*)     All other components within normal limits   MICROSCOPIC URINALYSIS - Abnormal; Notable for the following components:    Bacteria, UA NEGATIVE (*)     Crystals, UA NEG (*)     All other components within normal limits   COVID-19, RAPID   PROTIME-INR   APTT       All other labs were within normal range or notreturned as of this dictation. RE-ASSESSMENT          EMERGENCY DEPARTMENT COURSE and DIFFERENTIAL DIAGNOSIS/MDM:   Vitals:    Vitals:    11/13/22 2357 11/14/22 0609 11/14/22 0824 11/14/22 0826   BP:  (!) 151/46  (!) 155/59   Pulse:  64  64   Resp:  18     Temp:  98.4 °F (36.9 °C)  96.8 °F (36 °C)   TempSrc:    Temporal   SpO2:  91% (!) 85% 92%   Weight: 183 lb 4.8 oz (83.1 kg)      Height: 5' 10\" (1.778 m)              MDM  The plan will be for admission to his PCP I spoke with Dr. Benja Martinez who is taking call for Dr. Samella Hashimoto agreeable to admit. I also spoke with Dr. Debra Joyce with [facility] who will see in consult. Mild BRIAN will be admission. Patient himself continues to improve in regards to his abdominal pain following a very successful and large BM. However with a known AAA the patient is unsure nor is it clear documentation of this dissection is new or old which reinforced the patient coming in. PROCEDURES:    Procedures      FINAL IMPRESSION      1. Dissecting AAA (abdominal aortic aneurysm) ([facility] [address]    2.  Acute kidney injury [facility])          DISPOSITION/PLAN   DISPOSITION Admitted 11/13/2022 05:57:40 PM      PATIENT REFERRED TO:  MD Amisha Sargent [address]  [phone]    Follow up on 11/17/2022  post hospital visit @ [address] [place] [address],[address]:  Current Discharge Medication List          (Please note that portions of this note were completed with a voice recognition program.  Efforts were made to edit the dictations but occasionallywords are mis-transcribed.)    Eduin Mata, [address] [place] [address]  11/14/22 8666

## 2022-11-13 NOTE — ED NOTES
Pt called nurse for assistance. When this nurse approached room, pt states that he has urinated in his brief. Pt was able to roll side to side by himself, while he was cleaned and brief was changed. No other concerns or needs at this time.       Lynsey Guerin RN  11/13/22 [id number]

## 2022-11-14 VITALS
DIASTOLIC BLOOD PRESSURE: 59 MMHG | WEIGHT: 183.3 LBS | SYSTOLIC BLOOD PRESSURE: 155 MMHG | HEART RATE: 64 BPM | HEIGHT: 70 IN | BODY MASS INDEX: 26.24 KG/M2 | TEMPERATURE: 96.8 F | OXYGEN SATURATION: 90 % | RESPIRATION RATE: 18 BRPM

## 2022-11-14 PROCEDURE — 6370000000 HC RX 637 (ALT 250 FOR IP): Performed by: STUDENT IN AN ORGANIZED HEALTH CARE EDUCATION/TRAINING PROGRAM

## 2022-11-14 PROCEDURE — 99233 SBSQ HOSP IP/OBS HIGH 50: CPT | Performed by: SURGERY

## 2022-11-14 PROCEDURE — 2580000003 HC RX 258: Performed by: FAMILY MEDICINE

## 2022-11-14 RX ORDER — LEVOTHYROXINE SODIUM 0.12 MG/1
125 TABLET ORAL NIGHTLY
Status: DISCONTINUED | OUTPATIENT
Start: 2022-11-14 | End: 2022-11-14 | Stop reason: HOSPADM

## 2022-11-14 RX ORDER — ATORVASTATIN CALCIUM 40 MG/1
40 TABLET, FILM COATED ORAL DAILY
Status: DISCONTINUED | OUTPATIENT
Start: 2022-11-14 | End: 2022-11-14 | Stop reason: HOSPADM

## 2022-11-14 RX ORDER — CARVEDILOL 12.5 MG/1
12.5 TABLET ORAL 2 TIMES DAILY
Status: DISCONTINUED | OUTPATIENT
Start: 2022-11-14 | End: 2022-11-14 | Stop reason: HOSPADM

## 2022-11-14 RX ORDER — TAMSULOSIN HYDROCHLORIDE 0.4 MG/1
0.4 CAPSULE ORAL DAILY
Status: DISCONTINUED | OUTPATIENT
Start: 2022-11-14 | End: 2022-11-14 | Stop reason: HOSPADM

## 2022-11-14 RX ORDER — AMLODIPINE BESYLATE 5 MG/1
5 TABLET ORAL DAILY
Status: DISCONTINUED | OUTPATIENT
Start: 2022-11-14 | End: 2022-11-14 | Stop reason: HOSPADM

## 2022-11-14 RX ADMIN — SODIUM CHLORIDE, PRESERVATIVE FREE 10 ML: 5 INJECTION INTRAVENOUS at 08:36

## 2022-11-14 RX ADMIN — TAMSULOSIN HYDROCHLORIDE 0.4 MG: 0.4 CAPSULE ORAL at 08:36

## 2022-11-14 RX ADMIN — CARVEDILOL 12.5 MG: 12.5 TABLET, FILM COATED ORAL at 08:36

## 2022-11-14 RX ADMIN — AMLODIPINE BESYLATE 5 MG: 5 TABLET ORAL at 08:35

## 2022-11-14 RX ADMIN — ATORVASTATIN CALCIUM 40 MG: 40 TABLET, FILM COATED ORAL at 08:35

## 2022-11-14 NOTE — PROGRESS NOTES
Patient being discharged home per order. Patient verbalized understanding of all discharge instructions. Patient states he wears oxygen at home as needed. Patient verbalized understanding that he needs to monitor oxygen saturation and wear his oxygen as order prior to admission. [facility]note 2[facility] called to notify company patient will need portable oxygen tank. Patient stated he returned his tank because it was too much to worry about when he was not at home. Patient stated he wanted to be discharged without his portable tank at this time. Patient denies shortness of breath. 's office notified and aware of situation. Patient verbalized understanding to keep all follow up appointments. Patient stable and leaving floor via wheelchair with transportation.

## 2022-11-14 NOTE — H&P
History and Physical      CHIEF COMPLAINT:  Constipation    Reason for Admission:  BRIAN, aortic dissection    History Obtained From: Patient, chart review    HISTORY OF PRESENT ILLNESS:      The patient is a 78 y.o. male with significant past medical history of known AAA, CDA s/p CABG, COPD, HTN, and HLD who presents with constipation. He had not gone for several days and required an enema. During his workup, he was found to have an BRIAN and a CT showed focal aortic dissection within his known AAA. He was admitted for observation. This morning he was in his usual state of health and wants to go home. Denies abdominal pain and feels that the enema resolved all his symptoms. Past Medical History:    Past Medical History:   Diagnosis Date    AAA (abdominal aortic aneurysm) ([address]     ASHD (arteriosclerotic heart disease)     S/P coronary intervention followed by CBG    CAD (coronary artery disease)     Severe triple-vessel    CAD (coronary artery disease) 02/04/2015    COPD (chronic obstructive pulmonary disease) (HCC)     With tobacco abuse    GERD (gastroesophageal reflux disease)     HH (hiatus hernia)     History of cerebrovascular disease     Hyperlipidemia     PCP, Dr. Viktor Robles manages cholesterol.     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 Romie Davis    S/P TKR (total knee replacement)     RIGHT    Thyroid disease        Past Surgical History:    Past Surgical History:   Procedure Laterality Date    ABDOMINAL AORTIC ANEURYSM REPAIR, OPEN      ADENOIDECTOMY      APPENDECTOMY      CARDIAC SURGERY      CARDIOVASCULAR STRESS TEST  12/07/09, [facility]    Stress [facility]ho    CARDIOVASCULAR STRESS TEST  11/02/06, [facility]    Adenosine thallium treadmill    CHOLECYSTECTOMY      CORONARY ARTERY BYPASS GRAFT  12/01/06, [place]    CABG x 4 with sequential FAYE to diagonal, LAD, SVG, to obtuse marginal, posterior descending endoscopic vein harvesting. DIAGNOSTIC CARDIAC CATH LAB PROCEDURE  11/14/06, [facility]    Left cath, arteriography of bilateral native internal mammary arteries. DIAGNOSTIC CARDIAC CATH LAB PROCEDURE  06/27/03????, [facility]    Left cath, selective CA, primary stent placement two circumflex marginal branches. DIAGNOSTIC CARDIAC CATH LAB PROCEDURE  02/24/1993, [facility]    Left cath    DIAGNOSTIC CARDIAC CATH LAB PROCEDURE  05/15/91, [facility]    Left cath, SARABIA/Bahamian left ventric., selective coronary arteriography. JOINT REPLACEMENT      IL KNEE SCOPE, W/LATERAL RELEASE Right 12/15/2017    KNEE ARTHROSCOPY LATERAL PATELLA REALIGNMENT performed by Tamar Garcia DO at [facility] Right 4/6/2018    KNEE TOTAL ARTHROPLASTY PATELLOFEMORALREVISION performed by Tamar Garcia DO at [address] [place] [address]    PTCA  05/16/91, [facility]    PTCA to circumflex marginal branch. REVISION TOTAL KNEE ARTHROPLASTY Right 1/6/2017    KNEE TOTAL ARTHROPLASTY REVISION performed by Bessie Barros MD at [address] [place] [address]  4/2012    Left  knee        Medications Prior to Admission:    Medications Prior to Admission: hyoscyamine (ANASPAZ;LEVSIN) 125 MCG tablet, Take 125 mcg by mouth every 4 hours as needed for Cramping  nystatin-triamcinolone (MYCOLOG) 463598-0.1 UNIT/GM-% ointment, Apply topically 2 times daily Apply topically 2 times daily. glycopyrrolate (ROBINUL) 1 MG tablet, Take 1 mg by mouth 3 times daily  metoprolol succinate (TOPROL XL) 50 MG extended release tablet, Take 50 mg by mouth daily  Multiple Vitamins-Minerals (CENTRUM SILVER PO), Take by mouth  magnesium citrate solution, Take 296 mLs by mouth once  hydrocortisone 0.5 % cream, Apply topically 2 times daily Apply topically 2 times daily.   carvedilol (COREG) 12.5 MG tablet, Take 1 tablet by mouth 2 times daily  amLODIPine (NORVASC) 5 MG tablet, Take 1 tablet by mouth daily  aspirin 81 MG EC tablet, Take 81 mg by mouth daily  acetaminophen (TYLENOL) 500 MG tablet, Take 500 mg by mouth every 6 hours as needed for Pain  Magnesium 100 MG CAPS, Take by mouth daily  bumetanide (BUMEX) 1 MG tablet, Take 1 mg by mouth daily  nitroGLYCERIN (NITROSTAT) 0.4 MG SL tablet, Place 1 tablet under the tongue every 5 minutes as needed for Chest pain  clopidogrel (PLAVIX) 75 MG tablet, Take 1 tablet by mouth daily Start after Xarelto done  losartan (COZAAR) 100 MG tablet, Take 50 mg by mouth daily   tamsulosin (FLOMAX) 0.4 MG capsule, Take 0.4 mg by mouth daily. simvastatin (ZOCOR) 20 MG tablet, Take 20 mg by mouth nightly. levothyroxine (SYNTHROID) 125 MCG tablet, Take 125 mcg by mouth nightly     Allergies:  Pcn [penicillins], Azithromycin, Doxycycline, Tetracyclines & related, and Vibramycin [doxycycline calcium]    Social History:   Social History     Socioeconomic History    Marital status:       Spouse name: Not on file    Number of children: Not on file    Years of education: Not on file    Highest education level: Not on file   Occupational History    Not on file   Tobacco Use    Smoking status: Former     Types: Cigarettes     Quit date: 1989     Years since quittin.8    Smokeless tobacco: Never   Vaping Use    Vaping Use: Never used   Substance and Sexual Activity    Alcohol use: Not Currently     Comment: RARELY    Drug use: Never    Sexual activity: Not on file   Other Topics Concern    Not on file   Social History Narrative    Not on file     Social Determinants of Health     Financial Resource Strain: Not on file   Food Insecurity: Not on file   Transportation Needs: Not on file   Physical Activity: Not on file   Stress: Not on file   Social Connections: Not on file   Intimate Partner Violence: Not on file   Housing Stability: Not on file       Family History:   Family History   Problem Relation Age of Onset    Heart Disease Other     Heart Disease Mother        REVIEW OF SYSTEMS:    CONSTITUTIONAL:  Negative for anorexia, chills, fevers, night sweats and weight loss, patient has been relatively medically stable until illness outlined in HPI  EYES:  negative for eye dryness, icterus and redness, no significant changes in vision  HEENT:   negative for dental problems, epistaxis or sinus congestion , no history of facial trauma or difficulty swallowing  RESPIRATORY:  negative for chest tightness, cough, dyspnea on exertion, pneumonia or worse sputum production  CARDIOVASCULAR: No history of chest pain, dyspnea, exertional chest pressure/discomfort, irregular heart beat, or PND  GASTROINTESTINAL:  negative for abdominal pain, nausea or vomiting, no history of hematemesis, jaundice, melena or rectal bleeding  MUSCULOSKELETAL:  negative for muscle weakness, myalgias and neck pain, no significant arthralgias  NEUROLOGICAL:   negative for dizziness, headaches, seizures, speech problems, tremors and vertigo, mentation has been at baseline  INTEGUMENT: negative for pruritus, rash, skin color change and skin lesion(s)        Physical Exam:  Constitutional: The patient is oriented to person, place, and time. They appear well-developed. Able to answer questions appropriately  HEENT: Grossly normal sitting up in bed  Head: Normocephalic and atraumatic. Eyes: Pupils are equal, round, and reactive to light. Extraocular muscles appear to be intact  Neck: Neck supple without masses or carotid bruit. Cardiovascular: Regular rhythm and normal heart sounds. No extra or lift rub or murmur appreciated  Pulmonary/Chest: Effort normal and breath sounds normal. CTAB, no appreciable rales or wheezes  Abdominal: Soft. Bowel sounds are normal. There is  no distension or tenderness appreciated. There is no rebound and no guarding. Musculoskeletal: Normal range of motion. There is  no edema or tenderness. No significant joint swelling  Neurological: Pt is alert and oriented to person, place, and time. They have normal reflexes. CN 2-12 appear grossly intact  Skin: Skin is warm and dry without significant rashes     Results Review:   I reviewed the patient's new imaging results and agree with the interpretation. ASSESSMENT AND PLAN:      1. Principal Problem:    Acute kidney injury (nontraumatic) ([facility])  Active Problems:    ASHD (arteriosclerotic heart disease)    Hypertension    Hyperlipidemia    COPD (chronic obstructive pulmonary disease) ([facility])    AAA (abdominal aortic aneurysm)  Resolved Problems:    * No resolved hospital problems. *    Assessment:  79 yo M here for constipation w/ CT scan concerning for dissecting AAA. Plan:    Possible aortic dissection  Seen on CT, asymptomatic, vascular surgery following. Need CTA to clarify this, plan to draw BMP today to evaluate renal function then do CTA. BRIAN  Likely prerenal but could be related to aortic dissection, appears unlikely though. Constipation  Resolved.       Nallely Ocasio MD  10:55 AM 11/14/2022

## 2022-11-14 NOTE — DISCHARGE INSTR - DIET

## 2022-11-14 NOTE — DISCHARGE INSTRUCTIONS
Advised to report in the emergency room in the event of acute abdominal or back pain   keep follow up appointments  Take medications as directed  Oxygen per home routine

## 2022-11-14 NOTE — PROGRESS NOTES
Vascular Surgery  Dr. Ced Ashley   Daily Progress Note    Pt Name: Michael Rowe Record Number: [id number]  Date of Birth 1940   Today's Date: 11/14/2022      SUBJECTIVE:     Patient was seen and examined. Pain is controlled, stating the back of his right lower calf is tender. States the Dermatologist gave him a cream he puts on it at home. Has not had nausea/vomiting  Has had loose stools.   Asking if he can go home, \"I have dogs I need to get back to\"    OBJECTIVE:     Patient Vitals for the past 24 hrs:   BP Temp Temp src Pulse Resp SpO2 Height Weight   11/14/22 0826 (!) 155/59 96.8 °F (36 °C) Temporal 64 -- 92 % -- --   11/14/22 0824 -- -- -- -- -- (!) 85 % -- --   11/14/22 0609 (!) 151/46 98.4 °F (36.9 °C) -- 64 18 91 % -- --   11/13/22 2357 -- -- -- -- -- -- 5' 10\" (1.778 m) 183 lb 4.8 oz (83.1 kg)   11/13/22 2046 (!) 111/45 98.1 °F (36.7 °C) -- 57 18 92 % -- --   11/13/22 1830 (!) 159/62 -- -- 56 -- 90 % -- --   11/13/22 1730 (!) 157/59 -- -- 54 -- 91 % -- --   11/13/22 1630 (!) 161/58 -- -- 52 -- 94 % -- --   11/13/22 1530 (!) 168/63 -- -- 58 16 93 % -- --   11/13/22 1430 (!) 159/69 -- -- 52 -- 93 % -- --   11/13/22 1330 (!) 142/66 -- -- 52 -- 90 % -- --   11/13/22 1220 131/64 -- -- -- -- -- -- --         Intake/Output Summary (Last 24 hours) at 11/14/2022 1135  Last data filed at 11/14/2022 1030  Gross per 24 hour   Intake 460 ml   Output 200 ml   Net 260 ml       In: 460 [P.O.:460]  Out: 200 [Urine:200]    I/O last 3 completed shifts:  In: -   Out: 200 [Urine:200]     Date 11/14/22 0000 - 11/14/22 2359   Shift 9042-8506 0501-1705 1573-0284 24 Hour Total   INTAKE   P.O.(mL/kg/hr)  460  460   Shift Total(mL/kg)  460(5.5)  460(5.5)   OUTPUT   Urine(mL/kg/hr) 200(0.3)   200   Shift Total(mL/kg) 200(2.4)   200(2.4)   Weight (kg) 83.1 83.1 83.1 83.1       Wt Readings from Last 3 Encounters:   11/13/22 183 lb 4.8 oz (83.1 kg)   07/14/22 190 lb (86.2 kg)   07/11/22 191 lb 9 oz (86.9 kg) Body mass index is 26.3 kg/m². Diet: ADULT DIET; Regular      MEDS:     Scheduled Meds:   amLODIPine  5 mg Oral Daily    carvedilol  12.5 mg Oral BID    levothyroxine  125 mcg Oral Nightly    atorvastatin  40 mg Oral Daily    tamsulosin  0.4 mg Oral Daily    sodium chloride flush  5-40 mL IntraVENous 2 times per day     Continuous Infusions:   sodium chloride       PRN Meds:sodium chloride flush, 5-40 mL, PRN  sodium chloride, , PRN  acetaminophen, 650 mg, Q4H PRN  ondansetron, 4 mg, Q8H PRN   Or  ondansetron, 4 mg, Q6H PRN        PHYSICAL EXAM:     CONSTITUTIONAL: awake, alert, cooperative, no apparent distress  LUNGS: Clear bilaterally anteriorly  CARDIOVASCULAR: Heart regular rate and rhythm, no murmur noted  ABDOMEN: soft, nontender, nondistended, hyperactive bowel sounds noted  NEUROLOGIC: Awake, alert, oriented to name, place and time. EXTREMITY: feet warm to touch, has erythema bilateral distal calf/shin areas. Wrinkles noted from chronic edema.      LABS:     CBC:   Recent Labs     11/13/22  0940   WBC 12.2*   RBC 4.41*   HGB 14.3   HCT 44.7   .4*   MCH 32.4*   MCHC 32.0*   RDW 13.8      MPV 12.2      Last 3 CMP:   Recent Labs     11/13/22  0940   *   K 4.5   CL 94*   CO2 28   BUN 59*   CREATININE 2.0*   GLUCOSE 110*   CALCIUM 9.4   PROT 8.1   LABALBU 3.7   BILITOT 0.9   ALKPHOS 116   AST 21   ALT 16        Calcium:   Lab Results   Component Value Date/Time    CALCIUM 9.4 11/13/2022 09:40 AM    CALCIUM 9.3 07/14/2022 10:55 PM    CALCIUM 8.9 07/14/2022 06:04 AM        INR:   Recent Labs     11/13/22  0940   INR 1.14     Lactic Acid:   Lab Results   Component Value Date/Time    LACTA 0.7 11/04/2017 02:35 PM          ASSESSMENT:     POD #   HD # 1  Active Hospital Problems    Diagnosis Date Noted    Acute kidney injury (nontraumatic) ([facility] [N17.9] 11/13/2022     Priority: Medium    Hypertension [I10]     ASHD (arteriosclerotic heart disease) [I25.10]     COPD [facility]chronic obstructive [facility] disease) [facility] [J44.9]     Hyperlipidemia [E78.5]     AAA (abdominal aortic aneurysm) [I71.40]        Chief Complaint:  Chief Complaint   Patient presents with    Constipation     Pt reports \"I need that thing they stick up your butt\". He states he has been \"stopped up\" since last week, after a bad case of diarrhea. Pt also reports a decrease in urination. PLAN:     No Vascular intervention needed at this time. Would not recommend contrast study until patient creatinine recovers  Will make patient a 3 month office visit and then he will have a repeat CTA of abdomen/pelvis to re-evaluate status of aortic aneurysm    Best medical treatment: Aspirin, statin and risk factor modification.     Advised to report in the emergency room in the event of acute abdominal or back pain

## 2022-11-17 NOTE — PROGRESS NOTES
Physician Progress Note      Ivelisse Brock  CSN #:                  [id number]  :                       1940  ADMIT DATE:       2022 9:23 AM  DISCH DATE:        2022 3:54 PM  RESPONDING  PROVIDER #:        Janina Ma MD          QUERY TEXT:    Patient admitted with constipation and BRIAN. Documentation reflects suprarenal   AAA with possible dissection. Vascular consult notes, \"incidental finding of a   4.5 cm suprarenal AAA with suspicion for a focal dissection over the left   side of the aneurysm as well as small bilateral common iliac artery aneurysms. My a detailed review of the CT which was a noncontrast CT with stone   protocol did not clearly reveal this to be a dissection but could be a   possible bypass from the infrarenal aorta to the SMA as this tubular structure   becomes distinct from the aortic wall particularly     The medical record reflects the following:  Risk Factors:  male age 78, Former smoker, CAD  Clinical Indicators: CT Abd, \"Aneurysmal dilatation of the abdominal aorta   which is most pronounced in the proximal abdominal aorta measuring 4.5 x 3.4   cm there is also a small focal dissection on the left side of the proximal   abdominal aorta. \" Vascular consult notes, \"incidental finding of a 4.5 cm   suprarenal AAA with suspicion for a focal dissection over the left side of the   aneurysm as well as small bilateral common iliac artery aneurysms. My a   detailed review of the CT which was a noncontrast CT with stone protocol did   not clearly reveal this to be a dissection but coul  Treatment: Vascular consult, CT Abd, 3 month follow up with vascular with CTA   Abd.   Options provided:  -- Dissection of suprarenal AAA confirmed after study  -- Dissection of suprerenal AAA ruled out after study  -- Other - I will add my own diagnosis  -- Disagree - Not applicable / Not valid  -- Disagree - Clinically unable to determine / Unknown  -- Refer to Clinical Documentation Reviewer    PROVIDER RESPONSE TEXT:    Provider is clinically unable to determine a response to this query.     Query created by: Kenyatta Cornell on 11/17/2022 10:09 AM      Electronically signed by:  Randal Butler MD 11/17/2022 11:40 AM

## 2022-11-17 NOTE — PROGRESS NOTES
Physician Progress Note      Herman Aburto  KARLENE #:                  [id number]  :                       1940  ADMIT DATE:       2022 9:23 AM  DISCH DATE:        2022 3:54 PM  RESPONDING  PROVIDER #:        Ramandeep Samaniego MD        QUERY TEXT:    Stage of Chronic Kidney Disease: Please provide further specificity, if known. Clinical indicators include: bun, creatinine, ckd  Options provided:  -- Chronic kidney disease stage 1  -- Chronic kidney disease stage 2  -- Chronic kidney disease stage 3  -- Chronic kidney disease stage 3a  -- Chronic kidney disease stage 3b  -- Chronic kidney disease stage 4  -- Chronic kidney disease stage 5  -- Chronic kidney disease stage 5, requiring dialysis  -- End stage renal disease  -- Other - I will add my own diagnosis  -- Disagree - Not applicable / Not valid  -- Disagree - Clinically Unable to determine / Unknown        PROVIDER RESPONSE TEXT:    Provider was unable to determine a response for this query.       Electronically signed by:  Ramandeep Samaniego MD 2022 10:31 AM

## 2022-11-21 NOTE — DISCHARGE SUMMARY
Hospital Discharge Summary    Gilmore Hatchet  :  1940  MRN:  [id number]    Admit date:  2022  Discharge date:  2022    Admitting Physician:    Paulette Ortez MD    Discharge Diagnoses:    Principal Problem:    Acute kidney injury (nontraumatic) ([facility]  Active Problems:    ASHD (arteriosclerotic heart disease)    Hypertension    Hyperlipidemia    COPD (chronic obstructive pulmonary disease) ([facility]    AAA (abdominal aortic aneurysm)  Resolved Problems:    * No resolved hospital problems. [facility] Course:       Mr. Dante Bernal presented to the ED for constipation when CT a/p showed possible intramural hemorrhage and dissection within his known AAA. He was admitted for observation and evaluated by vascular surgery who reviewed the imaging and decided to follow conservatively.     Discharge Medications:         Medication List        CONTINUE taking these medications      acetaminophen 500 MG tablet  Commonly known as: TYLENOL     amLODIPine 5 MG tablet  Commonly known as: NORVASC  Take 1 tablet by mouth daily     aspirin 81 MG EC tablet     carvedilol 12.5 MG tablet  Commonly known as: COREG  Take 1 tablet by mouth 2 times daily     CENTRUM SILVER PO     clopidogrel 75 MG tablet  Commonly known as: Plavix  Take 1 tablet by mouth daily Start after Xarelto done     glycopyrrolate 1 MG tablet  Commonly known as: ROBINUL     hydrocortisone 0.5 % cream     hyoscyamine 125 MCG tablet  Commonly known as: ANASPAZ;LEVSIN     levothyroxine 125 MCG tablet  Commonly known as: SYNTHROID     Magnesium 100 MG Caps     magnesium citrate solution     nitroGLYCERIN 0.4 MG SL tablet  Commonly known as: Nitrostat  Place 1 tablet under the tongue every 5 minutes as needed for Chest pain     nystatin-triamcinolone 025254-6.1 UNIT/GM-% ointment  Commonly known as: MYCOLOG     simvastatin 20 MG tablet  Commonly known as: ZOCOR     tamsulosin 0.4 MG capsule  Commonly known as: FLOMAX            STOP taking these medications bumetanide 1 MG tablet  Commonly known as: BUMEX     losartan 100 MG tablet  Commonly known as: COZAAR     metoprolol succinate 50 MG extended release tablet  Commonly known as: TOPROL XL              Significant Diagnostic Studies:    See summary of labs/x-rays/path report for further details    Disposition:   home  Follow up with Jose Antonio Jack MD in 1 weeks.     Signed:  Kaylin Winters MD   11/21/2022, 2:40 PM

## 2022-11-28 NOTE — PROGRESS NOTES
Physician Progress Note      Amita Garcia  CSN #:                  [id number]  :                       1940  ADMIT DATE:       2022 9:23 AM  DISCH DATE:        2022 3:54 PM  RESPONDING  PROVIDER #:        Bindu Velasco MD          QUERY TEXT:    Patient admitted wiTH suprarenal AAA. Noted documentation of Acute Kidney   Injury in IM documentation. Only one chemistry panel was drawn and no baseline   creatinine documented to evaluate KDIGO guidelines. In order to support the   diagnosis of BRIAN, please include additional clinical indicators in your   documentation. ? Or please document if the diagnosis of BRIAN has been ruled out   after further study. The medical record reflects the following:  Risk Factors: HTN  Clinical Indicators: BUN/CR/GFR 59/2.0/32  Treatment: Chemistry panel, 0.9% NS 1L bolus    Defined by Kidney Disease Improving Global Outcomes (KDIGO) clinical practice   guideline for acute kidney injury:  -Increase in SCr by greater than or equal to 0.3 mg/dl within 48 hours; or  -Increase or decrease in SCr to greater than or equal to 1.5 times baseline,   which is known or presumed to have occurred within the prior 7 days; or  -Urine volume < 0.5ml/kg/h for 6 hours. Options provided:  -- Acute kidney injury evidenced by, Please document evidence as well as a   numerical baseline creatinine, if known.   -- Acute kidney injury ruled out after study  -- Other - I will add my own diagnosis  -- Disagree - Not applicable / Not valid  -- Disagree - Clinically unable to determine / Unknown  -- Refer to Clinical Documentation Reviewer    PROVIDER RESPONSE TEXT:    This patient has acute kidney injury as evidenced by elevated creatinine    Query created by: Ree Manning on 2022 11:08 AM      Electronically signed by:  Bindu Velasco MD 2022 10:34 AM

## 2023-01-17 ENCOUNTER — OFFICE VISIT (OUTPATIENT)
Dept: VASCULAR SURGERY | Age: 83
End: 2023-01-17
Payer: MEDICARE

## 2023-01-17 VITALS
TEMPERATURE: 98.3 F | WEIGHT: 190 LBS | HEIGHT: 70 IN | BODY MASS INDEX: 27.2 KG/M2 | DIASTOLIC BLOOD PRESSURE: 50 MMHG | HEART RATE: 51 BPM | OXYGEN SATURATION: 92 % | SYSTOLIC BLOOD PRESSURE: 103 MMHG

## 2023-01-17 DIAGNOSIS — I71.43 INFRARENAL ABDOMINAL AORTIC ANEURYSM (AAA) WITHOUT RUPTURE: Primary | ICD-10-CM

## 2023-01-17 DIAGNOSIS — K55.1 SUPERIOR MESENTERIC ARTERY STENOSIS (HCC): ICD-10-CM

## 2023-01-17 PROCEDURE — 1036F TOBACCO NON-USER: CPT | Performed by: NURSE PRACTITIONER

## 2023-01-17 PROCEDURE — 1123F ACP DISCUSS/DSCN MKR DOCD: CPT | Performed by: NURSE PRACTITIONER

## 2023-01-17 PROCEDURE — 99213 OFFICE O/P EST LOW 20 MIN: CPT | Performed by: NURSE PRACTITIONER

## 2023-01-17 PROCEDURE — 3074F SYST BP LT 130 MM HG: CPT | Performed by: NURSE PRACTITIONER

## 2023-01-17 PROCEDURE — G8484 FLU IMMUNIZE NO ADMIN: HCPCS | Performed by: NURSE PRACTITIONER

## 2023-01-17 PROCEDURE — G8427 DOCREV CUR MEDS BY ELIG CLIN: HCPCS | Performed by: NURSE PRACTITIONER

## 2023-01-17 PROCEDURE — G8417 CALC BMI ABV UP PARAM F/U: HCPCS | Performed by: NURSE PRACTITIONER

## 2023-01-17 PROCEDURE — 3078F DIAST BP <80 MM HG: CPT | Performed by: NURSE PRACTITIONER

## 2023-01-17 RX ORDER — LOSARTAN POTASSIUM 100 MG/1
100 TABLET ORAL DAILY
COMMUNITY

## 2023-01-17 RX ORDER — ATORVASTATIN CALCIUM 10 MG/1
10 TABLET, FILM COATED ORAL DAILY
COMMUNITY

## 2023-01-17 RX ORDER — METOPROLOL SUCCINATE 50 MG/1
50 TABLET, EXTENDED RELEASE ORAL DAILY
COMMUNITY

## 2023-01-17 RX ORDER — FUROSEMIDE 40 MG/1
40 TABLET ORAL 2 TIMES DAILY
COMMUNITY

## 2023-01-17 NOTE — PROGRESS NOTES
Morris Blackwell (:  1940) is a 78 y.o. male,Established patient, here for evaluation of the following chief complaint(s):  Follow-up (3 month hospital follow up.)            SUBJECTIVE/OBJECTIVE:  He has a known history of abdominal aortic and splenic artery aneurysm for 1 - 5 years. He had splenic artery aneurysm repaired in . No op note available. He has not had abdominal pain. He has not had back pain in the cervical spine, thoracic spine, lumbar spine, and sacral spine region. This pain is unchanged since the last visit. Pain is rated as 0. He did have some abdominal pain when films were taken. He was constipated and this has since resolved.         Morris Blackwell is a 78 y.o. 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 ([facility] 2021    PVD (peripheral vascular disease) ([facility] [address] 2021    Varicose veins of both lower extremities 2021    Patellofemoral disorder of right knee 2018    Patellofemoral disorder 2018    Lateral subluxation of right patella 12/15/2017    Bronchitis 2017    Status post coronary artery stent placement 02/15/2016    S/P CABG x 4 2015    ASHD (arteriosclerotic heart disease)     Hypertension     Hyperlipidemia     COPD (chronic obstructive pulmonary disease) (HCC)     History of cerebrovascular disease     Class 1 obesity due to excess calories with serious comorbidity and body mass index (BMI) of 30.0 to 30.9 in adult     AAA (abdominal aortic aneurysm)     S/P TKR (total knee replacement)     Diabetes mellitus (HCC)     GERD (gastroesophageal reflux disease)     Primary osteoarthritis of knee     HH (hiatus hernia)     Renal insufficiency      Current Outpatient Medications   Medication Sig Dispense Refill    metoprolol succinate (TOPROL XL) 50 MG extended release tablet Take 50 mg by mouth daily      losartan (COZAAR) 100 MG tablet Take 100 mg by mouth daily      dapagliflozin (FARXIGA) 5 MG tablet Take 5 mg by mouth every morning      atorvastatin (LIPITOR) 10 MG tablet Take 10 mg by mouth daily      furosemide (LASIX) 40 MG tablet Take 40 mg by mouth in the morning and 40 mg in the evening. Multiple Vitamins-Minerals (CENTRUM SILVER PO) Take by mouth      hydrocortisone 0.5 % cream Apply topically 2 times daily Apply topically 2 times daily. aspirin 81 MG EC tablet Take 81 mg by mouth daily      acetaminophen (TYLENOL) 500 MG tablet Take 500 mg by mouth every 6 hours as needed for Pain      nitroGLYCERIN (NITROSTAT) 0.4 MG SL tablet Place 1 tablet under the tongue every 5 minutes as needed for Chest pain 25 tablet 3    clopidogrel (PLAVIX) 75 MG tablet Take 1 tablet by mouth daily Start after Xarelto done 30 tablet 3    tamsulosin (FLOMAX) 0.4 MG capsule Take 0.4 mg by mouth daily. levothyroxine (SYNTHROID) 125 MCG tablet Take 125 mcg by mouth nightly       hyoscyamine (ANASPAZ;LEVSIN) 125 MCG tablet Take 125 mcg by mouth every 4 hours as needed for Cramping (Patient not taking: Reported on 1/17/2023)      nystatin-triamcinolone (MYCOLOG) 762627-2.1 UNIT/GM-% ointment Apply topically 2 times daily Apply topically 2 times daily.  (Patient not taking: Reported on 1/17/2023)      glycopyrrolate (ROBINUL) 1 MG tablet Take 1 mg by mouth 3 times daily (Patient not taking: Reported on 1/17/2023)      magnesium citrate solution Take 296 mLs by mouth once (Patient not taking: Reported on 1/17/2023)      carvedilol (COREG) 12.5 MG tablet Take 1 tablet by mouth 2 times daily (Patient not taking: Reported on 1/17/2023) 60 tablet 3    amLODIPine (NORVASC) 5 MG tablet Take 1 tablet by mouth daily (Patient not taking: Reported on 1/17/2023) 30 tablet 3 Magnesium 100 MG CAPS Take by mouth daily (Patient not taking: Reported on 1/17/2023)      simvastatin (ZOCOR) 20 MG tablet Take 20 mg by mouth nightly. (Patient not taking: Reported on 1/17/2023)       No current facility-administered medications for this visit. Allergies: Pcn [penicillins], Azithromycin, Doxycycline, Tetracyclines & related, and Vibramycin [doxycycline calcium]  Past Medical History:   Diagnosis Date    AAA (abdominal aortic aneurysm)     ASHD (arteriosclerotic heart disease)     S/P coronary intervention followed by CBG    CAD (coronary artery disease)     Severe triple-vessel    CAD (coronary artery disease) 02/04/2015    COPD (chronic obstructive pulmonary disease) (HCC)     With tobacco abuse    GERD (gastroesophageal reflux disease)     HH (hiatus hernia)     History of cerebrovascular disease     Hyperlipidemia     PCP, Dr. Nimisha Pettit manages cholesterol. 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 Jocelynn Neri    S/P TKR (total knee replacement)     RIGHT    Thyroid disease      Past Surgical History:   Procedure Laterality Date    ABDOMINAL AORTIC ANEURYSM REPAIR, OPEN      ADENOIDECTOMY      APPENDECTOMY      CARDIAC SURGERY      CARDIOVASCULAR STRESS TEST  12/07/09, [facility]    Stress ECho    CARDIOVASCULAR STRESS TEST  11/02/06, [facility]    Adenosine thallium treadmill    CHOLECYSTECTOMY      CORONARY ARTERY BYPASS GRAFT  12/01/06, [place]    CABG x 4 with sequential FAYE to diagonal, LAD, SVG, to obtuse marginal, posterior descending endoscopic vein harvesting. DIAGNOSTIC CARDIAC CATH LAB PROCEDURE  11/14/06, [facility]    Left cath, arteriography of bilateral native internal mammary arteries. DIAGNOSTIC CARDIAC CATH LAB PROCEDURE  06/27/03????, [facility]    Left cath, selective CA, primary stent placement two circumflex marginal branches.     DIAGNOSTIC CARDIAC CATH LAB PROCEDURE 1993, [facility]    Left cath    DIAGNOSTIC CARDIAC CATH LAB PROCEDURE  05/15/91, [facility]    Left cath, SARABIA/SHERON left ventric., selective coronary arteriography. JOINT REPLACEMENT      [facility] ARTHROSCOPY KNEE LATERAL RELEASE Right 12/15/2017    KNEE ARTHROSCOPY LATERAL PATELLA REALIGNMENT performed by Danitza Doss DO at [address] W/WO ALGRFT 1 COMPONENT Right 2018    KNEE TOTAL ARTHROPLASTY PATELLOFEMORALREVISION performed by Danitza Doss DO at [address]    PTCA  91, [facility]    PTCA to circumflex marginal branch. REVISION TOTAL KNEE ARTHROPLASTY Right 2017    KNEE TOTAL ARTHROPLASTY REVISION performed by Elena Moss MD at [address]  2012    Left  knee      Family History   Problem Relation Age of Onset    Heart Disease Other     Heart Disease Mother      Social History     Tobacco Use    Smoking status: Former     Types: Cigarettes     Quit date: 1989     Years since quittin.0    Smokeless tobacco: Never   Substance Use Topics    Alcohol use: Not Currently     Comment: RARELY       ROS  Eyes - no sudden vision change or amaurosis. Respiratory - no significant shortness of breath,  Cardiovascular - no chest pain or syncope. No  significant leg swelling. no claudication. Musculoskeletal - no gait disturbance  Skin - no new wound. Neurologic -  No speech difficulty or lateralizing weakness. All other review of systems are negative. Physical Exam    BP (!) 103/50 (Site: Right Upper Arm, Position: Sitting, Cuff Size: Medium Adult)   Pulse 51   Temp 98.3 °F (36.8 °C)   Ht 5' 10\" (1.778 m)   Wt 190 lb (86.2 kg)   SpO2 92%   BMI 27.26 kg/m²       Neck- carotid pulses 2+ to palpation with no bruit  Cardiovascular - Regular rate and rhythm. Pulmonary - effort appears normal.  No respiratory distress. Lungs - Breath sounds normal. No wheezes or rales.     GI - Abdomen - soft, non tender, bowel sounds X 4 quadrants. No guarding or rebound tenderness. No distension or palpable mass. Extremities - Radial and brachial pulses are 2+ to palpation bilaterally. Right femoral pulse: present 2+; Right popliteal pulse: absent Right DP: absent; Right PT absent; Left femoral pulse: present 2+; Left popliteal pulse: absent; Left DP: absent; Left PT: absent No cyanosis, clubbing, or significant edema. No signs atheroembolic event. Neurologic - alert and oriented X 3. Physiologic. Face symmetric. Skin - warm, dry, and intact. no wound  Psychiatric - mood, affect, and behavior appear normal.  Judgment and thought processes appear normal.      CT -   Aneurysmal dilatation of the abdominal aorta which is most pronounced in the   proximal abdominal aorta measuring 4.5 x 3.4 cm there is also a small focal   dissection on the left side of the proximal abdominal aorta. I have reviewed the films. I do not think this is a dissection. It think it is a bypass graft. Individual films reviewed:  Yes. These results have been reviewed with the patient. Disease process is stable              ASSESSMENT/PLAN:  1. Infrarenal abdominal aortic aneurysm (AAA) without rupture  -     CT ABDOMEN PELVIS WO CONTRAST Additional Contrast? None; Future  2. Superior mesenteric artery stenosis (HCC)  -     CT ABDOMEN PELVIS WO CONTRAST Additional Contrast? None; Future      Discussed management of CT scan which includes:  continue asa and lipitor to reduce risk of arterial thrombosis and to decrease rate of plaque buildup  Strongly encourage start/continue statin therapy -   Recommend no smoking -  Proceed with 1 year with ct  Symptoms of rupture reviewed with the patient including sudden onset severe back pain or abdominal pain. This pain can sometimes radiate into the groin or leg. The patient may experience a feeling of impending doom or death.   If this occurs they have been insturcted to call 911 and get to the emergency room telling them you have an aneurysm.  Patient has voiced understanding.                  An electronic signature was used to authenticate this note.    --JENNY Tobar

## 2023-01-17 NOTE — Clinical Note
Please let patient know that it is a graft. Everything looks okay.   We will see him in 1 year with ct

## 2023-01-19 ENCOUNTER — TELEPHONE (OUTPATIENT)
Dept: VASCULAR SURGERY | Age: 83
End: 2023-01-19

## 2023-01-19 NOTE — TELEPHONE ENCOUNTER
Left a message for the patient with the following information.             ----- Message from JENNY Hurtado sent at 1/19/2023 11:00 AM CST -----  Please let patient know that it is a graft. Everything looks okay.   We will see him in 1 year with ct

## 2023-04-05 ENCOUNTER — HOSPITAL ENCOUNTER (OUTPATIENT)
Dept: ULTRASOUND IMAGING | Age: 83
Discharge: HOME | End: 2023-04-05
Payer: MEDICARE

## 2023-04-05 DIAGNOSIS — N17.9 ACUTE KIDNEY INJURY (HCC): ICD-10-CM

## 2023-04-05 PROCEDURE — 76770 US EXAM ABDO BACK WALL COMP: CPT | Performed by: RADIOLOGY

## 2023-04-05 PROCEDURE — 76770 US EXAM ABDO BACK WALL COMP: CPT

## 2023-06-19 ENCOUNTER — HOSPITAL ENCOUNTER (INPATIENT)
Age: 83
LOS: 7 days | Discharge: NURSING, REHAB OR LONG-TERM CARE | DRG: 377 | End: 2023-06-26
Attending: EMERGENCY MEDICINE | Admitting: STUDENT IN AN ORGANIZED HEALTH CARE EDUCATION/TRAINING PROGRAM
Payer: MEDICARE

## 2023-06-19 ENCOUNTER — APPOINTMENT (OUTPATIENT)
Dept: GENERAL RADIOLOGY | Age: 83
DRG: 377 | End: 2023-06-19
Payer: MEDICARE

## 2023-06-19 ENCOUNTER — APPOINTMENT (OUTPATIENT)
Dept: CT IMAGING | Age: 83
DRG: 377 | End: 2023-06-19
Payer: MEDICARE

## 2023-06-19 DIAGNOSIS — R77.8 ELEVATED TROPONIN: ICD-10-CM

## 2023-06-19 DIAGNOSIS — D64.9 ANEMIA, UNSPECIFIED TYPE: ICD-10-CM

## 2023-06-19 DIAGNOSIS — R41.82 ALTERED MENTAL STATUS, UNSPECIFIED ALTERED MENTAL STATUS TYPE: Primary | ICD-10-CM

## 2023-06-19 DIAGNOSIS — N17.9 ACUTE RENAL FAILURE SUPERIMPOSED ON CHRONIC KIDNEY DISEASE, UNSPECIFIED CKD STAGE, UNSPECIFIED ACUTE RENAL FAILURE TYPE (HCC): ICD-10-CM

## 2023-06-19 DIAGNOSIS — N18.9 ACUTE RENAL FAILURE SUPERIMPOSED ON CHRONIC KIDNEY DISEASE, UNSPECIFIED CKD STAGE, UNSPECIFIED ACUTE RENAL FAILURE TYPE (HCC): ICD-10-CM

## 2023-06-19 DIAGNOSIS — M62.82 NON-TRAUMATIC RHABDOMYOLYSIS: ICD-10-CM

## 2023-06-19 DIAGNOSIS — K92.2 UPPER GI BLEED: ICD-10-CM

## 2023-06-19 PROBLEM — R74.01 ELEVATED AST (SGOT): Status: ACTIVE | Noted: 2023-06-19

## 2023-06-19 PROBLEM — R19.5 OCCULT GI BLEEDING: Status: ACTIVE | Noted: 2023-06-19

## 2023-06-19 PROBLEM — D53.9 MACROCYTIC ANEMIA: Status: ACTIVE | Noted: 2023-06-19

## 2023-06-19 PROBLEM — Z79.899 HIGH RISK MEDICATION USE: Status: ACTIVE | Noted: 2023-06-19

## 2023-06-19 PROBLEM — K92.1 MELENA: Status: ACTIVE | Noted: 2023-06-19

## 2023-06-19 LAB
ABO + RH BLD: NORMAL
ALBUMIN SERPL-MCNC: 3.8 G/DL (ref 3.5–5.2)
ALLENS TEST: ABNORMAL
ALP SERPL-CCNC: 69 U/L (ref 40–130)
ALT SERPL-CCNC: 41 U/L (ref 5–41)
AMPHET UR QL SCN: NEGATIVE
ANION GAP SERPL CALCULATED.3IONS-SCNC: 27 MMOL/L (ref 7–19)
APTT PPP: 29.9 SEC (ref 26–36.2)
AST SERPL-CCNC: 128 U/L (ref 5–40)
BACTERIA #/AREA URNS HPF: ABNORMAL /HPF
BACTERIA URNS QL MICRO: NEGATIVE /HPF
BARBITURATES UR QL SCN: NEGATIVE
BASE EXCESS ARTERIAL: -12.5 MMOL/L (ref -2–2)
BASOPHILS # BLD: 0 K/UL (ref 0–0.2)
BASOPHILS NFR BLD: 0.1 % (ref 0–1)
BENZODIAZ UR QL SCN: NEGATIVE
BILIRUB SERPL-MCNC: 0.5 MG/DL (ref 0.2–1.2)
BILIRUB UR QL STRIP: NEGATIVE
BILIRUB UR QL STRIP: NEGATIVE
BLD GP AB SCN SERPL QL: NORMAL
BUN SERPL-MCNC: 143 MG/DL (ref 8–23)
BUPRENORPHINE URINE: NEGATIVE
CALCIUM SERPL-MCNC: 9.1 MG/DL (ref 8.8–10.2)
CANNABINOIDS UR QL SCN: NEGATIVE
CARBOXYHEMOGLOBIN ARTERIAL: 3.4 % (ref 0–5)
CHLORIDE SERPL-SCNC: 103 MMOL/L (ref 98–111)
CK SERPL-CCNC: 6323 U/L (ref 39–308)
CLARITY UR: ABNORMAL
CLARITY UR: CLEAR
CO2 SERPL-SCNC: 11 MMOL/L (ref 22–29)
COCAINE UR QL SCN: NEGATIVE
COLOR UR: YELLOW
COLOR UR: YELLOW
CREAT SERPL-MCNC: 4 MG/DL (ref 0.5–1.2)
CREAT UR-MCNC: 108.8 MG/DL (ref 39–259)
CREAT UR-MCNC: 109.6 MG/DL (ref 39–259)
CRYSTALS URNS MICRO: ABNORMAL /HPF
CRYSTALS URNS MICRO: ABNORMAL /HPF
DRUG SCREEN COMMENT UR-IMP: NORMAL
EKG P AXIS: NORMAL DEGREES
EKG P-R INTERVAL: NORMAL MS
EKG Q-T INTERVAL: 420 MS
EKG QRS DURATION: 142 MS
EKG QTC CALCULATION (BAZETT): 462 MS
EKG T AXIS: -36 DEGREES
EOSINOPHIL # BLD: 0 K/UL (ref 0–0.6)
EOSINOPHIL NFR BLD: 0 % (ref 0–5)
EPI CELLS #/AREA URNS AUTO: 0 /HPF (ref 0–5)
ERYTHROCYTE [DISTWIDTH] IN BLOOD BY AUTOMATED COUNT: 15.1 % (ref 11.5–14.5)
ETHANOLAMINE SERPL-MCNC: <10 MG/DL (ref 0–0.08)
GLUCOSE SERPL-MCNC: 191 MG/DL (ref 74–109)
GLUCOSE UR STRIP.AUTO-MCNC: NEGATIVE MG/DL
GLUCOSE UR STRIP.AUTO-MCNC: NEGATIVE MG/DL
HCO3 ARTERIAL: 12.8 MMOL/L (ref 22–26)
HCT VFR BLD AUTO: 18.5 % (ref 42–52)
HEMOGLOBIN, ART, EXTENDED: 5.3 G/DL (ref 14–18)
HGB BLD-MCNC: 5.8 G/DL (ref 14–18)
HGB UR STRIP.AUTO-MCNC: NEGATIVE MG/L
HGB UR STRIP.AUTO-MCNC: NEGATIVE MG/L
HYALINE CASTS #/AREA URNS AUTO: 2 /HPF (ref 0–8)
IMM GRANULOCYTES # BLD: 0.7 K/UL
INR PPP: 1.71 (ref 0.88–1.18)
KETONES UR STRIP.AUTO-MCNC: NEGATIVE MG/DL
KETONES UR STRIP.AUTO-MCNC: NEGATIVE MG/DL
LACTATE BLDV-SCNC: 11.4 MMOL/L (ref 0.5–1.9)
LEUKOCYTE ESTERASE UR QL STRIP.AUTO: ABNORMAL
LEUKOCYTE ESTERASE UR QL STRIP.AUTO: ABNORMAL
LYMPHOCYTES # BLD: 1.7 K/UL (ref 1.1–4.5)
LYMPHOCYTES NFR BLD: 7.1 % (ref 20–40)
MCH RBC QN AUTO: 31.4 PG (ref 27–31)
MCHC RBC AUTO-ENTMCNC: 31.4 G/DL (ref 33–37)
MCV RBC AUTO: 100 FL (ref 80–94)
METHADONE UR QL SCN: NEGATIVE
METHAMPHETAMINE, URINE: NEGATIVE
METHEMOGLOBIN ARTERIAL: 1.5 %
MICROALBUMIN UR-MCNC: 4.2 MG/DL (ref 0–19)
MICROALBUMIN/CREAT UR-RTO: 38.6 MG/G
MONOCYTES # BLD: 1.9 K/UL (ref 0–0.9)
MONOCYTES NFR BLD: 8.2 % (ref 0–10)
NEUTROPHILS # BLD: 19.2 K/UL (ref 1.5–7.5)
NEUTS SEG NFR BLD: 81.5 % (ref 50–65)
NITRITE UR QL STRIP.AUTO: NEGATIVE
NITRITE UR QL STRIP.AUTO: NEGATIVE
O2 CONTENT ARTERIAL: 7.3 ML/DL
O2 DELIVERY DEVICE: ABNORMAL
O2 SAT, ARTERIAL: 95.6 %
O2 THERAPY: ABNORMAL
OPIATES UR QL SCN: NEGATIVE
OXYCODONE UR QL SCN: NEGATIVE
OXYGEN FLOW: 2
PCO2 ARTERIAL: 26 MMHG (ref 35–45)
PCP UR QL SCN: NEGATIVE
PH ARTERIAL: 7.3 (ref 7.35–7.45)
PH UR STRIP.AUTO: 5 [PH] (ref 5–8)
PH UR STRIP.AUTO: 5 [PH] (ref 5–8)
PLATELET # BLD AUTO: 101 K/UL (ref 130–400)
PMV BLD AUTO: 12.8 FL (ref 9.4–12.4)
PO2 ARTERIAL: 84 MMHG (ref 80–100)
POTASSIUM BLD-SCNC: 4.7 MMOL/L
POTASSIUM SERPL-SCNC: 5.6 MMOL/L (ref 3.5–5)
PROPOXYPH UR QL SCN: NEGATIVE
PROT SERPL-MCNC: 6.6 G/DL (ref 6.6–8.7)
PROT UR STRIP.AUTO-MCNC: NEGATIVE MG/DL
PROT UR STRIP.AUTO-MCNC: NEGATIVE MG/DL
PROTHROMBIN TIME: 19.6 SEC (ref 12–14.6)
RBC # BLD AUTO: 1.85 M/UL (ref 4.7–6.1)
RBC #/AREA URNS AUTO: 2 /HPF (ref 0–4)
RBC #/AREA URNS HPF: ABNORMAL /HPF (ref 0–2)
SAMPLE SOURCE: ABNORMAL
SARS-COV-2 RDRP RESP QL NAA+PROBE: NOT DETECTED
SODIUM SERPL-SCNC: 141 MMOL/L (ref 136–145)
SODIUM UR-SCNC: 21 MMOL/L
SP GR UR STRIP.AUTO: 1.02 (ref 1–1.03)
SP GR UR STRIP.AUTO: 1.02 (ref 1–1.03)
SQUAMOUS #/AREA URNS HPF: ABNORMAL /HPF
TRICYCLIC, URINE: NEGATIVE
TROPONIN T SERPL-MCNC: 0.05 NG/ML (ref 0–0.03)
UROBILINOGEN UR STRIP.AUTO-MCNC: 0.2 E.U./DL
UROBILINOGEN UR STRIP.AUTO-MCNC: 0.2 E.U./DL
WBC # BLD AUTO: 23.6 K/UL (ref 4.8–10.8)
WBC #/AREA URNS AUTO: 0 /HPF (ref 0–5)
WBC #/AREA URNS HPF: ABNORMAL /HPF (ref 0–5)

## 2023-06-19 PROCEDURE — 6360000002 HC RX W HCPCS: Performed by: PEDIATRICS

## 2023-06-19 PROCEDURE — 36430 TRANSFUSION BLD/BLD COMPNT: CPT

## 2023-06-19 PROCEDURE — 82550 ASSAY OF CK (CPK): CPT

## 2023-06-19 PROCEDURE — 6360000002 HC RX W HCPCS: Performed by: STUDENT IN AN ORGANIZED HEALTH CARE EDUCATION/TRAINING PROGRAM

## 2023-06-19 PROCEDURE — 96361 HYDRATE IV INFUSION ADD-ON: CPT

## 2023-06-19 PROCEDURE — 83540 ASSAY OF IRON: CPT

## 2023-06-19 PROCEDURE — 84300 ASSAY OF URINE SODIUM: CPT

## 2023-06-19 PROCEDURE — 96366 THER/PROPH/DIAG IV INF ADDON: CPT

## 2023-06-19 PROCEDURE — 82803 BLOOD GASES ANY COMBINATION: CPT

## 2023-06-19 PROCEDURE — 2580000003 HC RX 258: Performed by: PEDIATRICS

## 2023-06-19 PROCEDURE — 71045 X-RAY EXAM CHEST 1 VIEW: CPT

## 2023-06-19 PROCEDURE — 2580000003 HC RX 258: Performed by: STUDENT IN AN ORGANIZED HEALTH CARE EDUCATION/TRAINING PROGRAM

## 2023-06-19 PROCEDURE — P9016 RBC LEUKOCYTES REDUCED: HCPCS

## 2023-06-19 PROCEDURE — 84484 ASSAY OF TROPONIN QUANT: CPT

## 2023-06-19 PROCEDURE — 2500000003 HC RX 250 WO HCPCS: Performed by: PEDIATRICS

## 2023-06-19 PROCEDURE — 85025 COMPLETE CBC W/AUTO DIFF WBC: CPT

## 2023-06-19 PROCEDURE — 82077 ASSAY SPEC XCP UR&BREATH IA: CPT

## 2023-06-19 PROCEDURE — 82043 UR ALBUMIN QUANTITATIVE: CPT

## 2023-06-19 PROCEDURE — 96365 THER/PROPH/DIAG IV INF INIT: CPT

## 2023-06-19 PROCEDURE — 83550 IRON BINDING TEST: CPT

## 2023-06-19 PROCEDURE — 99285 EMERGENCY DEPT VISIT HI MDM: CPT

## 2023-06-19 PROCEDURE — 85610 PROTHROMBIN TIME: CPT

## 2023-06-19 PROCEDURE — P9059 PLASMA, FRZ BETWEEN 8-24HOUR: HCPCS

## 2023-06-19 PROCEDURE — 93005 ELECTROCARDIOGRAM TRACING: CPT | Performed by: PEDIATRICS

## 2023-06-19 PROCEDURE — 87635 SARS-COV-2 COVID-19 AMP PRB: CPT

## 2023-06-19 PROCEDURE — 99222 1ST HOSP IP/OBS MODERATE 55: CPT | Performed by: SPECIALIST

## 2023-06-19 PROCEDURE — P9073 PLATELETS PHERESIS PATH REDU: HCPCS

## 2023-06-19 PROCEDURE — 86923 COMPATIBILITY TEST ELECTRIC: CPT

## 2023-06-19 PROCEDURE — 80306 DRUG TEST PRSMV INSTRMNT: CPT

## 2023-06-19 PROCEDURE — 36415 COLL VENOUS BLD VENIPUNCTURE: CPT

## 2023-06-19 PROCEDURE — 85730 THROMBOPLASTIN TIME PARTIAL: CPT

## 2023-06-19 PROCEDURE — 80053 COMPREHEN METABOLIC PANEL: CPT

## 2023-06-19 PROCEDURE — 70450 CT HEAD/BRAIN W/O DYE: CPT

## 2023-06-19 PROCEDURE — C9113 INJ PANTOPRAZOLE SODIUM, VIA: HCPCS | Performed by: PEDIATRICS

## 2023-06-19 PROCEDURE — 86901 BLOOD TYPING SEROLOGIC RH(D): CPT

## 2023-06-19 PROCEDURE — C9113 INJ PANTOPRAZOLE SODIUM, VIA: HCPCS | Performed by: STUDENT IN AN ORGANIZED HEALTH CARE EDUCATION/TRAINING PROGRAM

## 2023-06-19 PROCEDURE — 36600 WITHDRAWAL OF ARTERIAL BLOOD: CPT

## 2023-06-19 PROCEDURE — 81001 URINALYSIS AUTO W/SCOPE: CPT

## 2023-06-19 PROCEDURE — 82570 ASSAY OF URINE CREATININE: CPT

## 2023-06-19 PROCEDURE — 82728 ASSAY OF FERRITIN: CPT

## 2023-06-19 PROCEDURE — 93010 ELECTROCARDIOGRAM REPORT: CPT | Performed by: INTERNAL MEDICINE

## 2023-06-19 PROCEDURE — 86900 BLOOD TYPING SEROLOGIC ABO: CPT

## 2023-06-19 PROCEDURE — 87040 BLOOD CULTURE FOR BACTERIA: CPT

## 2023-06-19 PROCEDURE — 1210000000 HC MED SURG R&B

## 2023-06-19 PROCEDURE — 82306 VITAMIN D 25 HYDROXY: CPT

## 2023-06-19 PROCEDURE — 83605 ASSAY OF LACTIC ACID: CPT

## 2023-06-19 PROCEDURE — 86850 RBC ANTIBODY SCREEN: CPT

## 2023-06-19 RX ORDER — SODIUM CHLORIDE, SODIUM LACTATE, POTASSIUM CHLORIDE, CALCIUM CHLORIDE 600; 310; 30; 20 MG/100ML; MG/100ML; MG/100ML; MG/100ML
INJECTION, SOLUTION INTRAVENOUS CONTINUOUS
Status: DISCONTINUED | OUTPATIENT
Start: 2023-06-19 | End: 2023-06-22

## 2023-06-19 RX ORDER — ACETAMINOPHEN 325 MG/1
650 TABLET ORAL EVERY 6 HOURS PRN
Status: DISCONTINUED | OUTPATIENT
Start: 2023-06-19 | End: 2023-06-21 | Stop reason: SDUPTHER

## 2023-06-19 RX ORDER — INSULIN LISPRO 100 [IU]/ML
0-4 INJECTION, SOLUTION INTRAVENOUS; SUBCUTANEOUS NIGHTLY
Status: DISCONTINUED | OUTPATIENT
Start: 2023-06-19 | End: 2023-06-22

## 2023-06-19 RX ORDER — SODIUM CHLORIDE 0.9 % (FLUSH) 0.9 %
5-40 SYRINGE (ML) INJECTION EVERY 12 HOURS SCHEDULED
Status: DISCONTINUED | OUTPATIENT
Start: 2023-06-19 | End: 2023-06-21 | Stop reason: SDUPTHER

## 2023-06-19 RX ORDER — SODIUM CHLORIDE 9 MG/ML
INJECTION, SOLUTION INTRAVENOUS PRN
Status: DISCONTINUED | OUTPATIENT
Start: 2023-06-19 | End: 2023-06-21 | Stop reason: SDUPTHER

## 2023-06-19 RX ORDER — ACETAMINOPHEN 650 MG/1
650 SUPPOSITORY RECTAL EVERY 6 HOURS PRN
Status: DISCONTINUED | OUTPATIENT
Start: 2023-06-19 | End: 2023-06-21 | Stop reason: SDUPTHER

## 2023-06-19 RX ORDER — SODIUM CHLORIDE 9 MG/ML
30 INJECTION, SOLUTION INTRAVENOUS ONCE
Status: COMPLETED | OUTPATIENT
Start: 2023-06-19 | End: 2023-06-19

## 2023-06-19 RX ORDER — SODIUM CHLORIDE 0.9 % (FLUSH) 0.9 %
5-40 SYRINGE (ML) INJECTION PRN
Status: DISCONTINUED | OUTPATIENT
Start: 2023-06-19 | End: 2023-06-26 | Stop reason: HOSPADM

## 2023-06-19 RX ORDER — ONDANSETRON 4 MG/1
4 TABLET, ORALLY DISINTEGRATING ORAL EVERY 8 HOURS PRN
Status: DISCONTINUED | OUTPATIENT
Start: 2023-06-19 | End: 2023-06-26 | Stop reason: HOSPADM

## 2023-06-19 RX ORDER — DEXTROSE MONOHYDRATE 100 MG/ML
INJECTION, SOLUTION INTRAVENOUS CONTINUOUS PRN
Status: DISCONTINUED | OUTPATIENT
Start: 2023-06-19 | End: 2023-06-22

## 2023-06-19 RX ORDER — INSULIN LISPRO 100 [IU]/ML
0-4 INJECTION, SOLUTION INTRAVENOUS; SUBCUTANEOUS
Status: DISCONTINUED | OUTPATIENT
Start: 2023-06-20 | End: 2023-06-22

## 2023-06-19 RX ORDER — PANTOPRAZOLE SODIUM 40 MG/10ML
80 INJECTION, POWDER, LYOPHILIZED, FOR SOLUTION INTRAVENOUS ONCE
Status: COMPLETED | OUTPATIENT
Start: 2023-06-19 | End: 2023-06-19

## 2023-06-19 RX ORDER — ONDANSETRON 2 MG/ML
4 INJECTION INTRAMUSCULAR; INTRAVENOUS EVERY 6 HOURS PRN
Status: DISCONTINUED | OUTPATIENT
Start: 2023-06-19 | End: 2023-06-21 | Stop reason: SDUPTHER

## 2023-06-19 RX ORDER — SODIUM CHLORIDE 9 MG/ML
INJECTION, SOLUTION INTRAVENOUS PRN
Status: DISCONTINUED | OUTPATIENT
Start: 2023-06-19 | End: 2023-06-26 | Stop reason: HOSPADM

## 2023-06-19 RX ORDER — ONDANSETRON 2 MG/ML
4 INJECTION INTRAMUSCULAR; INTRAVENOUS EVERY 6 HOURS PRN
Status: DISCONTINUED | OUTPATIENT
Start: 2023-06-19 | End: 2023-06-26 | Stop reason: HOSPADM

## 2023-06-19 RX ORDER — ONDANSETRON 4 MG/1
4 TABLET, ORALLY DISINTEGRATING ORAL EVERY 8 HOURS PRN
Status: DISCONTINUED | OUTPATIENT
Start: 2023-06-19 | End: 2023-06-21 | Stop reason: SDUPTHER

## 2023-06-19 RX ORDER — SODIUM CHLORIDE 0.9 % (FLUSH) 0.9 %
5-40 SYRINGE (ML) INJECTION EVERY 12 HOURS SCHEDULED
Status: DISCONTINUED | OUTPATIENT
Start: 2023-06-19 | End: 2023-06-26 | Stop reason: HOSPADM

## 2023-06-19 RX ORDER — SODIUM CHLORIDE 0.9 % (FLUSH) 0.9 %
5-40 SYRINGE (ML) INJECTION PRN
Status: DISCONTINUED | OUTPATIENT
Start: 2023-06-19 | End: 2023-06-21 | Stop reason: SDUPTHER

## 2023-06-19 RX ORDER — ACETAMINOPHEN 325 MG/1
650 TABLET ORAL EVERY 4 HOURS PRN
Status: DISCONTINUED | OUTPATIENT
Start: 2023-06-19 | End: 2023-06-21 | Stop reason: SDUPTHER

## 2023-06-19 RX ORDER — OCTREOTIDE ACETATE 50 UG/ML
50 INJECTION, SOLUTION INTRAVENOUS; SUBCUTANEOUS ONCE
Status: COMPLETED | OUTPATIENT
Start: 2023-06-19 | End: 2023-06-19

## 2023-06-19 RX ADMIN — OCTREOTIDE ACETATE 50 MCG: 50 INJECTION, SOLUTION INTRAVENOUS; SUBCUTANEOUS at 18:31

## 2023-06-19 RX ADMIN — OCTREOTIDE ACETATE 50 MCG/HR: 500 INJECTION, SOLUTION INTRAVENOUS; SUBCUTANEOUS at 19:50

## 2023-06-19 RX ADMIN — VANCOMYCIN HYDROCHLORIDE 1750 MG: 10 INJECTION, POWDER, LYOPHILIZED, FOR SOLUTION INTRAVENOUS at 14:53

## 2023-06-19 RX ADMIN — CEFEPIME 2000 MG: 2 INJECTION, POWDER, FOR SOLUTION INTRAVENOUS at 14:32

## 2023-06-19 RX ADMIN — PANTOPRAZOLE SODIUM 80 MG: 40 INJECTION, POWDER, FOR SOLUTION INTRAVENOUS at 18:28

## 2023-06-19 RX ADMIN — SODIUM CHLORIDE 30 ML/KG/HR: 9 INJECTION, SOLUTION INTRAVENOUS at 14:30

## 2023-06-19 RX ADMIN — SODIUM BICARBONATE: 84 INJECTION, SOLUTION INTRAVENOUS at 18:33

## 2023-06-19 RX ADMIN — SODIUM CHLORIDE 8 MG/HR: 9 INJECTION, SOLUTION INTRAVENOUS at 19:27

## 2023-06-20 ENCOUNTER — APPOINTMENT (OUTPATIENT)
Dept: ULTRASOUND IMAGING | Age: 83
DRG: 377 | End: 2023-06-20
Payer: MEDICARE

## 2023-06-20 LAB
25(OH)D3 SERPL-MCNC: 56.5 NG/ML
ALBUMIN SERPL-MCNC: 2.5 G/DL (ref 3.5–5.2)
ALP SERPL-CCNC: 50 U/L (ref 40–130)
ALT SERPL-CCNC: 56 U/L (ref 5–41)
AMMONIA PLAS-SCNC: 22 UMOL/L (ref 16–60)
ANION GAP SERPL CALCULATED.3IONS-SCNC: 11 MMOL/L (ref 7–19)
AST SERPL-CCNC: 140 U/L (ref 5–40)
BILIRUB SERPL-MCNC: 0.5 MG/DL (ref 0.2–1.2)
BLOOD BANK DISPENSE STATUS: NORMAL
BLOOD BANK PRODUCT CODE: NORMAL
BPU ID: NORMAL
BUN SERPL-MCNC: 130 MG/DL (ref 8–23)
CALCIUM SERPL-MCNC: 7.5 MG/DL (ref 8.8–10.2)
CHLORIDE SERPL-SCNC: 107 MMOL/L (ref 98–111)
CO2 SERPL-SCNC: 21 MMOL/L (ref 22–29)
CREAT SERPL-MCNC: 3.6 MG/DL (ref 0.5–1.2)
DESCRIPTION BLOOD BANK: NORMAL
ERYTHROCYTE [DISTWIDTH] IN BLOOD BY AUTOMATED COUNT: 14.9 % (ref 11.5–14.5)
FERRITIN SERPL-MCNC: 400.2 NG/ML (ref 30–400)
FOLATE SERPL-MCNC: 4.4 NG/ML (ref 4.5–32.2)
GLUCOSE BLD-MCNC: 101 MG/DL (ref 70–99)
GLUCOSE BLD-MCNC: 105 MG/DL (ref 70–99)
GLUCOSE BLD-MCNC: 113 MG/DL (ref 70–99)
GLUCOSE SERPL-MCNC: 228 MG/DL (ref 74–109)
HBA1C MFR BLD: 5.5 % (ref 4–6)
HCT VFR BLD AUTO: 17 % (ref 42–52)
HCT VFR BLD AUTO: 24 % (ref 42–52)
HCT VFR BLD AUTO: 24.7 % (ref 42–52)
HGB BLD-MCNC: 5.6 G/DL (ref 14–18)
HGB BLD-MCNC: 7.3 G/DL (ref 14–18)
HGB BLD-MCNC: 8 G/DL (ref 14–18)
IRON SATN MFR SERPL: 35 % (ref 14–50)
IRON SERPL-MCNC: 87 UG/DL (ref 59–158)
LACTATE BLDV-SCNC: 1.7 MMOL/L (ref 0.5–1.9)
MCH RBC QN AUTO: 31.6 PG (ref 27–31)
MCHC RBC AUTO-ENTMCNC: 32.9 G/DL (ref 33–37)
MCV RBC AUTO: 96 FL (ref 80–94)
PERFORMED ON: ABNORMAL
PLATELET # BLD AUTO: 114 K/UL (ref 130–400)
PMV BLD AUTO: 11.8 FL (ref 9.4–12.4)
POTASSIUM SERPL-SCNC: 4.5 MMOL/L (ref 3.5–5)
PROT SERPL-MCNC: 4.6 G/DL (ref 6.6–8.7)
PTH-INTACT SERPL-MCNC: 119.8 PG/ML (ref 15–65)
RBC # BLD AUTO: 1.77 M/UL (ref 4.7–6.1)
SODIUM SERPL-SCNC: 139 MMOL/L (ref 136–145)
TIBC SERPL-MCNC: 248 UG/DL (ref 250–400)
VANCOMYCIN SERPL-MCNC: 16.6 UG/ML
VIT B12 SERPL-MCNC: 610 PG/ML (ref 211–946)
WBC # BLD AUTO: 15.5 K/UL (ref 4.8–10.8)

## 2023-06-20 PROCEDURE — 85014 HEMATOCRIT: CPT

## 2023-06-20 PROCEDURE — P9059 PLASMA, FRZ BETWEEN 8-24HOUR: HCPCS

## 2023-06-20 PROCEDURE — P9016 RBC LEUKOCYTES REDUCED: HCPCS

## 2023-06-20 PROCEDURE — 6360000002 HC RX W HCPCS: Performed by: STUDENT IN AN ORGANIZED HEALTH CARE EDUCATION/TRAINING PROGRAM

## 2023-06-20 PROCEDURE — 85027 COMPLETE CBC AUTOMATED: CPT

## 2023-06-20 PROCEDURE — 36430 TRANSFUSION BLD/BLD COMPNT: CPT

## 2023-06-20 PROCEDURE — 82607 VITAMIN B-12: CPT

## 2023-06-20 PROCEDURE — 2580000003 HC RX 258: Performed by: STUDENT IN AN ORGANIZED HEALTH CARE EDUCATION/TRAINING PROGRAM

## 2023-06-20 PROCEDURE — 83970 ASSAY OF PARATHORMONE: CPT

## 2023-06-20 PROCEDURE — 82140 ASSAY OF AMMONIA: CPT

## 2023-06-20 PROCEDURE — 36415 COLL VENOUS BLD VENIPUNCTURE: CPT

## 2023-06-20 PROCEDURE — 82962 GLUCOSE BLOOD TEST: CPT

## 2023-06-20 PROCEDURE — 80053 COMPREHEN METABOLIC PANEL: CPT

## 2023-06-20 PROCEDURE — 82746 ASSAY OF FOLIC ACID SERUM: CPT

## 2023-06-20 PROCEDURE — 1210000000 HC MED SURG R&B

## 2023-06-20 PROCEDURE — 76770 US EXAM ABDO BACK WALL COMP: CPT

## 2023-06-20 PROCEDURE — 83036 HEMOGLOBIN GLYCOSYLATED A1C: CPT

## 2023-06-20 PROCEDURE — 80202 ASSAY OF VANCOMYCIN: CPT

## 2023-06-20 PROCEDURE — 83605 ASSAY OF LACTIC ACID: CPT

## 2023-06-20 PROCEDURE — 85018 HEMOGLOBIN: CPT

## 2023-06-20 PROCEDURE — 99232 SBSQ HOSP IP/OBS MODERATE 35: CPT | Performed by: SPECIALIST

## 2023-06-20 RX ORDER — ACETAMINOPHEN 500 MG
500 TABLET ORAL EVERY 6 HOURS PRN
Status: DISCONTINUED | OUTPATIENT
Start: 2023-06-20 | End: 2023-06-26 | Stop reason: HOSPADM

## 2023-06-20 RX ORDER — METOPROLOL SUCCINATE 50 MG/1
50 TABLET, EXTENDED RELEASE ORAL DAILY
Status: DISCONTINUED | OUTPATIENT
Start: 2023-06-20 | End: 2023-06-21

## 2023-06-20 RX ORDER — SODIUM CHLORIDE 9 MG/ML
INJECTION, SOLUTION INTRAVENOUS PRN
Status: DISCONTINUED | OUTPATIENT
Start: 2023-06-20 | End: 2023-06-21 | Stop reason: SDUPTHER

## 2023-06-20 RX ORDER — LEVOTHYROXINE SODIUM 0.12 MG/1
125 TABLET ORAL NIGHTLY
Status: DISCONTINUED | OUTPATIENT
Start: 2023-06-20 | End: 2023-06-26 | Stop reason: HOSPADM

## 2023-06-20 RX ORDER — ATORVASTATIN CALCIUM 10 MG/1
10 TABLET, FILM COATED ORAL DAILY
Status: DISCONTINUED | OUTPATIENT
Start: 2023-06-20 | End: 2023-06-26 | Stop reason: HOSPADM

## 2023-06-20 RX ORDER — SODIUM CHLORIDE 9 MG/ML
INJECTION, SOLUTION INTRAVENOUS PRN
Status: DISCONTINUED | OUTPATIENT
Start: 2023-06-20 | End: 2023-06-26 | Stop reason: HOSPADM

## 2023-06-20 RX ORDER — NITROGLYCERIN 0.4 MG/1
0.4 TABLET SUBLINGUAL EVERY 5 MIN PRN
Status: DISCONTINUED | OUTPATIENT
Start: 2023-06-20 | End: 2023-06-26 | Stop reason: HOSPADM

## 2023-06-20 RX ADMIN — CEFEPIME 1000 MG: 1 INJECTION, POWDER, FOR SOLUTION INTRAMUSCULAR; INTRAVENOUS at 14:42

## 2023-06-20 RX ADMIN — Medication 1000 MG: at 10:13

## 2023-06-20 RX ADMIN — OCTREOTIDE ACETATE 50 MCG/HR: 500 INJECTION, SOLUTION INTRAVENOUS; SUBCUTANEOUS at 23:44

## 2023-06-20 RX ADMIN — CEFEPIME 1000 MG: 1 INJECTION, POWDER, FOR SOLUTION INTRAMUSCULAR; INTRAVENOUS at 02:50

## 2023-06-20 RX ADMIN — SODIUM CHLORIDE, POTASSIUM CHLORIDE, SODIUM LACTATE AND CALCIUM CHLORIDE: 600; 310; 30; 20 INJECTION, SOLUTION INTRAVENOUS at 03:37

## 2023-06-21 LAB
ALBUMIN SERPL-MCNC: 2.5 G/DL (ref 3.5–5.2)
ALP SERPL-CCNC: 60 U/L (ref 40–130)
ALT SERPL-CCNC: 53 U/L (ref 5–41)
ANION GAP SERPL CALCULATED.3IONS-SCNC: 9 MMOL/L (ref 7–19)
ANION GAP SERPL CALCULATED.3IONS-SCNC: 9 MMOL/L (ref 7–19)
APTT PPP: 36 SEC (ref 26–36.2)
AST SERPL-CCNC: 92 U/L (ref 5–40)
BILIRUB SERPL-MCNC: 0.4 MG/DL (ref 0.2–1.2)
BUN SERPL-MCNC: 86 MG/DL (ref 8–23)
BUN SERPL-MCNC: 98 MG/DL (ref 8–23)
CALCIUM SERPL-MCNC: 7.4 MG/DL (ref 8.8–10.2)
CALCIUM SERPL-MCNC: 7.7 MG/DL (ref 8.8–10.2)
CHLORIDE SERPL-SCNC: 115 MMOL/L (ref 98–111)
CHLORIDE SERPL-SCNC: 124 MMOL/L (ref 98–111)
CK SERPL-CCNC: 1902 U/L (ref 39–308)
CO2 SERPL-SCNC: 21 MMOL/L (ref 22–29)
CO2 SERPL-SCNC: 22 MMOL/L (ref 22–29)
CREAT SERPL-MCNC: 2.9 MG/DL (ref 0.5–1.2)
CREAT SERPL-MCNC: 3 MG/DL (ref 0.5–1.2)
ERYTHROCYTE [DISTWIDTH] IN BLOOD BY AUTOMATED COUNT: 15.6 % (ref 11.5–14.5)
ERYTHROCYTE [DISTWIDTH] IN BLOOD BY AUTOMATED COUNT: 15.6 % (ref 11.5–14.5)
GLUCOSE BLD-MCNC: 101 MG/DL (ref 70–99)
GLUCOSE BLD-MCNC: 103 MG/DL (ref 70–99)
GLUCOSE BLD-MCNC: 81 MG/DL (ref 70–99)
GLUCOSE BLD-MCNC: 99 MG/DL (ref 70–99)
GLUCOSE BLD-MCNC: 99 MG/DL (ref 70–99)
GLUCOSE SERPL-MCNC: 96 MG/DL (ref 74–109)
GLUCOSE SERPL-MCNC: 98 MG/DL (ref 74–109)
HCT VFR BLD AUTO: 26.1 % (ref 42–52)
HCT VFR BLD AUTO: 26.5 % (ref 42–52)
HCT VFR BLD AUTO: 26.8 % (ref 42–52)
HCT VFR BLD AUTO: 27 % (ref 42–52)
HCT VFR BLD AUTO: 27.5 % (ref 42–52)
HGB BLD-MCNC: 8.7 G/DL (ref 14–18)
HGB BLD-MCNC: 8.8 G/DL (ref 14–18)
HGB BLD-MCNC: 8.9 G/DL (ref 14–18)
HGB BLD-MCNC: 9.1 G/DL (ref 14–18)
HGB BLD-MCNC: 9.2 G/DL (ref 14–18)
INR PPP: 1.45 (ref 0.88–1.18)
MCH RBC QN AUTO: 30.6 PG (ref 27–31)
MCH RBC QN AUTO: 31.1 PG (ref 27–31)
MCHC RBC AUTO-ENTMCNC: 33.5 G/DL (ref 33–37)
MCHC RBC AUTO-ENTMCNC: 33.6 G/DL (ref 33–37)
MCV RBC AUTO: 91.4 FL (ref 80–94)
MCV RBC AUTO: 92.7 FL (ref 80–94)
PERFORMED ON: ABNORMAL
PERFORMED ON: ABNORMAL
PERFORMED ON: NORMAL
PLATELET # BLD AUTO: 70 K/UL (ref 130–400)
PLATELET # BLD AUTO: 83 K/UL (ref 130–400)
PMV BLD AUTO: 11.4 FL (ref 9.4–12.4)
PMV BLD AUTO: 12.1 FL (ref 9.4–12.4)
POTASSIUM SERPL-SCNC: 4.6 MMOL/L (ref 3.5–5)
POTASSIUM SERPL-SCNC: 4.7 MMOL/L (ref 3.5–5)
PROT SERPL-MCNC: 5 G/DL (ref 6.6–8.7)
PROTHROMBIN TIME: 17.2 SEC (ref 12–14.6)
RBC # BLD AUTO: 2.86 M/UL (ref 4.7–6.1)
RBC # BLD AUTO: 3.01 M/UL (ref 4.7–6.1)
SODIUM SERPL-SCNC: 146 MMOL/L (ref 136–145)
SODIUM SERPL-SCNC: 154 MMOL/L (ref 136–145)
TSH SERPL DL<=0.005 MIU/L-ACNC: 1.72 UIU/ML (ref 0.35–5.5)
VANCOMYCIN SERPL-MCNC: 17.6 UG/ML
WBC # BLD AUTO: 13.6 K/UL (ref 4.8–10.8)
WBC # BLD AUTO: 14.6 K/UL (ref 4.8–10.8)

## 2023-06-21 PROCEDURE — 6370000000 HC RX 637 (ALT 250 FOR IP): Performed by: FAMILY MEDICINE

## 2023-06-21 PROCEDURE — 6360000002 HC RX W HCPCS: Performed by: STUDENT IN AN ORGANIZED HEALTH CARE EDUCATION/TRAINING PROGRAM

## 2023-06-21 PROCEDURE — 80202 ASSAY OF VANCOMYCIN: CPT

## 2023-06-21 PROCEDURE — 85610 PROTHROMBIN TIME: CPT

## 2023-06-21 PROCEDURE — 2700000000 HC OXYGEN THERAPY PER DAY

## 2023-06-21 PROCEDURE — 85730 THROMBOPLASTIN TIME PARTIAL: CPT

## 2023-06-21 PROCEDURE — 82962 GLUCOSE BLOOD TEST: CPT

## 2023-06-21 PROCEDURE — 36415 COLL VENOUS BLD VENIPUNCTURE: CPT

## 2023-06-21 PROCEDURE — 2580000003 HC RX 258: Performed by: STUDENT IN AN ORGANIZED HEALTH CARE EDUCATION/TRAINING PROGRAM

## 2023-06-21 PROCEDURE — 85018 HEMOGLOBIN: CPT

## 2023-06-21 PROCEDURE — 85014 HEMATOCRIT: CPT

## 2023-06-21 PROCEDURE — 1210000000 HC MED SURG R&B

## 2023-06-21 PROCEDURE — 85027 COMPLETE CBC AUTOMATED: CPT

## 2023-06-21 PROCEDURE — 84443 ASSAY THYROID STIM HORMONE: CPT

## 2023-06-21 PROCEDURE — 80053 COMPREHEN METABOLIC PANEL: CPT

## 2023-06-21 PROCEDURE — 99223 1ST HOSP IP/OBS HIGH 75: CPT | Performed by: PHYSICIAN ASSISTANT

## 2023-06-21 PROCEDURE — 82550 ASSAY OF CK (CPK): CPT

## 2023-06-21 PROCEDURE — 99222 1ST HOSP IP/OBS MODERATE 55: CPT | Performed by: INTERNAL MEDICINE

## 2023-06-21 PROCEDURE — 94760 N-INVAS EAR/PLS OXIMETRY 1: CPT

## 2023-06-21 PROCEDURE — C9113 INJ PANTOPRAZOLE SODIUM, VIA: HCPCS | Performed by: STUDENT IN AN ORGANIZED HEALTH CARE EDUCATION/TRAINING PROGRAM

## 2023-06-21 RX ORDER — CETIRIZINE HYDROCHLORIDE 10 MG/1
10 TABLET ORAL DAILY
Status: ON HOLD | COMMUNITY
End: 2023-06-26 | Stop reason: HOSPADM

## 2023-06-21 RX ORDER — GABAPENTIN 300 MG/1
300 CAPSULE ORAL NIGHTLY
Status: ON HOLD | COMMUNITY
End: 2023-06-26 | Stop reason: HOSPADM

## 2023-06-21 RX ORDER — HYDROXYZINE 50 MG/1
50 TABLET, FILM COATED ORAL 3 TIMES DAILY PRN
Status: ON HOLD | COMMUNITY
End: 2023-06-26 | Stop reason: HOSPADM

## 2023-06-21 RX ADMIN — INSULIN LISPRO 0 UNITS: 100 INJECTION, SOLUTION INTRAVENOUS; SUBCUTANEOUS at 08:32

## 2023-06-21 RX ADMIN — CEFEPIME 1000 MG: 1 INJECTION, POWDER, FOR SOLUTION INTRAMUSCULAR; INTRAVENOUS at 02:00

## 2023-06-21 RX ADMIN — OCTREOTIDE ACETATE 50 MCG/HR: 500 INJECTION, SOLUTION INTRAVENOUS; SUBCUTANEOUS at 23:50

## 2023-06-21 RX ADMIN — OCTREOTIDE ACETATE 50 MCG/HR: 500 INJECTION, SOLUTION INTRAVENOUS; SUBCUTANEOUS at 11:18

## 2023-06-21 RX ADMIN — CEFEPIME 1000 MG: 1 INJECTION, POWDER, FOR SOLUTION INTRAMUSCULAR; INTRAVENOUS at 15:42

## 2023-06-21 RX ADMIN — Medication 1000 MG: at 08:20

## 2023-06-21 RX ADMIN — SODIUM CHLORIDE, PRESERVATIVE FREE 10 ML: 5 INJECTION INTRAVENOUS at 08:02

## 2023-06-21 RX ADMIN — SODIUM CHLORIDE 8 MG/HR: 9 INJECTION, SOLUTION INTRAVENOUS at 00:01

## 2023-06-21 RX ADMIN — SODIUM CHLORIDE, PRESERVATIVE FREE 10 ML: 5 INJECTION INTRAVENOUS at 21:05

## 2023-06-21 RX ADMIN — METOPROLOL SUCCINATE 50 MG: 50 TABLET, EXTENDED RELEASE ORAL at 08:02

## 2023-06-21 RX ADMIN — ATORVASTATIN CALCIUM 10 MG: 10 TABLET, FILM COATED ORAL at 08:02

## 2023-06-21 RX ADMIN — LEVOTHYROXINE SODIUM 125 MCG: 125 TABLET ORAL at 21:05

## 2023-06-21 RX ADMIN — SODIUM CHLORIDE, POTASSIUM CHLORIDE, SODIUM LACTATE AND CALCIUM CHLORIDE: 600; 310; 30; 20 INJECTION, SOLUTION INTRAVENOUS at 05:26

## 2023-06-21 RX ADMIN — SODIUM CHLORIDE, PRESERVATIVE FREE 10 ML: 5 INJECTION INTRAVENOUS at 11:10

## 2023-06-21 ASSESSMENT — ENCOUNTER SYMPTOMS
GASTROINTESTINAL NEGATIVE: 1
RESPIRATORY NEGATIVE: 1
DIARRHEA: 0
NAUSEA: 0
VOMITING: 0
SHORTNESS OF BREATH: 0
EYES NEGATIVE: 1

## 2023-06-21 ASSESSMENT — PAIN DESCRIPTION - LOCATION: LOCATION: BUTTOCKS;SHOULDER

## 2023-06-21 ASSESSMENT — PAIN DESCRIPTION - ORIENTATION: ORIENTATION: RIGHT;LEFT

## 2023-06-21 ASSESSMENT — PAIN SCALES - WONG BAKER: WONGBAKER_NUMERICALRESPONSE: 4

## 2023-06-22 LAB
ALBUMIN SERPL-MCNC: 2.7 G/DL (ref 3.5–5.2)
ALP SERPL-CCNC: 65 U/L (ref 40–130)
ALT SERPL-CCNC: 48 U/L (ref 5–41)
ANION GAP SERPL CALCULATED.3IONS-SCNC: 9 MMOL/L (ref 7–19)
AST SERPL-CCNC: 75 U/L (ref 5–40)
BILIRUB SERPL-MCNC: 0.5 MG/DL (ref 0.2–1.2)
BUN SERPL-MCNC: 71 MG/DL (ref 8–23)
CALCIUM SERPL-MCNC: 7.9 MG/DL (ref 8.8–10.2)
CHLORIDE SERPL-SCNC: 115 MMOL/L (ref 98–111)
CO2 SERPL-SCNC: 20 MMOL/L (ref 22–29)
CREAT SERPL-MCNC: 2.5 MG/DL (ref 0.5–1.2)
EKG P AXIS: NORMAL DEGREES
EKG P-R INTERVAL: NORMAL MS
EKG Q-T INTERVAL: 562 MS
EKG QRS DURATION: 98 MS
EKG QTC CALCULATION (BAZETT): 531 MS
EKG T AXIS: 56 DEGREES
ERYTHROCYTE [DISTWIDTH] IN BLOOD BY AUTOMATED COUNT: 15.9 % (ref 11.5–14.5)
GLUCOSE BLD-MCNC: 102 MG/DL (ref 70–99)
GLUCOSE BLD-MCNC: 108 MG/DL (ref 70–99)
GLUCOSE BLD-MCNC: 79 MG/DL (ref 70–99)
GLUCOSE BLD-MCNC: 92 MG/DL (ref 70–99)
GLUCOSE SERPL-MCNC: 73 MG/DL (ref 74–109)
HCT VFR BLD AUTO: 28.4 % (ref 42–52)
HGB BLD-MCNC: 9.3 G/DL (ref 14–18)
MCH RBC QN AUTO: 31.1 PG (ref 27–31)
MCHC RBC AUTO-ENTMCNC: 32.7 G/DL (ref 33–37)
MCV RBC AUTO: 95 FL (ref 80–94)
PERFORMED ON: ABNORMAL
PERFORMED ON: ABNORMAL
PERFORMED ON: NORMAL
PERFORMED ON: NORMAL
PLATELET # BLD AUTO: 73 K/UL (ref 130–400)
PMV BLD AUTO: 12.4 FL (ref 9.4–12.4)
POTASSIUM SERPL-SCNC: 4.4 MMOL/L (ref 3.5–5)
PROT SERPL-MCNC: 5.2 G/DL (ref 6.6–8.7)
RBC # BLD AUTO: 2.99 M/UL (ref 4.7–6.1)
SODIUM SERPL-SCNC: 144 MMOL/L (ref 136–145)
VANCOMYCIN SERPL-MCNC: 20 UG/ML
WBC # BLD AUTO: 14.7 K/UL (ref 4.8–10.8)

## 2023-06-22 PROCEDURE — 99232 SBSQ HOSP IP/OBS MODERATE 35: CPT | Performed by: PHYSICIAN ASSISTANT

## 2023-06-22 PROCEDURE — 80053 COMPREHEN METABOLIC PANEL: CPT

## 2023-06-22 PROCEDURE — 97161 PT EVAL LOW COMPLEX 20 MIN: CPT

## 2023-06-22 PROCEDURE — 2700000000 HC OXYGEN THERAPY PER DAY

## 2023-06-22 PROCEDURE — 2580000003 HC RX 258: Performed by: STUDENT IN AN ORGANIZED HEALTH CARE EDUCATION/TRAINING PROGRAM

## 2023-06-22 PROCEDURE — 6370000000 HC RX 637 (ALT 250 FOR IP): Performed by: INTERNAL MEDICINE

## 2023-06-22 PROCEDURE — 97530 THERAPEUTIC ACTIVITIES: CPT

## 2023-06-22 PROCEDURE — 6360000002 HC RX W HCPCS: Performed by: STUDENT IN AN ORGANIZED HEALTH CARE EDUCATION/TRAINING PROGRAM

## 2023-06-22 PROCEDURE — 80202 ASSAY OF VANCOMYCIN: CPT

## 2023-06-22 PROCEDURE — 6370000000 HC RX 637 (ALT 250 FOR IP): Performed by: FAMILY MEDICINE

## 2023-06-22 PROCEDURE — 1210000000 HC MED SURG R&B

## 2023-06-22 PROCEDURE — 2580000003 HC RX 258: Performed by: INTERNAL MEDICINE

## 2023-06-22 PROCEDURE — 94760 N-INVAS EAR/PLS OXIMETRY 1: CPT

## 2023-06-22 PROCEDURE — 36415 COLL VENOUS BLD VENIPUNCTURE: CPT

## 2023-06-22 PROCEDURE — 85027 COMPLETE CBC AUTOMATED: CPT

## 2023-06-22 PROCEDURE — 82962 GLUCOSE BLOOD TEST: CPT

## 2023-06-22 PROCEDURE — 97165 OT EVAL LOW COMPLEX 30 MIN: CPT

## 2023-06-22 RX ORDER — SODIUM CHLORIDE 9 MG/ML
INJECTION, SOLUTION INTRAVENOUS CONTINUOUS
Status: DISCONTINUED | OUTPATIENT
Start: 2023-06-22 | End: 2023-06-24

## 2023-06-22 RX ORDER — SODIUM CHLORIDE 9 MG/ML
INJECTION, SOLUTION INTRAVENOUS PRN
Status: DISCONTINUED | OUTPATIENT
Start: 2023-06-22 | End: 2023-06-22

## 2023-06-22 RX ORDER — SODIUM BICARBONATE 650 MG/1
650 TABLET ORAL 4 TIMES DAILY
Status: DISCONTINUED | OUTPATIENT
Start: 2023-06-22 | End: 2023-06-26 | Stop reason: HOSPADM

## 2023-06-22 RX ORDER — CALCITRIOL 0.25 UG/1
0.25 CAPSULE, LIQUID FILLED ORAL DAILY
Status: DISCONTINUED | OUTPATIENT
Start: 2023-06-22 | End: 2023-06-26 | Stop reason: HOSPADM

## 2023-06-22 RX ORDER — PANTOPRAZOLE SODIUM 40 MG/1
40 TABLET, DELAYED RELEASE ORAL
Status: DISCONTINUED | OUTPATIENT
Start: 2023-06-22 | End: 2023-06-26 | Stop reason: HOSPADM

## 2023-06-22 RX ADMIN — SODIUM CHLORIDE, PRESERVATIVE FREE 10 ML: 5 INJECTION INTRAVENOUS at 08:33

## 2023-06-22 RX ADMIN — SODIUM BICARBONATE 650 MG: 650 TABLET ORAL at 21:19

## 2023-06-22 RX ADMIN — VANCOMYCIN HYDROCHLORIDE 750 MG: 750 INJECTION, POWDER, LYOPHILIZED, FOR SOLUTION INTRAVENOUS at 11:55

## 2023-06-22 RX ADMIN — SODIUM CHLORIDE, POTASSIUM CHLORIDE, SODIUM LACTATE AND CALCIUM CHLORIDE: 600; 310; 30; 20 INJECTION, SOLUTION INTRAVENOUS at 02:49

## 2023-06-22 RX ADMIN — CEFEPIME 1000 MG: 1 INJECTION, POWDER, FOR SOLUTION INTRAMUSCULAR; INTRAVENOUS at 13:31

## 2023-06-22 RX ADMIN — SODIUM BICARBONATE 650 MG: 650 TABLET ORAL at 13:27

## 2023-06-22 RX ADMIN — ATORVASTATIN CALCIUM 10 MG: 10 TABLET, FILM COATED ORAL at 08:37

## 2023-06-22 RX ADMIN — SODIUM CHLORIDE, PRESERVATIVE FREE 10 ML: 5 INJECTION INTRAVENOUS at 21:24

## 2023-06-22 RX ADMIN — SODIUM CHLORIDE: 9 INJECTION, SOLUTION INTRAVENOUS at 11:54

## 2023-06-22 RX ADMIN — PANTOPRAZOLE SODIUM 40 MG: 40 TABLET, DELAYED RELEASE ORAL at 17:39

## 2023-06-22 RX ADMIN — SODIUM BICARBONATE 650 MG: 650 TABLET ORAL at 17:39

## 2023-06-22 RX ADMIN — CEFEPIME 1000 MG: 1 INJECTION, POWDER, FOR SOLUTION INTRAMUSCULAR; INTRAVENOUS at 03:04

## 2023-06-22 RX ADMIN — LEVOTHYROXINE SODIUM 125 MCG: 125 TABLET ORAL at 21:19

## 2023-06-22 RX ADMIN — ACETAMINOPHEN 500 MG: 500 TABLET ORAL at 21:19

## 2023-06-22 RX ADMIN — CALCITRIOL CAPSULES 0.25 MCG 0.25 MCG: 0.25 CAPSULE ORAL at 13:27

## 2023-06-22 RX ADMIN — PANTOPRAZOLE SODIUM 40 MG: 40 TABLET, DELAYED RELEASE ORAL at 08:37

## 2023-06-22 ASSESSMENT — PAIN SCALES - GENERAL
PAINLEVEL_OUTOF10: 0
PAINLEVEL_OUTOF10: 2
PAINLEVEL_OUTOF10: 0
PAINLEVEL_OUTOF10: 0

## 2023-06-22 ASSESSMENT — PAIN DESCRIPTION - DESCRIPTORS: DESCRIPTORS: ACHING

## 2023-06-22 ASSESSMENT — PAIN DESCRIPTION - LOCATION: LOCATION: HEAD

## 2023-06-22 ASSESSMENT — PAIN SCALES - WONG BAKER
WONGBAKER_NUMERICALRESPONSE: 0

## 2023-06-22 ASSESSMENT — PAIN - FUNCTIONAL ASSESSMENT: PAIN_FUNCTIONAL_ASSESSMENT: ACTIVITIES ARE NOT PREVENTED

## 2023-06-23 ENCOUNTER — APPOINTMENT (OUTPATIENT)
Dept: GENERAL RADIOLOGY | Age: 83
DRG: 377 | End: 2023-06-23
Payer: MEDICARE

## 2023-06-23 ENCOUNTER — ANESTHESIA EVENT (OUTPATIENT)
Dept: ENDOSCOPY | Age: 83
End: 2023-06-23
Payer: MEDICARE

## 2023-06-23 ENCOUNTER — ANESTHESIA (OUTPATIENT)
Dept: ENDOSCOPY | Age: 83
End: 2023-06-23
Payer: MEDICARE

## 2023-06-23 LAB
ALBUMIN SERPL-MCNC: 2.6 G/DL (ref 3.5–5.2)
ALP SERPL-CCNC: 66 U/L (ref 40–130)
ALT SERPL-CCNC: 41 U/L (ref 5–41)
ANION GAP SERPL CALCULATED.3IONS-SCNC: 9 MMOL/L (ref 7–19)
AST SERPL-CCNC: 55 U/L (ref 5–40)
BACTERIA URNS QL MICRO: NEGATIVE /HPF
BASOPHILS # BLD: 0.1 K/UL (ref 0–0.2)
BASOPHILS NFR BLD: 0.6 % (ref 0–1)
BILIRUB SERPL-MCNC: 0.4 MG/DL (ref 0.2–1.2)
BILIRUB UR QL STRIP: NEGATIVE
BUN SERPL-MCNC: 56 MG/DL (ref 8–23)
CALCIUM SERPL-MCNC: 7.7 MG/DL (ref 8.8–10.2)
CHLORIDE SERPL-SCNC: 114 MMOL/L (ref 98–111)
CLARITY UR: CLEAR
CO2 SERPL-SCNC: 21 MMOL/L (ref 22–29)
COLOR UR: YELLOW
CREAT SERPL-MCNC: 2 MG/DL (ref 0.5–1.2)
CRYSTALS URNS MICRO: ABNORMAL /HPF
EOSINOPHIL # BLD: 1.1 K/UL (ref 0–0.6)
EOSINOPHIL NFR BLD: 7.9 % (ref 0–5)
EPI CELLS #/AREA URNS AUTO: 1 /HPF (ref 0–5)
ERYTHROCYTE [DISTWIDTH] IN BLOOD BY AUTOMATED COUNT: 15.6 % (ref 11.5–14.5)
GLUCOSE BLD-MCNC: 69 MG/DL (ref 70–99)
GLUCOSE BLD-MCNC: 78 MG/DL (ref 70–99)
GLUCOSE BLD-MCNC: 85 MG/DL (ref 70–99)
GLUCOSE BLD-MCNC: 89 MG/DL (ref 70–99)
GLUCOSE BLD-MCNC: 96 MG/DL (ref 70–99)
GLUCOSE SERPL-MCNC: 84 MG/DL (ref 74–109)
GLUCOSE UR STRIP.AUTO-MCNC: NEGATIVE MG/DL
HCT VFR BLD AUTO: 27.9 % (ref 42–52)
HGB BLD-MCNC: 9.2 G/DL (ref 14–18)
HGB UR STRIP.AUTO-MCNC: ABNORMAL MG/L
HYALINE CASTS #/AREA URNS AUTO: 5 /HPF (ref 0–8)
IMM GRANULOCYTES # BLD: 0.7 K/UL
KETONES UR STRIP.AUTO-MCNC: NEGATIVE MG/DL
LEUKOCYTE ESTERASE UR QL STRIP.AUTO: NEGATIVE
LYMPHOCYTES # BLD: 2.6 K/UL (ref 1.1–4.5)
LYMPHOCYTES NFR BLD: 17.9 % (ref 20–40)
MCH RBC QN AUTO: 31.4 PG (ref 27–31)
MCHC RBC AUTO-ENTMCNC: 33 G/DL (ref 33–37)
MCV RBC AUTO: 95.2 FL (ref 80–94)
MONOCYTES # BLD: 1.8 K/UL (ref 0–0.9)
MONOCYTES NFR BLD: 12.5 % (ref 0–10)
NEUTROPHILS # BLD: 8.1 K/UL (ref 1.5–7.5)
NEUTS SEG NFR BLD: 56.2 % (ref 50–65)
NITRITE UR QL STRIP.AUTO: NEGATIVE
PERFORMED ON: ABNORMAL
PERFORMED ON: NORMAL
PH UR STRIP.AUTO: 5.5 [PH] (ref 5–8)
PLATELET # BLD AUTO: 82 K/UL (ref 130–400)
PMV BLD AUTO: 12.7 FL (ref 9.4–12.4)
POTASSIUM SERPL-SCNC: 4.3 MMOL/L (ref 3.5–5)
PROT SERPL-MCNC: 5.2 G/DL (ref 6.6–8.7)
PROT UR STRIP.AUTO-MCNC: 30 MG/DL
RBC # BLD AUTO: 2.93 M/UL (ref 4.7–6.1)
RBC #/AREA URNS AUTO: 3 /HPF (ref 0–4)
SODIUM SERPL-SCNC: 144 MMOL/L (ref 136–145)
SP GR UR STRIP.AUTO: 1.01 (ref 1–1.03)
UROBILINOGEN UR STRIP.AUTO-MCNC: 0.2 E.U./DL
VANCOMYCIN SERPL-MCNC: 19.1 UG/ML
WBC # BLD AUTO: 14.4 K/UL (ref 4.8–10.8)
WBC #/AREA URNS AUTO: 3 /HPF (ref 0–5)

## 2023-06-23 PROCEDURE — 2500000003 HC RX 250 WO HCPCS: Performed by: ANESTHESIOLOGY

## 2023-06-23 PROCEDURE — 6360000002 HC RX W HCPCS: Performed by: SPECIALIST

## 2023-06-23 PROCEDURE — 6370000000 HC RX 637 (ALT 250 FOR IP): Performed by: FAMILY MEDICINE

## 2023-06-23 PROCEDURE — 2500000003 HC RX 250 WO HCPCS

## 2023-06-23 PROCEDURE — 3700000000 HC ANESTHESIA ATTENDED CARE: Performed by: SPECIALIST

## 2023-06-23 PROCEDURE — 94760 N-INVAS EAR/PLS OXIMETRY 1: CPT

## 2023-06-23 PROCEDURE — 2580000003 HC RX 258: Performed by: STUDENT IN AN ORGANIZED HEALTH CARE EDUCATION/TRAINING PROGRAM

## 2023-06-23 PROCEDURE — A4216 STERILE WATER/SALINE, 10 ML: HCPCS | Performed by: ANESTHESIOLOGY

## 2023-06-23 PROCEDURE — 6360000002 HC RX W HCPCS

## 2023-06-23 PROCEDURE — 3609017100 HC EGD: Performed by: SPECIALIST

## 2023-06-23 PROCEDURE — 2580000003 HC RX 258: Performed by: SPECIALIST

## 2023-06-23 PROCEDURE — 2709999900 HC NON-CHARGEABLE SUPPLY: Performed by: SPECIALIST

## 2023-06-23 PROCEDURE — 36415 COLL VENOUS BLD VENIPUNCTURE: CPT

## 2023-06-23 PROCEDURE — 80202 ASSAY OF VANCOMYCIN: CPT

## 2023-06-23 PROCEDURE — 0DJ08ZZ INSPECTION OF UPPER INTESTINAL TRACT, VIA NATURAL OR ARTIFICIAL OPENING ENDOSCOPIC: ICD-10-PCS | Performed by: SPECIALIST

## 2023-06-23 PROCEDURE — 85025 COMPLETE CBC W/AUTO DIFF WBC: CPT

## 2023-06-23 PROCEDURE — 2700000000 HC OXYGEN THERAPY PER DAY

## 2023-06-23 PROCEDURE — 3700000001 HC ADD 15 MINUTES (ANESTHESIA): Performed by: SPECIALIST

## 2023-06-23 PROCEDURE — 6360000002 HC RX W HCPCS: Performed by: STUDENT IN AN ORGANIZED HEALTH CARE EDUCATION/TRAINING PROGRAM

## 2023-06-23 PROCEDURE — 82962 GLUCOSE BLOOD TEST: CPT

## 2023-06-23 PROCEDURE — 7100000001 HC PACU RECOVERY - ADDTL 15 MIN: Performed by: SPECIALIST

## 2023-06-23 PROCEDURE — 80053 COMPREHEN METABOLIC PANEL: CPT

## 2023-06-23 PROCEDURE — 71046 X-RAY EXAM CHEST 2 VIEWS: CPT

## 2023-06-23 PROCEDURE — 6370000000 HC RX 637 (ALT 250 FOR IP): Performed by: SPECIALIST

## 2023-06-23 PROCEDURE — 2580000003 HC RX 258: Performed by: ANESTHESIOLOGY

## 2023-06-23 PROCEDURE — 81001 URINALYSIS AUTO W/SCOPE: CPT

## 2023-06-23 PROCEDURE — 1210000000 HC MED SURG R&B

## 2023-06-23 PROCEDURE — 6370000000 HC RX 637 (ALT 250 FOR IP): Performed by: INTERNAL MEDICINE

## 2023-06-23 PROCEDURE — 7100000000 HC PACU RECOVERY - FIRST 15 MIN: Performed by: SPECIALIST

## 2023-06-23 RX ORDER — SODIUM CHLORIDE 0.9 % (FLUSH) 0.9 %
5-40 SYRINGE (ML) INJECTION EVERY 12 HOURS SCHEDULED
Status: DISCONTINUED | OUTPATIENT
Start: 2023-06-23 | End: 2023-06-23

## 2023-06-23 RX ORDER — PROPOFOL 10 MG/ML
INJECTION, EMULSION INTRAVENOUS PRN
Status: DISCONTINUED | OUTPATIENT
Start: 2023-06-23 | End: 2023-06-23 | Stop reason: SDUPTHER

## 2023-06-23 RX ORDER — SODIUM CHLORIDE 0.9 % (FLUSH) 0.9 %
5-40 SYRINGE (ML) INJECTION EVERY 12 HOURS SCHEDULED
Status: DISCONTINUED | OUTPATIENT
Start: 2023-06-23 | End: 2023-06-26 | Stop reason: HOSPADM

## 2023-06-23 RX ORDER — HYDROMORPHONE HYDROCHLORIDE 1 MG/ML
0.5 INJECTION, SOLUTION INTRAMUSCULAR; INTRAVENOUS; SUBCUTANEOUS EVERY 5 MIN PRN
Status: DISCONTINUED | OUTPATIENT
Start: 2023-06-23 | End: 2023-06-26 | Stop reason: HOSPADM

## 2023-06-23 RX ORDER — LIDOCAINE HYDROCHLORIDE 10 MG/ML
INJECTION, SOLUTION EPIDURAL; INFILTRATION; INTRACAUDAL; PERINEURAL PRN
Status: DISCONTINUED | OUTPATIENT
Start: 2023-06-23 | End: 2023-06-23 | Stop reason: SDUPTHER

## 2023-06-23 RX ORDER — AMLODIPINE BESYLATE 5 MG/1
5 TABLET ORAL DAILY
Status: DISCONTINUED | OUTPATIENT
Start: 2023-06-23 | End: 2023-06-24

## 2023-06-23 RX ORDER — SODIUM CHLORIDE 0.9 % (FLUSH) 0.9 %
5-40 SYRINGE (ML) INJECTION PRN
Status: DISCONTINUED | OUTPATIENT
Start: 2023-06-23 | End: 2023-06-26 | Stop reason: HOSPADM

## 2023-06-23 RX ORDER — SODIUM CHLORIDE 0.9 % (FLUSH) 0.9 %
5-40 SYRINGE (ML) INJECTION PRN
Status: DISCONTINUED | OUTPATIENT
Start: 2023-06-23 | End: 2023-06-23

## 2023-06-23 RX ORDER — SODIUM CHLORIDE, SODIUM LACTATE, POTASSIUM CHLORIDE, CALCIUM CHLORIDE 600; 310; 30; 20 MG/100ML; MG/100ML; MG/100ML; MG/100ML
INJECTION, SOLUTION INTRAVENOUS CONTINUOUS
Status: DISCONTINUED | OUTPATIENT
Start: 2023-06-23 | End: 2023-06-23

## 2023-06-23 RX ORDER — HYDROMORPHONE HYDROCHLORIDE 1 MG/ML
0.25 INJECTION, SOLUTION INTRAMUSCULAR; INTRAVENOUS; SUBCUTANEOUS EVERY 5 MIN PRN
Status: DISCONTINUED | OUTPATIENT
Start: 2023-06-23 | End: 2023-06-26 | Stop reason: HOSPADM

## 2023-06-23 RX ORDER — ONDANSETRON 2 MG/ML
4 INJECTION INTRAMUSCULAR; INTRAVENOUS
Status: ACTIVE | OUTPATIENT
Start: 2023-06-23 | End: 2023-06-24

## 2023-06-23 RX ORDER — SODIUM CHLORIDE 9 MG/ML
INJECTION, SOLUTION INTRAVENOUS PRN
Status: DISCONTINUED | OUTPATIENT
Start: 2023-06-23 | End: 2023-06-23

## 2023-06-23 RX ORDER — SODIUM CHLORIDE 9 MG/ML
INJECTION, SOLUTION INTRAVENOUS PRN
Status: DISCONTINUED | OUTPATIENT
Start: 2023-06-23 | End: 2023-06-26 | Stop reason: HOSPADM

## 2023-06-23 RX ORDER — GLYCOPYRROLATE 0.2 MG/ML
INJECTION INTRAMUSCULAR; INTRAVENOUS PRN
Status: DISCONTINUED | OUTPATIENT
Start: 2023-06-23 | End: 2023-06-23 | Stop reason: SDUPTHER

## 2023-06-23 RX ADMIN — CEFEPIME 1000 MG: 1 INJECTION, POWDER, FOR SOLUTION INTRAMUSCULAR; INTRAVENOUS at 17:28

## 2023-06-23 RX ADMIN — SODIUM CHLORIDE, PRESERVATIVE FREE 10 ML: 5 INJECTION INTRAVENOUS at 19:41

## 2023-06-23 RX ADMIN — GLYCOPYRROLATE 0.2 MG: 0.2 INJECTION, SOLUTION INTRAMUSCULAR; INTRAVENOUS at 14:31

## 2023-06-23 RX ADMIN — CEFEPIME 1000 MG: 1 INJECTION, POWDER, FOR SOLUTION INTRAMUSCULAR; INTRAVENOUS at 03:12

## 2023-06-23 RX ADMIN — PROPOFOL 150 MG: 10 INJECTION, EMULSION INTRAVENOUS at 14:31

## 2023-06-23 RX ADMIN — SODIUM BICARBONATE 650 MG: 650 TABLET ORAL at 08:28

## 2023-06-23 RX ADMIN — LIDOCAINE HYDROCHLORIDE 100 MG: 10 INJECTION, SOLUTION EPIDURAL; INFILTRATION; INTRACAUDAL; PERINEURAL at 14:31

## 2023-06-23 RX ADMIN — AMLODIPINE BESYLATE 5 MG: 5 TABLET ORAL at 10:48

## 2023-06-23 RX ADMIN — SODIUM BICARBONATE 650 MG: 650 TABLET ORAL at 19:40

## 2023-06-23 RX ADMIN — FAMOTIDINE 20 MG: 10 INJECTION, SOLUTION INTRAVENOUS at 14:15

## 2023-06-23 RX ADMIN — LEVOTHYROXINE SODIUM 125 MCG: 125 TABLET ORAL at 19:40

## 2023-06-23 RX ADMIN — SODIUM BICARBONATE 650 MG: 650 TABLET ORAL at 16:15

## 2023-06-23 RX ADMIN — SODIUM CHLORIDE, POTASSIUM CHLORIDE, SODIUM LACTATE AND CALCIUM CHLORIDE: 600; 310; 30; 20 INJECTION, SOLUTION INTRAVENOUS at 13:57

## 2023-06-23 RX ADMIN — ATORVASTATIN CALCIUM 10 MG: 10 TABLET, FILM COATED ORAL at 08:28

## 2023-06-23 RX ADMIN — CALCITRIOL CAPSULES 0.25 MCG 0.25 MCG: 0.25 CAPSULE ORAL at 08:28

## 2023-06-23 RX ADMIN — PANTOPRAZOLE SODIUM 40 MG: 40 TABLET, DELAYED RELEASE ORAL at 06:00

## 2023-06-23 RX ADMIN — PANTOPRAZOLE SODIUM 40 MG: 40 TABLET, DELAYED RELEASE ORAL at 16:15

## 2023-06-23 RX ADMIN — SODIUM CHLORIDE, PRESERVATIVE FREE 10 ML: 5 INJECTION INTRAVENOUS at 08:28

## 2023-06-23 RX ADMIN — VANCOMYCIN HYDROCHLORIDE 750 MG: 750 INJECTION, POWDER, LYOPHILIZED, FOR SOLUTION INTRAVENOUS at 16:19

## 2023-06-23 ASSESSMENT — PAIN SCALES - WONG BAKER
WONGBAKER_NUMERICALRESPONSE: 0
WONGBAKER_NUMERICALRESPONSE: 2
WONGBAKER_NUMERICALRESPONSE: 0

## 2023-06-23 ASSESSMENT — PAIN SCALES - GENERAL
PAINLEVEL_OUTOF10: 0
PAINLEVEL_OUTOF10: 1
PAINLEVEL_OUTOF10: 0

## 2023-06-23 ASSESSMENT — LIFESTYLE VARIABLES: SMOKING_STATUS: 0

## 2023-06-23 NOTE — ANESTHESIA POSTPROCEDURE EVALUATION
Department of Anesthesiology  Postprocedure Note    Patient: Kathy Velasco  MRN: [id number]  YOB: 1940  Date of evaluation: 6/23/2023      Procedure Summary     Date: 06/23/23 Room / Location: [address]    Anesthesia Start: 5473 Anesthesia Stop: 1446    Procedure: EGD DIAGNOSTIC ONLY Diagnosis:       Gastrointestinal hemorrhage, unspecified gastrointestinal hemorrhage type      (Gastrointestinal hemorrhage, unspecified gastrointestinal hemorrhage type [K92.2])    Surgeons: Yolanda Fox MD Responsible Provider: JENNY Vera CRNA    Anesthesia Type: general, TIVA ASA Status: 3          Anesthesia Type: No value filed.     Eliezer Phase I:      Eliezer Phase II:        Anesthesia Post Evaluation    Patient location during evaluation: PACU  Patient participation: waiting for patient participation  Level of consciousness: sleepy but conscious  Pain score: 0  Airway patency: patent  Nausea & Vomiting: no nausea and no vomiting  Complications: no  Cardiovascular status: hemodynamically stable and blood pressure returned to baseline  Respiratory status: acceptable and nasal cannula  Hydration status: stable  Comments: BP (!) 104/56   Pulse 75   Temp 98 °F (36.7 °C)   Resp 13   Ht 5' 9\" (1.753 m)   Wt 150 lb (68 kg)   SpO2 98%   BMI 22.15 kg/m²

## 2023-06-23 NOTE — ANESTHESIA PRE PROCEDURE
Department of Anesthesiology  Preprocedure Note       Name:  Jeannie Ferreira   Age:  78 y.o.  :  1940                                          MRN:  [id number]         Date:  2023      Surgeon: Collette All):  Margaret Michele MD    Procedure: Procedure(s):  EGD DIAGNOSTIC ONLY    Medications prior to admission:   Prior to Admission medications    Medication Sig Start Date End Date Taking? Authorizing Provider   cetirizine (ZYRTEC) 10 MG tablet Take 1 tablet by mouth daily   Yes Historical Provider, MD   gabapentin (NEURONTIN) 300 MG capsule Take 1 capsule by mouth at bedtime. Max Daily Amount: 300 mg   Yes Historical Provider, MD   hydrOXYzine HCl (ATARAX) 50 MG tablet Take 1 tablet by mouth 3 times daily as needed for Itching or Anxiety   Yes Historical Provider, MD   metoprolol succinate (TOPROL XL) 50 MG extended release tablet Take 1 tablet by mouth daily    Historical Provider, MD   losartan (COZAAR) 100 MG tablet Take 0.5 tablets by mouth daily    Historical Provider, MD   dapagliflozin (FARXIGA) 5 MG tablet Take 5 mg by mouth every morning  Patient not taking: Reported on 2023    Historical Provider, MD   atorvastatin (LIPITOR) 10 MG tablet Take 1 tablet by mouth daily    Historical Provider, MD   furosemide (LASIX) 40 MG tablet Take 40 mg by mouth in the morning and 40 mg in the evening. Patient not taking: Reported on 2023    Historical Provider, MD   hyoscyamine (ANASPAZ;LEVSIN) 125 MCG tablet Take 125 mcg by mouth every 4 hours as needed for Cramping  Patient not taking: Reported on 2023    Historical Provider, MD   nystatin-triamcinolone [facility]) 322387-7.1 UNIT/GM-% ointment Apply topically 2 times daily Apply topically 2 times daily.   Patient not taking: Reported on 2023    Historical Provider, MD   glycopyrrolate (ROBINUL) 1 MG tablet Take 1 mg by mouth 3 times daily  Patient not taking: Reported on 2023    Historical Provider, MD   Multiple Vitamins-Minerals (CENTRUM

## 2023-06-24 LAB
ALBUMIN SERPL-MCNC: 3 G/DL (ref 3.5–5.2)
ALP SERPL-CCNC: 74 U/L (ref 40–130)
ALT SERPL-CCNC: 42 U/L (ref 5–41)
ANION GAP SERPL CALCULATED.3IONS-SCNC: 11 MMOL/L (ref 7–19)
AST SERPL-CCNC: 54 U/L (ref 5–40)
BACTERIA BLD CULT ORG #2: NORMAL
BACTERIA BLD CULT: NORMAL
BILIRUB SERPL-MCNC: 0.5 MG/DL (ref 0.2–1.2)
BUN SERPL-MCNC: 38 MG/DL (ref 8–23)
CALCIUM SERPL-MCNC: 8.1 MG/DL (ref 8.8–10.2)
CHLORIDE SERPL-SCNC: 109 MMOL/L (ref 98–111)
CO2 SERPL-SCNC: 23 MMOL/L (ref 22–29)
CREAT SERPL-MCNC: 1.7 MG/DL (ref 0.5–1.2)
ERYTHROCYTE [DISTWIDTH] IN BLOOD BY AUTOMATED COUNT: 15.8 % (ref 11.5–14.5)
GLUCOSE BLD-MCNC: 104 MG/DL (ref 70–99)
GLUCOSE BLD-MCNC: 79 MG/DL (ref 70–99)
GLUCOSE BLD-MCNC: 86 MG/DL (ref 70–99)
GLUCOSE BLD-MCNC: 96 MG/DL (ref 70–99)
GLUCOSE SERPL-MCNC: 99 MG/DL (ref 74–109)
HAV IGM SERPL QL IA: NORMAL
HBV CORE IGM SERPL QL IA: NORMAL
HBV SURFACE AG SERPL QL IA: NORMAL
HCT VFR BLD AUTO: 34.2 % (ref 42–52)
HCV AB SERPL QL IA: NORMAL
HGB BLD-MCNC: 10.9 G/DL (ref 14–18)
MCH RBC QN AUTO: 30.4 PG (ref 27–31)
MCHC RBC AUTO-ENTMCNC: 31.9 G/DL (ref 33–37)
MCV RBC AUTO: 95.3 FL (ref 80–94)
PERFORMED ON: ABNORMAL
PERFORMED ON: NORMAL
PLATELET # BLD AUTO: 101 K/UL (ref 130–400)
PMV BLD AUTO: 11.7 FL (ref 9.4–12.4)
POTASSIUM SERPL-SCNC: 4.2 MMOL/L (ref 3.5–5)
PROT SERPL-MCNC: 5.6 G/DL (ref 6.6–8.7)
RBC # BLD AUTO: 3.59 M/UL (ref 4.7–6.1)
SODIUM SERPL-SCNC: 143 MMOL/L (ref 136–145)
VANCOMYCIN SERPL-MCNC: 19.7 UG/ML
WBC # BLD AUTO: 12 K/UL (ref 4.8–10.8)

## 2023-06-24 PROCEDURE — 6360000002 HC RX W HCPCS: Performed by: STUDENT IN AN ORGANIZED HEALTH CARE EDUCATION/TRAINING PROGRAM

## 2023-06-24 PROCEDURE — 85027 COMPLETE CBC AUTOMATED: CPT

## 2023-06-24 PROCEDURE — 82962 GLUCOSE BLOOD TEST: CPT

## 2023-06-24 PROCEDURE — 2580000003 HC RX 258: Performed by: SPECIALIST

## 2023-06-24 PROCEDURE — 1210000000 HC MED SURG R&B

## 2023-06-24 PROCEDURE — 6370000000 HC RX 637 (ALT 250 FOR IP): Performed by: SPECIALIST

## 2023-06-24 PROCEDURE — 2700000000 HC OXYGEN THERAPY PER DAY

## 2023-06-24 PROCEDURE — 6360000002 HC RX W HCPCS: Performed by: SPECIALIST

## 2023-06-24 PROCEDURE — 2580000003 HC RX 258: Performed by: STUDENT IN AN ORGANIZED HEALTH CARE EDUCATION/TRAINING PROGRAM

## 2023-06-24 PROCEDURE — 51701 INSERT BLADDER CATHETER: CPT

## 2023-06-24 PROCEDURE — 80202 ASSAY OF VANCOMYCIN: CPT

## 2023-06-24 PROCEDURE — 80074 ACUTE HEPATITIS PANEL: CPT

## 2023-06-24 PROCEDURE — 51798 US URINE CAPACITY MEASURE: CPT

## 2023-06-24 PROCEDURE — 94760 N-INVAS EAR/PLS OXIMETRY 1: CPT

## 2023-06-24 PROCEDURE — 80053 COMPREHEN METABOLIC PANEL: CPT

## 2023-06-24 PROCEDURE — 6370000000 HC RX 637 (ALT 250 FOR IP): Performed by: INTERNAL MEDICINE

## 2023-06-24 PROCEDURE — 36415 COLL VENOUS BLD VENIPUNCTURE: CPT

## 2023-06-24 RX ORDER — LISINOPRIL 10 MG/1
10 TABLET ORAL DAILY
Status: DISCONTINUED | OUTPATIENT
Start: 2023-06-24 | End: 2023-06-26 | Stop reason: HOSPADM

## 2023-06-24 RX ORDER — AMLODIPINE BESYLATE 10 MG/1
10 TABLET ORAL DAILY
Status: DISCONTINUED | OUTPATIENT
Start: 2023-06-25 | End: 2023-06-26 | Stop reason: HOSPADM

## 2023-06-24 RX ORDER — HYDRALAZINE HYDROCHLORIDE 10 MG/1
20 TABLET, FILM COATED ORAL EVERY 6 HOURS SCHEDULED
Status: DISCONTINUED | OUTPATIENT
Start: 2023-06-24 | End: 2023-06-26 | Stop reason: HOSPADM

## 2023-06-24 RX ORDER — TAMSULOSIN HYDROCHLORIDE 0.4 MG/1
0.4 CAPSULE ORAL DAILY
Status: DISCONTINUED | OUTPATIENT
Start: 2023-06-24 | End: 2023-06-26

## 2023-06-24 RX ADMIN — LISINOPRIL 10 MG: 10 TABLET ORAL at 13:01

## 2023-06-24 RX ADMIN — SODIUM BICARBONATE 650 MG: 650 TABLET ORAL at 20:17

## 2023-06-24 RX ADMIN — VANCOMYCIN HYDROCHLORIDE 750 MG: 750 INJECTION, POWDER, LYOPHILIZED, FOR SOLUTION INTRAVENOUS at 18:24

## 2023-06-24 RX ADMIN — CEFEPIME 1000 MG: 1 INJECTION, POWDER, FOR SOLUTION INTRAMUSCULAR; INTRAVENOUS at 04:31

## 2023-06-24 RX ADMIN — SODIUM BICARBONATE 650 MG: 650 TABLET ORAL at 16:21

## 2023-06-24 RX ADMIN — CEFEPIME 1000 MG: 1 INJECTION, POWDER, FOR SOLUTION INTRAMUSCULAR; INTRAVENOUS at 14:06

## 2023-06-24 RX ADMIN — ATORVASTATIN CALCIUM 10 MG: 10 TABLET, FILM COATED ORAL at 07:29

## 2023-06-24 RX ADMIN — CALCITRIOL CAPSULES 0.25 MCG 0.25 MCG: 0.25 CAPSULE ORAL at 07:29

## 2023-06-24 RX ADMIN — SODIUM BICARBONATE 650 MG: 650 TABLET ORAL at 07:29

## 2023-06-24 RX ADMIN — SODIUM BICARBONATE 650 MG: 650 TABLET ORAL at 13:02

## 2023-06-24 RX ADMIN — PANTOPRAZOLE SODIUM 40 MG: 40 TABLET, DELAYED RELEASE ORAL at 16:21

## 2023-06-24 RX ADMIN — TAMSULOSIN HYDROCHLORIDE 0.4 MG: 0.4 CAPSULE ORAL at 18:21

## 2023-06-24 RX ADMIN — HYDRALAZINE HYDROCHLORIDE 20 MG: 10 TABLET, FILM COATED ORAL at 18:53

## 2023-06-24 RX ADMIN — AMLODIPINE BESYLATE 5 MG: 5 TABLET ORAL at 07:29

## 2023-06-24 RX ADMIN — LEVOTHYROXINE SODIUM 125 MCG: 125 TABLET ORAL at 20:17

## 2023-06-24 RX ADMIN — PANTOPRAZOLE SODIUM 40 MG: 40 TABLET, DELAYED RELEASE ORAL at 07:29

## 2023-06-25 LAB
ALBUMIN SERPL-MCNC: 2.6 G/DL (ref 3.5–5.2)
ALP SERPL-CCNC: 65 U/L (ref 40–130)
ALT SERPL-CCNC: 34 U/L (ref 5–41)
ANION GAP SERPL CALCULATED.3IONS-SCNC: 8 MMOL/L (ref 7–19)
AST SERPL-CCNC: 43 U/L (ref 5–40)
BILIRUB SERPL-MCNC: 0.5 MG/DL (ref 0.2–1.2)
BUN SERPL-MCNC: 27 MG/DL (ref 8–23)
CALCIUM SERPL-MCNC: 7.9 MG/DL (ref 8.8–10.2)
CHLORIDE SERPL-SCNC: 111 MMOL/L (ref 98–111)
CO2 SERPL-SCNC: 21 MMOL/L (ref 22–29)
CREAT SERPL-MCNC: 1.3 MG/DL (ref 0.5–1.2)
ERYTHROCYTE [DISTWIDTH] IN BLOOD BY AUTOMATED COUNT: 16.2 % (ref 11.5–14.5)
GLUCOSE BLD-MCNC: 109 MG/DL (ref 70–99)
GLUCOSE BLD-MCNC: 73 MG/DL (ref 70–99)
GLUCOSE BLD-MCNC: 76 MG/DL (ref 70–99)
GLUCOSE SERPL-MCNC: 81 MG/DL (ref 74–109)
HCT VFR BLD AUTO: 33.6 % (ref 42–52)
HGB BLD-MCNC: 10.2 G/DL (ref 14–18)
MCH RBC QN AUTO: 30.5 PG (ref 27–31)
MCHC RBC AUTO-ENTMCNC: 30.4 G/DL (ref 33–37)
MCV RBC AUTO: 100.6 FL (ref 80–94)
PERFORMED ON: ABNORMAL
PERFORMED ON: NORMAL
PERFORMED ON: NORMAL
PLATELET # BLD AUTO: 100 K/UL (ref 130–400)
PMV BLD AUTO: 11.9 FL (ref 9.4–12.4)
POTASSIUM SERPL-SCNC: 4.1 MMOL/L (ref 3.5–5)
PROT SERPL-MCNC: 5.3 G/DL (ref 6.6–8.7)
RBC # BLD AUTO: 3.34 M/UL (ref 4.7–6.1)
SODIUM SERPL-SCNC: 140 MMOL/L (ref 136–145)
VANCOMYCIN SERPL-MCNC: 19.7 UG/ML
WBC # BLD AUTO: 12.1 K/UL (ref 4.8–10.8)

## 2023-06-25 PROCEDURE — 6370000000 HC RX 637 (ALT 250 FOR IP): Performed by: INTERNAL MEDICINE

## 2023-06-25 PROCEDURE — 6370000000 HC RX 637 (ALT 250 FOR IP): Performed by: SPECIALIST

## 2023-06-25 PROCEDURE — 82962 GLUCOSE BLOOD TEST: CPT

## 2023-06-25 PROCEDURE — 2580000003 HC RX 258: Performed by: SPECIALIST

## 2023-06-25 PROCEDURE — 2700000000 HC OXYGEN THERAPY PER DAY

## 2023-06-25 PROCEDURE — 6360000002 HC RX W HCPCS: Performed by: SPECIALIST

## 2023-06-25 PROCEDURE — 85027 COMPLETE CBC AUTOMATED: CPT

## 2023-06-25 PROCEDURE — 51798 US URINE CAPACITY MEASURE: CPT

## 2023-06-25 PROCEDURE — 2580000003 HC RX 258: Performed by: STUDENT IN AN ORGANIZED HEALTH CARE EDUCATION/TRAINING PROGRAM

## 2023-06-25 PROCEDURE — 6360000002 HC RX W HCPCS: Performed by: STUDENT IN AN ORGANIZED HEALTH CARE EDUCATION/TRAINING PROGRAM

## 2023-06-25 PROCEDURE — 36415 COLL VENOUS BLD VENIPUNCTURE: CPT

## 2023-06-25 PROCEDURE — 1210000000 HC MED SURG R&B

## 2023-06-25 PROCEDURE — 80053 COMPREHEN METABOLIC PANEL: CPT

## 2023-06-25 PROCEDURE — 80202 ASSAY OF VANCOMYCIN: CPT

## 2023-06-25 PROCEDURE — 2580000003 HC RX 258: Performed by: ANESTHESIOLOGY

## 2023-06-25 RX ORDER — DOCUSATE SODIUM 100 MG/1
100 CAPSULE, LIQUID FILLED ORAL DAILY
Status: DISCONTINUED | OUTPATIENT
Start: 2023-06-25 | End: 2023-06-26 | Stop reason: HOSPADM

## 2023-06-25 RX ADMIN — ATORVASTATIN CALCIUM 10 MG: 10 TABLET, FILM COATED ORAL at 09:08

## 2023-06-25 RX ADMIN — CEFEPIME 2000 MG: 2 INJECTION, POWDER, FOR SOLUTION INTRAVENOUS at 14:27

## 2023-06-25 RX ADMIN — SODIUM CHLORIDE: 9 INJECTION, SOLUTION INTRAVENOUS at 14:24

## 2023-06-25 RX ADMIN — HYDRALAZINE HYDROCHLORIDE 20 MG: 10 TABLET, FILM COATED ORAL at 12:05

## 2023-06-25 RX ADMIN — SODIUM BICARBONATE 650 MG: 650 TABLET ORAL at 12:05

## 2023-06-25 RX ADMIN — SODIUM CHLORIDE, PRESERVATIVE FREE 10 ML: 5 INJECTION INTRAVENOUS at 21:00

## 2023-06-25 RX ADMIN — CALCITRIOL CAPSULES 0.25 MCG 0.25 MCG: 0.25 CAPSULE ORAL at 09:07

## 2023-06-25 RX ADMIN — VANCOMYCIN HYDROCHLORIDE 750 MG: 750 INJECTION, POWDER, LYOPHILIZED, FOR SOLUTION INTRAVENOUS at 22:13

## 2023-06-25 RX ADMIN — DOCUSATE SODIUM 100 MG: 100 CAPSULE, LIQUID FILLED ORAL at 14:27

## 2023-06-25 RX ADMIN — HYDRALAZINE HYDROCHLORIDE 20 MG: 10 TABLET, FILM COATED ORAL at 01:35

## 2023-06-25 RX ADMIN — SODIUM BICARBONATE 650 MG: 650 TABLET ORAL at 09:07

## 2023-06-25 RX ADMIN — PANTOPRAZOLE SODIUM 40 MG: 40 TABLET, DELAYED RELEASE ORAL at 06:12

## 2023-06-25 RX ADMIN — TAMSULOSIN HYDROCHLORIDE 0.4 MG: 0.4 CAPSULE ORAL at 09:08

## 2023-06-25 RX ADMIN — SODIUM CHLORIDE, PRESERVATIVE FREE 10 ML: 5 INJECTION INTRAVENOUS at 09:08

## 2023-06-25 RX ADMIN — SODIUM BICARBONATE 650 MG: 650 TABLET ORAL at 21:00

## 2023-06-25 RX ADMIN — HYDRALAZINE HYDROCHLORIDE 20 MG: 10 TABLET, FILM COATED ORAL at 06:12

## 2023-06-25 RX ADMIN — AMLODIPINE BESYLATE 10 MG: 10 TABLET ORAL at 09:07

## 2023-06-25 RX ADMIN — HYDRALAZINE HYDROCHLORIDE 20 MG: 10 TABLET, FILM COATED ORAL at 17:15

## 2023-06-25 RX ADMIN — SODIUM BICARBONATE 650 MG: 650 TABLET ORAL at 17:14

## 2023-06-25 RX ADMIN — CEFEPIME 1000 MG: 1 INJECTION, POWDER, FOR SOLUTION INTRAMUSCULAR; INTRAVENOUS at 01:43

## 2023-06-25 RX ADMIN — LEVOTHYROXINE SODIUM 125 MCG: 125 TABLET ORAL at 21:00

## 2023-06-25 RX ADMIN — LISINOPRIL 10 MG: 10 TABLET ORAL at 09:08

## 2023-06-25 RX ADMIN — PANTOPRAZOLE SODIUM 40 MG: 40 TABLET, DELAYED RELEASE ORAL at 17:15

## 2023-06-26 VITALS
BODY MASS INDEX: 23.25 KG/M2 | DIASTOLIC BLOOD PRESSURE: 66 MMHG | HEIGHT: 69 IN | TEMPERATURE: 97.3 F | HEART RATE: 75 BPM | RESPIRATION RATE: 20 BRPM | OXYGEN SATURATION: 94 % | SYSTOLIC BLOOD PRESSURE: 125 MMHG | WEIGHT: 157 LBS

## 2023-06-26 LAB
ALBUMIN SERPL-MCNC: 2.8 G/DL (ref 3.5–5.2)
ALP SERPL-CCNC: 62 U/L (ref 40–130)
ALT SERPL-CCNC: 28 U/L (ref 5–41)
ANION GAP SERPL CALCULATED.3IONS-SCNC: 11 MMOL/L (ref 7–19)
AST SERPL-CCNC: 32 U/L (ref 5–40)
BILIRUB SERPL-MCNC: 0.4 MG/DL (ref 0.2–1.2)
BUN SERPL-MCNC: 23 MG/DL (ref 8–23)
CALCIUM SERPL-MCNC: 7.8 MG/DL (ref 8.8–10.2)
CHLORIDE SERPL-SCNC: 109 MMOL/L (ref 98–111)
CO2 SERPL-SCNC: 23 MMOL/L (ref 22–29)
CREAT SERPL-MCNC: 1.3 MG/DL (ref 0.5–1.2)
ERYTHROCYTE [DISTWIDTH] IN BLOOD BY AUTOMATED COUNT: 16.1 % (ref 11.5–14.5)
GLUCOSE SERPL-MCNC: 72 MG/DL (ref 74–109)
HCT VFR BLD AUTO: 29.3 % (ref 42–52)
HGB BLD-MCNC: 9.3 G/DL (ref 14–18)
MCH RBC QN AUTO: 30 PG (ref 27–31)
MCHC RBC AUTO-ENTMCNC: 31.7 G/DL (ref 33–37)
MCV RBC AUTO: 94.5 FL (ref 80–94)
PLATELET # BLD AUTO: 107 K/UL (ref 130–400)
PMV BLD AUTO: 11.6 FL (ref 9.4–12.4)
POTASSIUM SERPL-SCNC: 4 MMOL/L (ref 3.5–5)
PROT SERPL-MCNC: 5.1 G/DL (ref 6.6–8.7)
RBC # BLD AUTO: 3.1 M/UL (ref 4.7–6.1)
SARS-COV-2 RDRP RESP QL NAA+PROBE: NOT DETECTED
SODIUM SERPL-SCNC: 143 MMOL/L (ref 136–145)
WBC # BLD AUTO: 11.3 K/UL (ref 4.8–10.8)

## 2023-06-26 PROCEDURE — 2580000003 HC RX 258: Performed by: ANESTHESIOLOGY

## 2023-06-26 PROCEDURE — 94760 N-INVAS EAR/PLS OXIMETRY 1: CPT

## 2023-06-26 PROCEDURE — 6370000000 HC RX 637 (ALT 250 FOR IP): Performed by: INTERNAL MEDICINE

## 2023-06-26 PROCEDURE — 80053 COMPREHEN METABOLIC PANEL: CPT

## 2023-06-26 PROCEDURE — 85027 COMPLETE CBC AUTOMATED: CPT

## 2023-06-26 PROCEDURE — 2580000003 HC RX 258: Performed by: FAMILY MEDICINE

## 2023-06-26 PROCEDURE — 6360000002 HC RX W HCPCS: Performed by: FAMILY MEDICINE

## 2023-06-26 PROCEDURE — 36415 COLL VENOUS BLD VENIPUNCTURE: CPT

## 2023-06-26 PROCEDURE — 6370000000 HC RX 637 (ALT 250 FOR IP): Performed by: SPECIALIST

## 2023-06-26 PROCEDURE — 2700000000 HC OXYGEN THERAPY PER DAY

## 2023-06-26 PROCEDURE — 97530 THERAPEUTIC ACTIVITIES: CPT

## 2023-06-26 PROCEDURE — 87635 SARS-COV-2 COVID-19 AMP PRB: CPT

## 2023-06-26 PROCEDURE — 99231 SBSQ HOSP IP/OBS SF/LOW 25: CPT | Performed by: UROLOGY

## 2023-06-26 PROCEDURE — 51798 US URINE CAPACITY MEASURE: CPT

## 2023-06-26 PROCEDURE — 6370000000 HC RX 637 (ALT 250 FOR IP): Performed by: UROLOGY

## 2023-06-26 PROCEDURE — 51702 INSERT TEMP BLADDER CATH: CPT

## 2023-06-26 PROCEDURE — 2580000003 HC RX 258: Performed by: SPECIALIST

## 2023-06-26 RX ORDER — CALCITRIOL 0.25 UG/1
0.25 CAPSULE, LIQUID FILLED ORAL DAILY
Qty: 30 CAPSULE | Refills: 3 | Status: SHIPPED | OUTPATIENT
Start: 2023-06-26

## 2023-06-26 RX ORDER — TAMSULOSIN HYDROCHLORIDE 0.4 MG/1
0.4 CAPSULE ORAL 2 TIMES DAILY
Status: DISCONTINUED | OUTPATIENT
Start: 2023-06-26 | End: 2023-06-26 | Stop reason: HOSPADM

## 2023-06-26 RX ORDER — HYDROCHLOROTHIAZIDE 25 MG/1
25 TABLET ORAL EVERY MORNING
Qty: 90 TABLET | Refills: 1 | Status: SHIPPED | OUTPATIENT
Start: 2023-06-26

## 2023-06-26 RX ORDER — CEFDINIR 300 MG/1
300 CAPSULE ORAL 2 TIMES DAILY
Qty: 10 CAPSULE | Refills: 0 | Status: SHIPPED | OUTPATIENT
Start: 2023-06-26 | End: 2023-07-01

## 2023-06-26 RX ORDER — TAMSULOSIN HYDROCHLORIDE 0.4 MG/1
0.4 CAPSULE ORAL 2 TIMES DAILY
Qty: 30 CAPSULE | Refills: 3 | Status: SHIPPED | OUTPATIENT
Start: 2023-06-26

## 2023-06-26 RX ORDER — PANTOPRAZOLE SODIUM 40 MG/1
40 TABLET, DELAYED RELEASE ORAL
Qty: 30 TABLET | Refills: 3 | Status: SHIPPED | OUTPATIENT
Start: 2023-06-26

## 2023-06-26 RX ADMIN — HYDRALAZINE HYDROCHLORIDE 20 MG: 10 TABLET, FILM COATED ORAL at 12:59

## 2023-06-26 RX ADMIN — CEFEPIME 2000 MG: 2 INJECTION, POWDER, FOR SOLUTION INTRAVENOUS at 02:31

## 2023-06-26 RX ADMIN — LISINOPRIL 10 MG: 10 TABLET ORAL at 08:04

## 2023-06-26 RX ADMIN — SODIUM BICARBONATE 650 MG: 650 TABLET ORAL at 12:59

## 2023-06-26 RX ADMIN — SODIUM CHLORIDE, PRESERVATIVE FREE 10 ML: 5 INJECTION INTRAVENOUS at 08:04

## 2023-06-26 RX ADMIN — HYDRALAZINE HYDROCHLORIDE 20 MG: 10 TABLET, FILM COATED ORAL at 00:06

## 2023-06-26 RX ADMIN — HYDRALAZINE HYDROCHLORIDE 20 MG: 10 TABLET, FILM COATED ORAL at 06:03

## 2023-06-26 RX ADMIN — DOCUSATE SODIUM 100 MG: 100 CAPSULE, LIQUID FILLED ORAL at 08:04

## 2023-06-26 RX ADMIN — PANTOPRAZOLE SODIUM 40 MG: 40 TABLET, DELAYED RELEASE ORAL at 06:03

## 2023-06-26 RX ADMIN — ATORVASTATIN CALCIUM 10 MG: 10 TABLET, FILM COATED ORAL at 08:03

## 2023-06-26 RX ADMIN — SODIUM BICARBONATE 650 MG: 650 TABLET ORAL at 08:03

## 2023-06-26 RX ADMIN — CALCITRIOL CAPSULES 0.25 MCG 0.25 MCG: 0.25 CAPSULE ORAL at 08:03

## 2023-06-26 RX ADMIN — TAMSULOSIN HYDROCHLORIDE 0.4 MG: 0.4 CAPSULE ORAL at 08:03

## 2023-06-26 RX ADMIN — AMLODIPINE BESYLATE 10 MG: 10 TABLET ORAL at 08:03

## 2023-06-30 ENCOUNTER — TELEPHONE (OUTPATIENT)
Dept: WOUND CARE | Age: 83
End: 2023-06-30

## 2023-07-05 ENCOUNTER — HOSPITAL ENCOUNTER (OUTPATIENT)
Dept: WOUND CARE | Age: 83
Discharge: HOME | End: 2023-07-05
Payer: MEDICARE

## 2023-07-05 VITALS
HEART RATE: 74 BPM | HEIGHT: 69 IN | WEIGHT: 157 LBS | TEMPERATURE: 97.4 F | BODY MASS INDEX: 23.25 KG/M2 | RESPIRATION RATE: 18 BRPM | SYSTOLIC BLOOD PRESSURE: 119 MMHG | DIASTOLIC BLOOD PRESSURE: 63 MMHG

## 2023-07-05 DIAGNOSIS — L89.153 DECUBITUS ULCER OF COCCYGEAL REGION, STAGE 3 (HCC): Primary | ICD-10-CM

## 2023-07-05 PROBLEM — L97.922 CHRONIC ULCER OF LEFT LEG WITH FAT LAYER EXPOSED (HCC): Chronic | Status: RESOLVED | Noted: 2021-04-26 | Resolved: 2023-07-05

## 2023-07-05 PROCEDURE — 99214 OFFICE O/P EST MOD 30 MIN: CPT

## 2023-07-05 PROCEDURE — 97597 DBRDMT OPN WND 1ST 20 CM/<: CPT

## 2023-07-05 RX ORDER — LIDOCAINE 40 MG/G
CREAM TOPICAL ONCE
OUTPATIENT
Start: 2023-07-05 | End: 2023-07-05

## 2023-07-05 RX ORDER — LIDOCAINE HYDROCHLORIDE 40 MG/ML
SOLUTION TOPICAL ONCE
OUTPATIENT
Start: 2023-07-05 | End: 2023-07-05

## 2023-07-05 RX ORDER — LIDOCAINE HYDROCHLORIDE 20 MG/ML
JELLY TOPICAL ONCE
OUTPATIENT
Start: 2023-07-05 | End: 2023-07-05

## 2023-07-05 RX ORDER — IBUPROFEN 200 MG
TABLET ORAL ONCE
OUTPATIENT
Start: 2023-07-05 | End: 2023-07-05

## 2023-07-05 RX ORDER — SODIUM CHLOR/HYPOCHLOROUS ACID 0.033 %
SOLUTION, IRRIGATION IRRIGATION ONCE
OUTPATIENT
Start: 2023-07-05 | End: 2023-07-05

## 2023-07-05 RX ORDER — GENTAMICIN SULFATE 1 MG/G
OINTMENT TOPICAL ONCE
OUTPATIENT
Start: 2023-07-05 | End: 2023-07-05

## 2023-07-05 RX ORDER — CLOBETASOL PROPIONATE 0.5 MG/G
OINTMENT TOPICAL ONCE
OUTPATIENT
Start: 2023-07-05 | End: 2023-07-05

## 2023-07-05 RX ORDER — GINSENG 100 MG
CAPSULE ORAL ONCE
OUTPATIENT
Start: 2023-07-05 | End: 2023-07-05

## 2023-07-05 RX ORDER — BETAMETHASONE DIPROPIONATE 0.05 %
OINTMENT (GRAM) TOPICAL ONCE
OUTPATIENT
Start: 2023-07-05 | End: 2023-07-05

## 2023-07-05 RX ORDER — BACITRACIN ZINC AND POLYMYXIN B SULFATE 500; 1000 [USP'U]/G; [USP'U]/G
OINTMENT TOPICAL ONCE
OUTPATIENT
Start: 2023-07-05 | End: 2023-07-05

## 2023-07-05 RX ORDER — LIDOCAINE 50 MG/G
OINTMENT TOPICAL ONCE
OUTPATIENT
Start: 2023-07-05 | End: 2023-07-05

## 2023-07-05 RX ORDER — LIDOCAINE HYDROCHLORIDE 20 MG/ML
JELLY TOPICAL ONCE
Status: DISCONTINUED | OUTPATIENT
Start: 2023-07-05 | End: 2023-07-07 | Stop reason: HOSPADM

## 2023-07-05 ASSESSMENT — VISUAL ACUITY: OU: 1

## 2023-07-05 NOTE — PROGRESS NOTES
Patient Care Team:  Adrian Navarro MD as PCP - General (Family Medicine)  Adrian Navarro MD as PCP - Empaneled Provider  C. Duayne Sandhoff, MD (Cardiology)    TODAY'S DATE:  7/5/2023     HISTORY of PRESENTILLNESS HPI   Woody Hanson is a 78 y.o. male who presents today for wound evaluation. He reports he developed a wound on coccyx. This started 1 month(s) ago. He believes this is not healing. He has been applying orders per nursing facility. He has not had  fever or chills. He has a history of hospitalization and indwelling magdaleno catheter.   Wound Type:pressure  Wound Location: coccyx  Modifying factors:chronic pressure, decreased mobility, and shear force    Patient Active Problem List   Diagnosis Code    ASHD (arteriosclerotic heart disease) I25.10    Hypertension I10    Hyperlipidemia E78.5    COPD (chronic obstructive pulmonary disease) ([facility]) J44.9    History of cerebrovascular disease Z86.79    AAA (abdominal aortic aneurysm) I71.40    S/P TKR (total knee replacement) Z96.659    Diabetes mellitus ([facility]) E11.9    GERD (gastroesophageal reflux disease) K21.9    Primary osteoarthritis of knee M17.10    HH (hiatus hernia) K44.9    Renal insufficiency N28.9    S/P CABG x 4 Z95.1    Status post coronary artery stent placement Z95.5    Bronchitis J40    Lateral subluxation of right patella S83.011A    Patellofemoral disorder of right knee M22.2X1    Patellofemoral disorder M22.2X9    PVD (peripheral vascular disease) ([facility]) I73.9    Varicose veins of both lower extremities I83.93    Pneumonia due to COVID-19 virus U07.1, J12.82    COVID-19 U07.1    Palliative care patient Z51.5    Acute kidney injury (nontraumatic) ([facility]) N17.9    GI bleed K92.2    Altered mental status R41.82    Melena K92.1    Acute anemia D64.9    Occult GI bleeding R19.5    Macrocytic anemia D53.9    Elevated AST (SGOT) R74.01    High risk medication use Z79.899    Decubitus ulcer of coccygeal region, stage 3 ([address]) L89.153       Atul KRISHNAMURTHY

## 2023-07-05 NOTE — DISCHARGE INSTRUCTIONS
[address] and Hyperbaric Oxygen Therapy   Physician Orders and Discharge Instructions  [address]  [place], [facility]  Telephone: [phone]    NAME:  Rowan Chambers OF BIRTH:  1940  MEDICAL RECORD NUMBER:  [id number]  DATE:  7/5/2023    Discharge condition: Stable    Discharge to: ECF    Left via:public transportation    Accompanied by:  transportation person    ECF/HHA: [facility]    Dressing Orders: Coccyx:   Wash with soap and water. Apply normal saline moistened Aquacel AG to wound bed. Secure with Mepilex border. Change daily. Treatment Orders:   Avoid Pressure to wound site. Turn frequently (turn at least every two hours when in bed)   While in chair reposition every 30 minutes     Continue Protein rich diet (unless restricted by your physician)   Take Multivitamin daily     Please use roho cushion in chair    [address] follow up visit ___________2 weeks with Dr. Robles__________________  (Please note your next appointment above and if you are unable to keep, kindly give a 24 hour notice. Thank you.)          If you experience any of the following, please call the [facility] during business hours:    * Increase in Pain  * Temperature over 101  * Increase in drainage from your wound  * Drainage with a foul odor  * Bleeding  * Increase in swelling  * Need for compression bandage changes due to slippage, breakthrough drainage. If you need medical attention outside of the business hours of the [address] [place] [address] please contact your PCP or go to the nearest emergency room.

## 2023-07-05 NOTE — PLAN OF CARE
Problem: Chronic Conditions and Co-morbidities  Goal: Patient's chronic conditions and co-morbidity symptoms are monitored and maintained or improved  Outcome: Progressing     Problem: Discharge Planning  Goal: Discharge to home or other facility with appropriate resources  Outcome: Progressing     Problem: Safety - Adult  Goal: Free from fall injury  Outcome: Progressing     Problem: Wound:  Goal: Will show signs of wound healing; wound closure and no evidence of infection  Description: Will show signs of wound healing; wound closure and no evidence of infection  Outcome: Progressing     Problem: Pressure Ulcer:  Goal: Signs of wound healing will improve  Description: Signs of wound healing will improve  Outcome: Progressing  Goal: Absence of new pressure ulcer  Description: Absence of new pressure ulcer  Outcome: Progressing  Goal: Will show no infection signs and symptoms  Description: Will show no infection signs and symptoms  Outcome: Progressing     Problem: Weight control:  Goal: Ability to maintain an optimal weight for height and age will be supported  Description: Ability to maintain an optimal weight for height and age will be supported  Outcome: Progressing     Problem: Falls - Risk of:  Goal: Will remain free from falls  Description: Will remain free from falls  Outcome: Progressing

## 2023-07-07 ENCOUNTER — OFFICE VISIT (OUTPATIENT)
Dept: UROLOGY | Age: 83
End: 2023-07-07
Payer: MEDICARE

## 2023-07-07 DIAGNOSIS — R39.9 LOWER URINARY TRACT SYMPTOMS (LUTS): Primary | ICD-10-CM

## 2023-07-07 PROCEDURE — G8420 CALC BMI NORM PARAMETERS: HCPCS | Performed by: NURSE PRACTITIONER

## 2023-07-07 PROCEDURE — 1111F DSCHRG MED/CURRENT MED MERGE: CPT | Performed by: NURSE PRACTITIONER

## 2023-07-07 PROCEDURE — 1123F ACP DISCUSS/DSCN MKR DOCD: CPT | Performed by: NURSE PRACTITIONER

## 2023-07-07 PROCEDURE — 1036F TOBACCO NON-USER: CPT | Performed by: NURSE PRACTITIONER

## 2023-07-07 PROCEDURE — G8427 DOCREV CUR MEDS BY ELIG CLIN: HCPCS | Performed by: NURSE PRACTITIONER

## 2023-07-07 PROCEDURE — 99213 OFFICE O/P EST LOW 20 MIN: CPT | Performed by: NURSE PRACTITIONER

## 2023-07-07 ASSESSMENT — ENCOUNTER SYMPTOMS
BACK PAIN: 0
ABDOMINAL PAIN: 0
VOMITING: 0
NAUSEA: 0
ABDOMINAL DISTENTION: 0

## 2023-07-10 ENCOUNTER — TELEPHONE (OUTPATIENT)
Dept: UROLOGY | Age: 83
End: 2023-07-10

## 2023-07-10 NOTE — TELEPHONE ENCOUNTER
I called the nurse at the nursing home the patient is staying in. I verbalized he refused the voiding trial with us at last visit and that they would have to try to do it there. She verbalized understanding and stated she would contact the doctor over the patient and let him know that was the plan from our standpoint.

## 2023-07-19 ENCOUNTER — HOSPITAL ENCOUNTER (OUTPATIENT)
Dept: WOUND CARE | Age: 83
Discharge: HOME | End: 2023-07-19
Payer: MEDICARE

## 2023-07-19 VITALS
WEIGHT: 157 LBS | RESPIRATION RATE: 18 BRPM | SYSTOLIC BLOOD PRESSURE: 113 MMHG | HEIGHT: 69 IN | DIASTOLIC BLOOD PRESSURE: 82 MMHG | BODY MASS INDEX: 23.25 KG/M2 | HEART RATE: 79 BPM | TEMPERATURE: 97.5 F

## 2023-07-19 DIAGNOSIS — L89.153 DECUBITUS ULCER OF COCCYGEAL REGION, STAGE 3 (HCC): Primary | ICD-10-CM

## 2023-07-19 PROCEDURE — 11042 DBRDMT SUBQ TIS 1ST 20SQCM/<: CPT | Performed by: SURGERY

## 2023-07-19 PROCEDURE — 11042 DBRDMT SUBQ TIS 1ST 20SQCM/<: CPT

## 2023-07-19 ASSESSMENT — PAIN SCALES - GENERAL: PAINLEVEL_OUTOF10: 0

## 2023-07-19 ASSESSMENT — PAIN - FUNCTIONAL ASSESSMENT: PAIN_FUNCTIONAL_ASSESSMENT: ACTIVITIES ARE NOT PREVENTED

## 2023-07-19 NOTE — PROGRESS NOTES
[address]   Progress Note and Procedure Note      Cole Man  MEDICAL RECORD NUMBER:  [id number]  AGE: 78 y.o. GENDER: male  : 1940  EPISODE DATE:  2023    Subjective:     Chief Complaint   Patient presents with    Wound Check         HISTORY of PRESENT ILLNESS HPI     Cole Man is a 78 y.o. male who presents today for wound/ulcer evaluation. Wound Context: Pt with coccyx wound here for eval/treat  Wound/Ulcer Pain Timing/Severity: none  Quality of pain: N/A  Severity:  0 / 10   Modifying Factors: None  Associated Signs/Symptoms: none    Ulcer Identification:  Ulcer Type: pressure  Contributing Factors: chronic pressure and decreased mobility    Wound:  pressure        PAST MEDICAL HISTORY        Diagnosis Date    AAA (abdominal aortic aneurysm) ([facility])     ASHD (arteriosclerotic heart disease)     S/P coronary intervention followed by CBG    CAD (coronary artery disease)     Severe triple-vessel    CAD (coronary artery disease) 2015    COPD (chronic obstructive pulmonary disease) ([facility])     With tobacco abuse    GERD (gastroesophageal reflux disease)     HH (hiatus hernia)     History of cerebrovascular disease     Hyperlipidemia     PCP, Dr. Stuart Dos Santos manages cholesterol.     Hypertension     Obesity     Osteoarthritis     Palliative care patient 2022    Renal insufficiency     S/P CABG x 3     S/P CABG x 4 2015 by Dr. Marquita Redding    S/P TKR (total knee replacement)     RIGHT    Thyroid disease        PAST SURGICAL HISTORY    Past Surgical History:   Procedure Laterality Date    ABDOMINAL AORTIC ANEURYSM REPAIR, OPEN      ADENOIDECTOMY      APPENDECTOMY      CARDIAC SURGERY      CARDIOVASCULAR STRESS TEST  09, [place]    Stress ECho    CARDIOVASCULAR STRESS TEST  06, [facility]    Adenosine thallium treadmill    CHOLECYSTECTOMY      CORONARY ARTERY BYPASS GRAFT  06, [place]    CABG x 4 with sequential FAYE to diagonal, LAD, SVG, to obtuse

## 2023-07-19 NOTE — DISCHARGE INSTRUCTIONS
[address] and Hyperbaric Oxygen Therapy   Physician Orders and Discharge Instructions  [address]  [place], [address] [place] [address]  Telephone: [phone]    NAME:  Mike Yañez OF BIRTH:  1940  MEDICAL RECORD NUMBER:  [id number]  DATE:  7/19/2023    Discharge condition: Stable    Discharge to: Home    Left via:Private automobile    Accompanied by: Self    [facility]     Dressing Orders: Coccyx:   Wash with soap and water. Apply normal saline moistened Aquacel AG to wound bed. Secure with Mepilex border. Change daily. Treatment Orders:   Avoid Pressure to wound site. Turn frequently (turn at least every two hours when in bed)   While in chair reposition every 30 minutes     Continue Protein rich diet (unless restricted by your physician)   Take Multivitamin daily     Please use roho cushion in chair    [facility] follow up visit ____________2 weeks_______________  (Please note your next appointment above and if you are unable to keep, kindly give a 24 hour notice. Thank you.)    If you experience any of the following, please call the [facility] [place] during business hours:    * Increase in Pain  * Temperature over 101  * Increase in drainage from your wound  * Drainage with a foul odor  * Bleeding  * Increase in swelling  * Need for compression bandage changes due to slippage, breakthrough drainage. If you need medical attention outside of the business hours of the [address] [place] [address] please contact your PCP or go to the nearest emergency room.

## 2023-07-19 NOTE — PLAN OF CARE
Problem: Chronic Conditions and Co-morbidities  Goal: Patient's chronic conditions and co-morbidity symptoms are monitored and maintained or improved  Outcome: Progressing     Problem: Discharge Planning  Goal: Discharge to home or other facility with appropriate resources  Outcome: Progressing     Problem: Safety - Adult  Goal: Free from fall injury  Outcome: Progressing     Problem: Skin/Tissue Integrity  Goal: Absence of new skin breakdown  Description: 1.   Monitor for areas of redness and/or skin breakdown  Outcome: Progressing     Problem: Wound:  Goal: Will show signs of wound healing; wound closure and no evidence of infection  Description: Will show signs of wound healing; wound closure and no evidence of infection  Outcome: Progressing     Problem: Pressure Ulcer:  Goal: Signs of wound healing will improve  Description: Signs of wound healing will improve  Outcome: Progressing  Goal: Absence of new pressure ulcer  Description: Absence of new pressure ulcer  Outcome: Progressing  Goal: Will show no infection signs and symptoms  Description: Will show no infection signs and symptoms  Outcome: Progressing

## 2023-08-01 ENCOUNTER — HOSPITAL ENCOUNTER (OUTPATIENT)
Dept: WOUND CARE | Age: 83
Discharge: HOME | End: 2023-08-01
Payer: MEDICARE

## 2023-08-01 VITALS
DIASTOLIC BLOOD PRESSURE: 54 MMHG | TEMPERATURE: 98 F | HEART RATE: 50 BPM | RESPIRATION RATE: 18 BRPM | SYSTOLIC BLOOD PRESSURE: 119 MMHG

## 2023-08-01 DIAGNOSIS — L89.153 DECUBITUS ULCER OF COCCYGEAL REGION, STAGE 3 (HCC): Primary | Chronic | ICD-10-CM

## 2023-08-01 PROCEDURE — 11042 DBRDMT SUBQ TIS 1ST 20SQCM/<: CPT

## 2023-08-01 PROCEDURE — 11042 DBRDMT SUBQ TIS 1ST 20SQCM/<: CPT | Performed by: SURGERY

## 2023-08-01 RX ORDER — BACITRACIN ZINC AND POLYMYXIN B SULFATE 500; 1000 [USP'U]/G; [USP'U]/G
OINTMENT TOPICAL ONCE
OUTPATIENT
Start: 2023-08-01 | End: 2023-08-01

## 2023-08-01 RX ORDER — BACITRACIN ZINC AND POLYMYXIN B SULFATE 500; 1000 [USP'U]/G; [USP'U]/G
OINTMENT TOPICAL ONCE
Status: CANCELLED | OUTPATIENT
Start: 2023-08-01 | End: 2023-08-01

## 2023-08-01 RX ORDER — LIDOCAINE 40 MG/G
CREAM TOPICAL ONCE
Status: CANCELLED | OUTPATIENT
Start: 2023-08-01 | End: 2023-08-01

## 2023-08-01 RX ORDER — SODIUM CHLOR/HYPOCHLOROUS ACID 0.033 %
SOLUTION, IRRIGATION IRRIGATION ONCE
Status: CANCELLED | OUTPATIENT
Start: 2023-08-01 | End: 2023-08-01

## 2023-08-01 RX ORDER — GINSENG 100 MG
CAPSULE ORAL ONCE
OUTPATIENT
Start: 2023-08-01 | End: 2023-08-01

## 2023-08-01 RX ORDER — CLOBETASOL PROPIONATE 0.5 MG/G
OINTMENT TOPICAL ONCE
OUTPATIENT
Start: 2023-08-01 | End: 2023-08-01

## 2023-08-01 RX ORDER — LIDOCAINE 50 MG/G
OINTMENT TOPICAL ONCE
OUTPATIENT
Start: 2023-08-01 | End: 2023-08-01

## 2023-08-01 RX ORDER — LIDOCAINE HYDROCHLORIDE 20 MG/ML
JELLY TOPICAL ONCE
OUTPATIENT
Start: 2023-08-01 | End: 2023-08-01

## 2023-08-01 RX ORDER — BETAMETHASONE DIPROPIONATE 0.05 %
OINTMENT (GRAM) TOPICAL ONCE
Status: CANCELLED | OUTPATIENT
Start: 2023-08-01 | End: 2023-08-01

## 2023-08-01 RX ORDER — GENTAMICIN SULFATE 1 MG/G
OINTMENT TOPICAL ONCE
OUTPATIENT
Start: 2023-08-01 | End: 2023-08-01

## 2023-08-01 RX ORDER — LIDOCAINE HYDROCHLORIDE 40 MG/ML
SOLUTION TOPICAL ONCE
OUTPATIENT
Start: 2023-08-01 | End: 2023-08-01

## 2023-08-01 RX ORDER — IBUPROFEN 200 MG
TABLET ORAL ONCE
OUTPATIENT
Start: 2023-08-01 | End: 2023-08-01

## 2023-08-01 RX ORDER — LIDOCAINE 50 MG/G
OINTMENT TOPICAL ONCE
Status: CANCELLED | OUTPATIENT
Start: 2023-08-01 | End: 2023-08-01

## 2023-08-01 RX ORDER — GENTAMICIN SULFATE 1 MG/G
OINTMENT TOPICAL ONCE
Status: CANCELLED | OUTPATIENT
Start: 2023-08-01 | End: 2023-08-01

## 2023-08-01 RX ORDER — BETAMETHASONE DIPROPIONATE 0.05 %
OINTMENT (GRAM) TOPICAL ONCE
OUTPATIENT
Start: 2023-08-01 | End: 2023-08-01

## 2023-08-01 RX ORDER — LIDOCAINE HYDROCHLORIDE 20 MG/ML
JELLY TOPICAL ONCE
Status: CANCELLED | OUTPATIENT
Start: 2023-08-01 | End: 2023-08-01

## 2023-08-01 RX ORDER — SODIUM CHLOR/HYPOCHLOROUS ACID 0.033 %
SOLUTION, IRRIGATION IRRIGATION ONCE
OUTPATIENT
Start: 2023-08-01 | End: 2023-08-01

## 2023-08-01 RX ORDER — CLOBETASOL PROPIONATE 0.5 MG/G
OINTMENT TOPICAL ONCE
Status: CANCELLED | OUTPATIENT
Start: 2023-08-01 | End: 2023-08-01

## 2023-08-01 RX ORDER — LIDOCAINE HYDROCHLORIDE 40 MG/ML
SOLUTION TOPICAL ONCE
Status: CANCELLED | OUTPATIENT
Start: 2023-08-01 | End: 2023-08-01

## 2023-08-01 RX ORDER — IBUPROFEN 200 MG
TABLET ORAL ONCE
Status: CANCELLED | OUTPATIENT
Start: 2023-08-01 | End: 2023-08-01

## 2023-08-01 RX ORDER — LIDOCAINE 40 MG/G
CREAM TOPICAL ONCE
OUTPATIENT
Start: 2023-08-01 | End: 2023-08-01

## 2023-08-01 RX ORDER — GINSENG 100 MG
CAPSULE ORAL ONCE
Status: CANCELLED | OUTPATIENT
Start: 2023-08-01 | End: 2023-08-01

## 2023-08-01 NOTE — PLAN OF CARE
Problem: Chronic Conditions and Co-morbidities  Goal: Patient's chronic conditions and co-morbidity symptoms are monitored and maintained or improved  Outcome: Progressing     Problem: Discharge Planning  Goal: Discharge to home or other facility with appropriate resources  Outcome: Progressing     Problem: Pressure Ulcer:  Goal: Signs of wound healing will improve  Description: Signs of wound healing will improve  Outcome: Progressing  Goal: Absence of new pressure ulcer  Description: Absence of new pressure ulcer  Outcome: Progressing  Goal: Will show no infection signs and symptoms  Description: Will show no infection signs and symptoms  Outcome: Progressing     Problem: Weight control:  Goal: Ability to maintain an optimal weight for height and age will be supported  Description: Ability to maintain an optimal weight for height and age will be supported  Outcome: Progressing     Problem: Falls - Risk of:  Goal: Will remain free from falls  Description: Will remain free from falls  Outcome: Progressing
Possible COVID-19

## 2023-08-01 NOTE — PROGRESS NOTES
Estimated Blood Loss:  Minimal    Hemostasis Achieved:  by pressure    Procedural Pain:  0  / 10     Post Procedural Pain:  0 / 10     Response to treatment:  Well tolerated by patient. Plan:     Problem List Items Addressed This Visit       * (Principal) Decubitus ulcer of coccygeal region, stage 3 ([address]) - Primary (Chronic)       Aquacel AG+ daily RTOO 1 week    Treatment Note please see attached Discharge Instructions    In my professional opinion this patient would benefit from HBO Therapy: No    Written patient dismissal instructions given to patient and signed by patient or POA. Discharge [facility] and Hyperbaric Oxygen Therapy   Physician Orders and Discharge Instructions  [address]  Telephone: [phone]    NAME:  Larry Sarkis:  1940  MEDICAL RECORD NUMBER:  [id number]  DATE:  8/1/2023    Discharge condition: Stable    Discharge to: Home    Left via:Private automobile    Accompanied by: Self     ECF/HHA: [facility] to send supplies      Dressing Orders: Coccyx:   Wash with soap and water. Apply normal saline moistened Aquacel AG to wound bed. Secure with Mepilex border. Change daily. Treatment Orders:   Avoid Pressure to wound site. Turn frequently (turn at least every two hours when in bed)   While in chair reposition every 30 minutes     Continue Protein rich diet (unless restricted by your physician)   Take Multivitamin daily     Please use roho cushion in chair       [facility] follow up visit ___________1 week__________________  (Please note your next appointment above and if you are unable to keep, kindly give a 24 hour notice.  Thank you.)    If you experience any of the following, please call the [facility] [employer] during business hours:    * Increase in Pain  * Temperature over 101  * Increase in drainage from your wound  * Drainage with

## 2023-08-01 NOTE — DISCHARGE INSTRUCTIONS
[address] and Hyperbaric Oxygen Therapy   Physician Orders and Discharge Instructions  [address]  [place], [place]hard [facility]  Telephone: [phone]    NAME:  Lew Marques OF BIRTH:  1940  MEDICAL RECORD NUMBER:  [id number]  DATE:  8/1/2023    Discharge condition: Stable    Discharge to: Home    Left via:Private automobile    Accompanied by: Self     ECF/HHA: [facility] to send supplies      Dressing Orders: Coccyx:   Wash with soap and water. Double layer of Aquacel AG to wound bed. Secure with Mepilex border. Change 3 times weekly    Use cushion or memory foam pillow anytime when sitting   Avoid Pressure to wound site. Turn frequently (turn at least every two hours when in bed)   While in chair reposition every 30 minutes   Continue Protein rich diet (unless restricted by your physician)   Take Multivitamin daily      [facility] follow up visit ___________1 week__________________  (Please note your next appointment above and if you are unable to keep, kindly give a 24 hour notice. Thank you.)    If you experience any of the following, please call the [facility] [employer] during business hours:    * Increase in Pain  * Temperature over 101  * Increase in drainage from your wound  * Drainage with a foul odor  * Bleeding  * Increase in swelling  * Need for compression bandage changes due to slippage, breakthrough drainage. If you need medical attention outside of the business hours of the [address] [place] [address] please contact your PCP or go to the nearest emergency room.

## 2023-08-01 NOTE — WOUND CARE
[address]:     [address] [facility], [address] [facility] f: [phone] f: [phone] p: [phone] [email or web address]      Ordering Center:     [address] [place] [address] [phone]  WOUND CARE Dept: [address] [place] [address] [phone]    Patient Information:      Delois Riedel  [address] [place] [address]   [phone]   : 1940  AGE: 78 y.o. GENDER: male   EPISODE DATE: 2023    Insurance:      PRIMARY INSURANCE:  Plan: MEDICARE PART A AND B  Coverage: MEDICARE  Effective Date: 2003  Group Number: [unfilled]  Subscriber Number: [id number] - (Medicare)    Payer/Plan Subscr  Sex Relation Sub. Ins. ID Effective Group Num   1. MEDICARE - ME* MACK GUERRERO L 1940 Male Self [id number]                                    [address] [address]   2.  [address] L 1940 Male Self [id number] PLAN N                                   [address]       Patient Wound Information:      Problem List Items Addressed This Visit          Other    * (Principal) Decubitus ulcer of coccygeal region, stage 3 ([address]) - Primary (Chronic)       WOUNDS REQUIRING DRESSING SUPPLIES:     Wound 23 Coccyx Mid Wound 1, coccyx, pressure injury (Active)   Wound Image   23 1332   Wound Etiology Pressure Stage 3 23 1332   Dressing Status Old drainage noted 23 1332   Wound Cleansed Soap and water 23 1332   Dressing/Treatment Alginate with Ag;Moisten with saline;Silicone border  1029   Wound Length (cm) 2.5 cm 23 1327   Wound Width (cm) 0.4 cm 23 1327   Wound Depth (cm) 0.4 cm 23 1327   Wound Surface Area (cm^2) 1 cm^2 23 1327   Change in Wound Size % (l*w) 75 23 132   Wound Volume (cm^3) 0.4 cm^3 23 132   Wound Healing % 50 23   Post-Procedure Length (cm) 2.5 cm 23 132

## 2023-08-08 ENCOUNTER — HOSPITAL ENCOUNTER (OUTPATIENT)
Dept: WOUND CARE | Age: 83
Discharge: HOME | End: 2023-08-08
Payer: MEDICARE

## 2023-08-08 VITALS
SYSTOLIC BLOOD PRESSURE: 157 MMHG | BODY MASS INDEX: 23.25 KG/M2 | RESPIRATION RATE: 18 BRPM | TEMPERATURE: 97.1 F | HEIGHT: 69 IN | HEART RATE: 71 BPM | DIASTOLIC BLOOD PRESSURE: 68 MMHG | WEIGHT: 157 LBS

## 2023-08-08 DIAGNOSIS — L89.153 DECUBITUS ULCER OF COCCYGEAL REGION, STAGE 3 (HCC): Primary | Chronic | ICD-10-CM

## 2023-08-08 PROCEDURE — 97597 DBRDMT OPN WND 1ST 20 CM/<: CPT

## 2023-08-08 PROCEDURE — 97597 DBRDMT OPN WND 1ST 20 CM/<: CPT | Performed by: SURGERY

## 2023-08-08 PROCEDURE — 6370000000 HC RX 637 (ALT 250 FOR IP): Performed by: SURGERY

## 2023-08-08 RX ORDER — LIDOCAINE HYDROCHLORIDE 20 MG/ML
JELLY TOPICAL ONCE
OUTPATIENT
Start: 2023-08-08 | End: 2023-08-08

## 2023-08-08 RX ORDER — LIDOCAINE HYDROCHLORIDE 20 MG/ML
JELLY TOPICAL ONCE
Status: COMPLETED | OUTPATIENT
Start: 2023-08-08 | End: 2023-08-08

## 2023-08-08 RX ORDER — CLOBETASOL PROPIONATE 0.5 MG/G
OINTMENT TOPICAL ONCE
OUTPATIENT
Start: 2023-08-08 | End: 2023-08-08

## 2023-08-08 RX ORDER — GINSENG 100 MG
CAPSULE ORAL ONCE
OUTPATIENT
Start: 2023-08-08 | End: 2023-08-08

## 2023-08-08 RX ORDER — SODIUM CHLOR/HYPOCHLOROUS ACID 0.033 %
SOLUTION, IRRIGATION IRRIGATION ONCE
OUTPATIENT
Start: 2023-08-08 | End: 2023-08-08

## 2023-08-08 RX ORDER — LIDOCAINE HYDROCHLORIDE 40 MG/ML
SOLUTION TOPICAL ONCE
OUTPATIENT
Start: 2023-08-08 | End: 2023-08-08

## 2023-08-08 RX ORDER — IBUPROFEN 200 MG
TABLET ORAL ONCE
OUTPATIENT
Start: 2023-08-08 | End: 2023-08-08

## 2023-08-08 RX ORDER — BETAMETHASONE DIPROPIONATE 0.05 %
OINTMENT (GRAM) TOPICAL ONCE
OUTPATIENT
Start: 2023-08-08 | End: 2023-08-08

## 2023-08-08 RX ORDER — LIDOCAINE 40 MG/G
CREAM TOPICAL ONCE
OUTPATIENT
Start: 2023-08-08 | End: 2023-08-08

## 2023-08-08 RX ORDER — BACITRACIN ZINC AND POLYMYXIN B SULFATE 500; 1000 [USP'U]/G; [USP'U]/G
OINTMENT TOPICAL ONCE
OUTPATIENT
Start: 2023-08-08 | End: 2023-08-08

## 2023-08-08 RX ORDER — GENTAMICIN SULFATE 1 MG/G
OINTMENT TOPICAL ONCE
OUTPATIENT
Start: 2023-08-08 | End: 2023-08-08

## 2023-08-08 RX ORDER — LIDOCAINE 50 MG/G
OINTMENT TOPICAL ONCE
OUTPATIENT
Start: 2023-08-08 | End: 2023-08-08

## 2023-08-08 RX ADMIN — LIDOCAINE HYDROCHLORIDE: 20 JELLY TOPICAL at 15:31

## 2023-08-08 ASSESSMENT — PAIN - FUNCTIONAL ASSESSMENT: PAIN_FUNCTIONAL_ASSESSMENT: ACTIVITIES ARE NOT PREVENTED

## 2023-08-08 ASSESSMENT — PAIN SCALES - GENERAL: PAINLEVEL_OUTOF10: 0

## 2023-08-08 NOTE — DISCHARGE INSTRUCTIONS
[address] and Hyperbaric Oxygen Therapy   Physician Orders and Discharge Instructions  [address]  [place], [place] [facility]  Telephone: [phone]    NAME:  Lew Marques OF BIRTH:  1940  MEDICAL RECORD NUMBER:  [id number]  DATE:  8/8/2023    Discharge condition: Stable    Discharge to: Home    Left via:Private automobile    Accompanied by: Self     ECF/HHA: [facility] to send supplies      Dressing Orders: Coccyx:   Wash with soap and water. Aquacel AG to wound bed. Secure with Mepilex border. Change every 2 days     Treatment Orders:   Avoid Pressure to wound site. Turn frequently (turn at least every two hours when in bed)   While in chair reposition every 30 minutes     Continue Protein rich diet (unless restricted by your physician)   Take Multivitamin daily     Please use roho cushion in chair    [facility] follow up visit _____________1 week________________  (Please note your next appointment above and if you are unable to keep, kindly give a 24 hour notice. Thank you.)    If you experience any of the following, please call the [facility] [employer] during business hours:    * Increase in Pain  * Temperature over 101  * Increase in drainage from your wound  * Drainage with a foul odor  * Bleeding  * Increase in swelling  * Need for compression bandage changes due to slippage, breakthrough drainage. If you need medical attention outside of the business hours of the [address] [place] [address] please contact your PCP or go to the nearest emergency room.

## 2023-08-08 NOTE — PROGRESS NOTES
notice. Thank you.)    If you experience any of the following, please call the [employer] [facility] during business hours:    * Increase in Pain  * Temperature over 101  * Increase in drainage from your wound  * Drainage with a foul odor  * Bleeding  * Increase in swelling  * Need for compression bandage changes due to slippage, breakthrough drainage. If you need medical attention outside of the business hours of the [address] [place], [place] [address] please contact your PCP or go to the nearest emergency room.          Electronically signed by Juancho Spivey MD on 8/8/2023 at 4:02 PM

## 2023-08-08 NOTE — PLAN OF CARE
Problem: Chronic Conditions and Co-morbidities  Goal: Patient's chronic conditions and co-morbidity symptoms are monitored and maintained or improved  Outcome: Progressing     Problem: Discharge Planning  Goal: Discharge to home or other facility with appropriate resources  Outcome: Progressing     Problem: Safety - Adult  Goal: Free from fall injury  Outcome: Progressing     Problem: Skin/Tissue Integrity  Goal: Absence of new skin breakdown  Description: 1. Monitor for areas of redness and/or skin breakdown  2. Assess vascular access sites hourly  3. Every 4-6 hours minimum:  Change oxygen saturation probe site  4. Every 4-6 hours:  If on nasal continuous positive airway pressure, respiratory therapy assess nares and determine need for appliance change or resting period.   Outcome: Progressing     Problem: Wound:  Goal: Will show signs of wound healing; wound closure and no evidence of infection  Description: Will show signs of wound healing; wound closure and no evidence of infection  Outcome: Progressing     Problem: Pressure Ulcer:  Goal: Signs of wound healing will improve  Description: Signs of wound healing will improve  Outcome: Progressing  Goal: Absence of new pressure ulcer  Description: Absence of new pressure ulcer  Outcome: Progressing  Goal: Will show no infection signs and symptoms  Description: Will show no infection signs and symptoms  Outcome: Progressing     Problem: Falls - Risk of:  Goal: Will remain free from falls  Description: Will remain free from falls  Outcome: Progressing

## 2023-08-15 ENCOUNTER — HOSPITAL ENCOUNTER (OUTPATIENT)
Dept: WOUND CARE | Age: 83
Discharge: HOME | End: 2023-08-15
Payer: MEDICARE

## 2023-08-15 VITALS
DIASTOLIC BLOOD PRESSURE: 54 MMHG | HEIGHT: 69 IN | TEMPERATURE: 97 F | BODY MASS INDEX: 23.25 KG/M2 | HEART RATE: 45 BPM | SYSTOLIC BLOOD PRESSURE: 122 MMHG | RESPIRATION RATE: 18 BRPM | WEIGHT: 157 LBS

## 2023-08-15 DIAGNOSIS — L89.153 DECUBITUS ULCER OF COCCYGEAL REGION, STAGE 3 (HCC): Primary | Chronic | ICD-10-CM

## 2023-08-15 PROCEDURE — 11042 DBRDMT SUBQ TIS 1ST 20SQCM/<: CPT | Performed by: SURGERY

## 2023-08-15 PROCEDURE — 6370000000 HC RX 637 (ALT 250 FOR IP): Performed by: SURGERY

## 2023-08-15 PROCEDURE — 11042 DBRDMT SUBQ TIS 1ST 20SQCM/<: CPT

## 2023-08-15 RX ORDER — IBUPROFEN 200 MG
TABLET ORAL ONCE
OUTPATIENT
Start: 2023-08-15 | End: 2023-08-15

## 2023-08-15 RX ORDER — CLOBETASOL PROPIONATE 0.5 MG/G
OINTMENT TOPICAL ONCE
OUTPATIENT
Start: 2023-08-15 | End: 2023-08-15

## 2023-08-15 RX ORDER — LIDOCAINE HYDROCHLORIDE 20 MG/ML
JELLY TOPICAL ONCE
OUTPATIENT
Start: 2023-08-15 | End: 2023-08-15

## 2023-08-15 RX ORDER — LIDOCAINE 40 MG/G
CREAM TOPICAL ONCE
OUTPATIENT
Start: 2023-08-15 | End: 2023-08-15

## 2023-08-15 RX ORDER — GENTAMICIN SULFATE 1 MG/G
OINTMENT TOPICAL ONCE
OUTPATIENT
Start: 2023-08-15 | End: 2023-08-15

## 2023-08-15 RX ORDER — LIDOCAINE 50 MG/G
OINTMENT TOPICAL ONCE
OUTPATIENT
Start: 2023-08-15 | End: 2023-08-15

## 2023-08-15 RX ORDER — GINSENG 100 MG
CAPSULE ORAL ONCE
OUTPATIENT
Start: 2023-08-15 | End: 2023-08-15

## 2023-08-15 RX ORDER — LIDOCAINE HYDROCHLORIDE 20 MG/ML
JELLY TOPICAL ONCE
Status: COMPLETED | OUTPATIENT
Start: 2023-08-15 | End: 2023-08-15

## 2023-08-15 RX ORDER — BETAMETHASONE DIPROPIONATE 0.05 %
OINTMENT (GRAM) TOPICAL ONCE
OUTPATIENT
Start: 2023-08-15 | End: 2023-08-15

## 2023-08-15 RX ORDER — LIDOCAINE HYDROCHLORIDE 40 MG/ML
SOLUTION TOPICAL ONCE
OUTPATIENT
Start: 2023-08-15 | End: 2023-08-15

## 2023-08-15 RX ORDER — BACITRACIN ZINC AND POLYMYXIN B SULFATE 500; 1000 [USP'U]/G; [USP'U]/G
OINTMENT TOPICAL ONCE
OUTPATIENT
Start: 2023-08-15 | End: 2023-08-15

## 2023-08-15 RX ORDER — SODIUM CHLOR/HYPOCHLOROUS ACID 0.033 %
SOLUTION, IRRIGATION IRRIGATION ONCE
OUTPATIENT
Start: 2023-08-15 | End: 2023-08-15

## 2023-08-15 RX ADMIN — LIDOCAINE HYDROCHLORIDE: 20 JELLY TOPICAL at 14:35

## 2023-08-15 NOTE — DISCHARGE INSTRUCTIONS
[address] and Hyperbaric Oxygen Therapy   Physician Orders and Discharge Instructions  [address]  [place], [place]hard [facility]  Telephone: [phone]    NAME:  Feliberto Marie OF BIRTH:  1940  MEDICAL RECORD NUMBER:  [id number]  DATE:  8/15/2023    Discharge condition: Stable    Discharge to: Home    Left via:Private automobile    Accompanied by: Self     ECF/HHA: [facility]      Dressing Orders: Coccyx:   Wash with soap and water. Aquacel AG to wound bed. Secure with Mepilex border. Change twice weekly     Treatment Orders:   Avoid Pressure to wound site. Turn frequently (turn at least every two hours when in bed)   While in chair reposition every 30 minutes  Continue Protein rich diet (unless restricted by your physician)   Take Multivitamin daily   Please use pillow in chair when sitting     [facility] follow up visit ________Nurse visit Friday, Dr visit on Tuesday _________________  (Please note your next appointment above and if you are unable to keep, kindly give a 24 hour notice. Thank you.)    If you experience any of the following, please call the [facility] [employer] during business hours:    * Increase in Pain  * Temperature over 101  * Increase in drainage from your wound  * Drainage with a foul odor  * Bleeding  * Increase in swelling  * Need for compression bandage changes due to slippage, breakthrough drainage. If you need medical attention outside of the business hours of the [address] [place] [address] please contact your PCP or go to the nearest emergency room.

## 2023-08-15 NOTE — PROGRESS NOTES
[address]   Progress Note and Procedure Note      Lali Zavala  MEDICAL RECORD NUMBER:  [id number]  AGE: 78 y.o. GENDER: male  : 1940  EPISODE DATE:  8/15/2023    Subjective:     Chief Complaint   Patient presents with    Wound Check         HISTORY of PRESENT ILLNESS HPI     Lali Zavala is a 78 y.o. male who presents today for wound/ulcer evaluation. Wound Context: Pt with coccyx wound here for eval/treat  Wound/Ulcer Pain Timing/Severity: none  Quality of pain: N/A  Severity:  0 / 10   Modifying Factors: None  Associated Signs/Symptoms: none    Ulcer Identification:  Ulcer Type: pressure  Contributing Factors: chronic pressure    Wound:  presssure        PAST MEDICAL HISTORY        Diagnosis Date    AAA (abdominal aortic aneurysm) ([facility])     ASHD (arteriosclerotic heart disease)     S/P coronary intervention followed by [facility]    CAD (coronary artery disease)     Severe triple-vessel    CAD (coronary artery disease) 2015    COPD (chronic obstructive pulmonary disease) ([facility])     With tobacco abuse    GERD (gastroesophageal reflux disease)     HH (hiatus hernia)     History of cerebrovascular disease     Hyperlipidemia     PCP, Dr. Evin Morillo manages cholesterol.     Hypertension     Obesity     Osteoarthritis     Palliative care patient 2022    Renal insufficiency     S/P CABG x 3     S/P CABG x 4 2015 by Dr. Deacon Stone    S/P TKR (total knee replacement)     RIGHT    Thyroid disease        PAST SURGICAL HISTORY    Past Surgical History:   Procedure Laterality Date    ABDOMINAL AORTIC ANEURYSM REPAIR, OPEN      ADENOIDECTOMY      APPENDECTOMY      CARDIAC SURGERY      CARDIOVASCULAR STRESS TEST  09, [facility]    Stress [facility]    CARDIOVASCULAR STRESS TEST  06, [facility]    Adenosine thallium treadmill    CHOLECYSTECTOMY      CORONARY ARTERY BYPASS GRAFT  06, [facility]    CABG x 4 with sequential FAYE to diagonal, LAD, SVG, to obtuse marginal, posterior

## 2023-08-15 NOTE — PLAN OF CARE
Problem: Chronic Conditions and Co-morbidities  Goal: Patient's chronic conditions and co-morbidity symptoms are monitored and maintained or improved  Outcome: Progressing     Problem: Discharge Planning  Goal: Discharge to home or other facility with appropriate resources  Outcome: Progressing     Problem: Skin/Tissue Integrity  Goal: Absence of new skin breakdown  Description: 1. Monitor for areas of redness and/or skin breakdown  2. Assess vascular access sites hourly  3. Every 4-6 hours minimum:  Change oxygen saturation probe site  4. Every 4-6 hours:  If on nasal continuous positive airway pressure, respiratory therapy assess nares and determine need for appliance change or resting period. Outcome: Progressing     Problem: Safety - Adult  Goal: Free from fall injury  Outcome: Progressing     Problem: Chronic Conditions and Co-morbidities  Goal: Patient's chronic conditions and co-morbidity symptoms are monitored and maintained or improved  Outcome: Progressing     Problem: Discharge Planning  Goal: Discharge to home or other facility with appropriate resources  Outcome: Progressing     Problem: Safety - Adult  Goal: Free from fall injury  Outcome: Progressing     Problem: Skin/Tissue Integrity  Goal: Absence of new skin breakdown  Description: 1. Monitor for areas of redness and/or skin breakdown  2. Assess vascular access sites hourly  3. Every 4-6 hours minimum:  Change oxygen saturation probe site  4. Every 4-6 hours:  If on nasal continuous positive airway pressure, respiratory therapy assess nares and determine need for appliance change or resting period.   Outcome: Progressing     Problem: Wound:  Goal: Will show signs of wound healing; wound closure and no evidence of infection  Description: Will show signs of wound healing; wound closure and no evidence of infection  Outcome: Progressing     Problem: Pressure Ulcer:  Goal: Signs of wound healing will improve  Description: Signs of wound

## 2023-08-18 ENCOUNTER — HOSPITAL ENCOUNTER (OUTPATIENT)
Dept: WOUND CARE | Age: 83
Discharge: HOME | End: 2023-08-18

## 2023-08-18 ENCOUNTER — HOSPITAL ENCOUNTER (OUTPATIENT)
Dept: WOUND CARE | Age: 83
Discharge: HOME | End: 2023-08-18
Payer: MEDICARE

## 2023-08-18 VITALS
SYSTOLIC BLOOD PRESSURE: 114 MMHG | HEART RATE: 45 BPM | TEMPERATURE: 97.1 F | DIASTOLIC BLOOD PRESSURE: 50 MMHG | RESPIRATION RATE: 18 BRPM

## 2023-08-18 DIAGNOSIS — L89.153 DECUBITUS ULCER OF COCCYGEAL REGION, STAGE 3 (HCC): Primary | Chronic | ICD-10-CM

## 2023-08-18 RX ORDER — LIDOCAINE 50 MG/G
OINTMENT TOPICAL ONCE
OUTPATIENT
Start: 2023-08-18 | End: 2023-08-18

## 2023-08-18 RX ORDER — GENTAMICIN SULFATE 1 MG/G
OINTMENT TOPICAL ONCE
OUTPATIENT
Start: 2023-08-18 | End: 2023-08-18

## 2023-08-18 RX ORDER — BETAMETHASONE DIPROPIONATE 0.05 %
OINTMENT (GRAM) TOPICAL ONCE
OUTPATIENT
Start: 2023-08-18 | End: 2023-08-18

## 2023-08-18 RX ORDER — SODIUM CHLOR/HYPOCHLOROUS ACID 0.033 %
SOLUTION, IRRIGATION IRRIGATION ONCE
OUTPATIENT
Start: 2023-08-18 | End: 2023-08-18

## 2023-08-18 RX ORDER — GINSENG 100 MG
CAPSULE ORAL ONCE
OUTPATIENT
Start: 2023-08-18 | End: 2023-08-18

## 2023-08-18 RX ORDER — IBUPROFEN 200 MG
TABLET ORAL ONCE
OUTPATIENT
Start: 2023-08-18 | End: 2023-08-18

## 2023-08-18 RX ORDER — LIDOCAINE HYDROCHLORIDE 20 MG/ML
JELLY TOPICAL ONCE
OUTPATIENT
Start: 2023-08-18 | End: 2023-08-18

## 2023-08-18 RX ORDER — LIDOCAINE 40 MG/G
CREAM TOPICAL ONCE
OUTPATIENT
Start: 2023-08-18 | End: 2023-08-18

## 2023-08-18 RX ORDER — LIDOCAINE HYDROCHLORIDE 40 MG/ML
SOLUTION TOPICAL ONCE
OUTPATIENT
Start: 2023-08-18 | End: 2023-08-18

## 2023-08-18 RX ORDER — CLOBETASOL PROPIONATE 0.5 MG/G
OINTMENT TOPICAL ONCE
OUTPATIENT
Start: 2023-08-18 | End: 2023-08-18

## 2023-08-18 RX ORDER — BACITRACIN ZINC AND POLYMYXIN B SULFATE 500; 1000 [USP'U]/G; [USP'U]/G
OINTMENT TOPICAL ONCE
OUTPATIENT
Start: 2023-08-18 | End: 2023-08-18

## 2023-08-18 NOTE — DISCHARGE INSTRUCTIONS
[address] and Hyperbaric Oxygen Therapy   Physician Orders and Discharge Instructions  [address], [address] [facility]  Telephone: [phone]    NAME:  Lew Marques OF BIRTH:  1940  MEDICAL RECORD NUMBER:  [id number]  DATE:  8/18/2023    Discharge condition: Stable    Discharge to: Home    Left via:Private automobile    Accompanied by:  self    ECF/HHA:     Accompanied by: Self     ECF/HHA: [facility]      Dressing Orders: Coccyx:   Wash with soap and water. Aquacel AG to wound bed. Secure with Mepilex border. Change twice weekly     Treatment Orders:   Avoid Pressure to wound site. Turn frequently (turn at least every two hours when in bed)   While in chair reposition every 30 minutes  Continue Protein rich diet (unless restricted by your physician)   Take Multivitamin daily   Please use cushion in chair when sitting        [facility] follow up visit _____________________________  (Please note your next appointment above and if you are unable to keep, kindly give a 24 hour notice. Thank you.)          If you experience any of the following, please call the [address] [place] [address] during business hours:    * Increase in Pain  * Temperature over 101  * Increase in drainage from your wound  * Drainage with a foul odor  * Bleeding  * Increase in swelling  * Need for compression bandage changes due to slippage, breakthrough drainage. If you need medical attention outside of the business hours of the [address] [place] [address] please contact your PCP or go to the nearest emergency room.

## 2023-08-18 NOTE — DISCHARGE INSTRUCTIONS
[address] and Hyperbaric Oxygen Therapy   Physician Orders and Discharge Instructions  [address]  [place], [place] [facility]  Telephone: [phone]    NAME:  Jamie Ruff OF BIRTH:  1940  MEDICAL RECORD NUMBER:  [id number]  DATE:  8/18/2023    Discharge condition: Stable    Discharge to: Home    Left via:Private automobile    Accompanied by:  self    ECF/HHA: [facility]    Dressing Orders: Coccyx:   Wash with soap and water. Aquacel AG to wound bed. Secure with Mepilex border. Change twice weekly     Treatment Orders:   Avoid Pressure to wound site. Turn frequently (turn at least every two hours when in bed)   While in chair reposition every 30 minutes  Continue Protein rich diet (unless restricted by your physician)   Take Multivitamin daily   Please use cushion in chair when sitting      [facility] follow up visit ________Dr visit on Tuesday _________________  (Please note your next appointment above and if you are unable to keep, kindly give a 24 hour notice. Thank you.)     If you experience any of the following, please call the [employer] during business hours:     * Increase in Pain  * Temperature over 101  * Increase in drainage from your wound  * Drainage with a foul odor  * Bleeding  * Increase in swelling  * Need for compression bandage changes due to slippage, breakthrough drainage. If you need medical attention outside of the business hours of the [facility] [employer] please contact your PCP or go to the nearest emergency room.

## 2023-08-22 ENCOUNTER — HOSPITAL ENCOUNTER (OUTPATIENT)
Dept: WOUND CARE | Age: 83
Discharge: HOME | End: 2023-08-22
Payer: MEDICARE

## 2023-08-22 VITALS
HEIGHT: 69 IN | TEMPERATURE: 97.2 F | DIASTOLIC BLOOD PRESSURE: 49 MMHG | BODY MASS INDEX: 23.25 KG/M2 | WEIGHT: 157 LBS | RESPIRATION RATE: 18 BRPM | SYSTOLIC BLOOD PRESSURE: 129 MMHG | HEART RATE: 79 BPM

## 2023-08-22 DIAGNOSIS — L89.153 DECUBITUS ULCER OF COCCYGEAL REGION, STAGE 3 (HCC): Primary | Chronic | ICD-10-CM

## 2023-08-22 PROCEDURE — 97597 DBRDMT OPN WND 1ST 20 CM/<: CPT

## 2023-08-22 PROCEDURE — 6370000000 HC RX 637 (ALT 250 FOR IP): Performed by: SURGERY

## 2023-08-22 PROCEDURE — 97597 DBRDMT OPN WND 1ST 20 CM/<: CPT | Performed by: SURGERY

## 2023-08-22 RX ORDER — IBUPROFEN 200 MG
TABLET ORAL ONCE
OUTPATIENT
Start: 2023-08-22 | End: 2023-08-22

## 2023-08-22 RX ORDER — GENTAMICIN SULFATE 1 MG/G
OINTMENT TOPICAL ONCE
OUTPATIENT
Start: 2023-08-22 | End: 2023-08-22

## 2023-08-22 RX ORDER — LIDOCAINE HYDROCHLORIDE 20 MG/ML
JELLY TOPICAL ONCE
Status: CANCELLED | OUTPATIENT
Start: 2023-08-22 | End: 2023-08-22

## 2023-08-22 RX ORDER — BETAMETHASONE DIPROPIONATE 0.05 %
OINTMENT (GRAM) TOPICAL ONCE
OUTPATIENT
Start: 2023-08-22 | End: 2023-08-22

## 2023-08-22 RX ORDER — BACITRACIN ZINC AND POLYMYXIN B SULFATE 500; 1000 [USP'U]/G; [USP'U]/G
OINTMENT TOPICAL ONCE
OUTPATIENT
Start: 2023-08-22 | End: 2023-08-22

## 2023-08-22 RX ORDER — LIDOCAINE 40 MG/G
CREAM TOPICAL ONCE
OUTPATIENT
Start: 2023-08-22 | End: 2023-08-22

## 2023-08-22 RX ORDER — LIDOCAINE HYDROCHLORIDE 20 MG/ML
JELLY TOPICAL ONCE
Status: COMPLETED | OUTPATIENT
Start: 2023-08-22 | End: 2023-08-22

## 2023-08-22 RX ORDER — SODIUM CHLOR/HYPOCHLOROUS ACID 0.033 %
SOLUTION, IRRIGATION IRRIGATION ONCE
OUTPATIENT
Start: 2023-08-22 | End: 2023-08-22

## 2023-08-22 RX ORDER — GINSENG 100 MG
CAPSULE ORAL ONCE
OUTPATIENT
Start: 2023-08-22 | End: 2023-08-22

## 2023-08-22 RX ORDER — CLOBETASOL PROPIONATE 0.5 MG/G
OINTMENT TOPICAL ONCE
OUTPATIENT
Start: 2023-08-22 | End: 2023-08-22

## 2023-08-22 RX ORDER — LIDOCAINE 50 MG/G
OINTMENT TOPICAL ONCE
OUTPATIENT
Start: 2023-08-22 | End: 2023-08-22

## 2023-08-22 RX ORDER — LIDOCAINE HYDROCHLORIDE 20 MG/ML
JELLY TOPICAL ONCE
OUTPATIENT
Start: 2023-08-22 | End: 2023-08-22

## 2023-08-22 RX ORDER — LIDOCAINE HYDROCHLORIDE 40 MG/ML
SOLUTION TOPICAL ONCE
OUTPATIENT
Start: 2023-08-22 | End: 2023-08-22

## 2023-08-22 RX ADMIN — LIDOCAINE HYDROCHLORIDE: 20 JELLY TOPICAL at 13:56

## 2023-08-22 NOTE — DISCHARGE INSTRUCTIONS
[address] and Hyperbaric Oxygen Therapy   Physician Orders and Discharge Instructions  [address], [address] [facility]  Telephone: [phone]    NAME:  Ishaan Roman OF BIRTH:  1940  MEDICAL RECORD NUMBER:  [id number]  DATE:  8/22/2023    Discharge condition: Stable    Discharge to: Home    Left via:Private automobile    Accompanied by: Self     ECF/HHA: [facility]      Dressing Orders: Coccyx:   Wash with soap and water. Aquacel AG to wound bed. Secure with Mepilex border. Change twice weekly     Treatment Orders:   Avoid Pressure to wound site. Turn frequently (turn at least every two hours when in bed)   While in chair reposition every 30 minutes  Continue Protein rich diet (unless restricted by your physician)   Take Multivitamin daily   Please use cushion in chair when sitting      [facility] follow up visit ________Nurse visit Friday, Dr visit on Tuesday _________________  If you experience any of the following, please call the [employer] during business hours:    * Increase in Pain  * Temperature over 101  * Increase in drainage from your wound  * Drainage with a foul odor  * Bleeding  * Increase in swelling  * Need for compression bandage changes due to slippage, breakthrough drainage. If you need medical attention outside of the business hours of the [facility] [employer] please contact your PCP or go to the nearest emergency room.

## 2023-08-22 NOTE — PLAN OF CARE
Problem: Chronic Conditions and Co-morbidities  Goal: Patient's chronic conditions and co-morbidity symptoms are monitored and maintained or improved  Outcome: Progressing     Problem: Discharge Planning  Goal: Discharge to home or other facility with appropriate resources  Outcome: Progressing     Problem: Wound:  Goal: Will show signs of wound healing; wound closure and no evidence of infection  Description: Will show signs of wound healing; wound closure and no evidence of infection  Outcome: Progressing     Problem: Pressure Ulcer:  Goal: Signs of wound healing will improve  Description: Signs of wound healing will improve  Outcome: Progressing  Goal: Absence of new pressure ulcer  Description: Absence of new pressure ulcer  Outcome: Progressing  Goal: Will show no infection signs and symptoms  Description: Will show no infection signs and symptoms  Outcome: Progressing     Problem: Weight control:  Goal: Ability to maintain an optimal weight for height and age will be supported  Description: Ability to maintain an optimal weight for height and age will be supported  Outcome: Progressing     Problem: Falls - Risk of:  Goal: Will remain free from falls  Description: Will remain free from falls  Outcome: Progressing

## 2023-08-24 ENCOUNTER — APPOINTMENT (OUTPATIENT)
Dept: CT IMAGING | Age: 83
End: 2023-08-24
Payer: MEDICARE

## 2023-08-24 ENCOUNTER — APPOINTMENT (OUTPATIENT)
Dept: GENERAL RADIOLOGY | Age: 83
End: 2023-08-24
Payer: MEDICARE

## 2023-08-24 ENCOUNTER — HOSPITAL ENCOUNTER (EMERGENCY)
Age: 83
Discharge: HOME | End: 2023-08-24
Payer: MEDICARE

## 2023-08-24 VITALS
HEART RATE: 51 BPM | DIASTOLIC BLOOD PRESSURE: 52 MMHG | OXYGEN SATURATION: 90 % | RESPIRATION RATE: 16 BRPM | SYSTOLIC BLOOD PRESSURE: 148 MMHG | TEMPERATURE: 98.4 F
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
## 2023-08-24 DIAGNOSIS — S00.03XA HEMATOMA OF SCALP, INITIAL ENCOUNTER: ICD-10-CM

## 2023-08-24 LAB
ALBUMIN SERPL-MCNC: 3.2 G/DL (ref 3.5–5.2)
ALLENS TEST: ABNORMAL
ALP SERPL-CCNC: 73 U/L (ref 40–130)
ALT SERPL-CCNC: 6 U/L (ref 5–41)
ANION GAP SERPL CALCULATED.3IONS-SCNC: 9 MMOL/L (ref 7–19)
APTT PPP: 30.3 SEC (ref 26–36.2)
AST SERPL-CCNC: 14 U/L (ref 5–40)
BASE EXCESS ARTERIAL: 1.2 MMOL/L (ref -2–2)
BASOPHILS # BLD: 0.1 K/UL (ref 0–0.2)
BASOPHILS NFR BLD: 0.8 % (ref 0–1)
BILIRUB SERPL-MCNC: 0.4 MG/DL (ref 0.2–1.2)
BUN SERPL-MCNC: 38 MG/DL (ref 8–23)
CALCIUM SERPL-MCNC: 8.3 MG/DL (ref 8.8–10.2)
CARBOXYHEMOGLOBIN ARTERIAL: 2.2 % (ref 0–5)
CHLORIDE SERPL-SCNC: 106 MMOL/L (ref 98–111)
CO2 SERPL-SCNC: 24 MMOL/L (ref 22–29)
CREAT SERPL-MCNC: 1.9 MG/DL (ref 0.5–1.2)
EOSINOPHIL # BLD: 0.6 K/UL (ref 0–0.6)
EOSINOPHIL NFR BLD: 6.3 % (ref 0–5)
ERYTHROCYTE [DISTWIDTH] IN BLOOD BY AUTOMATED COUNT: 16.8 % (ref 11.5–14.5)
FIO2: 21 %
GLUCOSE SERPL-MCNC: 87 MG/DL (ref 74–109)
HCO3 ARTERIAL: 26.6 MMOL/L (ref 22–26)
HCT VFR BLD AUTO: 31.8 % (ref 42–52)
HEMOGLOBIN, ART, EXTENDED: 10.4 G/DL (ref 14–18)
HGB BLD-MCNC: 10.2 G/DL (ref 14–18)
IMM GRANULOCYTES # BLD: 0.1 K/UL
INR PPP: 1.2 (ref 0.88–1.18)
LYMPHOCYTES # BLD: 2.9 K/UL (ref 1.1–4.5)
LYMPHOCYTES NFR BLD: 31.5 % (ref 20–40)
MCH RBC QN AUTO: 32 PG (ref 27–31)
MCHC RBC AUTO-ENTMCNC: 32.1 G/DL (ref 33–37)
MCV RBC AUTO: 99.7 FL (ref 80–94)
METHEMOGLOBIN ARTERIAL: 0.9 %
MONOCYTES # BLD: 1 K/UL (ref 0–0.9)
MONOCYTES NFR BLD: 10.8 % (ref 0–10)
NEUTROPHILS # BLD: 4.5 K/UL (ref 1.5–7.5)
NEUTS SEG NFR BLD: 49.9 % (ref 50–65)
O2 CONTENT ARTERIAL: 12.9 ML/DL
O2 SAT, ARTERIAL: 88.3 %
O2 THERAPY: ABNORMAL
PCO2 ARTERIAL: 45 MMHG (ref 35–45)
PH ARTERIAL: 7.38 (ref 7.35–7.45)
PLATELET # BLD AUTO: 137 K/UL (ref 130–400)
PMV BLD AUTO: 12.5 FL (ref 9.4–12.4)
PO2 ARTERIAL: 55 MMHG (ref 80–100)
POTASSIUM BLD-SCNC: 4.1 MMOL/L
POTASSIUM SERPL-SCNC: 4.3 MMOL/L (ref 3.5–5)
PROT SERPL-MCNC: 6.5 G/DL (ref 6.6–8.7)
PROTHROMBIN TIME: 14.9 SEC (ref 12–14.6)
RBC # BLD AUTO: 3.19 M/UL (ref 4.7–6.1)
SAMPLE SOURCE: ABNORMAL
SODIUM SERPL-SCNC: 139 MMOL/L (ref 136–145)
SPONT RATE(BPM): 22
WBC # BLD AUTO: 9.1 K/UL (ref 4.8–10.8)

## 2023-08-24 PROCEDURE — 80053 COMPREHEN METABOLIC PANEL: CPT

## 2023-08-24 PROCEDURE — 71045 X-RAY EXAM CHEST 1 VIEW: CPT

## 2023-08-24 PROCEDURE — 36415 COLL VENOUS BLD VENIPUNCTURE: CPT

## 2023-08-24 PROCEDURE — 85730 THROMBOPLASTIN TIME PARTIAL: CPT

## 2023-08-24 PROCEDURE — 6370000000 HC RX 637 (ALT 250 FOR IP): Performed by: PHYSICIAN ASSISTANT

## 2023-08-24 PROCEDURE — 70450 CT HEAD/BRAIN W/O DYE: CPT

## 2023-08-24 PROCEDURE — 85025 COMPLETE CBC W/AUTO DIFF WBC: CPT

## 2023-08-24 PROCEDURE — 99284 EMERGENCY DEPT VISIT MOD MDM: CPT

## 2023-08-24 PROCEDURE — 72125 CT NECK SPINE W/O DYE: CPT

## 2023-08-24 PROCEDURE — 82803 BLOOD GASES ANY COMBINATION: CPT

## 2023-08-24 PROCEDURE — 85610 PROTHROMBIN TIME: CPT

## 2023-08-24 RX ADMIN — Medication 1 EACH: at 11:40

## 2023-08-24 ASSESSMENT — ENCOUNTER SYMPTOMS
COLOR CHANGE: 0
PHOTOPHOBIA: 0
SHORTNESS OF BREATH: 0
APNEA: 0
BACK PAIN: 0
EYE ITCHING: 0
COUGH: 0
EYE DISCHARGE: 0

## 2023-08-24 ASSESSMENT — PAIN SCALES - GENERAL: PAINLEVEL_OUTOF10: 2

## 2023-08-24 ASSESSMENT — PAIN DESCRIPTION - DESCRIPTORS: DESCRIPTORS: ACHING

## 2023-08-24 ASSESSMENT — PAIN DESCRIPTION - PAIN TYPE: TYPE: ACUTE PAIN

## 2023-08-24 ASSESSMENT — PAIN DESCRIPTION - LOCATION: LOCATION: HEAD

## 2023-08-24 ASSESSMENT — PAIN - FUNCTIONAL ASSESSMENT: PAIN_FUNCTIONAL_ASSESSMENT: 0-10

## 2023-08-24 NOTE — ED NOTES
Pt states he does not want to keep on his C-collar and that he wants the thing off. Pt also stated that he will not be getting admitted. He had a terrible time when last admitted, and he \"aint doing that shit again\".       Angel Parr RN  08/24/23 6912

## 2023-08-24 NOTE — DISCHARGE INSTRUCTIONS
Goal for oxygen is 93 or greater if having to use oxygen consistently at home need to see Dr Danita Dubin check in 48 hrs  Compress with ice pack keep covered for 24 hrs

## 2023-08-24 NOTE — ED PROVIDER NOTES
[address] [facility] EMERGENCY DEPT  eMERGENCYdEPARTMENT eNCOUnter      Pt Name: Bladimir Brennan  MRN: [id number]  [address] [facility] 1940  Date of evaluation: 8/24/2023  Provider:TEO Dietz    CHIEF COMPLAINT       Chief Complaint   Patient presents with    Fall     Pt [place] via EMS from [place] in a c-collar. He was walking to car and tripped. Hit back of his head, did not experience loc. Pt is on blood thinners and has a hematoma forming. HISTORY OF PRESENT ILLNESS  (Location/Symptom, Timing/Onset, Context/Setting, Quality, Duration, Modifying Factors, Severity.)   Bladimir Brennan is a 78 y.o. male who presents to the emergency department with complaints of neck pain and hematoma to scalp post fall on thinners abrasion oozing no laceration 88 room he wears oxygen as needed hx of COPD no distress here. Mechanical fall in [employer] parking lot. HPI    Nursing Notes were reviewed and I agree. REVIEW OF SYSTEMS    (2-9 systems for level 4, 10 or more for level 5)     Review of Systems   Constitutional:  Negative for activity change, appetite change, chills and fever. HENT:  Negative for congestion and dental problem. Eyes:  Negative for photophobia, discharge and itching. Respiratory:  Negative for apnea, cough and shortness of breath. Cardiovascular:  Negative for chest pain. Musculoskeletal:  Positive for myalgias and neck pain. Negative for arthralgias, back pain and gait problem. Skin:  Positive for wound. Negative for color change, pallor and rash. Neurological:  Negative for dizziness, seizures and syncope. Psychiatric/Behavioral:  Negative for agitation. The patient is not nervous/anxious. Except as noted above the remainder of the review of systems was reviewed and negative.        PAST MEDICAL HISTORY     Past Medical History:   Diagnosis Date    AAA (abdominal aortic aneurysm) (HCC)     ASHD (arteriosclerotic heart disease)     S/P coronary intervention followed by CBG    CAD (coronary artery disease)     Severe triple-vessel    CAD (coronary artery disease) 02/04/2015    COPD (chronic obstructive pulmonary disease) (HCC)     With tobacco abuse    GERD (gastroesophageal reflux disease)     HH (hiatus hernia)     History of cerebrovascular disease     Hyperlipidemia     PCP, Dr. Steffi Shabazz manages cholesterol. 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 Nilam Sofia    S/P TKR (total knee replacement)     RIGHT    Thyroid disease          SURGICAL HISTORY       Past Surgical History:   Procedure Laterality Date    ABDOMINAL AORTIC ANEURYSM REPAIR, OPEN      ADENOIDECTOMY      APPENDECTOMY      CARDIAC SURGERY      CARDIOVASCULAR STRESS TEST  12/07/09, [facility]    Stress [facility]    CARDIOVASCULAR STRESS TEST  11/02/06, [facility]    Adenosine thallium treadmill    CHOLECYSTECTOMY      CORONARY ARTERY BYPASS GRAFT  12/01/06, [place]    CABG x 4 with sequential FAYE to diagonal, LAD, SVG, to obtuse marginal, posterior descending endoscopic vein harvesting. DIAGNOSTIC CARDIAC CATH LAB PROCEDURE  11/14/06, [facility]    Left cath, arteriography of bilateral native internal mammary arteries. DIAGNOSTIC CARDIAC CATH LAB PROCEDURE  06/27/03????, [facility]    Left cath, selective CA, primary stent placement two circumflex marginal branches. DIAGNOSTIC CARDIAC CATH LAB PROCEDURE  02/24/1993, [facility]    Left cath    DIAGNOSTIC CARDIAC CATH LAB PROCEDURE  05/15/91, [facility]    Left cath, SARABIA/SHERON left ventric., selective coronary arteriography.     JOINT REPLACEMENT      [place] ARTHROSCOPY KNEE LATERAL RELEASE Right 12/15/2017    KNEE ARTHROSCOPY LATERAL PATELLA REALIGNMENT performed by Ignacio Euceda DO at [address] W/WO ALGRFT 1 COMPONENT Right 04/06/2018    KNEE TOTAL ARTHROPLASTY PATELLOFEMORALREVISION performed by Ignacio Euceda DO at [address]    PTCA  05/16/91, [facility]    PTCA to circumflex marginal

## 2023-08-25 ENCOUNTER — HOSPITAL ENCOUNTER (OUTPATIENT)
Dept: WOUND CARE | Age: 83
Discharge: HOME | End: 2023-08-25
Payer: MEDICARE

## 2023-08-25 VITALS
HEART RATE: 54 BPM | SYSTOLIC BLOOD PRESSURE: 139 MMHG | TEMPERATURE: 97.5 F | RESPIRATION RATE: 16 BRPM | DIASTOLIC BLOOD PRESSURE: 62 MMHG

## 2023-08-25 DIAGNOSIS — L89.153 DECUBITUS ULCER OF COCCYGEAL REGION, STAGE 3 (HCC): Primary | Chronic | ICD-10-CM

## 2023-08-25 DIAGNOSIS — S00.03XA SCALP HEMATOMA, INITIAL ENCOUNTER: Chronic | ICD-10-CM

## 2023-08-25 PROCEDURE — 6370000000 HC RX 637 (ALT 250 FOR IP): Performed by: SURGERY

## 2023-08-25 PROCEDURE — 99213 OFFICE O/P EST LOW 20 MIN: CPT

## 2023-08-25 PROCEDURE — 99212 OFFICE O/P EST SF 10 MIN: CPT | Performed by: SURGERY

## 2023-08-25 RX ORDER — LIDOCAINE HYDROCHLORIDE 20 MG/ML
JELLY TOPICAL ONCE
OUTPATIENT
Start: 2023-08-25 | End: 2023-08-25

## 2023-08-25 RX ORDER — CLOBETASOL PROPIONATE 0.5 MG/G
OINTMENT TOPICAL ONCE
OUTPATIENT
Start: 2023-08-25 | End: 2023-08-25

## 2023-08-25 RX ORDER — GENTAMICIN SULFATE 1 MG/G
OINTMENT TOPICAL ONCE
OUTPATIENT
Start: 2023-08-25 | End: 2023-08-25

## 2023-08-25 RX ORDER — LIDOCAINE 50 MG/G
OINTMENT TOPICAL ONCE
OUTPATIENT
Start: 2023-08-25 | End: 2023-08-25

## 2023-08-25 RX ORDER — LIDOCAINE 40 MG/G
CREAM TOPICAL ONCE
OUTPATIENT
Start: 2023-08-25 | End: 2023-08-25

## 2023-08-25 RX ORDER — GINSENG 100 MG
CAPSULE ORAL ONCE
OUTPATIENT
Start: 2023-08-25 | End: 2023-08-25

## 2023-08-25 RX ORDER — BETAMETHASONE DIPROPIONATE 0.05 %
OINTMENT (GRAM) TOPICAL ONCE
OUTPATIENT
Start: 2023-08-25 | End: 2023-08-25

## 2023-08-25 RX ORDER — BACITRACIN ZINC AND POLYMYXIN B SULFATE 500; 1000 [USP'U]/G; [USP'U]/G
OINTMENT TOPICAL ONCE
OUTPATIENT
Start: 2023-08-25 | End: 2023-08-25

## 2023-08-25 RX ORDER — SODIUM CHLOR/HYPOCHLOROUS ACID 0.033 %
SOLUTION, IRRIGATION IRRIGATION ONCE
OUTPATIENT
Start: 2023-08-25 | End: 2023-08-25

## 2023-08-25 RX ORDER — IBUPROFEN 200 MG
TABLET ORAL ONCE
OUTPATIENT
Start: 2023-08-25 | End: 2023-08-25

## 2023-08-25 RX ORDER — LIDOCAINE HYDROCHLORIDE 20 MG/ML
JELLY TOPICAL ONCE
Status: COMPLETED | OUTPATIENT
Start: 2023-08-25 | End: 2023-08-25

## 2023-08-25 RX ORDER — LIDOCAINE HYDROCHLORIDE 40 MG/ML
SOLUTION TOPICAL ONCE
OUTPATIENT
Start: 2023-08-25 | End: 2023-08-25

## 2023-08-25 RX ADMIN — LIDOCAINE HYDROCHLORIDE: 20 JELLY TOPICAL at 08:19

## 2023-08-25 ASSESSMENT — PAIN SCALES - GENERAL: PAINLEVEL_OUTOF10: 3

## 2023-08-25 ASSESSMENT — PAIN DESCRIPTION - FREQUENCY: FREQUENCY: INTERMITTENT

## 2023-08-25 ASSESSMENT — PAIN DESCRIPTION - LOCATION: LOCATION: HEAD

## 2023-08-25 ASSESSMENT — PAIN DESCRIPTION - PAIN TYPE: TYPE: ACUTE PAIN

## 2023-08-25 ASSESSMENT — PAIN DESCRIPTION - ONSET: ONSET: ON-GOING

## 2023-08-25 ASSESSMENT — PAIN - FUNCTIONAL ASSESSMENT: PAIN_FUNCTIONAL_ASSESSMENT: ACTIVITIES ARE NOT PREVENTED

## 2023-08-25 ASSESSMENT — PAIN DESCRIPTION - DESCRIPTORS: DESCRIPTORS: ACHING

## 2023-08-25 NOTE — PROGRESS NOTES
No    Written patient dismissal instructions given to patient and signed by patient or POA. Discharge [address] [facility] and Hyperbaric Oxygen Therapy   Physician Orders and Discharge Instructions  [address], [address] [facility]  Telephone: [phone]    NAME:  Ady Wang:  1940  MEDICAL RECORD NUMBER:  [id number]  DATE:  8/25/2023    Discharge condition: Stable    Discharge to: Home    Left via:Private automobile    Accompanied by: Self     ECF/HHA: [facility]     Dressing Orders: Scalp Wound   Dry dressing leave in place until next appointment      Dressing Orders: Coccyx:   Wash with soap and water. Double layer of Aquacel AG to wound bed, Dry gauze and medipore tape, leave in place until next appointment     Treatment Orders:   Avoid Pressure to wound site. Turn frequently (turn at least every two hours when in bed)   While in chair reposition every 30 minutes  Continue Protein rich diet (unless restricted by your physician)   Take Multivitamin daily   Please use cushion in chair when sitting     [address] [place] [address] follow up visit _____________1 week________________  (Please note your next appointment above and if you are unable to keep, kindly give a 24 hour notice. Thank you.)    If you experience any of the following, please call the [facility] during business hours:    * Increase in Pain  * Temperature over 101  * Increase in drainage from your wound  * Drainage with a foul odor  * Bleeding  * Increase in swelling  * Need for compression bandage changes due to slippage, breakthrough drainage. If you need medical attention outside of the business hours of the [facility] please contact your PCP or go to the nearest emergency room.          Electronically signed by Francine Dior MD on 8/25/2023 at 8:29 AM

## 2023-08-25 NOTE — DISCHARGE INSTRUCTIONS
[address] and Hyperbaric Oxygen Therapy   Physician Orders and Discharge Instructions  [address], 801 [facility]  Telephone: [phone]    NAME:  Oswaldo Blackwell OF BIRTH:  1940  MEDICAL RECORD NUMBER:  [id number]  DATE:  8/25/2023    Discharge condition: Stable    Discharge to: Home    Left via:Private automobile    Accompanied by: Self     ECF/HHA: [facility]     Dressing Orders: Scalp Wound   Dry dressing leave in place until next appointment      Dressing Orders: Coccyx:   Wash with soap and water. Double layer of Aquacel AG to wound bed, Dry gauze and medipore tape, leave in place until next appointment     Treatment Orders:   Avoid Pressure to wound site. Turn frequently (turn at least every two hours when in bed)   While in chair reposition every 30 minutes  Continue Protein rich diet (unless restricted by your physician)   Take Multivitamin daily   Please use cushion in chair when sitting   Call office if you feel you need to be seen sooner     [facility] follow up visit _____________1 week________________  (Please note your next appointment above and if you are unable to keep, kindly give a 24 hour notice. Thank you.)    If you experience any of the following, please call the [facility] during business hours:    * Increase in Pain  * Temperature over 101  * Increase in drainage from your wound  * Drainage with a foul odor  * Bleeding  * Increase in swelling  * Need for compression bandage changes due to slippage, breakthrough drainage. If you need medical attention outside of the business hours of the [facility] please contact your PCP or go to the nearest emergency room.

## 2023-08-25 NOTE — PLAN OF CARE
Problem: Chronic Conditions and Co-morbidities  Goal: Patient's chronic conditions and co-morbidity symptoms are monitored and maintained or improved  Outcome: Progressing     Problem: Discharge Planning  Goal: Discharge to home or other facility with appropriate resources  Outcome: Progressing     Problem: Pain  Goal: Verbalizes/displays adequate comfort level or baseline comfort level  Outcome: Progressing     Problem: Wound:  Goal: Will show signs of wound healing; wound closure and no evidence of infection  Description: Will show signs of wound healing; wound closure and no evidence of infection  Outcome: Progressing     Problem: Pressure Ulcer:  Goal: Signs of wound healing will improve  Description: Signs of wound healing will improve  Outcome: Progressing  Goal: Absence of new pressure ulcer  Description: Absence of new pressure ulcer  Outcome: Progressing  Goal: Will show no infection signs and symptoms  Description: Will show no infection signs and symptoms  Outcome: Progressing     Problem: Weight control:  Goal: Ability to maintain an optimal weight for height and age will be supported  Description: Ability to maintain an optimal weight for height and age will be supported  Outcome: Progressing     Problem: Falls - Risk of:  Goal: Will remain free from falls  Description: Will remain free from falls  Outcome: Progressing

## 2023-08-26 ENCOUNTER — HOSPITAL ENCOUNTER (EMERGENCY)
Age: 83
Discharge: HOME | End: 2023-08-26
Attending: STUDENT IN AN ORGANIZED HEALTH CARE EDUCATION/TRAINING PROGRAM
Payer: MEDICARE

## 2023-08-26 VITALS
SYSTOLIC BLOOD PRESSURE: 119 MMHG | TEMPERATURE: 98.6 F | DIASTOLIC BLOOD PRESSURE: 70 MMHG | OXYGEN SATURATION: 99 % | HEART RATE: 47 BPM

## 2023-08-26 DIAGNOSIS — Z51.89 VISIT FOR WOUND CHECK: ICD-10-CM

## 2023-08-26 DIAGNOSIS — S00.03XD SCALP HEMATOMA, SUBSEQUENT ENCOUNTER: Primary | ICD-10-CM

## 2023-08-26 PROCEDURE — 99282 EMERGENCY DEPT VISIT SF MDM: CPT

## 2023-08-26 ASSESSMENT — ENCOUNTER SYMPTOMS
SHORTNESS OF BREATH: 0
CHEST TIGHTNESS: 0
NAUSEA: 0
DIARRHEA: 0
SORE THROAT: 0
ABDOMINAL PAIN: 0
BLOOD IN STOOL: 0
VOMITING: 0
EYE REDNESS: 0
COUGH: 0
EYE PAIN: 0

## 2023-08-26 NOTE — ED PROVIDER NOTES
[facility] EMERGENCY DEPT  eMERGENCY dEPARTMENT eNCOUnter      Pt Name: [place]  MRN: [id number]  [address] [place] 1940  Date of evaluation: 8/26/2023  Provider: Stephanie Mcdaniel MD    [address] [facility]       Chief Complaint   Patient presents with    Wound Check     Top of head           HISTORY OF PRESENT ILLNESS   (Location/Symptom, Timing/Onset,Context/Setting, Quality, Duration, Modifying Factors, Severity)  Note limiting factors. HPI    [place] is a 78 y.o. male with PMH of AAA, hypertension, peripheral vascular disease, diabetes, GERD, coronary artery disease status post CABG x4 who presents to the emergency department with CC of wound check. Patient was seen in this ED a couple days ago after a ground-level fall with head injury. He sustained a scalp hematoma to the superior posterior portion of his scalp, which was large secondary to him being on aspirin and Plavix. Bleeding was controlled, and the wound was dressed. He had no other injuries. He was advised to have a wound recheck in 48 hours to ensure it is not getting worse. He denies any significant pain, and no new bleeding from the wound. He denies any other concerns. NursingNotes were reviewed. REVIEW OF SYSTEMS    (2-9 systems for level 4, 10 or more for level 5)     Review of Systems   Constitutional:  Negative for appetite change, chills, fatigue and fever. HENT:  Negative for congestion and sore throat. Eyes:  Negative for pain and redness. Respiratory:  Negative for cough, chest tightness and shortness of breath. Cardiovascular:  Negative for chest pain and leg swelling. Gastrointestinal:  Negative for abdominal pain, blood in stool, diarrhea, nausea and vomiting. Genitourinary:  Negative for decreased urine volume, dysuria and frequency. Musculoskeletal:  Negative for neck pain and neck stiffness. Skin:  Positive for wound. Negative for rash.    Neurological:  Negative for dizziness, seizures, light-headedness to follow-up with his family doctor in the next couple days for another wound recheck. Return precautions discussed, wound was dressed in the emergency department, and patient was discharged in stable condition. CONSULTS:  None    :  Unless otherwise noted below, none     Procedures    FINAL IMPRESSION      1. Scalp hematoma, subsequent encounter    2.  Visit for wound check          DISPOSITION/PLAN   DISPOSITION Decision To Discharge 08/26/2023 03:01:34 PM      PATIENT REFERRED TO:  Sherral Paget, MD  [address] [place] [address] [phone]    Schedule an appointment as soon as possible for a visit in 2 days  For wound re-check      DISCHARGE MEDICATIONS:  Discharge Medication List as of 8/26/2023  3:03 PM             (Please note that portions of this note were completed with a voice recognition program.  Efforts were made to edit thedictations but occasionally words are mis-transcribed.)    Kerri Rodrigez MD (electronically signed)Emergency Physician          Kerri Rodrigez MD  08/26/23 [id number]

## 2023-08-26 NOTE — DISCHARGE INSTRUCTIONS
Continue to monitor your wound. Follow-up with your doctor in the next couple days for recheck. If he rebleeds or has uncontrolled bleeding, pain, or gets larger then return to the ED.

## 2023-08-29 ENCOUNTER — HOSPITAL ENCOUNTER (OUTPATIENT)
Dept: WOUND CARE | Age: 83
Discharge: HOME | End: 2023-08-29
Payer: MEDICARE

## 2023-08-29 ENCOUNTER — HOSPITAL ENCOUNTER (OUTPATIENT)
Dept: WOUND CARE | Age: 83
Discharge: HOME | End: 2023-08-29

## 2023-08-29 VITALS
HEART RATE: 52 BPM | BODY MASS INDEX: 23.25 KG/M2 | TEMPERATURE: 97.1 F | WEIGHT: 157 LBS | DIASTOLIC BLOOD PRESSURE: 58 MMHG | HEIGHT: 69 IN | RESPIRATION RATE: 16 BRPM | SYSTOLIC BLOOD PRESSURE: 123 MMHG

## 2023-08-29 DIAGNOSIS — L89.153 DECUBITUS ULCER OF COCCYGEAL REGION, STAGE 3 (HCC): Primary | Chronic | ICD-10-CM

## 2023-08-29 DIAGNOSIS — S00.03XA SCALP HEMATOMA, INITIAL ENCOUNTER: Chronic | ICD-10-CM

## 2023-08-29 PROCEDURE — 11042 DBRDMT SUBQ TIS 1ST 20SQCM/<: CPT

## 2023-08-29 PROCEDURE — 6370000000 HC RX 637 (ALT 250 FOR IP): Performed by: SURGERY

## 2023-08-29 PROCEDURE — 11042 DBRDMT SUBQ TIS 1ST 20SQCM/<: CPT | Performed by: SURGERY

## 2023-08-29 RX ORDER — CLOBETASOL PROPIONATE 0.5 MG/G
OINTMENT TOPICAL ONCE
OUTPATIENT
Start: 2023-08-29 | End: 2023-08-29

## 2023-08-29 RX ORDER — GENTAMICIN SULFATE 1 MG/G
OINTMENT TOPICAL ONCE
OUTPATIENT
Start: 2023-08-29 | End: 2023-08-29

## 2023-08-29 RX ORDER — GINSENG 100 MG
CAPSULE ORAL ONCE
OUTPATIENT
Start: 2023-08-29 | End: 2023-08-29

## 2023-08-29 RX ORDER — LIDOCAINE HYDROCHLORIDE 20 MG/ML
JELLY TOPICAL ONCE
OUTPATIENT
Start: 2023-08-29 | End: 2023-08-29

## 2023-08-29 RX ORDER — LIDOCAINE HYDROCHLORIDE 40 MG/ML
SOLUTION TOPICAL ONCE
OUTPATIENT
Start: 2023-08-29 | End: 2023-08-29

## 2023-08-29 RX ORDER — SODIUM CHLOR/HYPOCHLOROUS ACID 0.033 %
SOLUTION, IRRIGATION IRRIGATION ONCE
OUTPATIENT
Start: 2023-08-29 | End: 2023-08-29

## 2023-08-29 RX ORDER — BETAMETHASONE DIPROPIONATE 0.05 %
OINTMENT (GRAM) TOPICAL ONCE
OUTPATIENT
Start: 2023-08-29 | End: 2023-08-29

## 2023-08-29 RX ORDER — LIDOCAINE 40 MG/G
CREAM TOPICAL ONCE
OUTPATIENT
Start: 2023-08-29 | End: 2023-08-29

## 2023-08-29 RX ORDER — BACITRACIN ZINC AND POLYMYXIN B SULFATE 500; 1000 [USP'U]/G; [USP'U]/G
OINTMENT TOPICAL ONCE
OUTPATIENT
Start: 2023-08-29 | End: 2023-08-29

## 2023-08-29 RX ORDER — LIDOCAINE 50 MG/G
OINTMENT TOPICAL ONCE
OUTPATIENT
Start: 2023-08-29 | End: 2023-08-29

## 2023-08-29 RX ORDER — LIDOCAINE HYDROCHLORIDE 20 MG/ML
JELLY TOPICAL ONCE
Status: COMPLETED | OUTPATIENT
Start: 2023-08-29 | End: 2023-08-29

## 2023-08-29 RX ORDER — IBUPROFEN 200 MG
TABLET ORAL ONCE
OUTPATIENT
Start: 2023-08-29 | End: 2023-08-29

## 2023-08-29 RX ADMIN — LIDOCAINE HYDROCHLORIDE: 20 JELLY TOPICAL at 10:03

## 2023-08-29 ASSESSMENT — PAIN DESCRIPTION - LOCATION: LOCATION: HEAD

## 2023-08-29 ASSESSMENT — PAIN DESCRIPTION - ONSET: ONSET: ON-GOING

## 2023-08-29 ASSESSMENT — PAIN DESCRIPTION - FREQUENCY: FREQUENCY: INTERMITTENT

## 2023-08-29 ASSESSMENT — PAIN DESCRIPTION - DESCRIPTORS: DESCRIPTORS: ACHING

## 2023-08-29 ASSESSMENT — PAIN - FUNCTIONAL ASSESSMENT: PAIN_FUNCTIONAL_ASSESSMENT: ACTIVITIES ARE NOT PREVENTED

## 2023-08-29 ASSESSMENT — PAIN SCALES - GENERAL: PAINLEVEL_OUTOF10: 3

## 2023-08-29 ASSESSMENT — PAIN DESCRIPTION - PAIN TYPE: TYPE: ACUTE PAIN

## 2023-08-29 NOTE — DISCHARGE INSTRUCTIONS
[address] and Hyperbaric Oxygen Therapy   Physician Orders and Discharge Instructions  [address], 875 [facility]  Telephone: [phone]    NAME:  Jamie Rhodes:  1940  MEDICAL RECORD NUMBER:  [id number]  DATE:  8/29/2023    Discharge condition: Stable    Discharge to: Home    Left via:Private automobile    Accompanied by: Self    ECF/HHA: [facility]   Possible Wound Vac     Dressing Orders: Scalp Wound   Dry dressing leave in place until next appointment      Dressing Orders: Coccyx:   Wash with soap and water. Double layer of Aquacel AG to wound bed, Dry gauze and medipore tape, Change 3 times weekly     Treatment Orders:   Avoid Pressure to wound site. Turn frequently (turn at least every two hours when in bed)   While in chair reposition every 30 minutes  Continue Protein rich diet (unless restricted by your physician)   Take Multivitamin daily   Please use cushion in chair when sitting     [facility] follow up visit _____________1 week________________  (Please note your next appointment above and if you are unable to keep, kindly give a 24 hour notice. Thank you.)    If you experience any of the following, please call the [facility] during business hours:    * Increase in Pain  * Temperature over 101  * Increase in drainage from your wound  * Drainage with a foul odor  * Bleeding  * Increase in swelling  * Need for compression bandage changes due to slippage, breakthrough drainage. If you need medical attention outside of the business hours of the [facility] please contact your PCP or go to the nearest emergency room.

## 2023-08-29 NOTE — PROGRESS NOTES
Relevant Orders    Initiate Outpatient Wound Care Protocol    Scalp hematoma, initial encounter (Chronic)    Relevant Orders    Initiate Outpatient Wound Care Protocol       Pt wound needs wound vac and HH RTO 1 week    Treatment Note please see attached Discharge Instructions    In my professional opinion this patient would benefit from HBO Therapy: No    Written patient dismissal instructions given to patient and signed by patient or POA. Discharge [address] [facility] and Hyperbaric Oxygen Therapy   Physician Orders and Discharge Instructions  [address], [address] [facility]  Telephone: [phone]    NAME:  Althea Spindle:  1940  MEDICAL RECORD NUMBER:  [id number]  DATE:  8/29/2023    Discharge condition: Stable    Discharge to: Home    Left via:Private automobile    Accompanied by: Self    ECF/HHA: [facility]   Possible Wound Vac     Dressing Orders: Scalp Wound   Dry dressing leave in place until next appointment      Dressing Orders: Coccyx:   Wash with soap and water. Double layer of Aquacel AG to wound bed, Dry gauze and medipore tape, Change 3 times weekly     Treatment Orders:   Avoid Pressure to wound site. Turn frequently (turn at least every two hours when in bed)   While in chair reposition every 30 minutes  Continue Protein rich diet (unless restricted by your physician)   Take Multivitamin daily   Please use cushion in chair when sitting     [facility] follow up visit _____________1 week________________  (Please note your next appointment above and if you are unable to keep, kindly give a 24 hour notice.  Thank you.)    If you experience any of the following, please call the [address] [place] [address] during business hours:    * Increase in Pain  * Temperature over 101  * Increase in drainage from your wound  * Drainage with a foul odor  * Bleeding  * Increase in swelling  * Need for

## 2023-09-06 ENCOUNTER — HOSPITAL ENCOUNTER (OUTPATIENT)
Dept: WOUND CARE | Age: 83
Discharge: HOME | End: 2023-09-06
Payer: MEDICARE

## 2023-09-06 VITALS
HEIGHT: 69 IN | RESPIRATION RATE: 18 BRPM | DIASTOLIC BLOOD PRESSURE: 56 MMHG | TEMPERATURE: 96.6 F | SYSTOLIC BLOOD PRESSURE: 136 MMHG | BODY MASS INDEX: 23.25 KG/M2 | HEART RATE: 42 BPM | WEIGHT: 157 LBS

## 2023-09-06 DIAGNOSIS — L89.153 DECUBITUS ULCER OF COCCYGEAL REGION, STAGE 3 (HCC): Primary | Chronic | ICD-10-CM

## 2023-09-06 DIAGNOSIS — S00.03XA SCALP HEMATOMA, INITIAL ENCOUNTER: Chronic | ICD-10-CM

## 2023-09-06 PROCEDURE — 6370000000 HC RX 637 (ALT 250 FOR IP): Performed by: SURGERY

## 2023-09-06 PROCEDURE — 97597 DBRDMT OPN WND 1ST 20 CM/<: CPT | Performed by: SURGERY

## 2023-09-06 RX ORDER — CLOBETASOL PROPIONATE 0.5 MG/G
OINTMENT TOPICAL ONCE
OUTPATIENT
Start: 2023-09-06 | End: 2023-09-06

## 2023-09-06 RX ORDER — LIDOCAINE HYDROCHLORIDE 20 MG/ML
JELLY TOPICAL ONCE
Status: COMPLETED | OUTPATIENT
Start: 2023-09-06 | End: 2023-09-06

## 2023-09-06 RX ORDER — BACITRACIN ZINC AND POLYMYXIN B SULFATE 500; 1000 [USP'U]/G; [USP'U]/G
OINTMENT TOPICAL ONCE
OUTPATIENT
Start: 2023-09-06 | End: 2023-09-06

## 2023-09-06 RX ORDER — SODIUM HYPOCHLORITE 1.25 MG/ML
SOLUTION TOPICAL 2 TIMES DAILY PRN
Qty: 1000 ML | Refills: 1 | Status: SHIPPED | OUTPATIENT
Start: 2023-09-06

## 2023-09-06 RX ORDER — LIDOCAINE HYDROCHLORIDE 40 MG/ML
SOLUTION TOPICAL ONCE
OUTPATIENT
Start: 2023-09-06 | End: 2023-09-06

## 2023-09-06 RX ORDER — SODIUM CHLOR/HYPOCHLOROUS ACID 0.033 %
SOLUTION, IRRIGATION IRRIGATION ONCE
OUTPATIENT
Start: 2023-09-06 | End: 2023-09-06

## 2023-09-06 RX ORDER — LIDOCAINE 50 MG/G
OINTMENT TOPICAL ONCE
OUTPATIENT
Start: 2023-09-06 | End: 2023-09-06

## 2023-09-06 RX ORDER — GINSENG 100 MG
CAPSULE ORAL ONCE
OUTPATIENT
Start: 2023-09-06 | End: 2023-09-06

## 2023-09-06 RX ORDER — LIDOCAINE HYDROCHLORIDE 20 MG/ML
JELLY TOPICAL ONCE
OUTPATIENT
Start: 2023-09-06 | End: 2023-09-06

## 2023-09-06 RX ORDER — BETAMETHASONE DIPROPIONATE 0.05 %
OINTMENT (GRAM) TOPICAL ONCE
OUTPATIENT
Start: 2023-09-06 | End: 2023-09-06

## 2023-09-06 RX ORDER — LIDOCAINE 40 MG/G
CREAM TOPICAL ONCE
OUTPATIENT
Start: 2023-09-06 | End: 2023-09-06

## 2023-09-06 RX ORDER — IBUPROFEN 200 MG
TABLET ORAL ONCE
OUTPATIENT
Start: 2023-09-06 | End: 2023-09-06

## 2023-09-06 RX ORDER — GENTAMICIN SULFATE 1 MG/G
OINTMENT TOPICAL ONCE
OUTPATIENT
Start: 2023-09-06 | End: 2023-09-06

## 2023-09-06 RX ADMIN — LIDOCAINE HYDROCHLORIDE: 20 JELLY TOPICAL at 08:15

## 2023-09-06 ASSESSMENT — PAIN DESCRIPTION - PAIN TYPE: TYPE: ACUTE PAIN

## 2023-09-06 ASSESSMENT — PAIN - FUNCTIONAL ASSESSMENT: PAIN_FUNCTIONAL_ASSESSMENT: ACTIVITIES ARE NOT PREVENTED

## 2023-09-06 ASSESSMENT — PAIN SCALES - GENERAL: PAINLEVEL_OUTOF10: 4

## 2023-09-06 ASSESSMENT — PAIN DESCRIPTION - ONSET: ONSET: ON-GOING

## 2023-09-06 ASSESSMENT — PAIN DESCRIPTION - FREQUENCY: FREQUENCY: INTERMITTENT

## 2023-09-06 ASSESSMENT — PAIN DESCRIPTION - LOCATION: LOCATION: HEAD

## 2023-09-06 ASSESSMENT — PAIN DESCRIPTION - DESCRIPTORS: DESCRIPTORS: OTHER (COMMENT)

## 2023-09-06 NOTE — WOUND CARE
Tunneling (cm) 0 cm 09/06/23 0816   Tunneling Position ___ O'Clock 0 09/06/23 0816   Undermining Starts ___ O'Clock 0 09/06/23 0816   Undermining Ends___ O'Clock 0 09/06/23 0816   Undermining Maxium Distance (cm) 0 09/06/23 0816   Wound Assessment [place]/red;Slough 09/06/23 0816   Drainage Amount Moderate (25-50%) 09/06/23 0816   Drainage Description Serosanguinous 09/06/23 0816   Odor None 09/06/23 0816   Mariajose-wound Assessment Blanchable erythema 09/06/23 0816   Margins Defined edges 09/06/23 0816   Wound Thickness Description not for Pressure Injury Full thickness 09/06/23 0816   Number of days: 79       Wound 08/25/23 Head Posterior; Upper wound #2, traumatic, head (Active)   Wound Image   09/06/23 0816   Wound Etiology Traumatic 09/06/23 0816   Dressing Status New dressing applied 09/06/23 0855   Wound Cleansed Soap and water 09/06/23 0816   Dressing/Treatment Silicone border;Xeroform 09/06/23 0855   Wound Length (cm) 3 cm 09/06/23 0816   Wound Width (cm) 2.7 cm 09/06/23 0816   Wound Depth (cm) 0.1 cm 09/06/23 0816   Wound Surface Area (cm^2) 8.1 cm^2 09/06/23 0816   Change in Wound Size % (l*w) 25 09/06/23 0816   Wound Volume (cm^3) 0.81 cm^3 09/06/23 0816   Wound Healing % 25 09/06/23 0816   Post-Procedure Length (cm) 3 cm 09/06/23 0847   Post-Procedure Width (cm) 2.7 cm 09/06/23 0847   Post-Procedure Depth (cm) 0.1 cm 09/06/23 0847   Post-Procedure Surface Area (cm^2) 8.1 cm^2 09/06/23 0847   Post-Procedure Volume (cm^3) 0.81 cm^3 09/06/23 0847   Distance Tunneling (cm) 0 cm 09/06/23 0816   Tunneling Position ___ O'Clock 0 09/06/23 0816   Undermining Starts ___ O'Clock 0 09/06/23 0816   Undermining Ends___ O'Clock 0 09/06/23 0816   Undermining Maxium Distance (cm) 0 09/06/23 0816   Wound Assessment Slough;[place]/red 09/06/23 0816   Drainage Amount Moderate (25-50%) 09/06/23 0816   Drainage Description Serosanguinous 09/06/23 0816   Odor None 09/06/23 0816   Mariajose-wound Assessment Dry/flaky 09/06/23 0816   Margins

## 2023-09-06 NOTE — PROGRESS NOTES
[address]   Progress Note and Procedure Note      Kemal Krishnan  MEDICAL RECORD NUMBER:  [id number]  AGE: 78 y.o. GENDER: male  : 1940  EPISODE DATE:  2023    Subjective:     Chief Complaint   Patient presents with    Wound Check         HISTORY of PRESENT ILLNESS HPI     Kemal Krishnan is a 78 y.o. male who presents today for wound/ulcer evaluation. Wound Context: Pt with scalpwound and buttock wound here for eval/treat  Wound/Ulcer Pain Timing/Severity: none  Quality of pain: N/A  Severity:  0 / 10   Modifying Factors: None  Associated Signs/Symptoms: none    Ulcer Identification:  Ulcer Type: pressure and traumatic  Contributing Factors: shear force and anticoagulation therapy    Wound: Contusion        PAST MEDICAL HISTORY        Diagnosis Date    AAA (abdominal aortic aneurysm) ([facility])     ASHD (arteriosclerotic heart disease)     S/P coronary intervention followed by CBG    CAD (coronary artery disease)     Severe triple-vessel    CAD (coronary artery disease) 2015    COPD (chronic obstructive pulmonary disease) ([facility])     With tobacco abuse    GERD (gastroesophageal reflux disease)     HH (hiatus hernia)     History of cerebrovascular disease     Hyperlipidemia     PCP, Dr. Sheila Banks manages cholesterol.     Hypertension     Obesity     Osteoarthritis     Palliative care patient 2022    Renal insufficiency     S/P CABG x 3     S/P CABG x 4 2015 by Dr. Naomi Cooney    S/P TKR (total knee replacement)     RIGHT    Thyroid disease        PAST SURGICAL HISTORY    Past Surgical History:   Procedure Laterality Date    ABDOMINAL AORTIC ANEURYSM REPAIR, OPEN      ADENOIDECTOMY      APPENDECTOMY      CARDIAC SURGERY      CARDIOVASCULAR STRESS TEST  09, [facility]    Stress [facility]    CARDIOVASCULAR STRESS TEST  06, [facility]    Adenosine thallium treadmill    CHOLECYSTECTOMY      CORONARY ARTERY BYPASS GRAFT  06, [place]    CABG x 4 with sequential FAYE to

## 2023-09-06 NOTE — PLAN OF CARE
Problem: Chronic Conditions and Co-morbidities  Goal: Patient's chronic conditions and co-morbidity symptoms are monitored and maintained or improved  Outcome: Progressing     Problem: Discharge Planning  Goal: Discharge to home or other facility with appropriate resources  Outcome: Progressing     Problem: Pain  Goal: Verbalizes/displays adequate comfort level or baseline comfort level  Outcome: Progressing     Problem: Safety - Adult  Goal: Free from fall injury  Outcome: Progressing     Problem: Skin/Tissue Integrity  Goal: Absence of new skin breakdown  Description: 1. Monitor for areas of redness and/or skin breakdown  2. Assess vascular access sites hourly  3. Every 4-6 hours minimum:  Change oxygen saturation probe site  4. Every 4-6 hours:  If on nasal continuous positive airway pressure, respiratory therapy assess nares and determine need for appliance change or resting period.   Outcome: Progressing     Problem: Wound:  Goal: Will show signs of wound healing; wound closure and no evidence of infection  Description: Will show signs of wound healing; wound closure and no evidence of infection  Outcome: Progressing     Problem: Falls - Risk of:  Goal: Will remain free from falls  Description: Will remain free from falls  Outcome: Progressing

## 2023-09-06 NOTE — DISCHARGE INSTRUCTIONS
[address] and Hyperbaric Oxygen Therapy   Physician Orders and Discharge Instructions  [address]  [place], [facility]  Telephone: [phone]    NAME:  Gerardo Smith OF BIRTH:  1940  MEDICAL RECORD NUMBER:  [id number]  DATE:  9/6/2023    Discharge condition: Stable    Discharge to: Home    Left via:Private automobile    Accompanied by:  self    ECF/HHA: [facility] - start [address] wound vac when it arrives to patient home    Dressing Orders:  Location of wound: Head. Soap and water wash. Apply a double layer of Xeroform (the yellow sticky dressing), cover with silicone border dressing, over the wound bed daily. Location: Coccyx  Soap and water wash. Apply Aquacel ag to wound, cover with dry gauze and medipore tape. Change dressing daily. Location of Wound: Coccyx (start vac therapy when it arrives to patient home)  Wash with soap and water  Apply wound vac as follows: Apply Skin Prep to periwound. Apply drape - window pane to surrounding area (periwound). (May use duoderm instead of drape to prevent skin breakdown. Use ostomy paste to fill any creases to prevent leakage. If skin becomes red due to tape irritation please apply skin prep. Then apply light dusting of ostomy powder or antifungal powder to periwound. Then blot with skin prep to form crusting to protect skin. May repeat skin prep, powdering steps until proper crusting is made. )  Then apply drape to periwound. DO NOT PUT FOAM ON GOOD SKIN. Apply black foam to wound bed. BRIDGE to left or right hip. Set wound vac to 125 mmHG, continuous. Change wound vac dressing 3 times per week (MWF or TTS)  Reapply vac dressing if vac stops working or dressing begins to leak or cannot be reinforced. Alternative dressing: Wash with soap and water. Apply 1/4 STR Dakins moistened gauze to wound bed. Cover with gauze. Secure with roll gauze and medipore tape.  Change twice

## 2023-09-07 ENCOUNTER — TELEPHONE (OUTPATIENT)
Dept: WOUND CARE | Age: 83
End: 2023-09-07

## 2023-09-07 NOTE — PROGRESS NOTES
Received call from GALI with [employer] re: home vac. Vac has been approved and with be shipped overnight to patient home. Call was placed to [facility] and message left on Patricia PORTER voice mail that home vac to be delivered overnight and can be placed per [facility] AT [facility], call placed for planning and scheduling. Informed Dr. Milady Healy that wound vac to be shipped overnight and [facility] to place. normal...

## 2023-09-07 NOTE — TELEPHONE ENCOUNTER
Received call from Ariana Bowles with [facility] re: home vac. Vac has been approved and with be shipped overnight to patient home. Call was placed to [facility] and message left on Patricia PORTER voice mail that home vac to be delivered overnight and can be placed per [facility] AT [facility], call placed for planning and scheduling. Informed Dr. Richmond Dewey that wound vac to be shipped overnight and C to place.

## 2023-09-12 ENCOUNTER — HOSPITAL ENCOUNTER (OUTPATIENT)
Dept: WOUND CARE | Age: 83
Discharge: HOME | End: 2023-09-12
Payer: MEDICARE

## 2023-09-12 VITALS
RESPIRATION RATE: 18 BRPM | HEART RATE: 47 BPM | SYSTOLIC BLOOD PRESSURE: 126 MMHG | TEMPERATURE: 97 F | HEIGHT: 69 IN | WEIGHT: 157 LBS | DIASTOLIC BLOOD PRESSURE: 50 MMHG | BODY MASS INDEX: 23.25 KG/M2

## 2023-09-12 DIAGNOSIS — L89.153 DECUBITUS ULCER OF COCCYGEAL REGION, STAGE 3 (HCC): Primary | ICD-10-CM

## 2023-09-12 DIAGNOSIS — S00.03XA SCALP HEMATOMA, INITIAL ENCOUNTER: ICD-10-CM

## 2023-09-12 PROCEDURE — 97597 DBRDMT OPN WND 1ST 20 CM/<: CPT

## 2023-09-12 PROCEDURE — 99212 OFFICE O/P EST SF 10 MIN: CPT | Performed by: SURGERY

## 2023-09-12 PROCEDURE — 6370000000 HC RX 637 (ALT 250 FOR IP): Performed by: SURGERY

## 2023-09-12 RX ORDER — GINSENG 100 MG
CAPSULE ORAL ONCE
OUTPATIENT
Start: 2023-09-12 | End: 2023-09-12

## 2023-09-12 RX ORDER — LIDOCAINE 40 MG/G
CREAM TOPICAL ONCE
OUTPATIENT
Start: 2023-09-12 | End: 2023-09-12

## 2023-09-12 RX ORDER — LIDOCAINE HYDROCHLORIDE 20 MG/ML
JELLY TOPICAL ONCE
OUTPATIENT
Start: 2023-09-12 | End: 2023-09-12

## 2023-09-12 RX ORDER — LIDOCAINE 50 MG/G
OINTMENT TOPICAL ONCE
OUTPATIENT
Start: 2023-09-12 | End: 2023-09-12

## 2023-09-12 RX ORDER — LIDOCAINE HYDROCHLORIDE 40 MG/ML
SOLUTION TOPICAL ONCE
OUTPATIENT
Start: 2023-09-12 | End: 2023-09-12

## 2023-09-12 RX ORDER — IBUPROFEN 200 MG
TABLET ORAL ONCE
OUTPATIENT
Start: 2023-09-12 | End: 2023-09-12

## 2023-09-12 RX ORDER — SODIUM CHLOR/HYPOCHLOROUS ACID 0.033 %
SOLUTION, IRRIGATION IRRIGATION ONCE
OUTPATIENT
Start: 2023-09-12 | End: 2023-09-12

## 2023-09-12 RX ORDER — LIDOCAINE HYDROCHLORIDE 20 MG/ML
JELLY TOPICAL ONCE
Status: COMPLETED | OUTPATIENT
Start: 2023-09-12 | End: 2023-09-12

## 2023-09-12 RX ORDER — BACITRACIN ZINC AND POLYMYXIN B SULFATE 500; 1000 [USP'U]/G; [USP'U]/G
OINTMENT TOPICAL ONCE
OUTPATIENT
Start: 2023-09-12 | End: 2023-09-12

## 2023-09-12 RX ORDER — GENTAMICIN SULFATE 1 MG/G
OINTMENT TOPICAL ONCE
OUTPATIENT
Start: 2023-09-12 | End: 2023-09-12

## 2023-09-12 RX ORDER — BETAMETHASONE DIPROPIONATE 0.05 %
OINTMENT (GRAM) TOPICAL ONCE
OUTPATIENT
Start: 2023-09-12 | End: 2023-09-12

## 2023-09-12 RX ORDER — CLOBETASOL PROPIONATE 0.5 MG/G
OINTMENT TOPICAL ONCE
OUTPATIENT
Start: 2023-09-12 | End: 2023-09-12

## 2023-09-12 RX ADMIN — LIDOCAINE HYDROCHLORIDE: 20 JELLY TOPICAL at 08:20

## 2023-09-12 NOTE — PROGRESS NOTES
Currently     Comment: RARELY    Drug use: Never       ALLERGIES    Allergies   Allergen Reactions    Pcn [Penicillins] Anaphylaxis    Azithromycin      Other reaction(s): Other (See Comments)  UNKNOWN     Doxycycline Other (See Comments)     UNKNOWN REACTION    Tetracyclines & Related      Other reaction(s): Other (See Comments)  UNKNOWN REACTION  Not sure    Vibramycin [Doxycycline Calcium]      Not sure       MEDICATIONS    Current Outpatient Medications on File Prior to Encounter   Medication Sig Dispense Refill    sodium hypochlorite (DAKINS) 0.125 % SOLN external solution Apply topically 2 times daily as needed (Alternate dressing) Alternative dressing: Wash with soap and water. Apply 1/4 STR Dakins moistened gauze to wound bed. Cover with gauze. Secure with roll gauze and medipore tape. Change twice daily .  1000 mL 1    calcitRIOL (ROCALTROL) 0.25 MCG capsule Take 1 capsule by mouth daily 30 capsule 3    tamsulosin (FLOMAX) 0.4 MG capsule Take 1 capsule by mouth in the morning and at bedtime 30 capsule 3    pantoprazole (PROTONIX) 40 MG tablet Take 1 tablet by mouth 2 times daily (before meals) 30 tablet 3    hydroCHLOROthiazide (HYDRODIURIL) 25 MG tablet Take 1 tablet by mouth every morning 90 tablet 1    losartan (COZAAR) 100 MG tablet Take 0.5 tablets by mouth daily      dapagliflozin (FARXIGA) 5 MG tablet Take 1 tablet by mouth every morning      atorvastatin (LIPITOR) 10 MG tablet Take 1 tablet by mouth daily      Multiple Vitamins-Minerals (CENTRUM SILVER PO) Take by mouth      amLODIPine (NORVASC) 5 MG tablet Take 1 tablet by mouth daily 30 tablet 3    aspirin 81 MG EC tablet Take 1 tablet by mouth daily      acetaminophen (TYLENOL) 500 MG tablet Take 1 tablet by mouth every 6 hours as needed for Pain      nitroGLYCERIN (NITROSTAT) 0.4 MG SL tablet Place 1 tablet under the tongue every 5 minutes as needed for Chest pain 25 tablet 3    clopidogrel (PLAVIX) 75 MG tablet Take 1 tablet by mouth daily Start

## 2023-09-12 NOTE — DISCHARGE INSTRUCTIONS
[address] and Hyperbaric Oxygen Therapy   Physician Orders and Discharge Instructions  [address] [facility],[address] [facility] [facility] [address], [address]  Telephone: [phone]    NAME:  Jamie Ruff OF BIRTH:  1940  MEDICAL RECORD NUMBER:  [id number]  DATE:  9/12/2023    Discharge condition: Stable    Discharge to: Home    Left via:Private automobile    Accompanied by: Self    ECF/HHA: [facility]- to change dressing on Monday, Wednesday and Friday unless patient has a wound care appointment on that day     D/C wound vac    Dressing Orders: Head. Soap and water wash. Apply a double layer of Xeroform cover with dry gauze and medipore tape     Dressing Orders: Coccyx (start vac therapy when it arrives to patient home)  Wash with soap and water  Aquacel Ag cut to fit open area, cover with 2x2 gauze and medipore tape    Treatment Orders:  Protein rich diet (unless restricted by your physician)   Multivitamin daily    [address] [place] [address] follow up visit _____________1 week________________  (Please note your next appointment above and if you are unable to keep, kindly give a 24 hour notice. Thank you.)    If you experience any of the following, please call the [address] [place], [place] [address]i [place] during business hours:    * Increase in Pain  * Temperature over 101  * Increase in drainage from your wound  * Drainage with a foul odor  * Bleeding  * Increase in swelling  * Need for compression bandage changes due to slippage, breakthrough drainage. If you need medical attention outside of the business hours of the [address] [place], [place] [address]i [place] please contact your PCP or go to the nearest emergency room.

## 2023-09-12 NOTE — PLAN OF CARE
Problem: Wound:  Goal: Will show signs of wound healing; wound closure and no evidence of infection  Description: Will show signs of wound healing; wound closure and no evidence of infection  Outcome: Progressing     Problem: Pressure Ulcer:  Goal: Signs of wound healing will improve  Description: Signs of wound healing will improve  Outcome: Progressing  Goal: Absence of new pressure ulcer  Description: Absence of new pressure ulcer  Outcome: Progressing  Goal: Will show no infection signs and symptoms  Description: Will show no infection signs and symptoms  Outcome: Progressing     Problem: Pressure Ulcer:  Goal: Will show no infection signs and symptoms  Description: Will show no infection signs and symptoms  Outcome: Progressing     Problem: Weight control:  Goal: Ability to maintain an optimal weight for height and age will be supported  Description: Ability to maintain an optimal weight for height and age will be supported  Outcome: Progressing     Problem: Falls - Risk of:  Goal: Will remain free from falls  Description: Will remain free from falls  Outcome: Progressing     Problem: Chronic Conditions and Co-morbidities  Goal: Patient's chronic conditions and co-morbidity symptoms are monitored and maintained or improved  Outcome: Not Progressing

## 2023-09-20 ENCOUNTER — HOSPITAL ENCOUNTER (OUTPATIENT)
Dept: WOUND CARE | Age: 83
Discharge: HOME | End: 2023-09-20
Payer: MEDICARE

## 2023-09-20 VITALS
HEIGHT: 69 IN | WEIGHT: 157 LBS | TEMPERATURE: 96.7 F | RESPIRATION RATE: 18 BRPM | BODY MASS INDEX: 23.25 KG/M2 | SYSTOLIC BLOOD PRESSURE: 158 MMHG | HEART RATE: 48 BPM | DIASTOLIC BLOOD PRESSURE: 55 MMHG

## 2023-09-20 DIAGNOSIS — S00.03XA SCALP HEMATOMA, INITIAL ENCOUNTER: Primary | ICD-10-CM

## 2023-09-20 DIAGNOSIS — L89.153 DECUBITUS ULCER OF COCCYGEAL REGION, STAGE 3 (HCC): ICD-10-CM

## 2023-09-20 PROCEDURE — 6370000000 HC RX 637 (ALT 250 FOR IP): Performed by: SURGERY

## 2023-09-20 PROCEDURE — 97597 DBRDMT OPN WND 1ST 20 CM/<: CPT | Performed by: SURGERY

## 2023-09-20 PROCEDURE — 97597 DBRDMT OPN WND 1ST 20 CM/<: CPT

## 2023-09-20 RX ORDER — LIDOCAINE HYDROCHLORIDE 20 MG/ML
JELLY TOPICAL ONCE
OUTPATIENT
Start: 2023-09-20 | End: 2023-09-20

## 2023-09-20 RX ORDER — LIDOCAINE 40 MG/G
CREAM TOPICAL ONCE
OUTPATIENT
Start: 2023-09-20 | End: 2023-09-20

## 2023-09-20 RX ORDER — GINSENG 100 MG
CAPSULE ORAL ONCE
OUTPATIENT
Start: 2023-09-20 | End: 2023-09-20

## 2023-09-20 RX ORDER — LIDOCAINE 50 MG/G
OINTMENT TOPICAL ONCE
OUTPATIENT
Start: 2023-09-20 | End: 2023-09-20

## 2023-09-20 RX ORDER — BETAMETHASONE DIPROPIONATE 0.05 %
OINTMENT (GRAM) TOPICAL ONCE
OUTPATIENT
Start: 2023-09-20 | End: 2023-09-20

## 2023-09-20 RX ORDER — IBUPROFEN 200 MG
TABLET ORAL ONCE
OUTPATIENT
Start: 2023-09-20 | End: 2023-09-20

## 2023-09-20 RX ORDER — CLOBETASOL PROPIONATE 0.5 MG/G
OINTMENT TOPICAL ONCE
OUTPATIENT
Start: 2023-09-20 | End: 2023-09-20

## 2023-09-20 RX ORDER — SODIUM CHLOR/HYPOCHLOROUS ACID 0.033 %
SOLUTION, IRRIGATION IRRIGATION ONCE
OUTPATIENT
Start: 2023-09-20 | End: 2023-09-20

## 2023-09-20 RX ORDER — LIDOCAINE HYDROCHLORIDE 20 MG/ML
JELLY TOPICAL ONCE
Status: COMPLETED | OUTPATIENT
Start: 2023-09-20 | End: 2023-09-20

## 2023-09-20 RX ORDER — GENTAMICIN SULFATE 1 MG/G
OINTMENT TOPICAL ONCE
OUTPATIENT
Start: 2023-09-20 | End: 2023-09-20

## 2023-09-20 RX ORDER — LIDOCAINE HYDROCHLORIDE 40 MG/ML
SOLUTION TOPICAL ONCE
OUTPATIENT
Start: 2023-09-20 | End: 2023-09-20

## 2023-09-20 RX ORDER — BACITRACIN ZINC AND POLYMYXIN B SULFATE 500; 1000 [USP'U]/G; [USP'U]/G
OINTMENT TOPICAL ONCE
OUTPATIENT
Start: 2023-09-20 | End: 2023-09-20

## 2023-09-20 RX ORDER — TRIAMCINOLONE ACETONIDE 1 MG/G
OINTMENT TOPICAL ONCE
OUTPATIENT
Start: 2023-09-20 | End: 2023-09-20

## 2023-09-20 RX ADMIN — LIDOCAINE HYDROCHLORIDE: 20 JELLY TOPICAL at 08:04

## 2023-09-20 ASSESSMENT — PAIN SCALES - GENERAL: PAINLEVEL_OUTOF10: 0

## 2023-09-20 NOTE — PROGRESS NOTES
[address]   Progress Note and Procedure Note      Erickson Barrios  MEDICAL RECORD NUMBER:  [id number]  AGE: 78 y.o. GENDER: male  : 1940  EPISODE DATE:  2023    Subjective:     Chief Complaint   Patient presents with    Wound Check         HISTORY of PRESENT ILLNESS HPI     Erickson Barrios is a 78 y.o. male who presents today for wound/ulcer evaluation. Wound Context: Pt with coccyx wound and head wound here for eval/treat  Wound/Ulcer Pain Timing/Severity: none  Quality of pain: N/A  Severity:  0 / 10   Modifying Factors: None  Associated Signs/Symptoms: none    Ulcer Identification:  Ulcer Type: pressure  Contributing Factors: chronic pressure    Wound:  pressure        PAST MEDICAL HISTORY        Diagnosis Date    AAA (abdominal aortic aneurysm) ([facility])     ASHD (arteriosclerotic heart disease)     S/P coronary intervention followed by CBG    CAD (coronary artery disease)     Severe triple-vessel    CAD (coronary artery disease) 2015    COPD (chronic obstructive pulmonary disease) ([facility])     With tobacco abuse    GERD (gastroesophageal reflux disease)     HH (hiatus hernia)     History of cerebrovascular disease     Hyperlipidemia     PCP, Dr. Indy Jeffrey manages cholesterol.     Hypertension     Obesity     Osteoarthritis     Palliative care patient 2022    Renal insufficiency     S/P CABG x 3     S/P CABG x 4 2015 by Dr. Flavia Hopkins    S/P TKR (total knee replacement)     RIGHT    Thyroid disease        PAST SURGICAL HISTORY    Past Surgical History:   Procedure Laterality Date    ABDOMINAL AORTIC ANEURYSM REPAIR, OPEN      ADENOIDECTOMY      APPENDECTOMY      CARDIAC SURGERY      CARDIOVASCULAR STRESS TEST  09, [facility]    Stress [facility]    CARDIOVASCULAR STRESS TEST  06, [facility]    Adenosine thallium treadmill    CHOLECYSTECTOMY      CORONARY ARTERY BYPASS GRAFT  06, [place]    CABG x 4 with sequential FAYE to diagonal, LAD, SVG, to obtuse marginal,

## 2023-09-20 NOTE — DISCHARGE INSTRUCTIONS
[address] and Hyperbaric Oxygen Therapy   Physician Orders and Discharge Instructions  [address] [facility],[address] [facility] [address] [facility] [address], [address]  Telephone: [phone]    NAME:  Sena Jennings OF BIRTH:  1940  MEDICAL RECORD NUMBER:  [id number]  DATE:  9/20/2023    Discharge condition: Stable    Discharge to: Home    Left via:Private automobile    Accompanied by: Self    ECF/HHA: [facility]     Dressing Orders: Head. Soap and water wash. Apply Xeroform (the yellow sticky dressing), cover with dry gauze and medipore tape,   Change on Monday, Wednesday and Friday     Dressing Orders: Coccyx Wound  Soap and water wash  Aquacel Ag to open area, cover with dry gauze and medipore tape  Change on Monday, Wednesday and Friday       [facility] follow up visit ____________1 week_________________  (Please note your next appointment above and if you are unable to keep, kindly give a 24 hour notice. Thank you.)    If you experience any of the following, please call the [address] [place] [address] during business hours:    * Increase in Pain  * Temperature over 101  * Increase in drainage from your wound  * Drainage with a foul odor  * Bleeding  * Increase in swelling  * Need for compression bandage changes due to slippage, breakthrough drainage. If you need medical attention outside of the business hours of the [address] [place] [address] please contact your PCP or go to the nearest emergency room.

## 2023-09-20 NOTE — PLAN OF CARE
Problem: Chronic Conditions and Co-morbidities  Goal: Patient's chronic conditions and co-morbidity symptoms are monitored and maintained or improved  Outcome: Progressing     Problem: Wound:  Goal: Will show signs of wound healing; wound closure and no evidence of infection  Description: Will show signs of wound healing; wound closure and no evidence of infection  Outcome: Progressing     Problem: Falls - Risk of:  Goal: Will remain free from falls  Description: Will remain free from falls  Outcome: Progressing

## 2023-09-27 ENCOUNTER — HOSPITAL ENCOUNTER (OUTPATIENT)
Dept: WOUND CARE | Age: 83
Discharge: HOME | End: 2023-09-27
Payer: MEDICARE

## 2023-09-27 VITALS
HEART RATE: 49 BPM | BODY MASS INDEX: 23.25 KG/M2 | TEMPERATURE: 97.2 F | SYSTOLIC BLOOD PRESSURE: 160 MMHG | DIASTOLIC BLOOD PRESSURE: 57 MMHG | HEIGHT: 69 IN | RESPIRATION RATE: 18 BRPM | WEIGHT: 157 LBS

## 2023-09-27 DIAGNOSIS — S00.03XA SCALP HEMATOMA, INITIAL ENCOUNTER: Primary | ICD-10-CM

## 2023-09-27 DIAGNOSIS — L89.153 DECUBITUS ULCER OF COCCYGEAL REGION, STAGE 3 (HCC): ICD-10-CM

## 2023-09-27 PROCEDURE — 6370000000 HC RX 637 (ALT 250 FOR IP): Performed by: SURGERY

## 2023-09-27 PROCEDURE — 97597 DBRDMT OPN WND 1ST 20 CM/<: CPT

## 2023-09-27 PROCEDURE — 97597 DBRDMT OPN WND 1ST 20 CM/<: CPT | Performed by: SURGERY

## 2023-09-27 RX ORDER — TRIAMCINOLONE ACETONIDE 1 MG/G
OINTMENT TOPICAL ONCE
OUTPATIENT
Start: 2023-09-27 | End: 2023-09-27

## 2023-09-27 RX ORDER — BETAMETHASONE DIPROPIONATE 0.05 %
OINTMENT (GRAM) TOPICAL ONCE
OUTPATIENT
Start: 2023-09-27 | End: 2023-09-27

## 2023-09-27 RX ORDER — LIDOCAINE HYDROCHLORIDE 20 MG/ML
JELLY TOPICAL ONCE
OUTPATIENT
Start: 2023-09-27 | End: 2023-09-27

## 2023-09-27 RX ORDER — CLOBETASOL PROPIONATE 0.5 MG/G
OINTMENT TOPICAL ONCE
OUTPATIENT
Start: 2023-09-27 | End: 2023-09-27

## 2023-09-27 RX ORDER — LIDOCAINE HYDROCHLORIDE 40 MG/ML
SOLUTION TOPICAL ONCE
OUTPATIENT
Start: 2023-09-27 | End: 2023-09-27

## 2023-09-27 RX ORDER — LIDOCAINE 50 MG/G
OINTMENT TOPICAL ONCE
OUTPATIENT
Start: 2023-09-27 | End: 2023-09-27

## 2023-09-27 RX ORDER — IBUPROFEN 200 MG
TABLET ORAL ONCE
OUTPATIENT
Start: 2023-09-27 | End: 2023-09-27

## 2023-09-27 RX ORDER — BACITRACIN ZINC AND POLYMYXIN B SULFATE 500; 1000 [USP'U]/G; [USP'U]/G
OINTMENT TOPICAL ONCE
OUTPATIENT
Start: 2023-09-27 | End: 2023-09-27

## 2023-09-27 RX ORDER — LIDOCAINE 40 MG/G
CREAM TOPICAL ONCE
OUTPATIENT
Start: 2023-09-27 | End: 2023-09-27

## 2023-09-27 RX ORDER — LIDOCAINE HYDROCHLORIDE 20 MG/ML
JELLY TOPICAL ONCE
Status: COMPLETED | OUTPATIENT
Start: 2023-09-27 | End: 2023-09-27

## 2023-09-27 RX ORDER — SODIUM CHLOR/HYPOCHLOROUS ACID 0.033 %
SOLUTION, IRRIGATION IRRIGATION ONCE
OUTPATIENT
Start: 2023-09-27 | End: 2023-09-27

## 2023-09-27 RX ORDER — GENTAMICIN SULFATE 1 MG/G
OINTMENT TOPICAL ONCE
OUTPATIENT
Start: 2023-09-27 | End: 2023-09-27

## 2023-09-27 RX ORDER — GINSENG 100 MG
CAPSULE ORAL ONCE
OUTPATIENT
Start: 2023-09-27 | End: 2023-09-27

## 2023-09-27 RX ADMIN — LIDOCAINE HYDROCHLORIDE: 20 JELLY TOPICAL at 08:49

## 2023-09-27 NOTE — PATIENT INSTRUCTIONS
[address] and Hyperbaric Oxygen Therapy   Physician Orders and Discharge Instructions  1830 [facility],[address] [facility] [address], [address]  Telephone: [phone]    NAME:  Shreya Berrios OF BIRTH:  1940  MEDICAL RECORD NUMBER:  [id number]  DATE:  9/27/2023    Discharge condition: Stable    Discharge to: Home    Left via:Private automobile    Accompanied by:  self    ECF/HHA: [facility]      Dressing Orders: Head. Soap and water wash. Apply Xeroform (the yellow sticky dressing), cover with dry gauze and medipore tape,   Change on Monday, Wednesday and Friday      Dressing Orders: Coccyx Wound  Soap and water wash  Aquacel Ag to open area, cover with dry gauze and medipore tape  Change on Monday, Wednesday and Friday     Sit on Memory Foam Pillow      [facility] follow up visit ____________2 weeks_________________  (Please note your next appointment above and if you are unable to keep, kindly give a 24 hour notice. Thank you.)          If you experience any of the following, please call the [address] [place] [address] during business hours:    * Increase in Pain  * Temperature over 101  * Increase in drainage from your wound  * Drainage with a foul odor  * Bleeding  * Increase in swelling  * Need for compression bandage changes due to slippage, breakthrough drainage. If you need medical attention outside of the business hours of the [address] [place] [address] please contact your PCP or go to the nearest emergency room.

## 2023-09-27 NOTE — PLAN OF CARE
Problem: Chronic Conditions and Co-morbidities  Goal: Patient's chronic conditions and co-morbidity symptoms are monitored and maintained or improved  Outcome: Progressing     Problem: Wound:  Goal: Will show signs of wound healing; wound closure and no evidence of infection  Description: Will show signs of wound healing; wound closure and no evidence of infection  Outcome: Progressing     Problem: Pressure Ulcer:  Goal: Signs of wound healing will improve  Description: Signs of wound healing will improve  Outcome: Progressing  Goal: Absence of new pressure ulcer  Description: Absence of new pressure ulcer  Outcome: Progressing  Goal: Will show no infection signs and symptoms  Description: Will show no infection signs and symptoms  Outcome: Progressing     Problem: Weight control:  Goal: Ability to maintain an optimal weight for height and age will be supported  Description: Ability to maintain an optimal weight for height and age will be supported  Outcome: Progressing     Problem: Falls - Risk of:  Goal: Will remain free from falls  Description: Will remain free from falls  Outcome: Progressing

## 2023-09-27 NOTE — PROGRESS NOTES
0.34 cm^3 09/27/23 0920   Distance Tunneling (cm) 0 cm 09/27/23 0849   Tunneling Position ___ O'Clock 0 09/27/23 0849   Undermining Starts ___ O'Clock 0 09/27/23 0849   Undermining Ends___ O'Clock 0 09/27/23 0849   Undermining Maxium Distance (cm) 0 09/27/23 0849   Wound Assessment [place]/red;Slough 09/27/23 0849   Drainage Amount Moderate (25-50%) 09/27/23 0849   Drainage Description Serosanguinous 09/27/23 0849   Odor None 09/27/23 0849   Mariajose-wound Assessment Blanchable erythema 09/27/23 0849   Margins Defined edges 09/27/23 0849   Wound Thickness Description not for Pressure Injury Full thickness 09/12/23 0821   Number of days: 100       Wound 08/25/23 Head Posterior; Upper wound #2, traumatic, head (Active)   Wound Image   09/27/23 0849   Wound Etiology Traumatic 09/27/23 0849   Dressing Status Old drainage noted 09/27/23 0849   Wound Cleansed Soap and water 09/27/23 0849   Dressing/Treatment Xeroform 09/20/23 0833   Wound Length (cm) 2.3 cm 09/27/23 0849   Wound Width (cm) 2 cm 09/27/23 0849   Wound Depth (cm) 0.1 cm 09/27/23 0849   Wound Surface Area (cm^2) 4.6 cm^2 09/27/23 0849   Change in Wound Size % (l*w) 57.41 09/27/23 0849   Wound Volume (cm^3) 0.46 cm^3 09/27/23 0849   Wound Healing % 57 09/27/23 0849   Post-Procedure Length (cm) 2.3 cm 09/27/23 0920   Post-Procedure Width (cm) 2 cm 09/27/23 0920   Post-Procedure Depth (cm) 0.1 cm 09/27/23 0920   Post-Procedure Surface Area (cm^2) 4.6 cm^2 09/27/23 0920   Post-Procedure Volume (cm^3) 0.46 cm^3 09/27/23 0920   Distance Tunneling (cm) 0 cm 09/27/23 0849   Tunneling Position ___ O'Clock 0 09/27/23 0849   Undermining Starts ___ O'Clock 0 09/27/23 0849   Undermining Ends___ O'Clock 0 09/27/23 0849   Undermining Maxium Distance (cm) 0 09/27/23 0849   Wound Assessment Pink/red 09/27/23 0849   Drainage Amount Moderate (25-50%) 09/27/23 0849   Drainage Description Serosanguinous 09/27/23 0849   Odor None 09/27/23 0849   Mariajose-wound Assessment Dry/flaky 09/27/23

## 2023-10-11 ENCOUNTER — HOSPITAL ENCOUNTER (OUTPATIENT)
Dept: WOUND CARE | Age: 83
Discharge: HOME | End: 2023-10-11
Payer: MEDICARE

## 2023-10-11 VITALS
SYSTOLIC BLOOD PRESSURE: 146 MMHG | DIASTOLIC BLOOD PRESSURE: 63 MMHG | WEIGHT: 157 LBS | TEMPERATURE: 96.7 F | BODY MASS INDEX: 23.25 KG/M2 | HEART RATE: 65 BPM | RESPIRATION RATE: 18 BRPM | HEIGHT: 69 IN

## 2023-10-11 DIAGNOSIS — S00.03XA SCALP HEMATOMA, INITIAL ENCOUNTER: Primary | ICD-10-CM

## 2023-10-11 DIAGNOSIS — L89.153 DECUBITUS ULCER OF COCCYGEAL REGION, STAGE 3 (HCC): ICD-10-CM

## 2023-10-11 PROCEDURE — 97597 DBRDMT OPN WND 1ST 20 CM/<: CPT

## 2023-10-11 PROCEDURE — 6370000000 HC RX 637 (ALT 250 FOR IP): Performed by: SURGERY

## 2023-10-11 RX ORDER — LIDOCAINE HYDROCHLORIDE 20 MG/ML
JELLY TOPICAL ONCE
OUTPATIENT
Start: 2023-10-11 | End: 2023-10-11

## 2023-10-11 RX ORDER — SODIUM CHLOR/HYPOCHLOROUS ACID 0.033 %
SOLUTION, IRRIGATION IRRIGATION ONCE
OUTPATIENT
Start: 2023-10-11 | End: 2023-10-11

## 2023-10-11 RX ORDER — TRIAMCINOLONE ACETONIDE 1 MG/G
OINTMENT TOPICAL ONCE
OUTPATIENT
Start: 2023-10-11 | End: 2023-10-11

## 2023-10-11 RX ORDER — IBUPROFEN 200 MG
TABLET ORAL ONCE
OUTPATIENT
Start: 2023-10-11 | End: 2023-10-11

## 2023-10-11 RX ORDER — GINSENG 100 MG
CAPSULE ORAL ONCE
OUTPATIENT
Start: 2023-10-11 | End: 2023-10-11

## 2023-10-11 RX ORDER — CLOBETASOL PROPIONATE 0.5 MG/G
OINTMENT TOPICAL ONCE
OUTPATIENT
Start: 2023-10-11 | End: 2023-10-11

## 2023-10-11 RX ORDER — GENTAMICIN SULFATE 1 MG/G
OINTMENT TOPICAL ONCE
OUTPATIENT
Start: 2023-10-11 | End: 2023-10-11

## 2023-10-11 RX ORDER — BETAMETHASONE DIPROPIONATE 0.05 %
OINTMENT (GRAM) TOPICAL ONCE
OUTPATIENT
Start: 2023-10-11 | End: 2023-10-11

## 2023-10-11 RX ORDER — LIDOCAINE 40 MG/G
CREAM TOPICAL ONCE
OUTPATIENT
Start: 2023-10-11 | End: 2023-10-11

## 2023-10-11 RX ORDER — LIDOCAINE HYDROCHLORIDE 40 MG/ML
SOLUTION TOPICAL ONCE
OUTPATIENT
Start: 2023-10-11 | End: 2023-10-11

## 2023-10-11 RX ORDER — BACITRACIN ZINC AND POLYMYXIN B SULFATE 500; 1000 [USP'U]/G; [USP'U]/G
OINTMENT TOPICAL ONCE
OUTPATIENT
Start: 2023-10-11 | End: 2023-10-11

## 2023-10-11 RX ORDER — LIDOCAINE 50 MG/G
OINTMENT TOPICAL ONCE
OUTPATIENT
Start: 2023-10-11 | End: 2023-10-11

## 2023-10-11 RX ORDER — LIDOCAINE HYDROCHLORIDE 20 MG/ML
JELLY TOPICAL ONCE
Status: COMPLETED | OUTPATIENT
Start: 2023-10-11 | End: 2023-10-11

## 2023-10-11 RX ADMIN — LIDOCAINE HYDROCHLORIDE: 20 JELLY TOPICAL at 09:01

## 2023-10-11 ASSESSMENT — PAIN DESCRIPTION - PAIN TYPE: TYPE: ACUTE PAIN

## 2023-10-11 ASSESSMENT — PAIN DESCRIPTION - DESCRIPTORS: DESCRIPTORS: OTHER (COMMENT)

## 2023-10-11 ASSESSMENT — PAIN - FUNCTIONAL ASSESSMENT: PAIN_FUNCTIONAL_ASSESSMENT: ACTIVITIES ARE NOT PREVENTED

## 2023-10-11 ASSESSMENT — PAIN DESCRIPTION - ORIENTATION: ORIENTATION: RIGHT

## 2023-10-11 ASSESSMENT — PAIN SCALES - GENERAL: PAINLEVEL_OUTOF10: 7

## 2023-10-11 ASSESSMENT — PAIN DESCRIPTION - LOCATION: LOCATION: KNEE

## 2023-10-11 NOTE — PATIENT INSTRUCTIONS
[address] and Hyperbaric Oxygen Therapy   Physician Orders and Discharge Instructions  [address] [place] [facility],[address] [facility] [address], [address]  Telephone: [phone]    NAME:  Gerardo Smith OF BIRTH:  1940  MEDICAL RECORD NUMBER:  [id number]  DATE:  10/11/2023    Discharge condition: Stable    Discharge to: Home    Left via:Private automobile    Accompanied by: Self    ECF/HHA: [facility]      Dressing Orders: Head. Soap and water wash. Apply Xeroform (the yellow sticky dressing), cover with dry gauze and medipore tape,   Change on Monday, Wednesday and Friday      Dressing Orders: Coccyx Wound  Soap and water wash  Aquacel Ag to open area, cover with dry gauze and medipore tape  Change on Monday, Wednesday and Friday      Sit on Memory Foam Pillow     [address] [facility] follow up visit _____________________________  (Please note your next appointment above and if you are unable to keep, kindly give a 24 hour notice. Thank you.)    If you experience any of the following, please call the [facility] during business hours:    * Increase in Pain  * Temperature over 101  * Increase in drainage from your wound  * Drainage with a foul odor  * Bleeding  * Increase in swelling  * Need for compression bandage changes due to slippage, breakthrough drainage. If you need medical attention outside of the business hours of the [address] [place] [address] please contact your PCP or go to the nearest emergency room.

## 2023-10-11 NOTE — PROGRESS NOTES
(cm^3) 0.28 cm^3 10/11/23 0941   Distance Tunneling (cm) 0 cm 10/11/23 0900   Tunneling Position ___ O'Clock 0 10/11/23 0900   Undermining Starts ___ O'Clock 0 10/11/23 0900   Undermining Ends___ O'Clock 0 10/11/23 0900   Undermining Maxium Distance (cm) 0 10/11/23 0900   Wound Assessment Pink/red;Slough 10/11/23 0900   Drainage Amount Moderate (25-50%) 10/11/23 0900   Drainage Description Serosanguinous; Yellow 10/11/23 0900   Odor None 10/11/23 0900   Mariajose-wound Assessment Blanchable erythema 10/11/23 0900   Margins Defined edges 10/11/23 0900   Wound Thickness Description not for Pressure Injury Full thickness 09/12/23 0821   Number of days: 114       Wound 08/25/23 Head Posterior; Upper wound #2, traumatic, head (Active)   Wound Image    10/11/23 0900   Wound Etiology Traumatic 10/11/23 0900   Dressing Status Old drainage noted 10/11/23 0900   Wound Cleansed Soap and water 10/11/23 0900   Dressing/Treatment Xeroform 09/27/23 0944   Wound Length (cm) 0.5 cm 10/11/23 0900   Wound Width (cm) 0.6 cm 10/11/23 0900   Wound Depth (cm) 0.1 cm 10/11/23 0900   Wound Surface Area (cm^2) 0.3 cm^2 10/11/23 0900   Change in Wound Size % (l*w) 97.22 10/11/23 0900   Wound Volume (cm^3) 0.03 cm^3 10/11/23 0900   Wound Healing % 97 10/11/23 0900   Post-Procedure Length (cm) 0.5 cm 10/11/23 0941   Post-Procedure Width (cm) 0.6 cm 10/11/23 0941   Post-Procedure Depth (cm) 0.1 cm 10/11/23 0941   Post-Procedure Surface Area (cm^2) 0.3 cm^2 10/11/23 0941   Post-Procedure Volume (cm^3) 0.03 cm^3 10/11/23 0941   Distance Tunneling (cm) 0 cm 10/11/23 0900   Tunneling Position ___ O'Clock 0 10/11/23 0900   Undermining Starts ___ O'Clock 0 10/11/23 0900   Undermining Ends___ O'Clock 0 10/11/23 0900   Undermining Maxium Distance (cm) 0 10/11/23 0900   Wound Assessment Hyper granulation tissue 10/11/23 0900   Drainage Amount Moderate (25-50%) 10/11/23 0900   Drainage Description Serous; Yellow 10/11/23 0900   Odor None 10/11/23 0900

## 2023-10-25 ENCOUNTER — HOSPITAL ENCOUNTER (OUTPATIENT)
Dept: WOUND CARE | Age: 83
Discharge: HOME | End: 2023-10-25
Payer: MEDICARE

## 2023-10-25 VITALS
RESPIRATION RATE: 20 BRPM | HEIGHT: 69 IN | BODY MASS INDEX: 23.25 KG/M2 | HEART RATE: 57 BPM | WEIGHT: 157 LBS | SYSTOLIC BLOOD PRESSURE: 147 MMHG | TEMPERATURE: 97.4 F | DIASTOLIC BLOOD PRESSURE: 60 MMHG

## 2023-10-25 DIAGNOSIS — L89.153 DECUBITUS ULCER OF COCCYGEAL REGION, STAGE 3 (HCC): ICD-10-CM

## 2023-10-25 DIAGNOSIS — S00.03XA SCALP HEMATOMA, INITIAL ENCOUNTER: Primary | ICD-10-CM

## 2023-10-25 PROCEDURE — 11042 DBRDMT SUBQ TIS 1ST 20SQCM/<: CPT | Performed by: SURGERY

## 2023-10-25 PROCEDURE — 11042 DBRDMT SUBQ TIS 1ST 20SQCM/<: CPT

## 2023-10-25 RX ORDER — BETAMETHASONE DIPROPIONATE 0.05 %
OINTMENT (GRAM) TOPICAL ONCE
OUTPATIENT
Start: 2023-10-25 | End: 2023-10-25

## 2023-10-25 RX ORDER — TRIAMCINOLONE ACETONIDE 1 MG/G
OINTMENT TOPICAL ONCE
OUTPATIENT
Start: 2023-10-25 | End: 2023-10-25

## 2023-10-25 RX ORDER — GENTAMICIN SULFATE 1 MG/G
OINTMENT TOPICAL ONCE
OUTPATIENT
Start: 2023-10-25 | End: 2023-10-25

## 2023-10-25 RX ORDER — BACITRACIN ZINC AND POLYMYXIN B SULFATE 500; 1000 [USP'U]/G; [USP'U]/G
OINTMENT TOPICAL ONCE
OUTPATIENT
Start: 2023-10-25 | End: 2023-10-25

## 2023-10-25 RX ORDER — LIDOCAINE HYDROCHLORIDE 20 MG/ML
JELLY TOPICAL ONCE
OUTPATIENT
Start: 2023-10-25 | End: 2023-10-25

## 2023-10-25 RX ORDER — CLOBETASOL PROPIONATE 0.5 MG/G
OINTMENT TOPICAL ONCE
OUTPATIENT
Start: 2023-10-25 | End: 2023-10-25

## 2023-10-25 RX ORDER — SODIUM CHLOR/HYPOCHLOROUS ACID 0.033 %
SOLUTION, IRRIGATION IRRIGATION ONCE
OUTPATIENT
Start: 2023-10-25 | End: 2023-10-25

## 2023-10-25 RX ORDER — LIDOCAINE 50 MG/G
OINTMENT TOPICAL ONCE
OUTPATIENT
Start: 2023-10-25 | End: 2023-10-25

## 2023-10-25 RX ORDER — IBUPROFEN 200 MG
TABLET ORAL ONCE
OUTPATIENT
Start: 2023-10-25 | End: 2023-10-25

## 2023-10-25 RX ORDER — LIDOCAINE HYDROCHLORIDE 40 MG/ML
SOLUTION TOPICAL ONCE
OUTPATIENT
Start: 2023-10-25 | End: 2023-10-25

## 2023-10-25 RX ORDER — GINSENG 100 MG
CAPSULE ORAL ONCE
OUTPATIENT
Start: 2023-10-25 | End: 2023-10-25

## 2023-10-25 RX ORDER — LIDOCAINE 40 MG/G
CREAM TOPICAL ONCE
OUTPATIENT
Start: 2023-10-25 | End: 2023-10-25

## 2023-10-25 ASSESSMENT — PAIN - FUNCTIONAL ASSESSMENT: PAIN_FUNCTIONAL_ASSESSMENT: ACTIVITIES ARE NOT PREVENTED

## 2023-10-25 ASSESSMENT — PAIN DESCRIPTION - FREQUENCY: FREQUENCY: INTERMITTENT

## 2023-10-25 ASSESSMENT — PAIN DESCRIPTION - PAIN TYPE: TYPE: CHRONIC PAIN

## 2023-10-25 ASSESSMENT — PAIN DESCRIPTION - ORIENTATION: ORIENTATION: RIGHT

## 2023-10-25 ASSESSMENT — PAIN DESCRIPTION - LOCATION: LOCATION: KNEE

## 2023-10-25 ASSESSMENT — PAIN SCALES - GENERAL: PAINLEVEL_OUTOF10: 4

## 2023-10-25 ASSESSMENT — PAIN DESCRIPTION - DESCRIPTORS: DESCRIPTORS: DISCOMFORT

## 2023-10-25 ASSESSMENT — PAIN DESCRIPTION - ONSET: ONSET: ON-GOING

## 2023-10-25 NOTE — DISCHARGE INSTR - COC
(please select all that are sent with patient):  {HAILY DME Belongings:722152079}    RN SIGNATURE:  {Esignature:069310408}    CASE MANAGEMENT/SOCIAL WORK SECTION    Inpatient Status Date: ***    Readmission Risk Assessment Score:  Readmission Risk              Risk of Unplanned Readmission:  0           Discharging to Facility/ Agency   Name:   Address:  Phone:  Fax:    Dialysis Facility (if applicable)   Name:  Address:  Dialysis Schedule:  Phone:  Fax:    / signature: {Esignature:170681651}    PHYSICIAN SECTION    Prognosis: {Prognosis:4544588036}    Condition at Discharge: [address] Patient Condition:421849057}    Rehab Potential (if transferring to Rehab): {Prognosis:6336743348}    Recommended Labs or Other Treatments After Discharge: ***    Physician Certification: I certify the above information and transfer of Bladimir Brennan  is necessary for the continuing treatment of the diagnosis listed and that he requires {Admit to Appropriate Level of Care:46375} for {GREATER/LESS:976233045} 30 days.      Update Admission H&P: {CHP DME Changes in NUA:055437815}    PHYSICIAN SIGNATURE:  {Esignature:061989773}

## 2023-10-25 NOTE — PATIENT INSTRUCTIONS
[address] and Hyperbaric Oxygen Therapy   Physician Orders and Discharge Instructions  1830 [facility],[address] [facility] [address], [address]  Telephone: [phone]    NAME:  Lesly Stephens OF BIRTH:  1940  MEDICAL RECORD NUMBER:  [id number]  DATE:  10/25/2023    Discharge condition: Stable    Discharge to: Home    Left via:Private automobile    Accompanied by: [facility]     Dressing Orders: Head. Healed, Moisturize and Protect     Dressing Orders: Coccyx Wound  Soap and water wash  Aquacel Ag to open area, cover with dry gauze and medipore tape  Change on Monday, Wednesday and Friday  Sit on Memory Foam Pillow      [facility] follow up visit _____________1 week________________  (Please note your next appointment above and if you are unable to keep, kindly give a 24 hour notice. Thank you.)    If you experience any of the following, please call the [employer] during business hours:    * Increase in Pain  * Temperature over 101  * Increase in drainage from your wound  * Drainage with a foul odor  * Bleeding  * Increase in swelling  * Need for compression bandage changes due to slippage, breakthrough drainage. If you need medical attention outside of the business hours of the [facility] [employer] please contact your PCP or go to the nearest emergency room.

## 2023-10-25 NOTE — PROGRESS NOTES
[address]   Progress Note and Procedure Note      Micki Villarreal  MEDICAL RECORD NUMBER:  [id number]  AGE: 78 y.o. GENDER: male  : 1940  EPISODE DATE:  10/25/2023    Subjective:     Chief Complaint   Patient presents with    Wound Check         HISTORY of PRESENT ILLNESS HPI     Micki Villarreal is a 78 y.o. male who presents today for wound/ulcer evaluation. Wound Context: Pt with coccyx pressure wound here for eval/treat  Wound/Ulcer Pain Timing/Severity: none  Quality of pain: N/A  Severity:  0 / 10   Modifying Factors: None  Associated Signs/Symptoms: none    Ulcer Identification:  Ulcer Type: pressure  Contributing Factors: chronic pressure    Wound:  pressure        PAST MEDICAL HISTORY        Diagnosis Date    AAA (abdominal aortic aneurysm) ([facility])     ASHD (arteriosclerotic heart disease)     S/P coronary intervention followed by CBG    CAD (coronary artery disease)     Severe triple-vessel    CAD (coronary artery disease) 2015    COPD (chronic obstructive pulmonary disease) ([facility])     With tobacco abuse    GERD (gastroesophageal reflux disease)     HH (hiatus hernia)     History of cerebrovascular disease     Hyperlipidemia     PCP, Dr. Consuelo Jacobs manages cholesterol.     Hypertension     Obesity     Osteoarthritis     Palliative care patient 2022    Renal insufficiency     S/P CABG x 3     S/P CABG x 4 2015 by Dr. Don Humphrey    S/P TKR (total knee replacement)     RIGHT    Thyroid disease        PAST SURGICAL HISTORY    Past Surgical History:   Procedure Laterality Date    ABDOMINAL AORTIC ANEURYSM REPAIR, OPEN      ADENOIDECTOMY      APPENDECTOMY      CARDIAC SURGERY      CARDIOVASCULAR STRESS TEST  09, [facility]    Stress [facility]    CARDIOVASCULAR STRESS TEST  06, [facility]    Adenosine thallium treadmill    CHOLECYSTECTOMY      CORONARY ARTERY BYPASS GRAFT  06, [place]    CABG x 4 with sequential FAYE to diagonal, LAD, SVG, to obtuse marginal,

## 2023-10-25 NOTE — PLAN OF CARE
Problem: Chronic Conditions and Co-morbidities  Goal: Patient's chronic conditions and co-morbidity symptoms are monitored and maintained or improved  Outcome: Progressing     Problem: Pain  Goal: Verbalizes/displays adequate comfort level or baseline comfort level  Outcome: Progressing     Problem: Wound:  Goal: Will show signs of wound healing; wound closure and no evidence of infection  Description: Will show signs of wound healing; wound closure and no evidence of infection  Outcome: Progressing     Problem: Pressure Ulcer:  Goal: Signs of wound healing will improve  Description: Signs of wound healing will improve  Outcome: Progressing  Goal: Absence of new pressure ulcer  Description: Absence of new pressure ulcer  Outcome: Progressing  Goal: Will show no infection signs and symptoms  Description: Will show no infection signs and symptoms  Outcome: Progressing     Problem: Weight control:  Goal: Ability to maintain an optimal weight for height and age will be supported  Description: Ability to maintain an optimal weight for height and age will be supported  Outcome: Progressing     Problem: Falls - Risk of:  Goal: Will remain free from falls  Description: Will remain free from falls  Outcome: Progressing

## 2023-11-01 ENCOUNTER — HOSPITAL ENCOUNTER (OUTPATIENT)
Dept: WOUND CARE | Age: 83
Discharge: HOME | End: 2023-11-01
Payer: MEDICARE

## 2023-11-01 VITALS
HEART RATE: 57 BPM | HEIGHT: 69 IN | WEIGHT: 157 LBS | TEMPERATURE: 97 F | RESPIRATION RATE: 20 BRPM | DIASTOLIC BLOOD PRESSURE: 60 MMHG | SYSTOLIC BLOOD PRESSURE: 147 MMHG | BODY MASS INDEX: 23.25 KG/M2

## 2023-11-01 DIAGNOSIS — L89.153 DECUBITUS ULCER OF COCCYGEAL REGION, STAGE 3 (HCC): ICD-10-CM

## 2023-11-01 DIAGNOSIS — S00.03XA SCALP HEMATOMA, INITIAL ENCOUNTER: Primary | ICD-10-CM

## 2023-11-01 PROCEDURE — 97597 DBRDMT OPN WND 1ST 20 CM/<: CPT | Performed by: SURGERY

## 2023-11-01 PROCEDURE — 97597 DBRDMT OPN WND 1ST 20 CM/<: CPT

## 2023-11-01 RX ORDER — IBUPROFEN 200 MG
TABLET ORAL ONCE
OUTPATIENT
Start: 2023-11-01 | End: 2023-11-01

## 2023-11-01 RX ORDER — GENTAMICIN SULFATE 1 MG/G
OINTMENT TOPICAL ONCE
OUTPATIENT
Start: 2023-11-01 | End: 2023-11-01

## 2023-11-01 RX ORDER — CLOBETASOL PROPIONATE 0.5 MG/G
OINTMENT TOPICAL ONCE
OUTPATIENT
Start: 2023-11-01 | End: 2023-11-01

## 2023-11-01 RX ORDER — BACITRACIN ZINC AND POLYMYXIN B SULFATE 500; 1000 [USP'U]/G; [USP'U]/G
OINTMENT TOPICAL ONCE
OUTPATIENT
Start: 2023-11-01 | End: 2023-11-01

## 2023-11-01 RX ORDER — LIDOCAINE HYDROCHLORIDE 20 MG/ML
JELLY TOPICAL ONCE
OUTPATIENT
Start: 2023-11-01 | End: 2023-11-01

## 2023-11-01 RX ORDER — LIDOCAINE 40 MG/G
CREAM TOPICAL ONCE
OUTPATIENT
Start: 2023-11-01 | End: 2023-11-01

## 2023-11-01 RX ORDER — TRIAMCINOLONE ACETONIDE 1 MG/G
OINTMENT TOPICAL ONCE
OUTPATIENT
Start: 2023-11-01 | End: 2023-11-01

## 2023-11-01 RX ORDER — SODIUM CHLOR/HYPOCHLOROUS ACID 0.033 %
SOLUTION, IRRIGATION IRRIGATION ONCE
OUTPATIENT
Start: 2023-11-01 | End: 2023-11-01

## 2023-11-01 RX ORDER — LIDOCAINE 50 MG/G
OINTMENT TOPICAL ONCE
OUTPATIENT
Start: 2023-11-01 | End: 2023-11-01

## 2023-11-01 RX ORDER — LIDOCAINE HYDROCHLORIDE 40 MG/ML
SOLUTION TOPICAL ONCE
OUTPATIENT
Start: 2023-11-01 | End: 2023-11-01

## 2023-11-01 RX ORDER — GINSENG 100 MG
CAPSULE ORAL ONCE
OUTPATIENT
Start: 2023-11-01 | End: 2023-11-01

## 2023-11-01 RX ORDER — BETAMETHASONE DIPROPIONATE 0.05 %
OINTMENT (GRAM) TOPICAL ONCE
OUTPATIENT
Start: 2023-11-01 | End: 2023-11-01

## 2023-11-01 ASSESSMENT — PAIN - FUNCTIONAL ASSESSMENT: PAIN_FUNCTIONAL_ASSESSMENT: ACTIVITIES ARE NOT PREVENTED

## 2023-11-01 ASSESSMENT — PAIN DESCRIPTION - ORIENTATION: ORIENTATION: RIGHT;LEFT

## 2023-11-01 ASSESSMENT — PAIN DESCRIPTION - LOCATION: LOCATION: LEG

## 2023-11-01 ASSESSMENT — PAIN DESCRIPTION - FREQUENCY: FREQUENCY: INTERMITTENT

## 2023-11-01 ASSESSMENT — PAIN DESCRIPTION - DESCRIPTORS: DESCRIPTORS: DISCOMFORT;ACHING

## 2023-11-01 ASSESSMENT — PAIN DESCRIPTION - PAIN TYPE: TYPE: CHRONIC PAIN

## 2023-11-01 ASSESSMENT — PAIN DESCRIPTION - ONSET: ONSET: ON-GOING

## 2023-11-01 ASSESSMENT — PAIN SCALES - GENERAL: PAINLEVEL_OUTOF10: 4

## 2023-11-01 NOTE — PATIENT INSTRUCTIONS
[address] and Hyperbaric Oxygen Therapy   Physician Orders and Discharge Instructions  [address]  [facility] [address] [place] [address]  Telephone: [phone]    NAME:  Rafiq Coppola OF BIRTH:  1940  MEDICAL RECORD NUMBER:  [id number]  DATE:  11/1/2023    Discharge condition: Stable    Discharge to: Home    Left via:Private automobile    Accompanied by: Self    Dressing Orders: Coccyx Wound  Soap and water wash  Aquacel Ag to open area, cover with dry gauze and medipore tape  Change on Monday, Wednesday and Friday  Sit on Memory Foam Pillow    401 [facility] follow up visit ___________1 week__________________  (Please note your next appointment above and if you are unable to keep, kindly give a 24 hour notice. Thank you.)    If you experience any of the following, please call the [address] [place] [address] during business hours:    * Increase in Pain  * Temperature over 101  * Increase in drainage from your wound  * Drainage with a foul odor  * Bleeding  * Increase in swelling  * Need for compression bandage changes due to slippage, breakthrough drainage. If you need medical attention outside of the business hours of the [address] [place] [address] please contact your PCP or go to the nearest emergency room.

## 2023-11-01 NOTE — PROGRESS NOTES
[address]   Progress Note and Procedure Note      Abdoul Guerrero  MEDICAL RECORD NUMBER:  [id number]  AGE: 78 y.o. GENDER: male  : 1940  EPISODE DATE:  2023    Subjective:     Chief Complaint   Patient presents with    Wound Check     Pt presents for recheck of coccyx wound         HISTORY of PRESENT ILLNESS HPI     Abdoul Guerrero is a 78 y.o. male who presents today for wound/ulcer evaluation. Wound Context: Pt with coccyx wound here for eval/treat  Wound/Ulcer Pain Timing/Severity: none  Quality of pain: N/A  Severity:  0 / 10   Modifying Factors: None  Associated Signs/Symptoms: none    Ulcer Identification:  Ulcer Type: pressure  Contributing Factors: chronic pressure    Wound:  pressure        PAST MEDICAL HISTORY        Diagnosis Date    AAA (abdominal aortic aneurysm) ([facility])     ASHD (arteriosclerotic heart disease)     S/P coronary intervention followed by CBG    CAD (coronary artery disease)     Severe triple-vessel    CAD (coronary artery disease) 2015    COPD (chronic obstructive pulmonary disease) ([facility])     With tobacco abuse    GERD (gastroesophageal reflux disease)     HH (hiatus hernia)     History of cerebrovascular disease     Hyperlipidemia     PCP, Dr. Marita Griffith manages cholesterol.     Hypertension     Obesity     Osteoarthritis     Palliative care patient 2022    Renal insufficiency     S/P CABG x 3     S/P CABG x 4 2015 by Dr. Angeli Garvin    S/P TKR (total knee replacement)     RIGHT    Thyroid disease        PAST SURGICAL HISTORY    Past Surgical History:   Procedure Laterality Date    ABDOMINAL AORTIC ANEURYSM REPAIR, OPEN      ADENOIDECTOMY      APPENDECTOMY      CARDIAC SURGERY      CARDIOVASCULAR STRESS TEST  09, [facility]    Stress [facility]    CARDIOVASCULAR STRESS TEST  06, [facility]    Adenosine thallium treadmill    CHOLECYSTECTOMY      CORONARY ARTERY BYPASS GRAFT  06, [place]    CABG x 4 with sequential FAYE to diagonal,

## 2023-11-08 ENCOUNTER — HOSPITAL ENCOUNTER (OUTPATIENT)
Dept: WOUND CARE | Age: 83
Discharge: HOME | End: 2023-11-08
Payer: MEDICARE

## 2023-11-08 VITALS
BODY MASS INDEX: 23.25 KG/M2 | TEMPERATURE: 97 F | RESPIRATION RATE: 20 BRPM | DIASTOLIC BLOOD PRESSURE: 54 MMHG | WEIGHT: 157 LBS | HEIGHT: 69 IN | HEART RATE: 45 BPM | SYSTOLIC BLOOD PRESSURE: 157 MMHG

## 2023-11-08 DIAGNOSIS — S00.03XA SCALP HEMATOMA, INITIAL ENCOUNTER: ICD-10-CM

## 2023-11-08 DIAGNOSIS — L89.153 DECUBITUS ULCER OF COCCYGEAL REGION, STAGE 3 (HCC): Primary | Chronic | ICD-10-CM

## 2023-11-08 PROCEDURE — 97597 DBRDMT OPN WND 1ST 20 CM/<: CPT

## 2023-11-08 RX ORDER — LIDOCAINE HYDROCHLORIDE 20 MG/ML
JELLY TOPICAL ONCE
Status: COMPLETED | OUTPATIENT
Start: 2023-11-08 | End: 2023-11-08

## 2023-11-08 RX ORDER — BETAMETHASONE DIPROPIONATE 0.05 %
OINTMENT (GRAM) TOPICAL ONCE
OUTPATIENT
Start: 2023-11-08 | End: 2023-11-08

## 2023-11-08 RX ORDER — LIDOCAINE HYDROCHLORIDE 20 MG/ML
JELLY TOPICAL ONCE
OUTPATIENT
Start: 2023-11-08 | End: 2023-11-08

## 2023-11-08 RX ORDER — LIDOCAINE 50 MG/G
OINTMENT TOPICAL ONCE
OUTPATIENT
Start: 2023-11-08 | End: 2023-11-08

## 2023-11-08 RX ORDER — IBUPROFEN 200 MG
TABLET ORAL ONCE
OUTPATIENT
Start: 2023-11-08 | End: 2023-11-08

## 2023-11-08 RX ORDER — CLOBETASOL PROPIONATE 0.5 MG/G
OINTMENT TOPICAL ONCE
OUTPATIENT
Start: 2023-11-08 | End: 2023-11-08

## 2023-11-08 RX ORDER — LIDOCAINE 40 MG/G
CREAM TOPICAL ONCE
OUTPATIENT
Start: 2023-11-08 | End: 2023-11-08

## 2023-11-08 RX ORDER — GINSENG 100 MG
CAPSULE ORAL ONCE
OUTPATIENT
Start: 2023-11-08 | End: 2023-11-08

## 2023-11-08 RX ORDER — GENTAMICIN SULFATE 1 MG/G
OINTMENT TOPICAL ONCE
OUTPATIENT
Start: 2023-11-08 | End: 2023-11-08

## 2023-11-08 RX ORDER — LIDOCAINE HYDROCHLORIDE 40 MG/ML
SOLUTION TOPICAL ONCE
OUTPATIENT
Start: 2023-11-08 | End: 2023-11-08

## 2023-11-08 RX ORDER — SODIUM CHLOR/HYPOCHLOROUS ACID 0.033 %
SOLUTION, IRRIGATION IRRIGATION ONCE
OUTPATIENT
Start: 2023-11-08 | End: 2023-11-08

## 2023-11-08 RX ORDER — HYDROXYZINE PAMOATE 50 MG/1
50 CAPSULE ORAL 3 TIMES DAILY PRN
COMMUNITY

## 2023-11-08 RX ORDER — TRIAMCINOLONE ACETONIDE 1 MG/G
OINTMENT TOPICAL ONCE
OUTPATIENT
Start: 2023-11-08 | End: 2023-11-08

## 2023-11-08 RX ORDER — BACITRACIN ZINC AND POLYMYXIN B SULFATE 500; 1000 [USP'U]/G; [USP'U]/G
OINTMENT TOPICAL ONCE
OUTPATIENT
Start: 2023-11-08 | End: 2023-11-08

## 2023-11-08 RX ADMIN — LIDOCAINE HYDROCHLORIDE: 20 JELLY TOPICAL at 08:31

## 2023-11-08 NOTE — PROGRESS NOTES
(cm) 0.1 cm 11/08/23 0835   Post-Procedure Surface Area (cm^2) 0.6 cm^2 11/08/23 0835   Post-Procedure Volume (cm^3) 0.06 cm^3 11/08/23 0835   Distance Tunneling (cm) 0 cm 11/01/23 1107   Tunneling Position ___ O'Clock 0 11/01/23 1107   Undermining Starts ___ O'Clock 0 11/01/23 1107   Undermining Ends___ O'Clock 0 11/01/23 1107   Undermining Maxium Distance (cm) 0 11/01/23 1107   Wound Assessment Pink/red;Slough 11/08/23 0830   Drainage Amount Moderate (25-50%) 11/08/23 0830   Drainage Description Serosanguinous; Yellow 11/08/23 0830   Odor None 11/08/23 0830   Mariajose-wound Assessment Blanchable erythema; Excoriated 11/08/23 0830   Margins Defined edges 11/08/23 0830   Wound Thickness Description not for Pressure Injury Full thickness 11/08/23 0830   Number of days: 142             Estimated Blood Loss:  Minimal    Hemostasis Achieved:  by pressure    Procedural Pain:  0  / 10     Post Procedural Pain:  0 / 10     Response to treatment:  Well tolerated by patient. Plan:     Problem List Items Addressed This Visit       * (Principal) Decubitus ulcer of coccygeal region, stage 3 ([address]) - Primary (Chronic)    Relevant Orders    Initiate Outpatient Wound Care Protocol    Scalp hematoma, initial encounter    Relevant Orders    Initiate Outpatient Wound Care Protocol       Cont current care RTO 1 week    Treatment Note please see attached Discharge Instructions    In my professional opinion this patient would benefit from HBO Therapy: No    Written patient dismissal instructions given to patient and signed by patient or POA.              Electronically signed by Eris Lancaster MD on 11/8/2023 at 8:40 AM

## 2023-11-08 NOTE — PLAN OF CARE
Problem: Chronic Conditions and Co-morbidities  Goal: Patient's chronic conditions and co-morbidity symptoms are monitored and maintained or improved  Outcome: Progressing     Problem: Chronic Conditions and Co-morbidities  Goal: Patient's chronic conditions and co-morbidity symptoms are monitored and maintained or improved  Outcome: Progressing     Problem: Pain  Goal: Verbalizes/displays adequate comfort level or baseline comfort level  Outcome: Progressing     Problem: Wound:  Goal: Will show signs of wound healing; wound closure and no evidence of infection  Description: Will show signs of wound healing; wound closure and no evidence of infection  Outcome: Progressing     Problem: Pressure Ulcer:  Goal: Signs of wound healing will improve  Description: Signs of wound healing will improve  Outcome: Progressing  Goal: Absence of new pressure ulcer  Description: Absence of new pressure ulcer  Outcome: Progressing  Goal: Will show no infection signs and symptoms  Description: Will show no infection signs and symptoms  Outcome: Progressing     Problem: Weight control:  Goal: Ability to maintain an optimal weight for height and age will be supported  Description: Ability to maintain an optimal weight for height and age will be supported  Outcome: Progressing     Problem: Falls - Risk of:  Goal: Will remain free from falls  Description: Will remain free from falls  Outcome: Progressing

## 2023-11-08 NOTE — PATIENT INSTRUCTIONS
[address] and Hyperbaric Oxygen Therapy   Physician Orders and Discharge Instructions  1830 [facility],[address] 1101 [facility], [address]  Telephone: [phone]    NAME:  Rafiq Coppola OF BIRTH:  1940  MEDICAL RECORD NUMBER:  [id number]  DATE:  11/8/2023    Discharge condition: Stable    Discharge to: Home    Left via:Private automobile    Accompanied by: Self    Dressing Orders: Coccyx Wound  Soap and water wash  Aquacel Ag to open area, cover with dry gauze and medipore tape  Change on Monday, Wednesday and Friday  Sit on Memory Foam Pillow    401 [facility] follow up visit _____________1 week________________  (Please note your next appointment above and if you are unable to keep, kindly give a 24 hour notice. Thank you.)    If you experience any of the following, please call the [facility] during business hours:    * Increase in Pain  * Temperature over 101  * Increase in drainage from your wound  * Drainage with a foul odor  * Bleeding  * Increase in swelling  * Need for compression bandage changes due to slippage, breakthrough drainage. If you need medical attention outside of the business hours of the [address] [place], [place] [address] please contact your PCP or go to the nearest emergency room.

## 2023-11-15 ENCOUNTER — HOSPITAL ENCOUNTER (OUTPATIENT)
Dept: WOUND CARE | Age: 83
Discharge: HOME | End: 2023-11-15
Payer: MEDICARE

## 2023-11-15 VITALS
BODY MASS INDEX: 23.25 KG/M2 | RESPIRATION RATE: 20 BRPM | SYSTOLIC BLOOD PRESSURE: 176 MMHG | WEIGHT: 157 LBS | TEMPERATURE: 97.5 F | DIASTOLIC BLOOD PRESSURE: 61 MMHG | HEART RATE: 46 BPM | HEIGHT: 69 IN

## 2023-11-15 DIAGNOSIS — L89.153 DECUBITUS ULCER OF COCCYGEAL REGION, STAGE 3 (HCC): ICD-10-CM

## 2023-11-15 DIAGNOSIS — S00.03XA SCALP HEMATOMA, INITIAL ENCOUNTER: Primary | ICD-10-CM

## 2023-11-15 PROCEDURE — 97597 DBRDMT OPN WND 1ST 20 CM/<: CPT | Performed by: SURGERY

## 2023-11-15 PROCEDURE — 97597 DBRDMT OPN WND 1ST 20 CM/<: CPT

## 2023-11-15 RX ORDER — LIDOCAINE 40 MG/G
CREAM TOPICAL ONCE
OUTPATIENT
Start: 2023-11-15 | End: 2023-11-15

## 2023-11-15 RX ORDER — LIDOCAINE HYDROCHLORIDE 20 MG/ML
JELLY TOPICAL ONCE
Status: COMPLETED | OUTPATIENT
Start: 2023-11-15 | End: 2023-11-15

## 2023-11-15 RX ORDER — GINSENG 100 MG
CAPSULE ORAL ONCE
OUTPATIENT
Start: 2023-11-15 | End: 2023-11-15

## 2023-11-15 RX ORDER — LIDOCAINE HYDROCHLORIDE 20 MG/ML
JELLY TOPICAL ONCE
OUTPATIENT
Start: 2023-11-15 | End: 2023-11-15

## 2023-11-15 RX ORDER — TRIAMCINOLONE ACETONIDE 1 MG/G
OINTMENT TOPICAL ONCE
OUTPATIENT
Start: 2023-11-15 | End: 2023-11-15

## 2023-11-15 RX ORDER — LIDOCAINE 50 MG/G
OINTMENT TOPICAL ONCE
OUTPATIENT
Start: 2023-11-15 | End: 2023-11-15

## 2023-11-15 RX ORDER — BACITRACIN ZINC AND POLYMYXIN B SULFATE 500; 1000 [USP'U]/G; [USP'U]/G
OINTMENT TOPICAL ONCE
OUTPATIENT
Start: 2023-11-15 | End: 2023-11-15

## 2023-11-15 RX ORDER — GENTAMICIN SULFATE 1 MG/G
OINTMENT TOPICAL ONCE
OUTPATIENT
Start: 2023-11-15 | End: 2023-11-15

## 2023-11-15 RX ORDER — CLOBETASOL PROPIONATE 0.5 MG/G
OINTMENT TOPICAL ONCE
OUTPATIENT
Start: 2023-11-15 | End: 2023-11-15

## 2023-11-15 RX ORDER — SODIUM CHLOR/HYPOCHLOROUS ACID 0.033 %
SOLUTION, IRRIGATION IRRIGATION ONCE
OUTPATIENT
Start: 2023-11-15 | End: 2023-11-15

## 2023-11-15 RX ORDER — LIDOCAINE HYDROCHLORIDE 40 MG/ML
SOLUTION TOPICAL ONCE
OUTPATIENT
Start: 2023-11-15 | End: 2023-11-15

## 2023-11-15 RX ORDER — BETAMETHASONE DIPROPIONATE 0.05 %
OINTMENT (GRAM) TOPICAL ONCE
OUTPATIENT
Start: 2023-11-15 | End: 2023-11-15

## 2023-11-15 RX ORDER — IBUPROFEN 200 MG
TABLET ORAL ONCE
OUTPATIENT
Start: 2023-11-15 | End: 2023-11-15

## 2023-11-15 RX ADMIN — LIDOCAINE HYDROCHLORIDE: 20 JELLY TOPICAL at 08:24

## 2023-11-15 NOTE — PROGRESS NOTES
Relevant Orders    Initiate Outpatient Wound Care Protocol    Scalp hematoma, initial encounter - Primary    Relevant Orders    Initiate Outpatient Wound Care Protocol        Procedure Note  Indications:  Based on my examination of this patient's wound(s)/ulcer(s) today, debridement is required to promote healing and evaluate the wound base. Performed by: Steven Pichardo MD    Consent obtained:  Yes    Time out taken:  Yes    Pain Control: Anesthetic  Anesthetic: 2% Lidocaine Gel Topical       Debridement:Non-excisional Debridement    Using curette the wound(s)/ulcer(s) was/were sharply debrided down through and including the removal of epidermis and dermis.         Devitalized Tissue Debrided:  fibrin, biofilm, slough, necrotic/eschar, and exudate      Pre Debridement Measurements:  Are located in the Wound/Ulcer Documentation Flow Sheet    Wound/Ulcer #: 1    Percent of Wound(s)/Ulcer(s) Debrided: 100%    Total Surface Area Debrided:  0.65 sq cm       Diabetic/Pressure/Non Pressure Ulcers only:  Ulcer: Pressure ulcer, Stage 3             Post Debridement Measurements:    Wound/Ulcer Descriptions are Pre Debridement --EXCEPT MEASUREMENTS    Wound 06/19/23 Coccyx Mid Wound 1, coccyx, pressure injury (Active)   Wound Image   11/15/23 0812   Wound Etiology Pressure Stage 3 11/15/23 0812   Dressing Status New dressing applied 11/08/23 0856   Wound Cleansed Soap and water 11/15/23 0812   Dressing/Treatment Alginate with Ag 11/15/23 0828   Wound Length (cm) 1.3 cm 11/15/23 0812   Wound Width (cm) 0.5 cm 11/15/23 0812   Wound Depth (cm) 0.7 cm 11/15/23 0812   Wound Surface Area (cm^2) 0.65 cm^2 11/15/23 0812   Change in Wound Size % (l*w) 83.75 11/15/23 0812   Wound Volume (cm^3) 0.455 cm^3 11/15/23 0812   Wound Healing % 43 11/15/23 0812   Post-Procedure Length (cm) 1.3 cm 11/15/23 0828   Post-Procedure Width (cm) 0.5 cm 11/15/23 0828   Post-Procedure Depth (cm) 0.7 cm 11/15/23 0828   Post-Procedure Surface Area

## 2023-11-15 NOTE — PATIENT INSTRUCTIONS
[address] and Hyperbaric Oxygen Therapy   Physician Orders and Discharge Instructions  1830 [facility],[address] [facility] [address], [address]  Telephone: [phone]    NAME:  Gerardo Smith OF BIRTH:  1940  MEDICAL RECORD NUMBER:  [id number]  DATE:  11/15/2023    Discharge condition: Stable    Discharge to: Home    Left via:Private automobile    Accompanied by: Self    Dressing Orders: Coccyx Wound  Soap and water wash  Aquacel Ag to open area, cover with dry gauze and medipore tape  Change on Monday, Wednesday and Friday  Sit on Memory Foam [facility] follow up visit _____________1 week________________  (Please note your next appointment above and if you are unable to keep, kindly give a 24 hour notice. Thank you.)    If you experience any of the following, please call the [address] [place], [place] [address]i [place] during business hours:    * Increase in Pain  * Temperature over 101  * Increase in drainage from your wound  * Drainage with a foul odor  * Bleeding  * Increase in swelling  * Need for compression bandage changes due to slippage, breakthrough drainage. If you need medical attention outside of the business hours of the [address] [place] [address] please contact your PCP or go to the nearest emergency room.

## 2023-11-21 ENCOUNTER — HOSPITAL ENCOUNTER (OUTPATIENT)
Dept: WOUND CARE | Age: 83
Discharge: HOME | End: 2023-11-21
Payer: MEDICARE

## 2023-11-21 VITALS
BODY MASS INDEX: 23.25 KG/M2 | WEIGHT: 157 LBS | TEMPERATURE: 97 F | RESPIRATION RATE: 20 BRPM | DIASTOLIC BLOOD PRESSURE: 56 MMHG | SYSTOLIC BLOOD PRESSURE: 180 MMHG | HEART RATE: 49 BPM | HEIGHT: 69 IN

## 2023-11-21 DIAGNOSIS — S00.03XA SCALP HEMATOMA, INITIAL ENCOUNTER: Primary | ICD-10-CM

## 2023-11-21 DIAGNOSIS — L89.153 DECUBITUS ULCER OF COCCYGEAL REGION, STAGE 3 (HCC): ICD-10-CM

## 2023-11-21 PROCEDURE — 99212 OFFICE O/P EST SF 10 MIN: CPT | Performed by: SURGERY

## 2023-11-21 PROCEDURE — 97597 DBRDMT OPN WND 1ST 20 CM/<: CPT

## 2023-11-21 PROCEDURE — 99213 OFFICE O/P EST LOW 20 MIN: CPT

## 2023-11-21 RX ORDER — LIDOCAINE HYDROCHLORIDE 40 MG/ML
SOLUTION TOPICAL ONCE
OUTPATIENT
Start: 2023-11-21 | End: 2023-11-21

## 2023-11-21 RX ORDER — GINSENG 100 MG
CAPSULE ORAL ONCE
OUTPATIENT
Start: 2023-11-21 | End: 2023-11-21

## 2023-11-21 RX ORDER — LIDOCAINE HYDROCHLORIDE 20 MG/ML
JELLY TOPICAL ONCE
OUTPATIENT
Start: 2023-11-21 | End: 2023-11-21

## 2023-11-21 RX ORDER — IBUPROFEN 200 MG
TABLET ORAL ONCE
OUTPATIENT
Start: 2023-11-21 | End: 2023-11-21

## 2023-11-21 RX ORDER — BETAMETHASONE DIPROPIONATE 0.05 %
OINTMENT (GRAM) TOPICAL ONCE
OUTPATIENT
Start: 2023-11-21 | End: 2023-11-21

## 2023-11-21 RX ORDER — LIDOCAINE 50 MG/G
OINTMENT TOPICAL ONCE
OUTPATIENT
Start: 2023-11-21 | End: 2023-11-21

## 2023-11-21 RX ORDER — TRIAMCINOLONE ACETONIDE 1 MG/G
OINTMENT TOPICAL ONCE
OUTPATIENT
Start: 2023-11-21 | End: 2023-11-21

## 2023-11-21 RX ORDER — CLOBETASOL PROPIONATE 0.5 MG/G
OINTMENT TOPICAL ONCE
OUTPATIENT
Start: 2023-11-21 | End: 2023-11-21

## 2023-11-21 RX ORDER — BACITRACIN ZINC AND POLYMYXIN B SULFATE 500; 1000 [USP'U]/G; [USP'U]/G
OINTMENT TOPICAL ONCE
OUTPATIENT
Start: 2023-11-21 | End: 2023-11-21

## 2023-11-21 RX ORDER — LIDOCAINE 40 MG/G
CREAM TOPICAL ONCE
OUTPATIENT
Start: 2023-11-21 | End: 2023-11-21

## 2023-11-21 RX ORDER — SODIUM CHLOR/HYPOCHLOROUS ACID 0.033 %
SOLUTION, IRRIGATION IRRIGATION ONCE
OUTPATIENT
Start: 2023-11-21 | End: 2023-11-21

## 2023-11-21 RX ORDER — GENTAMICIN SULFATE 1 MG/G
OINTMENT TOPICAL ONCE
OUTPATIENT
Start: 2023-11-21 | End: 2023-11-21

## 2023-11-21 NOTE — PROGRESS NOTES
Post-Procedure Depth (cm) 0.7 cm 11/15/23 0828   Post-Procedure Surface Area (cm^2) 0.65 cm^2 11/15/23 0828   Post-Procedure Volume (cm^3) 0.455 cm^3 11/15/23 0828   Distance Tunneling (cm) 0 cm 11/21/23 0954   Tunneling Position ___ O'Clock 0 11/21/23 0954   Undermining Starts ___ O'Clock 0 11/21/23 0954   Undermining Ends___ O'Clock 0 11/21/23 0954   Undermining Maxium Distance (cm) 0 11/21/23 0954   Wound Assessment Pink/red;Slough 11/21/23 0954   Drainage Amount Moderate (25-50%) 11/21/23 0954   Drainage Description Serosanguinous; Yellow 11/21/23 0954   Odor None 11/21/23 0954   Mariajose-wound Assessment Blanchable erythema; Excoriated 11/21/23 0954   Margins Defined edges 11/21/23 0954   Wound Thickness Description not for Pressure Injury Full thickness 11/21/23 0954   Number of days: 155             Plan:     Problem List Items Addressed This Visit       * (Principal) Decubitus ulcer of coccygeal region, stage 3 ([address]) (Chronic)    Relevant Orders    Initiate Outpatient Wound Care Protocol    Scalp hematoma, initial encounter - Primary    Relevant Orders    Initiate Outpatient Wound Care Protocol     Pt wound continues to improve slowly. RTO 1 week Cont current care       Treatment Note please see attached Discharge Instructions    In my professional opinion this patient would benefit from HBO Therapy: No    Written patient dismissal instructions given to patient and signed by patient or POA.          Discharge [facility] and Hyperbaric Oxygen Therapy   Physician Orders and Discharge Instructions  [address]  Telephone: [phone]    NAME:  Yuki Grief:  1940  MEDICAL RECORD NUMBER:  [id number]  DATE:  11/21/2023    Discharge condition: Stable    Discharge to: Home    Left via:Private automobile    Accompanied by: Self    Dressing Orders: Coccyx Wound  Soap and water wash  Aquacel

## 2023-11-21 NOTE — DISCHARGE INSTRUCTIONS
[address] and Hyperbaric Oxygen Therapy   Physician Orders and Discharge Instructions  [address], [address]  Telephone: [phone]    NAME:  Josefina Arzate OF BIRTH:  1940  MEDICAL RECORD NUMBER:  [id number]  DATE:  11/21/2023    Discharge condition: Stable    Discharge to: Home    Left via:Private automobile    Accompanied by: Self    Dressing Orders: Coccyx Wound  Soap and water wash  Aquacel Ag to open area, cover with dry gauze and medipore tape  Change on Monday, Wednesday and Friday  Sit on Memory Foam Pillow    401 [facility] follow up visit _____________1 week________________  (Please note your next appointment above and if you are unable to keep, kindly give a 24 hour notice. Thank you.)    If you experience any of the following, please call the [address] [place], [place] [address] during business hours:    * Increase in Pain  * Temperature over 101  * Increase in drainage from your wound  * Drainage with a foul odor  * Bleeding  * Increase in swelling  * Need for compression bandage changes due to slippage, breakthrough drainage. If you need medical attention outside of the business hours of the [address] [place] [address] please contact your PCP or go to the nearest emergency room.

## 2023-12-05 ENCOUNTER — HOSPITAL ENCOUNTER (OUTPATIENT)
Dept: WOUND CARE | Age: 83
Discharge: HOME | End: 2023-12-05
Payer: MEDICARE

## 2023-12-05 VITALS
SYSTOLIC BLOOD PRESSURE: 144 MMHG | HEART RATE: 50 BPM | TEMPERATURE: 97.6 F | RESPIRATION RATE: 18 BRPM | DIASTOLIC BLOOD PRESSURE: 72 MMHG

## 2023-12-05 DIAGNOSIS — L89.153 DECUBITUS ULCER OF COCCYGEAL REGION, STAGE 3 (HCC): Primary | ICD-10-CM

## 2023-12-05 DIAGNOSIS — S00.03XA SCALP HEMATOMA, INITIAL ENCOUNTER: ICD-10-CM

## 2023-12-05 PROCEDURE — 99212 OFFICE O/P EST SF 10 MIN: CPT | Performed by: SURGERY

## 2023-12-05 PROCEDURE — 99213 OFFICE O/P EST LOW 20 MIN: CPT

## 2023-12-05 RX ORDER — LIDOCAINE HYDROCHLORIDE 40 MG/ML
SOLUTION TOPICAL ONCE
OUTPATIENT
Start: 2023-12-05 | End: 2023-12-05

## 2023-12-05 RX ORDER — LIDOCAINE 40 MG/G
CREAM TOPICAL ONCE
OUTPATIENT
Start: 2023-12-05 | End: 2023-12-05

## 2023-12-05 RX ORDER — LIDOCAINE 50 MG/G
OINTMENT TOPICAL ONCE
OUTPATIENT
Start: 2023-12-05 | End: 2023-12-05

## 2023-12-05 RX ORDER — LIDOCAINE HYDROCHLORIDE 20 MG/ML
JELLY TOPICAL ONCE
OUTPATIENT
Start: 2023-12-05 | End: 2023-12-05

## 2023-12-05 RX ORDER — SODIUM CHLOR/HYPOCHLOROUS ACID 0.033 %
SOLUTION, IRRIGATION IRRIGATION ONCE
OUTPATIENT
Start: 2023-12-05 | End: 2023-12-05

## 2023-12-05 RX ORDER — GENTAMICIN SULFATE 1 MG/G
OINTMENT TOPICAL ONCE
OUTPATIENT
Start: 2023-12-05 | End: 2023-12-05

## 2023-12-05 RX ORDER — BACITRACIN ZINC AND POLYMYXIN B SULFATE 500; 1000 [USP'U]/G; [USP'U]/G
OINTMENT TOPICAL ONCE
OUTPATIENT
Start: 2023-12-05 | End: 2023-12-05

## 2023-12-05 RX ORDER — CLOBETASOL PROPIONATE 0.5 MG/G
OINTMENT TOPICAL ONCE
OUTPATIENT
Start: 2023-12-05 | End: 2023-12-05

## 2023-12-05 RX ORDER — IBUPROFEN 200 MG
TABLET ORAL ONCE
OUTPATIENT
Start: 2023-12-05 | End: 2023-12-05

## 2023-12-05 RX ORDER — GINSENG 100 MG
CAPSULE ORAL ONCE
OUTPATIENT
Start: 2023-12-05 | End: 2023-12-05

## 2023-12-05 RX ORDER — BETAMETHASONE DIPROPIONATE 0.05 %
OINTMENT (GRAM) TOPICAL ONCE
OUTPATIENT
Start: 2023-12-05 | End: 2023-12-05

## 2023-12-05 RX ORDER — TRIAMCINOLONE ACETONIDE 1 MG/G
OINTMENT TOPICAL ONCE
OUTPATIENT
Start: 2023-12-05 | End: 2023-12-05

## 2023-12-05 NOTE — PROGRESS NOTES
11/15/23 0828   Post-Procedure Volume (cm^3) 0.455 cm^3 11/15/23 0828   Distance Tunneling (cm) 0 cm 11/21/23 0954   Tunneling Position ___ O'Clock 0 11/21/23 0954   Undermining Starts ___ O'Clock 0 11/21/23 0954   Undermining Ends___ O'Clock 0 11/21/23 0954   Undermining Maxium Distance (cm) 0 11/21/23 0954   Wound Assessment Epithelialization;Pink/red 12/05/23 0851   Drainage Amount Scant (moist but unmeasurable) 12/05/23 0851   Drainage Description Serosanguinous; Yellow 11/21/23 0954   Odor None 12/05/23 0851   Mariajose-wound Assessment Intact 12/05/23 0851   Margins Defined edges 12/05/23 0851   Wound Thickness Description not for Pressure Injury Full thickness 11/21/23 0954   Number of days: 169             Plan:     Problem List Items Addressed This Visit       * (Principal) Decubitus ulcer of coccygeal region, stage 3 ([address]) - Primary (Chronic)    Relevant Orders    Initiate Outpatient Wound Care Protocol    Scalp hematoma, initial encounter    Relevant Orders    Initiate Outpatient Wound Care Protocol     Wound almost healed Cont current care RTO 1 week      Treatment Note please see attached Discharge Instructions    In my professional opinion this patient would benefit from HBO Therapy: No    Written patient dismissal instructions given to patient and signed by patient or POA.              Electronically signed by Katya Butts MD on 12/5/2023 at 9:13 AM

## 2023-12-05 NOTE — PATIENT INSTRUCTIONS
[address] and Hyperbaric Oxygen Therapy   Physician Orders and Discharge Instructions  [address]  [place], [facility]  Telephone: [phone]    NAME:  Keila Morrow OF BIRTH:  1940  MEDICAL RECORD NUMBER:  [id number]  DATE:  12/5/2023    Discharge condition: Stable    Discharge to: Home    Left via:Private automobile    Accompanied by: Self     Dressing Orders: Coccyx Wound  Soap and water wash  Aquacel Ag to open area, cover with dry gauze and medipore tape  Change on Monday, Wednesday and Friday  Sit on Memory Foam [facility] follow up visit _____________1 week________________  (Please note your next appointment above and if you are unable to keep, kindly give a 24 hour notice. Thank you.)    If you experience any of the following, please call the [address] [place] [address] during business hours:    * Increase in Pain  * Temperature over 101  * Increase in drainage from your wound  * Drainage with a foul odor  * Bleeding  * Increase in swelling  * Need for compression bandage changes due to slippage, breakthrough drainage. If you need medical attention outside of the business hours of the [address] [place] [address] please contact your PCP or go to the nearest emergency room.

## 2023-12-05 NOTE — PLAN OF CARE
Problem: Chronic Conditions and Co-morbidities  Goal: Patient's chronic conditions and co-morbidity symptoms are monitored and maintained or improved  Outcome: Adequate for Discharge     Problem: Chronic Conditions and Co-morbidities  Goal: Patient's chronic conditions and co-morbidity symptoms are monitored and maintained or improved  Outcome: Adequate for Discharge     Problem: Wound:  Goal: Will show signs of wound healing; wound closure and no evidence of infection  Description: Will show signs of wound healing; wound closure and no evidence of infection  Outcome: Adequate for Discharge     Problem: Pressure Ulcer:  Goal: Signs of wound healing will improve  Description: Signs of wound healing will improve  Outcome: Adequate for Discharge  Goal: Absence of new pressure ulcer  Description: Absence of new pressure ulcer  Outcome: Adequate for Discharge  Goal: Will show no infection signs and symptoms  Description: Will show no infection signs and symptoms  Outcome: Adequate for Discharge     Problem: Falls - Risk of:  Goal: Will remain free from falls  Description: Will remain free from falls  Outcome: Adequate for Discharge

## 2023-12-11 NOTE — PROGRESS NOTES
[address]   Progress Note and Procedure Note      Pratibha Wilkes  MEDICAL RECORD NUMBER:  [id number]  AGE: 78 y.o. GENDER: male  : 1940  EPISODE DATE:  2023    Subjective:     Chief Complaint   Patient presents with    Wound Check     Patient presents for recheck of coccyx wound         HISTORY of PRESENT ILLNESS HPI     Pratibha Wilkes is a 78 y.o. male who presents today for wound/ulcer evaluation. Wound Context: Pt with coccyx wound here for eval/treat  Wound/Ulcer Pain Timing/Severity: none  Quality of pain: N/A  Severity:  0 / 10   Modifying Factors: None  Associated Signs/Symptoms: none    Ulcer Identification:  Ulcer Type: pressure  Contributing Factors: chronic pressure    Wound:  pressure        PAST MEDICAL HISTORY        Diagnosis Date    AAA (abdominal aortic aneurysm) (H[facility])     ASHD (arteriosclerotic heart disease)     S/P coronary intervention followed by CBG    CAD (coronary artery disease)     Severe triple-vessel    CAD (coronary artery disease) 2015    COPD (chronic obstructive pulmonary disease) ([facility])     With tobacco abuse    GERD (gastroesophageal reflux disease)     HH (hiatus hernia)     History of cerebrovascular disease     Hyperlipidemia     PCP, Dr. Parag Martinez manages cholesterol.     Hypertension     Obesity     Osteoarthritis     Palliative care patient 2022    Renal insufficiency     S/P CABG x 3     S/P CABG x 4 2015 by Dr. Cody Helms    S/P TKR (total knee replacement)     RIGHT    Thyroid disease        PAST SURGICAL HISTORY    Past Surgical History:   Procedure Laterality Date    ABDOMINAL AORTIC ANEURYSM REPAIR, OPEN      ADENOIDECTOMY      APPENDECTOMY      CARDIAC SURGERY      CARDIOVASCULAR STRESS TEST  09, [facility]    Stress [facility]    CARDIOVASCULAR STRESS TEST  06, [facility]    Adenosine thallium treadmill    CHOLECYSTECTOMY      CORONARY ARTERY BYPASS GRAFT  06, [place]    CABG x 4 with sequential FAYE to
yes

## 2023-12-12 ENCOUNTER — HOSPITAL ENCOUNTER (OUTPATIENT)
Dept: WOUND CARE | Age: 83
Discharge: HOME | End: 2023-12-12
Payer: MEDICARE

## 2023-12-12 VITALS
HEART RATE: 54 BPM | BODY MASS INDEX: 23.25 KG/M2 | SYSTOLIC BLOOD PRESSURE: 144 MMHG | DIASTOLIC BLOOD PRESSURE: 70 MMHG | TEMPERATURE: 97.1 F | HEIGHT: 69 IN | WEIGHT: 157 LBS | RESPIRATION RATE: 18 BRPM

## 2023-12-12 DIAGNOSIS — L89.153 DECUBITUS ULCER OF COCCYGEAL REGION, STAGE 3 (HCC): Primary | ICD-10-CM

## 2023-12-12 DIAGNOSIS — S00.03XA SCALP HEMATOMA, INITIAL ENCOUNTER: ICD-10-CM

## 2023-12-12 PROCEDURE — 99213 OFFICE O/P EST LOW 20 MIN: CPT

## 2023-12-12 PROCEDURE — 99212 OFFICE O/P EST SF 10 MIN: CPT | Performed by: SURGERY

## 2023-12-12 RX ORDER — IBUPROFEN 200 MG
TABLET ORAL ONCE
Status: CANCELLED | OUTPATIENT
Start: 2023-12-12 | End: 2023-12-12

## 2023-12-12 RX ORDER — SODIUM CHLOR/HYPOCHLOROUS ACID 0.033 %
SOLUTION, IRRIGATION IRRIGATION ONCE
Status: CANCELLED | OUTPATIENT
Start: 2023-12-12 | End: 2023-12-12

## 2023-12-12 RX ORDER — LIDOCAINE HYDROCHLORIDE 20 MG/ML
JELLY TOPICAL ONCE
Status: CANCELLED | OUTPATIENT
Start: 2023-12-12 | End: 2023-12-12

## 2023-12-12 RX ORDER — BETAMETHASONE DIPROPIONATE 0.05 %
OINTMENT (GRAM) TOPICAL ONCE
Status: CANCELLED | OUTPATIENT
Start: 2023-12-12 | End: 2023-12-12

## 2023-12-12 RX ORDER — GINSENG 100 MG
CAPSULE ORAL ONCE
Status: CANCELLED | OUTPATIENT
Start: 2023-12-12 | End: 2023-12-12

## 2023-12-12 RX ORDER — LIDOCAINE HYDROCHLORIDE 40 MG/ML
SOLUTION TOPICAL ONCE
Status: CANCELLED | OUTPATIENT
Start: 2023-12-12 | End: 2023-12-12

## 2023-12-12 RX ORDER — BACITRACIN ZINC AND POLYMYXIN B SULFATE 500; 1000 [USP'U]/G; [USP'U]/G
OINTMENT TOPICAL ONCE
Status: CANCELLED | OUTPATIENT
Start: 2023-12-12 | End: 2023-12-12

## 2023-12-12 RX ORDER — LIDOCAINE 40 MG/G
CREAM TOPICAL ONCE
Status: CANCELLED | OUTPATIENT
Start: 2023-12-12 | End: 2023-12-12

## 2023-12-12 RX ORDER — LIDOCAINE 50 MG/G
OINTMENT TOPICAL ONCE
Status: CANCELLED | OUTPATIENT
Start: 2023-12-12 | End: 2023-12-12

## 2023-12-12 RX ORDER — CETIRIZINE HYDROCHLORIDE 10 MG/1
10 TABLET ORAL DAILY
COMMUNITY

## 2023-12-12 RX ORDER — TRIAMCINOLONE ACETONIDE 1 MG/G
OINTMENT TOPICAL ONCE
Status: CANCELLED | OUTPATIENT
Start: 2023-12-12 | End: 2023-12-12

## 2023-12-12 RX ORDER — GENTAMICIN SULFATE 1 MG/G
OINTMENT TOPICAL ONCE
Status: CANCELLED | OUTPATIENT
Start: 2023-12-12 | End: 2023-12-12

## 2023-12-12 RX ORDER — METOPROLOL SUCCINATE 50 MG/1
50 TABLET, EXTENDED RELEASE ORAL DAILY
COMMUNITY

## 2023-12-12 RX ORDER — CLOBETASOL PROPIONATE 0.5 MG/G
OINTMENT TOPICAL ONCE
Status: CANCELLED | OUTPATIENT
Start: 2023-12-12 | End: 2023-12-12

## 2023-12-12 NOTE — PATIENT INSTRUCTIONS
soles. Check the heels and soles of your shoes for wear. Repair or replace worn heels or soles. Do not wear socks without shoes on wood floors. Walk on the grass when the sidewalks are slippery. If you live in an area that gets snow and ice in the winter, sprinkle salt on slippery steps and sidewalks. Preventing falls in the bath  Install grab bars and nonskid mats inside and outside your shower or tub and near the toilet and sinks. Use shower chairs and bath benches. Use a hand-held shower head that will allow you to sit while showering. Get into a tub or shower by putting the weaker leg in first. Get out of a tub or shower with your strong side first.  Repair loose toilet seats and consider installing a raised toilet seat to make getting on and off the toilet easier. Keep your bathroom door unlocked while you are in the shower. 401 [facility] follow up visit ____________PRN_________________  (Please note your next appointment above and if you are unable to keep, kindly give a 24 hour notice. Thank you.)    If you experience any of the following, please call the [address] [place] [address] during business hours:    * Increase in Pain  * Temperature over 101  * Increase in drainage from your wound  * Drainage with a foul odor  * Bleeding  * Increase in swelling  * Need for compression bandage changes due to slippage, breakthrough drainage. If you need medical attention outside of the business hours of the [address] [place] [address] please contact your PCP or go to the nearest emergency room.

## 2023-12-12 NOTE — PROGRESS NOTES
[address]   Progress Note and Procedure Note      Blane Verdin  MEDICAL RECORD NUMBER:  [id number]  AGE: 78 y.o. GENDER: male  : 1940  EPISODE DATE:  2023    Subjective:     Chief Complaint   Patient presents with    Wound Check     Pt presents for recheck of coccyx wound         HISTORY of PRESENT ILLNESS HPI     Blane Verdin is a 78 y.o. male who presents today for wound/ulcer evaluation. History of Wound Context: Pt with coccyx wound here for eval/treat  Wound/Ulcer Pain Timing/Severity: none  Quality of pain: N/A  Severity:  0 / 10   Modifying Factors: None  Associated Signs/Symptoms: none    Ulcer Identification:  Ulcer Type: pressure  Contributing Factors: chronic pressure    Wound:  pressure        PAST MEDICAL HISTORY        Diagnosis Date    AAA (abdominal aortic aneurysm) ([facility])     ASHD (arteriosclerotic heart disease)     S/P coronary intervention followed by CBG    CAD (coronary artery disease)     Severe triple-vessel    CAD (coronary artery disease) 2015    COPD (chronic obstructive pulmonary disease) ([facility])     With tobacco abuse    GERD (gastroesophageal reflux disease)     HH (hiatus hernia)     History of cerebrovascular disease     Hyperlipidemia     PCP, Dr. Steffi Shabazz manages cholesterol.     Hypertension     Obesity     Osteoarthritis     Palliative care patient 2022    Renal insufficiency     S/P CABG x 3     S/P CABG x 4 2015 by Dr. Nilam Sofia    S/P TKR (total knee replacement)     RIGHT    Thyroid disease        PAST SURGICAL HISTORY    Past Surgical History:   Procedure Laterality Date    ABDOMINAL AORTIC ANEURYSM REPAIR, OPEN      ADENOIDECTOMY      APPENDECTOMY      CARDIAC SURGERY      CARDIOVASCULAR STRESS TEST  09, [facility]    Stress [facility]    CARDIOVASCULAR STRESS TEST  06, [facility]    Adenosine thallium treadmill    CHOLECYSTECTOMY      CORONARY ARTERY BYPASS GRAFT  06, [place]    CABG x 4 with sequential FAYE to

## 2024-01-16 NOTE — PROGRESS NOTES
left ventric., selective coronary arteriography.    JOINT REPLACEMENT      NE ARTHROSCOPY KNEE LATERAL RELEASE Right 12/15/2017    KNEE ARTHROSCOPY LATERAL PATELLA REALIGNMENT performed by Mickey Morataya DO at [facility] OR    NE REVJ TOTAL KNEE ARTHRP W/WO ALGRFT 1 COMPONENT Right 2018    KNEE TOTAL ARTHROPLASTY PATELLOFEMORALREVISION performed by Mickey Morataya DO at [facility] OR    PTCA  91, [facility]    PTCA to circumflex marginal branch.    REVISION TOTAL KNEE ARTHROPLASTY Right 2017    KNEE TOTAL ARTHROPLASTY REVISION performed by Prashanth Cowan MD at [facility] OR    SPLENECTOMY      TONSILLECTOMY      TOTAL KNEE ARTHROPLASTY  2012    Left  knee     UPPER GASTROINTESTINAL ENDOSCOPY N/A 2023    Dr Regalado-Large duodenal bulb ulcer at the junction of duodenal bulb and postbulbar area, narrowing at the postbulbar area could be due to edema, unable to advance the scope into the second part duodenum, narrowing could also be due to diaphragm disease secondary to NSAIDs-Repeat in 2 months     Family History   Problem Relation Age of Onset    Heart Disease Other     Heart Disease Mother      Social History     Tobacco Use    Smoking status: Former     Current packs/day: 0.00     Types: Cigarettes     Quit date: 1989     Years since quittin.0    Smokeless tobacco: Never   Substance Use Topics    Alcohol use: Not Currently     Comment: RARELY         ROS  Eyes - no sudden vision change or amaurosis.  Respiratory - no significant shortness of breath,  Cardiovascular - no chest pain or syncope.  No  significant leg swelling.  no claudication.  Musculoskeletal - no gait disturbance  Skin - no new wound.  Neurologic -  No speech difficulty or lateralizing weakness.  All other review of systems are negative.    Physical Exam    Pulse (!) 42   Temp 97.9 °F (36.6 °C)   SpO2 97%       Neck- carotid pulses 2+ to palpation with no bruit  Cardiovascular - Regular rate and rhythm.    Pulmonary - effort appears

## 2024-01-23 ENCOUNTER — HOSPITAL ENCOUNTER (OUTPATIENT)
Dept: CT IMAGING | Age: 84
Discharge: HOME | End: 2024-01-23
Payer: MEDICARE

## 2024-01-23 ENCOUNTER — OFFICE VISIT (OUTPATIENT)
Dept: VASCULAR SURGERY | Age: 84
End: 2024-01-23
Payer: MEDICARE

## 2024-01-23 VITALS — OXYGEN SATURATION: 97 % | HEART RATE: 42 BPM | TEMPERATURE: 97.9 F

## 2024-01-23 DIAGNOSIS — I71.43 INFRARENAL ABDOMINAL AORTIC ANEURYSM (AAA) WITHOUT RUPTURE (HCC): Primary | ICD-10-CM

## 2024-01-23 DIAGNOSIS — K55.1 SUPERIOR MESENTERIC ARTERY STENOSIS (HCC): ICD-10-CM

## 2024-01-23 DIAGNOSIS — I71.43 INFRARENAL ABDOMINAL AORTIC ANEURYSM (AAA) WITHOUT RUPTURE (HCC): ICD-10-CM

## 2024-01-23 PROCEDURE — G8484 FLU IMMUNIZE NO ADMIN: HCPCS | Performed by: NURSE PRACTITIONER

## 2024-01-23 PROCEDURE — 1036F TOBACCO NON-USER: CPT | Performed by: NURSE PRACTITIONER

## 2024-01-23 PROCEDURE — 74176 CT ABD & PELVIS W/O CONTRAST: CPT

## 2024-01-23 PROCEDURE — 99214 OFFICE O/P EST MOD 30 MIN: CPT | Performed by: NURSE PRACTITIONER

## 2024-01-23 PROCEDURE — G8427 DOCREV CUR MEDS BY ELIG CLIN: HCPCS | Performed by: NURSE PRACTITIONER

## 2024-01-23 PROCEDURE — G8420 CALC BMI NORM PARAMETERS: HCPCS | Performed by: NURSE PRACTITIONER

## 2024-01-23 PROCEDURE — 1123F ACP DISCUSS/DSCN MKR DOCD: CPT | Performed by: NURSE PRACTITIONER

## 2024-02-01 NOTE — PROGRESS NOTES
Addended by: STEFF LO on: 2/1/2024 05:31 PM     Modules accepted: Orders     Progress Note  Date:2022       Room:[facility]/433-02  Patient Name:Atul Peoples     YOB: 1940     Age:82 y.o. Blood pressure elevated overnight  Getting stabilized    Subjective    Subjective:  Symptoms:  Stable. Diet:  Poor intake. Activity level: Impaired due to weakness. Pain:  He reports no pain. Review of Systems  Objective         Vitals Last 24 Hours:  TEMPERATURE:  Temp  Av.7 °F (36.5 °C)  Min: 97.3 °F (36.3 °C)  Max: 97.9 °F (36.6 °C)  RESPIRATIONS RANGE: Resp  Av  Min: 16  Max: 18  PULSE OXIMETRY RANGE: SpO2  Av %  Min: 92 %  Max: 96 %  PULSE RANGE: Pulse  Av.8  Min: 52  Max: 57  BLOOD PRESSURE RANGE: Systolic (62KJF), BSH:154 , Min:163 , BVS:103   ; Diastolic (41RRX), BJK:93, Min:59, Max:69    I/O (24Hr): Intake/Output Summary (Last 24 hours) at 2022 0810  Last data filed at 2022 0510  Gross per 24 hour   Intake 840 ml   Output 800 ml   Net 40 ml     Objective:  General Appearance:  Comfortable and well-appearing. Vital signs: (most recent): Blood pressure (!) 187/69, pulse 56, temperature 97.3 °F (36.3 °C), temperature source Temporal, resp. rate 16, height 5' 9\" (1.753 m), weight 191 lb 9 oz (86.9 kg), SpO2 92 %. Vital signs are normal.  (Elevated blood pressure noted). HEENT: Normal HEENT exam.    Lungs:  Normal effort and normal respiratory rate. Heart: Normal rate. Regular rhythm. Abdomen: Bowel sounds are normal.     Extremities: Normal range of motion. Neurological: Patient is alert.       Labs/Imaging/Diagnostics    Labs:  CBC:  Recent Labs     22  1715 22  0413 22  0505   WBC 7.7 5.1 8.4   RBC 3.70* 3.79* 3.74*   HGB 12.6* 12.8* 12.8*   HCT 40.4* 41.2* 41.3*   .2* 108.7* 110.4*   RDW 15.2* 15.4* 15.3*    165 165     CHEMISTRIES:  Recent Labs     22  1715 22  1740 22  0413 22  0505     --  139 140   K 5.5* 5.0 5.8* 5.3*     --  103 106   CO2 26  -- 26 27   BUN 23  --  24* 31*   CREATININE 1.5*  --  1.5* 1.5*   GLUCOSE 81  --  100 80     PT/INR:No results for input(s): PROTIME, INR in the last 72 hours. APTT:No results for input(s): APTT in the last 72 hours. LIVER PROFILE:  Recent Labs     07/11/22  1715 07/12/22  0413 07/13/22  0505   AST 31 37 28   ALT 12 21 17   BILITOT <0.2 <0.2 <0.2   ALKPHOS 82 87 75       Imaging Last 24 Hours:  XR CHEST PORTABLE    Result Date: 7/11/2022  NO PRIOR REPORT AVAILABLE Exam: X-RAY OF THECHEST Clinical data:Sepsis and hypoxia. Technique:Single view of the chest. Prior studies: Radiographs of the chest dated 09/03/2019 images. Findings:Diffuse patchy infiltrates are noted at both lower lung zones. Median sternotomy sutures are noted. Noevidence of pleural effusion. Cardiac silhouette is within normal limits. No acute osseous abnormality is detected. Diffuse patchy infiltrates are noted at both lower lung zones. Recommendation: Follow up as clinically indicated. Electronically Signed by Mirlande Skinner MD at 11-Jul-2022 06:54:46 PM             Assessment//Plan           Hospital Problems           Last Modified POA    * (Principal) Pneumonia due to COVID-19 virus 7/11/2022 Yes    COVID-19 7/11/2022 Yes    ASHD (arteriosclerotic heart disease) 7/11/2022 Yes    Hypertension 7/11/2022 Yes    Hyperlipidemia 7/11/2022 Yes    COPD (chronic obstructive pulmonary disease) ([facility] [address] 7/11/2022 Yes    History of cerebrovascular disease 7/11/2022 Yes    Diabetes mellitus ([facility] [address] 7/11/2022 Yes    GERD (gastroesophageal reflux disease) 7/11/2022 Yes    PVD (peripheral vascular disease) ([facility] [address] 7/11/2022 Yes        Assessment:    Condition: In stable condition. Improving. Plan:   (      COVID-19 infection-respiratory status seems stable. Continue present treatment. Minimal oxygen requirements. Mental status change-related to infection and hypoxemia, doing better on oxygen.     Hypertension-increase Coreg to 12.5 twice daily and add Norvasc 5 mg daily. Increase activity/out of bed today in hopes of discharge home 1-2 days. Patient will need home health care upon discharge. ).        Electronically signed by Carlos Christie MD on 7/13/22 at 8:10 AM CDT

## 2024-07-22 ENCOUNTER — HOSPITAL ENCOUNTER (INPATIENT)
Age: 84
LOS: 3 days | Discharge: NURSING, REHAB OR LONG-TERM CARE | End: 2024-07-26
Attending: STUDENT IN AN ORGANIZED HEALTH CARE EDUCATION/TRAINING PROGRAM | Admitting: STUDENT IN AN ORGANIZED HEALTH CARE EDUCATION/TRAINING PROGRAM
Payer: MEDICARE

## 2024-07-22 ENCOUNTER — APPOINTMENT (OUTPATIENT)
Dept: CT IMAGING | Age: 84
End: 2024-07-22
Payer: MEDICARE

## 2024-07-22 ENCOUNTER — APPOINTMENT (OUTPATIENT)
Dept: GENERAL RADIOLOGY | Age: 84
End: 2024-07-22
Payer: MEDICARE
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
## 2024-07-22 DIAGNOSIS — R00.1 BRADYCARDIA: Primary | ICD-10-CM

## 2024-07-22 DIAGNOSIS — R07.9 CHEST PAIN, UNSPECIFIED TYPE: ICD-10-CM

## 2024-07-22 LAB
ALBUMIN SERPL-MCNC: 3.4 G/DL (ref 3.5–5.2)
ALP SERPL-CCNC: 96 U/L (ref 40–130)
ALT SERPL-CCNC: 9 U/L (ref 5–41)
ANION GAP SERPL CALCULATED.3IONS-SCNC: 10 MMOL/L (ref 7–19)
AST SERPL-CCNC: 20 U/L (ref 5–40)
B PARAP IS1001 DNA NPH QL NAA+NON-PROBE: NOT DETECTED
B PERT.PT PRMT NPH QL NAA+NON-PROBE: NOT DETECTED
BASOPHILS # BLD: 0.1 K/UL (ref 0–0.2)
BASOPHILS NFR BLD: 0.8 % (ref 0–1)
BILIRUB SERPL-MCNC: 0.3 MG/DL (ref 0.2–1.2)
BNP BLD-MCNC: 7816 PG/ML (ref 0–449)
BUN SERPL-MCNC: 51 MG/DL (ref 8–23)
C PNEUM DNA NPH QL NAA+NON-PROBE: NOT DETECTED
CALCIUM SERPL-MCNC: 8.1 MG/DL (ref 8.8–10.2)
CHLORIDE SERPL-SCNC: 104 MMOL/L (ref 98–111)
CO2 SERPL-SCNC: 24 MMOL/L (ref 22–29)
CREAT SERPL-MCNC: 2.3 MG/DL (ref 0.5–1.2)
EOSINOPHIL # BLD: 0.9 K/UL (ref 0–0.6)
EOSINOPHIL NFR BLD: 9.3 % (ref 0–5)
ERYTHROCYTE [DISTWIDTH] IN BLOOD BY AUTOMATED COUNT: 17 % (ref 11.5–14.5)
FLUAV RNA NPH QL NAA+NON-PROBE: NOT DETECTED
FLUBV RNA NPH QL NAA+NON-PROBE: NOT DETECTED
GLUCOSE SERPL-MCNC: 89 MG/DL (ref 74–109)
HADV DNA NPH QL NAA+NON-PROBE: NOT DETECTED
HCOV 229E RNA NPH QL NAA+NON-PROBE: NOT DETECTED
HCOV HKU1 RNA NPH QL NAA+NON-PROBE: NOT DETECTED
HCOV NL63 RNA NPH QL NAA+NON-PROBE: NOT DETECTED
HCOV OC43 RNA NPH QL NAA+NON-PROBE: NOT DETECTED
HCT VFR BLD AUTO: 36 % (ref 42–52)
HGB BLD-MCNC: 11.6 G/DL (ref 14–18)
HMPV RNA NPH QL NAA+NON-PROBE: NOT DETECTED
HPIV1 RNA NPH QL NAA+NON-PROBE: NOT DETECTED
HPIV2 RNA NPH QL NAA+NON-PROBE: NOT DETECTED
HPIV3 RNA NPH QL NAA+NON-PROBE: NOT DETECTED
HPIV4 RNA NPH QL NAA+NON-PROBE: NOT DETECTED
IMM GRANULOCYTES # BLD: 0.1 K/UL
LACTATE BLDV-SCNC: 0.7 MMOL/L (ref 0.5–1.9)
LYMPHOCYTES # BLD: 3.1 K/UL (ref 1.1–4.5)
LYMPHOCYTES NFR BLD: 33.6 % (ref 20–40)
M PNEUMO DNA NPH QL NAA+NON-PROBE: NOT DETECTED
MCH RBC QN AUTO: 31.5 PG (ref 27–31)
MCHC RBC AUTO-ENTMCNC: 32.2 G/DL (ref 33–37)
MCV RBC AUTO: 97.8 FL (ref 80–94)
MONOCYTES # BLD: 0.9 K/UL (ref 0–0.9)
MONOCYTES NFR BLD: 9.6 % (ref 0–10)
NEUTROPHILS # BLD: 4.3 K/UL (ref 1.5–7.5)
NEUTS SEG NFR BLD: 46.2 % (ref 50–65)
PLATELET # BLD AUTO: 154 K/UL (ref 130–400)
PMV BLD AUTO: 13.9 FL (ref 9.4–12.4)
POTASSIUM SERPL-SCNC: 5.4 MMOL/L (ref 3.5–5)
PROT SERPL-MCNC: 7.2 G/DL (ref 6.6–8.7)
RBC # BLD AUTO: 3.68 M/UL (ref 4.7–6.1)
RSV RNA NPH QL NAA+NON-PROBE: NOT DETECTED
RV+EV RNA NPH QL NAA+NON-PROBE: NOT DETECTED
SARS-COV-2 RNA NPH QL NAA+NON-PROBE: NOT DETECTED
SODIUM SERPL-SCNC: 138 MMOL/L (ref 136–145)
TROPONIN, HIGH SENSITIVITY: 37 NG/L (ref 0–22)
TROPONIN, HIGH SENSITIVITY: 41 NG/L (ref 0–22)
TROPONIN, HIGH SENSITIVITY: 44 NG/L (ref 0–22)
VANCOMYCIN SERPL-MCNC: <4 UG/ML
WBC # BLD AUTO: 9.3 K/UL (ref 4.8–10.8)

## 2024-07-22 PROCEDURE — 36415 COLL VENOUS BLD VENIPUNCTURE: CPT

## 2024-07-22 PROCEDURE — 72128 CT CHEST SPINE W/O DYE: CPT

## 2024-07-22 PROCEDURE — 99285 EMERGENCY DEPT VISIT HI MDM: CPT

## 2024-07-22 PROCEDURE — 85025 COMPLETE CBC W/AUTO DIFF WBC: CPT

## 2024-07-22 PROCEDURE — 84484 ASSAY OF TROPONIN QUANT: CPT

## 2024-07-22 PROCEDURE — 83605 ASSAY OF LACTIC ACID: CPT

## 2024-07-22 PROCEDURE — 80053 COMPREHEN METABOLIC PANEL: CPT

## 2024-07-22 PROCEDURE — 70450 CT HEAD/BRAIN W/O DYE: CPT

## 2024-07-22 PROCEDURE — 0202U NFCT DS 22 TRGT SARS-COV-2: CPT

## 2024-07-22 PROCEDURE — 71045 X-RAY EXAM CHEST 1 VIEW: CPT

## 2024-07-22 PROCEDURE — 72125 CT NECK SPINE W/O DYE: CPT

## 2024-07-22 PROCEDURE — 6360000002 HC RX W HCPCS: Performed by: PHYSICIAN ASSISTANT

## 2024-07-22 PROCEDURE — 2580000003 HC RX 258: Performed by: PHYSICIAN ASSISTANT

## 2024-07-22 PROCEDURE — 83880 ASSAY OF NATRIURETIC PEPTIDE: CPT

## 2024-07-22 PROCEDURE — 87040 BLOOD CULTURE FOR BACTERIA: CPT

## 2024-07-22 PROCEDURE — 93005 ELECTROCARDIOGRAM TRACING: CPT | Performed by: PHYSICIAN ASSISTANT

## 2024-07-22 PROCEDURE — 72131 CT LUMBAR SPINE W/O DYE: CPT

## 2024-07-22 PROCEDURE — 96365 THER/PROPH/DIAG IV INF INIT: CPT

## 2024-07-22 PROCEDURE — 87150 DNA/RNA AMPLIFIED PROBE: CPT

## 2024-07-22 PROCEDURE — 87077 CULTURE AEROBIC IDENTIFY: CPT

## 2024-07-22 PROCEDURE — 80202 ASSAY OF VANCOMYCIN: CPT

## 2024-07-22 PROCEDURE — 96366 THER/PROPH/DIAG IV INF ADDON: CPT

## 2024-07-22 RX ADMIN — VANCOMYCIN HYDROCHLORIDE 1750 MG: 10 INJECTION, POWDER, LYOPHILIZED, FOR SOLUTION INTRAVENOUS at 16:59

## 2024-07-22 ASSESSMENT — ENCOUNTER SYMPTOMS
BACK PAIN: 1
DIARRHEA: 0
SHORTNESS OF BREATH: 0
COUGH: 0
ABDOMINAL PAIN: 0
NAUSEA: 0
VOMITING: 0

## 2024-07-22 ASSESSMENT — PAIN SCALES - GENERAL: PAINLEVEL_OUTOF10: 8

## 2024-07-22 ASSESSMENT — PAIN - FUNCTIONAL ASSESSMENT: PAIN_FUNCTIONAL_ASSESSMENT: 0-10

## 2024-07-22 NOTE — PROGRESS NOTES
[facility]   Pharmacy Pharmacokinetic Monitoring Service - Vancomycin     Atul Peoples is a 84 y.o. male starting on vancomycin therapy for SSTI. Pharmacy consulted by Bharath Anderson for monitoring and adjustment.    Target Concentration:  Goal trough of 15-20 mg/L switch to -600 once renal function stable    Additional Antimicrobials: none    Pertinent Laboratory Values:   Wt Readings from Last 1 Encounters:   07/22/24 68 kg (150 lb)     Temp Readings from Last 1 Encounters:   07/22/24 97.6 °F (36.4 °C) (Oral)     Estimated Creatinine Clearance: 22 mL/min (A) (based on SCr of 2.3 mg/dL (H)).  Recent Labs     07/22/24  1608   CREATININE 2.3*   BUN 51*   WBC 9.3     Procalcitonin: none    Pertinent Cultures:  Culture Date Source Results   07/22/24 Blood 1 Sent   07/22/24 Blood 2 Sent   07/22/24 Respiratory panel Sent   MRSA Nasal Swab: N/A. Non-respiratory infection.    Plan:  Concentration-guided dosing due to renal impairment/insufficiency  Start vancomycin 1750 mg loading dose, followed by pulse dosing  Renal labs as indicated   Vancomycin concentration ordered for 07/23/24 @ 0500   Pharmacy will continue to monitor patient and adjust therapy as indicated    Thank you for the consult,  OREN CARVAJAL RPH  7/22/2024 5:17 PM

## 2024-07-22 NOTE — ED PROVIDER NOTES
to wound bed. Cover with gauze. Secure with roll gauze and medipore tape. Change twice daily .    TAMSULOSIN (FLOMAX) 0.4 MG CAPSULE    Take 1 capsule by mouth in the morning and at bedtime       ALLERGIES     Pcn [penicillins], Azithromycin, Doxycycline, Tetracyclines & related, and Vibramycin [doxycycline calcium]    FAMILY HISTORY       Family History   Problem Relation Age of Onset    Heart Disease Other     Heart Disease Mother           SOCIAL HISTORY       Social History     Socioeconomic History    Marital status:      Spouse name: None    Number of children: None    Years of education: None    Highest education level: None   Tobacco Use    Smoking status: Former     Current packs/day: 0.00     Types: Cigarettes     Quit date: 1989     Years since quittin.5    Smokeless tobacco: Never   Vaping Use    Vaping Use: Never used   Substance and Sexual Activity    Alcohol use: Not Currently     Comment: RARELY    Drug use: Never       SCREENINGS    [place] Coma Scale  Eye Opening: Spontaneous  Best Verbal Response: Oriented  Best Motor Response: Obeys commands  [place] Coma Scale Score: 15        PHYSICAL EXAM    (up to 7 for level 4, 8 or more for level 5)     ED Triage Vitals   BP Temp Temp Source Pulse Respirations SpO2 Height Weight - Scale   24 1546 24 1544 24 1544 24 1544 24 1544 24 1544 -- 24 1544   (!) 161/56 97.6 °F (36.4 °C) Oral (!) 45 18 (!) 87 %  68 kg (150 lb)       Physical Exam  Vitals and nursing note reviewed.   Constitutional:       General: He is not in acute distress.     Appearance: Normal appearance. He is normal weight. He is ill-appearing (chronically). He is not toxic-appearing or diaphoretic.   HENT:      Head: Normocephalic and atraumatic.   Cardiovascular:      Rate and Rhythm: Regular rhythm. Bradycardia present.      Pulses: Normal pulses.      Heart sounds: Normal heart sounds.   Pulmonary:      Effort: Pulmonary effort is

## 2024-07-23 ENCOUNTER — APPOINTMENT (OUTPATIENT)
Age: 84
End: 2024-07-23
Attending: INTERNAL MEDICINE
Payer: MEDICARE

## 2024-07-23 PROBLEM — R00.1 BRADYCARDIA: Status: ACTIVE | Noted: 2024-07-23

## 2024-07-23 PROBLEM — E44.0 MODERATE MALNUTRITION (HCC): Status: ACTIVE | Noted: 2024-07-23

## 2024-07-23 LAB
ANION GAP SERPL CALCULATED.3IONS-SCNC: 10 MMOL/L (ref 7–19)
BUN SERPL-MCNC: 48 MG/DL (ref 8–23)
CALCIUM SERPL-MCNC: 8.3 MG/DL (ref 8.8–10.2)
CHLORIDE SERPL-SCNC: 107 MMOL/L (ref 98–111)
CO2 SERPL-SCNC: 22 MMOL/L (ref 22–29)
CREAT SERPL-MCNC: 1.9 MG/DL (ref 0.5–1.2)
EKG P AXIS: 83 DEGREES
EKG P AXIS: NORMAL DEGREES
EKG P-R INTERVAL: 210 MS
EKG P-R INTERVAL: NORMAL MS
EKG Q-T INTERVAL: 552 MS
EKG Q-T INTERVAL: 566 MS
EKG QRS DURATION: 156 MS
EKG QRS DURATION: 162 MS
EKG QTC CALCULATION (BAZETT): 521 MS
EKG QTC CALCULATION (BAZETT): 535 MS
EKG T AXIS: 150 DEGREES
EKG T AXIS: 45 DEGREES
GLUCOSE BLD-MCNC: 78 MG/DL (ref 70–99)
GLUCOSE BLD-MCNC: 99 MG/DL (ref 70–99)
GLUCOSE SERPL-MCNC: 80 MG/DL (ref 74–109)
PERFORMED ON: NORMAL
PERFORMED ON: NORMAL
POTASSIUM SERPL-SCNC: 5 MMOL/L (ref 3.5–5)
SODIUM SERPL-SCNC: 139 MMOL/L (ref 136–145)
T4 FREE SERPL-MCNC: 0.19 NG/DL (ref 0.93–1.7)
TSH SERPL DL<=0.005 MIU/L-ACNC: 43.23 UIU/ML (ref 0.27–4.2)
VANCOMYCIN TROUGH SERPL-MCNC: 14.3 UG/ML (ref 10–20)

## 2024-07-23 PROCEDURE — 6360000002 HC RX W HCPCS: Performed by: INTERNAL MEDICINE

## 2024-07-23 PROCEDURE — 6360000002 HC RX W HCPCS: Performed by: HOSPITALIST

## 2024-07-23 PROCEDURE — 93010 ELECTROCARDIOGRAM REPORT: CPT | Performed by: INTERNAL MEDICINE

## 2024-07-23 PROCEDURE — 6370000000 HC RX 637 (ALT 250 FOR IP): Performed by: INTERNAL MEDICINE

## 2024-07-23 PROCEDURE — 6370000000 HC RX 637 (ALT 250 FOR IP): Performed by: STUDENT IN AN ORGANIZED HEALTH CARE EDUCATION/TRAINING PROGRAM

## 2024-07-23 PROCEDURE — 93306 TTE W/DOPPLER COMPLETE: CPT | Performed by: INTERNAL MEDICINE

## 2024-07-23 PROCEDURE — 84443 ASSAY THYROID STIM HORMONE: CPT

## 2024-07-23 PROCEDURE — 2140000000 HC CCU INTERMEDIATE R&B

## 2024-07-23 PROCEDURE — 6360000002 HC RX W HCPCS: Performed by: STUDENT IN AN ORGANIZED HEALTH CARE EDUCATION/TRAINING PROGRAM

## 2024-07-23 PROCEDURE — 36415 COLL VENOUS BLD VENIPUNCTURE: CPT

## 2024-07-23 PROCEDURE — 97116 GAIT TRAINING THERAPY: CPT

## 2024-07-23 PROCEDURE — 92523 SPEECH SOUND LANG COMPREHEN: CPT

## 2024-07-23 PROCEDURE — 82962 GLUCOSE BLOOD TEST: CPT

## 2024-07-23 PROCEDURE — 2580000003 HC RX 258: Performed by: STUDENT IN AN ORGANIZED HEALTH CARE EDUCATION/TRAINING PROGRAM

## 2024-07-23 PROCEDURE — 84439 ASSAY OF FREE THYROXINE: CPT

## 2024-07-23 PROCEDURE — 2700000000 HC OXYGEN THERAPY PER DAY

## 2024-07-23 PROCEDURE — 97161 PT EVAL LOW COMPLEX 20 MIN: CPT

## 2024-07-23 PROCEDURE — 94760 N-INVAS EAR/PLS OXIMETRY 1: CPT

## 2024-07-23 PROCEDURE — 80048 BASIC METABOLIC PNL TOTAL CA: CPT

## 2024-07-23 PROCEDURE — 93356 MYOCRD STRAIN IMG SPCKL TRCK: CPT | Performed by: INTERNAL MEDICINE

## 2024-07-23 PROCEDURE — 80202 ASSAY OF VANCOMYCIN: CPT

## 2024-07-23 PROCEDURE — 97535 SELF CARE MNGMENT TRAINING: CPT

## 2024-07-23 PROCEDURE — 93306 TTE W/DOPPLER COMPLETE: CPT

## 2024-07-23 PROCEDURE — 92610 EVALUATE SWALLOWING FUNCTION: CPT

## 2024-07-23 PROCEDURE — 99222 1ST HOSP IP/OBS MODERATE 55: CPT | Performed by: INTERNAL MEDICINE

## 2024-07-23 PROCEDURE — 97165 OT EVAL LOW COMPLEX 30 MIN: CPT

## 2024-07-23 RX ORDER — SODIUM CHLORIDE, SODIUM LACTATE, POTASSIUM CHLORIDE, AND CALCIUM CHLORIDE .6; .31; .03; .02 G/100ML; G/100ML; G/100ML; G/100ML
1000 INJECTION, SOLUTION INTRAVENOUS ONCE
Status: COMPLETED | OUTPATIENT
Start: 2024-07-23 | End: 2024-07-23

## 2024-07-23 RX ORDER — LEVOTHYROXINE SODIUM 0.07 MG/1
150 TABLET ORAL NIGHTLY
Status: DISCONTINUED | OUTPATIENT
Start: 2024-07-23 | End: 2024-07-23

## 2024-07-23 RX ORDER — LEVOTHYROXINE SODIUM 20 UG/ML
50 INJECTION, SOLUTION INTRAVENOUS DAILY
Status: DISCONTINUED | OUTPATIENT
Start: 2024-07-23 | End: 2024-07-23 | Stop reason: DRUGHIGH

## 2024-07-23 RX ORDER — CLOPIDOGREL BISULFATE 75 MG/1
75 TABLET ORAL DAILY
Status: DISCONTINUED | OUTPATIENT
Start: 2024-07-23 | End: 2024-07-26 | Stop reason: HOSPADM

## 2024-07-23 RX ORDER — LEVOTHYROXINE SODIUM 20 UG/ML
125 INJECTION, SOLUTION INTRAVENOUS ONCE
Status: COMPLETED | OUTPATIENT
Start: 2024-07-23 | End: 2024-07-23

## 2024-07-23 RX ORDER — ONDANSETRON 4 MG/1
4 TABLET, ORALLY DISINTEGRATING ORAL EVERY 8 HOURS PRN
Status: DISCONTINUED | OUTPATIENT
Start: 2024-07-23 | End: 2024-07-26 | Stop reason: HOSPADM

## 2024-07-23 RX ORDER — SODIUM CHLORIDE 0.9 % (FLUSH) 0.9 %
5-40 SYRINGE (ML) INJECTION PRN
Status: DISCONTINUED | OUTPATIENT
Start: 2024-07-23 | End: 2024-07-25 | Stop reason: SDUPTHER

## 2024-07-23 RX ORDER — SODIUM CHLORIDE 0.9 % (FLUSH) 0.9 %
5-40 SYRINGE (ML) INJECTION EVERY 12 HOURS SCHEDULED
Status: DISCONTINUED | OUTPATIENT
Start: 2024-07-23 | End: 2024-07-25 | Stop reason: SDUPTHER

## 2024-07-23 RX ORDER — ONDANSETRON 2 MG/ML
4 INJECTION INTRAMUSCULAR; INTRAVENOUS EVERY 6 HOURS PRN
Status: DISCONTINUED | OUTPATIENT
Start: 2024-07-23 | End: 2024-07-26 | Stop reason: HOSPADM

## 2024-07-23 RX ORDER — LEVOTHYROXINE SODIUM 0.1 MG/1
100 TABLET ORAL NIGHTLY
Status: DISCONTINUED | OUTPATIENT
Start: 2024-07-23 | End: 2024-07-23

## 2024-07-23 RX ORDER — ACETAMINOPHEN 325 MG/1
650 TABLET ORAL EVERY 4 HOURS PRN
Status: DISCONTINUED | OUTPATIENT
Start: 2024-07-23 | End: 2024-07-26 | Stop reason: HOSPADM

## 2024-07-23 RX ORDER — HYDRALAZINE HYDROCHLORIDE 20 MG/ML
10 INJECTION INTRAMUSCULAR; INTRAVENOUS EVERY 4 HOURS PRN
Status: DISCONTINUED | OUTPATIENT
Start: 2024-07-23 | End: 2024-07-26 | Stop reason: HOSPADM

## 2024-07-23 RX ORDER — AMLODIPINE BESYLATE 5 MG/1
5 TABLET ORAL ONCE
Status: COMPLETED | OUTPATIENT
Start: 2024-07-23 | End: 2024-07-23

## 2024-07-23 RX ORDER — ATORVASTATIN CALCIUM 20 MG/1
10 TABLET, FILM COATED ORAL DAILY
Status: DISCONTINUED | OUTPATIENT
Start: 2024-07-23 | End: 2024-07-26 | Stop reason: HOSPADM

## 2024-07-23 RX ORDER — CALCITRIOL 0.25 UG/1
0.25 CAPSULE, LIQUID FILLED ORAL DAILY
Status: DISCONTINUED | OUTPATIENT
Start: 2024-07-23 | End: 2024-07-26 | Stop reason: HOSPADM

## 2024-07-23 RX ORDER — AMLODIPINE BESYLATE 5 MG/1
5 TABLET ORAL 2 TIMES DAILY
Status: DISCONTINUED | OUTPATIENT
Start: 2024-07-24 | End: 2024-07-24

## 2024-07-23 RX ORDER — LOSARTAN POTASSIUM 25 MG/1
50 TABLET ORAL DAILY
Status: DISCONTINUED | OUTPATIENT
Start: 2024-07-23 | End: 2024-07-26 | Stop reason: HOSPADM

## 2024-07-23 RX ORDER — ASPIRIN 81 MG/1
81 TABLET ORAL DAILY
Status: DISCONTINUED | OUTPATIENT
Start: 2024-07-23 | End: 2024-07-26 | Stop reason: HOSPADM

## 2024-07-23 RX ORDER — AMLODIPINE BESYLATE 5 MG/1
5 TABLET ORAL DAILY
Status: DISCONTINUED | OUTPATIENT
Start: 2024-07-23 | End: 2024-07-23

## 2024-07-23 RX ORDER — SODIUM CHLORIDE 9 MG/ML
INJECTION, SOLUTION INTRAVENOUS PRN
Status: DISCONTINUED | OUTPATIENT
Start: 2024-07-23 | End: 2024-07-25 | Stop reason: SDUPTHER

## 2024-07-23 RX ORDER — PANTOPRAZOLE SODIUM 40 MG/1
40 TABLET, DELAYED RELEASE ORAL
Status: DISCONTINUED | OUTPATIENT
Start: 2024-07-23 | End: 2024-07-26 | Stop reason: HOSPADM

## 2024-07-23 RX ORDER — CLINDAMYCIN PHOSPHATE 600 MG/50ML
600 INJECTION, SOLUTION INTRAVENOUS EVERY 8 HOURS
Status: DISCONTINUED | OUTPATIENT
Start: 2024-07-23 | End: 2024-07-23 | Stop reason: ALTCHOICE

## 2024-07-23 RX ORDER — HYDRALAZINE HYDROCHLORIDE 20 MG/ML
10 INJECTION INTRAMUSCULAR; INTRAVENOUS ONCE
Status: COMPLETED | OUTPATIENT
Start: 2024-07-23 | End: 2024-07-23

## 2024-07-23 RX ORDER — LEVOTHYROXINE SODIUM 0.07 MG/1
150 TABLET ORAL DAILY
Status: DISCONTINUED | OUTPATIENT
Start: 2024-07-24 | End: 2024-07-26 | Stop reason: HOSPADM

## 2024-07-23 RX ORDER — TAMSULOSIN HYDROCHLORIDE 0.4 MG/1
0.4 CAPSULE ORAL 2 TIMES DAILY
Status: DISCONTINUED | OUTPATIENT
Start: 2024-07-23 | End: 2024-07-26 | Stop reason: HOSPADM

## 2024-07-23 RX ADMIN — AMLODIPINE BESYLATE 5 MG: 5 TABLET ORAL at 10:58

## 2024-07-23 RX ADMIN — AMLODIPINE BESYLATE 5 MG: 5 TABLET ORAL at 12:36

## 2024-07-23 RX ADMIN — HYDRALAZINE HYDROCHLORIDE 10 MG: 20 INJECTION INTRAMUSCULAR; INTRAVENOUS at 12:36

## 2024-07-23 RX ADMIN — ACETAMINOPHEN 650 MG: 325 TABLET ORAL at 21:42

## 2024-07-23 RX ADMIN — ASPIRIN 81 MG: 81 TABLET, COATED ORAL at 10:58

## 2024-07-23 RX ADMIN — EMPAGLIFLOZIN 10 MG: 10 TABLET, FILM COATED ORAL at 10:58

## 2024-07-23 RX ADMIN — TAMSULOSIN HYDROCHLORIDE 0.4 MG: 0.4 CAPSULE ORAL at 10:58

## 2024-07-23 RX ADMIN — SODIUM CHLORIDE, PRESERVATIVE FREE 10 ML: 5 INJECTION INTRAVENOUS at 20:14

## 2024-07-23 RX ADMIN — CLOPIDOGREL BISULFATE 75 MG: 75 TABLET ORAL at 10:58

## 2024-07-23 RX ADMIN — PANTOPRAZOLE SODIUM 40 MG: 40 TABLET, DELAYED RELEASE ORAL at 16:00

## 2024-07-23 RX ADMIN — TAMSULOSIN HYDROCHLORIDE 0.4 MG: 0.4 CAPSULE ORAL at 20:10

## 2024-07-23 RX ADMIN — PANTOPRAZOLE SODIUM 40 MG: 40 TABLET, DELAYED RELEASE ORAL at 10:58

## 2024-07-23 RX ADMIN — CLINDAMYCIN PHOSPHATE 600 MG: 600 INJECTION, SOLUTION INTRAVENOUS at 10:55

## 2024-07-23 RX ADMIN — CLINDAMYCIN HYDROCHLORIDE 450 MG: 300 CAPSULE ORAL at 20:10

## 2024-07-23 RX ADMIN — LEVOTHYROXINE SODIUM 125 MCG: 20 INJECTION, SOLUTION INTRAVENOUS at 16:00

## 2024-07-23 RX ADMIN — CALCITRIOL CAPSULES 0.25 MCG 0.25 MCG: 0.25 CAPSULE ORAL at 10:58

## 2024-07-23 RX ADMIN — SODIUM CHLORIDE, PRESERVATIVE FREE 10 ML: 5 INJECTION INTRAVENOUS at 11:03

## 2024-07-23 RX ADMIN — SODIUM CHLORIDE, POTASSIUM CHLORIDE, SODIUM LACTATE AND CALCIUM CHLORIDE 1000 ML: 600; 310; 30; 20 INJECTION, SOLUTION INTRAVENOUS at 10:53

## 2024-07-23 RX ADMIN — ATORVASTATIN CALCIUM 10 MG: 20 TABLET, FILM COATED ORAL at 10:58

## 2024-07-23 RX ADMIN — HYDRALAZINE HYDROCHLORIDE 10 MG: 20 INJECTION INTRAMUSCULAR; INTRAVENOUS at 01:30

## 2024-07-23 ASSESSMENT — PAIN DESCRIPTION - LOCATION: LOCATION: HEAD

## 2024-07-23 ASSESSMENT — PAIN DESCRIPTION - ORIENTATION: ORIENTATION: INNER

## 2024-07-23 ASSESSMENT — PAIN SCALES - GENERAL: PAINLEVEL_OUTOF10: 5

## 2024-07-23 ASSESSMENT — PAIN DESCRIPTION - DESCRIPTORS: DESCRIPTORS: ACHING

## 2024-07-23 NOTE — PLAN OF CARE
Problem: Discharge Planning  Goal: Discharge to home or other facility with appropriate resources  Outcome: Progressing  Flowsheets (Taken 7/23/2024 1716)  Discharge to home or other facility with appropriate resources: Identify barriers to discharge with patient and caregiver     Problem: Pain  Goal: Verbalizes/displays adequate comfort level or baseline comfort level  Outcome: Progressing     Problem: Skin/Tissue Integrity  Goal: Absence of new skin breakdown  Description: 1.  Monitor for areas of redness and/or skin breakdown  2.  Assess vascular access sites hourly  3.  Every 4-6 hours minimum:  Change oxygen saturation probe site  4.  Every 4-6 hours:  If on nasal continuous positive airway pressure, respiratory therapy assess nares and determine need for appliance change or resting period.  Outcome: Progressing     Problem: Safety - Adult  Goal: Free from fall injury  Outcome: Progressing     Problem: ABCDS Injury Assessment  Goal: Absence of physical injury  Outcome: Progressing     Problem: Chronic Conditions and Co-morbidities  Goal: Patient's chronic conditions and co-morbidity symptoms are monitored and maintained or improved  Outcome: Progressing  Flowsheets (Taken 7/23/2024 1716)  Care Plan - Patient's Chronic Conditions and Co-Morbidity Symptoms are Monitored and Maintained or Improved: Monitor and assess patient's chronic conditions and comorbid symptoms for stability, deterioration, or improvement     Problem: Nutrition Deficit:  Goal: Optimize nutritional status  Outcome: Progressing

## 2024-07-23 NOTE — CONSULTS
bolus 1,000 mL, 1,000 mL, IntraVENous, Once   sodium chloride         Past Medical History   has a past medical history of AAA (abdominal aortic aneurysm) ([facility]), ASHD (arteriosclerotic heart disease), CAD (coronary artery disease), CAD (coronary artery disease), COPD (chronic obstructive pulmonary disease) ([facility]), GERD (gastroesophageal reflux disease), HH (hiatus hernia), History of cerebrovascular disease, Hyperlipidemia, Hypertension, Obesity, Osteoarthritis, Palliative care patient, Renal insufficiency, S/P CABG x 3, S/P CABG x 4, S/P TKR (total knee replacement), and Thyroid disease.    Past Surgical History   has a past surgical history that includes cardiovascular stress test (12/07/09, [facility]); cardiovascular stress test (11/02/06, [facility]); Tonsillectomy; Adenoidectomy; Appendectomy; Cholecystectomy; Coronary artery bypass graft (12/01/06, [place]); Diagnostic Cardiac Cath Lab Procedure (11/14/06, [facility]); Diagnostic Cardiac Cath Lab Procedure (06/27/03????, [facility]); Diagnostic Cardiac Cath Lab Procedure (02/24/1993, [facility]); Diagnostic Cardiac Cath Lab Procedure (05/15/91, [facility]); Percutaneous Transluminal Coronary Angio (05/16/91, [facility]); Total knee arthroplasty (04/2012); Splenectomy; AAA repair, open; Revision total knee arthroplasty (Right, 01/06/2017); pr arthroscopy knee lateral release (Right, 12/15/2017); Cardiac surgery; joint replacement; pr revj total knee arthrp w/wo algrft 1 component (Right, 04/06/2018); and Upper gastrointestinal endoscopy (N/A, 06/23/2023).    Social History   reports that he quit smoking about 35 years ago. His smoking use included cigarettes. He has never used smokeless tobacco. He reports that he does not currently use alcohol. He reports that he does not use drugs.    Family History  family history includes Heart Disease in his mother and another family member.    Review of Systems:   Negative except as noted in HPI, 10 organ review performed. The ten organ systems reviewed included: cardiac,

## 2024-07-23 NOTE — ACP (ADVANCE CARE PLANNING)
Advance Care Planning     Advance Care Planning Inpatient Note  [facility] Department    Today's Date: 7/23/2024  Unit: [facility] PROGRESSIVE CARE    Received request from Other: Palliative care .  Upon review of chart and communication with care team, patient's decision making abilities are not in question.. Patient was/were present in the room during visit.    Goals of ACP Conversation:  Discuss advance care planning documents    Health Care Decision Makers:       Primary Decision Maker: Barry Peoples - Child - [phone]    Secondary Decision Maker: DeepTootie - Child - [phone]  Summary:  Verified Documents    Advance Care Planning Documents (Patient Wishes):  Living Will/Advance Directive     Assessment:  Pt is an 84 year old male and he had a fall. He is on blood thinners but lives at home alone. Pt has a son and a daughter and he says they check on him. Pt want to return home and care for himself if he is able.     Interventions:  Confirmed pt's LW, decision maker, and code status.    Care Preferences Communicated:     Hospitalization:  If the patient's health worsens and it becomes clear that the chance of recovery is unlikely,     the patient wants hospitalization.    Ventilation:   If the patient, in their present state of health, suddenly became very ill and unable to breathe on their own,     the patient would desire the use of a ventilator (breathing machine).    If their health worsens and it becomes clear that the change of recovery is unlikely,     the patient would NOT desire the use of a ventilator (breathing machine).    Resuscitation:  In the event the patient's heart stopped as a result of an underlying serious health condition, the patient communicates a preference for      resuscitative attempts (CPR).    Outcomes/Plan:  ACP Discussion: Completed    Electronically signed by Mary Behrens, Chaplain on 7/23/2024 at 12:02 PM

## 2024-07-23 NOTE — CARE COORDINATION
Yes  Plans to Return to Present Housing: (P) Unknown at present  Other Identified Issues/Barriers to RETURNING to current housing: Balance and endurance   Potential Assistance needed at discharge: (P) N/A            Potential DME:    Patient expects to discharge to: (P) Skilled nursing facility  Plan for transportation at discharge: (P) Family    Financial    Payor: [employer]ARE / Plan: [employer] MEDICARE REPLACEMENT / Product Type: *No Product type* /     Does insurance require precert for SNF: Yes    Potential assistance Purchasing Medications: (P) No  Meds-to-Beds request:        [id number] - [place] - [address] [phone] - F [phone] [address] [place] [address]  Phone: [phone] Fax: [phone]    [facility] [place] [address] [facility] [phone] -  [phone]  [facility] [address]  Phone: [phone] Fax: [phone]      Notes:    Factors facilitating achievement of predicted outcomes: Family support    Barriers to discharge: Pain    Additional Case Management Notes: Sw spoke to pt about discharge goals. Pt states prior to his admission he was an active , and lived alone. Pt reports his daughter Tootie lives in [place], [place] and his son lives in [place], [place]. Pt reports he uses a quad cane. Pt reports he did spend some time in a nursing home for 6 months but, did not recall the name of the snf. Pt is not interested in home health. Pt states his wife  12 years ago along with the dog whom had cancer.  Pt states he is on home o2 but, does not recall the DME supplier. Sw awaiting for PT/OT evals before final disposition of d/c plan.     The Plan for Transition of Care is related to the following treatment goals of Bradycardia [R00.1]  Fall, initial encounter [W19.XXXA]    IF APPLICABLE: The Patient and/or patient representative Atul and his family were provided with a choice of  Occupation N/A   Discharge Planning   Type of Residence House   Living Arrangements Alone   [facility]   Potential Assistance Needed N/A   DME Ordered? No   Potential Assistance Purchasing Medications No   Type of Home Care Services PT;OT;Attendant;Aide Services   Patient expects to be discharged to: Skilled nursing facility   Follow Up Appointment: Best Day/Time  Wednesday AM   One/Two Story Residence One story   History of falls? 1   Services At/After Discharge   Transition of Care Consult (CM Consult) Home Health;SNF   Internal Home Health Yes   Partner SNF No   Reason Why Partner SNF Not Chosen Location   Services At/After Discharge Home Health;Skilled Nursing Facility (SNF)   [place] Resource Information Provided? No   Mode of Transport at Discharge Self   Hospital Transport Time of Discharge   (UNKNOWN TIME OF D/C AT THIS TIME.)   Confirm Follow Up Transport Family   Condition of Participation: Discharge Planning   The Plan for Transition of Care is related to the following treatment goals: PT GOAL IS TO RETURN HOME WITH HOME HEALTH   The Patient and/or Patient Representative was provided with a Choice of Provider? Patient   The Patient and/Or Patient Representative agree with the Discharge Plan? Yes   Freedom of Choice list was provided with basic dialogue that supports the patient's individualized plan of care/goals, treatment preferences, and shares the quality data associated with the providers?  Yes

## 2024-07-23 NOTE — PROGRESS NOTES
4 Eyes Skin Assessment     NAME:  Atul Peoples  YOB: 1940  MEDICAL RECORD NUMBER:  [id number]    The patient is being assessed for  Admission    I agree that at least one RN has performed a thorough Head to Toe Skin Assessment on the patient. ALL assessment sites listed below have been assessed.      Areas assessed by both nurses:    Head, Face, Ears, Shoulders, Back, Chest, and Sacrum. Buttock, Coccyx, Ischium        Does the Patient have a Wound? Yes wound(s) were present on assessment. LDA wound assessment was Initiated and completed by RN       Juan Diego Prevention initiated by RN: Yes  Wound Care Orders initiated by RN: Yes    Pressure Injury (Stage 3,4, Unstageable, DTI, NWPT, and Complex wounds) if present, place Wound referral order by RN under : No    New Ostomies, if present place, Ostomy referral order under : No     Nurse 1 eSignature: Electronically signed by Tigre Beatty RN on 7/23/24 at 1:32 AM CDT    **SHARE this note so that the co-signing nurse can place an eSignature**    Nurse 2 eSignature:  Electronically signed by Rohini Mallory RN on 7/23/24 at 3:18 AM CDT

## 2024-07-23 NOTE — H&P
History and Physical      CHIEF COMPLAINT:  Falls    Reason for Admission:  Falls, bradycardia    History Obtained From: Patient    HISTORY OF PRESENT ILLNESS:      The patient is a 84 y.o. male with significant past medical history of AAA, CAD, COPD, HTN, and HLD who presents with multiple falls, altered mental status, and bradycardia.  He has fallen several times over the last couple days and he is really unable to give history as to why he is fallen.  Unsure if these are mechanical or syncopal episodes. In the Emergency department, he was found to have bradycardia, being sinus bradycardic in the 30s and was admitted for further workup.    Past Medical History:    Past Medical History:   Diagnosis Date    AAA (abdominal aortic aneurysm) ([facility])     ASHD (arteriosclerotic heart disease)     S/P coronary intervention followed by CBG    CAD (coronary artery disease)     Severe triple-vessel    CAD (coronary artery disease) 02/04/2015    COPD (chronic obstructive pulmonary disease) ([facility])     With tobacco abuse    GERD (gastroesophageal reflux disease)     HH (hiatus hernia)     History of cerebrovascular disease     Hyperlipidemia     PCP, Dr. REZA Horner manages cholesterol.    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 June    S/P TKR (total knee replacement)     RIGHT    Thyroid disease        Past Surgical History:    Past Surgical History:   Procedure Laterality Date    ABDOMINAL AORTIC ANEURYSM REPAIR, OPEN      ADENOIDECTOMY      APPENDECTOMY      CARDIAC SURGERY      CARDIOVASCULAR STRESS TEST  12/07/09, [facility]    Stress [facility]    CARDIOVASCULAR STRESS TEST  11/02/06, [facility]    Adenosine thallium treadmill    CHOLECYSTECTOMY      CORONARY ARTERY BYPASS GRAFT  12/01/06, [place]    CABG x 4 with sequential FAYE to diagonal, LAD, SVG, to obtuse marginal, posterior descending endoscopic vein harvesting.

## 2024-07-23 NOTE — PROGRESS NOTES
Palliative Care/Spiritual Care: Met with pt to initiate palliative care. Pt says he had a rough night because it was cold in his room. Pt's nurse came into the room during the visit. Pt had a fall and ED note also says Bradycardia. Pt has other diagnoses in past medical history and he is known to palliative care.         Advance Directives: Pt has a LW. His son Barry Peoples is named as his decision maker. Pt says he wants CPR and Ventilator but does not want to remain on the ventilator if he worsens while on the ventilator. SEE ACP NOTE.         Pain/other symptoms: Pt says he is having back pain and his nurse Jenifer heard the conversation.         Social/Spiritual: Pt says he previously attended [facility].         Pt/family discussion r/t goals: Pt says he lives at home alone. He says his wife  about twelve years ago. Pt says he previously had a dog but the dog  recently. Pt says he performs daily living skills to care for  himself at home. He says he wants to return home and continue caring for himself.     Provided spiritual care with sustaining presence, nurtured hope, and prayer. Pt expressed gratitude for spiritual care.     Electronically signed by Mary Behrens on 2024 at 12:01 PM

## 2024-07-23 NOTE — PROGRESS NOTES
Atul Peoples arrived to room # 730.   Presented with: Bradycardia  Mental Status: Patient is oriented, alert, coherent, logical, thought processes intact, and able to concentrate and follow conversation.   Vitals:    07/23/24 0014   BP: (!) 186/58   Pulse: (!) 37   Resp: 16   Temp: (!) 96.6 °F (35.9 °C)   SpO2: 99%     Patient safety contract and falls prevention contract reviewed with patient Yes.  Oriented Patient to room.  Call light within reach. Yes.  Needs, issues or concerns expressed at this time: yes,   Social Determinants of Health     Tobacco Use: Medium Risk (7/22/2024)    Patient History     Smoking Tobacco Use: Former     Smokeless Tobacco Use: Never     Passive Exposure: Not on file   Alcohol Use: Not At Risk (7/11/2022)    AUDIT-C     Frequency of Alcohol Consumption: Never     Average Number of Drinks: Not on file     Frequency of Binge Drinking: Not on file   Financial Resource Strain: Not on file   Food Insecurity: No Food Insecurity (7/23/2024)    Hunger Vital Sign     Worried About Running Out of Food in the Last Year: Never true     Ran Out of Food in the Last Year: Never true   Transportation Needs: No Transportation Needs (7/23/2024)    PRAPARE - Transportation     Lack of Transportation (Medical): No     Lack of Transportation (Non-Medical): No   Physical Activity: Not on file   Stress: Not on file   Social Connections: Not on file   Intimate Partner Violence: Not on file   Depression: Not on file   Housing Stability: Low Risk  (7/23/2024)    Housing Stability Vital Sign     Unable to Pay for Housing in the Last Year: No     Number of Places Lived in the Last Year: 1     Unstable Housing in the Last Year: No   Interpersonal Safety: Not At Risk (7/22/2024)    Interpersonal Safety Domain Source: IP Abuse Screening     Physical abuse: Denies     Verbal abuse: Denies     Emotional abuse: Denies     Financial abuse: Denies     Sexual abuse: Denies   Utilities: Not At Risk (7/23/2024)    [facility]

## 2024-07-23 NOTE — PROGRESS NOTES
[facility]   Pharmacy Pharmacokinetic Monitoring Service - Vancomycin    Consulting Provider: Bharath Anderson   Indication: Skin/soft Tissue Infection  Target Concentration:  Trough 15-20  Day of Therapy: 2  Additional Antimicrobials: None    Pertinent Laboratory Values:   Wt Readings from Last 1 Encounters:   07/23/24 70.1 kg (154 lb 9.6 oz)     Temp Readings from Last 1 Encounters:   07/23/24 (!) 96.6 °F (35.9 °C) (Temporal)     Estimated Creatinine Clearance: 23 mL/min (A) (based on SCr of 2.3 mg/dL (H)).  Recent Labs     07/22/24  1608   CREATININE 2.3*   BUN 51*   WBC 9.3     Procalcitonin: N/A    Pertinent Cultures: No current cultures at this time  Culture Date Source Results        MRSA Nasal Swab: N/A. Non-respiratory infection.    Recent vancomycin administrations                     vancomycin (VANCOCIN) 1,750 mg in sodium chloride 0.9 % 500 mL IVPB (mg) 1,750 mg New Bag 07/22/24 4089                    Assessment:  Date/Time Current Dose Concentration Timing of Concentration (h) AUC   07/23/24 Pulse dosing 14.3 Random level     Note: Serum concentrations collected for AUC dosing may appear elevated if collected in close proximity to the dose administered, this is not necessarily an indication of toxicity    Plan:  Current dosing regimen is therapeutic  Give Vancomycin 1000mg x1 dose today with random level tomorrow morning  Repeat vancomycin concentration ordered for 07/24 @ 0300   Pharmacy will continue to monitor patient and adjust therapy as indicated    Thank you for the consult,  Dai Sellers RPH  7/23/2024 6:04 AM

## 2024-07-23 NOTE — ED NOTES
ED TO INPATIENT SBAR HANDOFF    Patient Name: Atul Peoples   : 1940  84 y.o.   Family/Caregiver Present: No  Code Status Order: Prior    C-SSRS: Risk of Suicide: No Risk  Sitter No  Restraints:         Situation  Chief Complaint:   Chief Complaint   Patient presents with    Fall     Fall today c/o of left leg and back pain. On blood thinners per EMS unsure of which blood thinner     Patient Diagnosis: No admission diagnoses are documented for this encounter.     Brief Description of Patient's Condition: Pt being admitted for bradycardia. Pt arrived today bc of a fall and c/o of back and left leg pain on arrival. Denies hitting his head or syncope. Pt has wound to left leg that he says has been there for a month. When pt arrived his 02 was in the 80s was placed on 2L NC and has tolerated well since. Heart rate primarily has been in the 40s but occasionally has got down to around 38. Has a 18g to RAC and 20g to left wrist area. Oriented x4. Systolic bp has been high with diastolic being low at times. Troponin has been elevated each time it has been drawn with the latest at 41. Will need oxygen tree in room and may need assistance transferring to bed. Pt uses urinal to eliminate has not ambulated while in ED.  Mental Status: oriented, alert, coherent, logical, thought processes intact, and able to concentrate and follow conversation  Arrived from: ambulance    Imaging:   CT LUMBAR SPINE WO CONTRAST   Final Result   Impression:  No acute findings        All CT scans are performed using dose optimization techniques as appropriate to the performed exam and include    at least one of the following: Automated exposure control, adjustment of the mA and/or kV according to size, and the use of iterative reconstruction technique.        ______________________________________    Electronically signed by: ROSARIO BISWAS M.D.   Date:     2024   Time:    19:03       CT THORACIC SPINE WO CONTRAST   Final Result

## 2024-07-23 NOTE — PROGRESS NOTES
Occupational Therapy Initial Assessment  Date: 2024   Patient Name: Atul Peoples  MRN: [id number]     : 1940    Date of Service: 2024    Discharge Recommendations:  Patient would benefit from continued therapy after discharge       Assessment   Assessment: Evaluation completed and tx initiated.  The patient would benefit from further skilled therapy to upgrade safety and functional independence.  Patient reports hx of frequent falls, is unsteady with ambulatory ADL so would benefit from rehab prior to returning home alone.  Treatment Diagnosis: Bradycardia, fall, back and left leg pain  REQUIRES OT FOLLOW-UP: Yes  Activity Tolerance  Activity Tolerance Comments: Nurse notified therapy of bradycardia during tx.  Patient noted to be asymptomatic           Patient Diagnosis(es): The primary encounter diagnosis was Bradycardia. Diagnoses of Fall, initial encounter and Chest pain, unspecified type were also pertinent to this visit.   has a past medical history of AAA (abdominal aortic aneurysm) ([facility]), ASHD (arteriosclerotic heart disease), CAD (coronary artery disease), CAD (coronary artery disease), COPD (chronic obstructive pulmonary disease) ([facility]), GERD (gastroesophageal reflux disease), HH (hiatus hernia), History of cerebrovascular disease, Hyperlipidemia, Hypertension, Obesity, Osteoarthritis, Palliative care patient, Renal insufficiency, S/P CABG x 3, S/P CABG x 4, S/P TKR (total knee replacement), and Thyroid disease.   has a past surgical history that includes cardiovascular stress test (09, [facility]); cardiovascular stress test (06, [facility]); Tonsillectomy; Adenoidectomy; Appendectomy; Cholecystectomy; Coronary artery bypass graft (06, [place]); Diagnostic Cardiac Cath Lab Procedure (06, [facility]); Diagnostic Cardiac Cath Lab Procedure (03????, [facility]); Diagnostic Cardiac Cath Lab Procedure (1993, [facility]); Diagnostic Cardiac Cath Lab Procedure (05/15/91, [facility]); Percutaneous

## 2024-07-23 NOTE — PROGRESS NOTES
Comprehensive Nutrition Assessment    Type and Reason for Visit:  Initial, Positive Nutrition Screen    Nutrition Recommendations/Plan:   Ensure ONS TID  Add Moderate Malnutrition to the Problem List  Update preferences     Malnutrition Assessment:  Malnutrition Status:  Moderate malnutrition (07/23/24 1352)    Context:  Acute Illness     Findings of the 6 clinical characteristics of malnutrition:  Energy Intake:  No significant decrease in energy intake  Weight Loss:  No significant weight loss     Body Fat Loss:  Mild body fat loss Orbital   Fluid Accumulation:  Mild Extremities    Nutrition Assessment:    Pt meets the criteria for Moderate Malnutrition AEB fluid accumulation and Mild Fat loss. PO intake of meals is good. BUN, Creatinine, and GFR are improving. Preferences obtained and will be updated in the diet order. Pt reports his appetite has been \"alright\" at home. Pt is at risk for nutritional decline r/t altered skin integrity. He is agreeable to Ensure ONS TID to help meet nutritional needs and aid in wound healing.    Nutrition Related Findings:      Wound Type: Stage II       Current Nutrition Intake & Therapies:    Average Meal Intake: 51-75%  ADULT DIET; Regular    Anthropometric Measures:  Height: 175.3 cm (5' 9.02\") (5'9\")  Ideal Body Weight (IBW): 160 lbs (73 kg)    Current Body Weight: 70.1 kg (154 lb 8.7 oz)  Current BMI (kg/m2): 22.8  BMI Categories: Normal Weight (BMI 22.0 to 24.9) age over 65    Estimated Daily Nutrient Needs:  Energy Requirements Based On: Kcal/kg  Weight Used for Energy Requirements: Current  Energy (kcal/day): 8629-0861 kcals/day  Weight Used for Protein Requirements: Current  Protein (g/day):  g/protein/day  Method Used for Fluid Requirements: 1 ml/kcal  Fluid (ml/day): 8927-3614 mL/day    Nutrition Diagnosis:   Moderate malnutrition related to acute injury/trauma as evidenced by Criteria as identified in malnutrition assessment    Nutrition Interventions:   Food

## 2024-07-23 NOTE — PROGRESS NOTES
Pharmacy Intravenous to Oral Protocol    Medication changed per [facility] IV to PO protocol: clindamycin    Patient meets the following inclusion criteria and none of the exclusion criteria:    Inclusion criteria:  - IV therapy > 24 hours (antibiotics only)  - Tolerating diet more advanced than clear liquids  - Tolerating PO medications  - No vasopressor blood pressure support (ie no signs of shock)  - Patient hasn't had a seizure for 72 hrs (antiepileptic medications only)    Exclusion criteria:  - Infections requiring IV therapy (ie meningitis, endocarditis, osteomyelitis, pancreatitis)   - Nausea and/or vomiting or severe diarrhea within past 24 hours   - Has gastrectomy, ileus, gastric outlet or bowel obstruction, or malabsorption syndromes   - Has significant painful oral ulceration   - TPN with an NPO order   - Active GI bleed   - Unable to swallow   - NPO   - Febrile in the last 24 hours (antibiotics only)   - Clinical deteriorating or unstable (antibiotics only)   - Pediatric patients and patients who are not euthyroid (not on oral levothyroxine/not stabilized on oral levothyroxine)- Levothyroxine only     Electronically signed by OREN CARVAJAL RPH on 7/23/2024 at 1:40 PM    No

## 2024-07-23 NOTE — PROGRESS NOTES
Pt daughter Tootie requested that she be called with updates or doctor's plans due to the patient's cognitive status at this time. Her number is an emergency contact.

## 2024-07-23 NOTE — PROGRESS NOTES
Discussed PT hr with Dr. Longoria in person recommended hydralazine 10mg once for HR and BP during admit, follow up contact Dr. Smith seeing attending in chart hydralazine given prior recommend monitoring HR, and BP WCTM

## 2024-07-23 NOTE — PROGRESS NOTES
Physical Therapy  Facility/Department: [facility] PROGRESSIVE CARE  Physical Therapy Initial Assessment    Name: Atul Peoples  : 1940  MRN: [id number]  Date of Service: 2024    Discharge Recommendations:  Continue to assess pending progress, 24 hour supervision or assist          Patient Diagnosis(es): The primary encounter diagnosis was Bradycardia. Diagnoses of Fall, initial encounter and Chest pain, unspecified type were also pertinent to this visit.  Past Medical History:  has a past medical history of AAA (abdominal aortic aneurysm) ([facility]), ASHD (arteriosclerotic heart disease), CAD (coronary artery disease), CAD (coronary artery disease), COPD (chronic obstructive pulmonary disease) ([facility]), GERD (gastroesophageal reflux disease), HH (hiatus hernia), History of cerebrovascular disease, Hyperlipidemia, Hypertension, Obesity, Osteoarthritis, Palliative care patient, Renal insufficiency, S/P CABG x 3, S/P CABG x 4, S/P TKR (total knee replacement), and Thyroid disease.  Past Surgical History:  has a past surgical history that includes cardiovascular stress test (09, [facility]); cardiovascular stress test (06, [facility]); Tonsillectomy; Adenoidectomy; Appendectomy; Cholecystectomy; Coronary artery bypass graft (06, [place]); Diagnostic Cardiac Cath Lab Procedure (06, [facility]); Diagnostic Cardiac Cath Lab Procedure (03????, [facility]); Diagnostic Cardiac Cath Lab Procedure (1993, [facility]); Diagnostic Cardiac Cath Lab Procedure (05/15/91, [facility]); Percutaneous Transluminal Coronary Angio (91, [facility]); Total knee arthroplasty (2012); Splenectomy; AAA repair, open; Revision total knee arthroplasty (Right, 2017); pr arthroscopy knee lateral release (Right, 12/15/2017); Cardiac surgery; joint replacement; pr revj total knee arthrp w/wo algrft 1 component (Right, 2018); and Upper gastrointestinal endoscopy (N/A, 2023).    Assessment   Body Structures, Functions, Activity Limitations    Cognition   Cognition  Cognition Comment: Awake, alert, verbal, but needs supervision for safety. Possible mild confusion but does well in a structured environment     Objective   Temp: 97.4 °F (36.3 °C)  Pulse: (!) 37  Heart Rate Source: Monitor  Respirations: 16  SpO2: 99 %  O2 Device: Nasal cannula  BP: (!) 175/51  MAP (Calculated): 92  BP Location: Right upper arm  BP Method: Automatic  Patient Position: Supine        Gross Assessment  AROM: Generally decreased, functional                    Bed mobility  Supine to Sit: Supervision  Transfers  Sit to Stand: Contact guard assistance;Minimal Assistance  Stand to Sit: Contact guard assistance;Minimal Assistance  Ambulation  Device: Large Base Quad Cane  Assistance: Contact guard assistance;Minimal assistance  Quality of Gait: UNSTEADY OVERALL, NO TRUE LOB, PLACES QC TOO FAR IN FRONT OF ULISES  Gait Deviations: Slow Marian;Decreased step length  Distance: 20'     Balance  Sitting - Dynamic: Good  Standing - Dynamic: Fair           OutComes Score                                                  AM-PAC - Mobility              Tinneti Score       Goals  Short Term Goals  Time Frame for Short Term Goals: 14 DAYS  Short Term Goal 1: BED MOB MOD IND  Short Term Goal 2: TRANSFERS SUPERVISION  Short Term Goal 3: ' AAD SUPERVISION       Education  Patient Education  Education Given To: Patient  Education Provided: Role of Therapy;Plan of Care  Barriers to Learning: None      Therapy Time   Individual Concurrent Group Co-treatment   Time In           Time Out           Minutes                   Nallely Duncan, PT

## 2024-07-24 PROBLEM — E03.5 MYXEDEMA COMA (HCC): Status: ACTIVE | Noted: 2024-07-24

## 2024-07-24 LAB
ANION GAP SERPL CALCULATED.3IONS-SCNC: 10 MMOL/L (ref 7–19)
BUN SERPL-MCNC: 46 MG/DL (ref 8–23)
CALCIUM SERPL-MCNC: 7.8 MG/DL (ref 8.8–10.2)
CHLORIDE SERPL-SCNC: 108 MMOL/L (ref 98–111)
CO2 SERPL-SCNC: 23 MMOL/L (ref 22–29)
CORTIS SERPL-MCNC: 15 UG/DL
CREAT SERPL-MCNC: 2.3 MG/DL (ref 0.5–1.2)
ECHO AO ASC DIAM: 3.2 CM
ECHO AO ASCENDING AORTA INDEX: 1.73 CM/M2
ECHO AO ROOT DIAM: 2.7 CM
ECHO AO ROOT INDEX: 1.46 CM/M2
ECHO AR MAX VEL PISA: 4.6 M/S
ECHO AV AREA PEAK VELOCITY: 1.5 CM2
ECHO AV AREA VTI: 1.5 CM2
ECHO AV AREA/BSA PEAK VELOCITY: 0.8 CM2/M2
ECHO AV AREA/BSA VTI: 0.8 CM2/M2
ECHO AV MEAN GRADIENT: 10 MMHG
ECHO AV MEAN VELOCITY: 1.4 M/S
ECHO AV PEAK GRADIENT: 18 MMHG
ECHO AV PEAK VELOCITY: 2.1 M/S
ECHO AV REGURGITANT PHT: 509 MS
ECHO AV VELOCITY RATIO: 0.52
ECHO AV VTI: 64.5 CM
ECHO BSA: 1.84 M2
ECHO EST RA PRESSURE: 3 MMHG
ECHO IVC PROX: 1.4 CM
ECHO LA AREA 2C: 23.7 CM2
ECHO LA AREA 4C: 23.4 CM2
ECHO LA DIAMETER INDEX: 3.03 CM/M2
ECHO LA DIAMETER: 5.6 CM
ECHO LA MAJOR AXIS: 6.2 CM
ECHO LA MINOR AXIS: 6.1 CM
ECHO LA TO AORTIC ROOT RATIO: 2.07
ECHO LA VOL BP: 73 ML (ref 18–58)
ECHO LA VOL MOD A2C: 76 ML (ref 18–58)
ECHO LA VOL MOD A4C: 72 ML (ref 18–58)
ECHO LA VOL/BSA BIPLANE: 39 ML/M2 (ref 16–34)
ECHO LA VOLUME INDEX MOD A2C: 41 ML/M2 (ref 16–34)
ECHO LA VOLUME INDEX MOD A4C: 39 ML/M2 (ref 16–34)
ECHO LV E' LATERAL VELOCITY: 6 CM/S
ECHO LV E' SEPTAL VELOCITY: 6 CM/S
ECHO LV EDV A4C: 68 ML
ECHO LV EDV INDEX A4C: 37 ML/M2
ECHO LV EJECTION FRACTION A4C: 57 %
ECHO LV ESV A4C: 30 ML
ECHO LV ESV INDEX A4C: 16 ML/M2
ECHO LV FRACTIONAL SHORTENING: 35 % (ref 28–44)
ECHO LV GLOBAL LONGITUDINAL STRAIN (GLS): -20.6 %
ECHO LV INTERNAL DIMENSION DIASTOLE INDEX: 2.32 CM/M2
ECHO LV INTERNAL DIMENSION DIASTOLIC: 4.3 CM (ref 4.2–5.9)
ECHO LV INTERNAL DIMENSION SYSTOLIC INDEX: 1.51 CM/M2
ECHO LV INTERNAL DIMENSION SYSTOLIC: 2.8 CM
ECHO LV IVSD: 1.7 CM (ref 0.6–1)
ECHO LV MASS 2D: 232.2 G (ref 88–224)
ECHO LV MASS INDEX 2D: 125.5 G/M2 (ref 49–115)
ECHO LV POSTERIOR WALL DIASTOLIC: 1.1 CM (ref 0.6–1)
ECHO LV RELATIVE WALL THICKNESS RATIO: 0.51
ECHO LVOT AREA: 2.8 CM2
ECHO LVOT AV VTI INDEX: 0.51
ECHO LVOT DIAM: 1.9 CM
ECHO LVOT MEAN GRADIENT: 2 MMHG
ECHO LVOT PEAK GRADIENT: 5 MMHG
ECHO LVOT PEAK VELOCITY: 1.1 M/S
ECHO LVOT STROKE VOLUME INDEX: 50.5 ML/M2
ECHO LVOT SV: 93.5 ML
ECHO LVOT VTI: 33 CM
ECHO MV A VELOCITY: 1.18 M/S
ECHO MV AREA VTI: 1.4 CM2
ECHO MV E DECELERATION TIME (DT): 417 MS
ECHO MV E VELOCITY: 1.24 M/S
ECHO MV E/A RATIO: 1.05
ECHO MV E/E' LATERAL: 20.67
ECHO MV E/E' RATIO (AVERAGED): 20.67
ECHO MV E/E' SEPTAL: 20.67
ECHO MV LVOT VTI INDEX: 2.08
ECHO MV MAX VELOCITY: 1.2 M/S
ECHO MV MEAN GRADIENT: 2 MMHG
ECHO MV MEAN VELOCITY: 0.7 M/S
ECHO MV PEAK GRADIENT: 6 MMHG
ECHO MV VTI: 68.5 CM
ECHO RA AREA 4C: 21.4 CM2
ECHO RA END SYSTOLIC VOLUME APICAL 4 CHAMBER INDEX BSA: 36 ML/M2
ECHO RA VOLUME: 66 ML
ECHO RIGHT VENTRICULAR SYSTOLIC PRESSURE (RVSP): 41 MMHG
ECHO RV BASAL DIMENSION: 4.9 CM
ECHO RV LONGITUDINAL DIMENSION: 6.2 CM
ECHO RV MID DIMENSION: 2.9 CM
ECHO RV TAPSE: 2.2 CM (ref 1.7–?)
ECHO TV REGURGITANT MAX VELOCITY: 3.09 M/S
ECHO TV REGURGITANT PEAK GRADIENT: 35 MMHG
GLUCOSE SERPL-MCNC: 81 MG/DL (ref 74–109)
POTASSIUM SERPL-SCNC: 5.4 MMOL/L (ref 3.5–5)
SODIUM SERPL-SCNC: 141 MMOL/L (ref 136–145)
TROPONIN, HIGH SENSITIVITY: 35 NG/L (ref 0–22)

## 2024-07-24 PROCEDURE — 36415 COLL VENOUS BLD VENIPUNCTURE: CPT

## 2024-07-24 PROCEDURE — 82533 TOTAL CORTISOL: CPT

## 2024-07-24 PROCEDURE — 2140000000 HC CCU INTERMEDIATE R&B

## 2024-07-24 PROCEDURE — 94760 N-INVAS EAR/PLS OXIMETRY 1: CPT

## 2024-07-24 PROCEDURE — 97530 THERAPEUTIC ACTIVITIES: CPT

## 2024-07-24 PROCEDURE — 6370000000 HC RX 637 (ALT 250 FOR IP): Performed by: STUDENT IN AN ORGANIZED HEALTH CARE EDUCATION/TRAINING PROGRAM

## 2024-07-24 PROCEDURE — 6370000000 HC RX 637 (ALT 250 FOR IP): Performed by: INTERNAL MEDICINE

## 2024-07-24 PROCEDURE — 80048 BASIC METABOLIC PNL TOTAL CA: CPT

## 2024-07-24 PROCEDURE — 97116 GAIT TRAINING THERAPY: CPT

## 2024-07-24 PROCEDURE — 2580000003 HC RX 258: Performed by: STUDENT IN AN ORGANIZED HEALTH CARE EDUCATION/TRAINING PROGRAM

## 2024-07-24 PROCEDURE — 82530 CORTISOL FREE: CPT

## 2024-07-24 PROCEDURE — 97535 SELF CARE MNGMENT TRAINING: CPT

## 2024-07-24 PROCEDURE — 6360000002 HC RX W HCPCS: Performed by: STUDENT IN AN ORGANIZED HEALTH CARE EDUCATION/TRAINING PROGRAM

## 2024-07-24 PROCEDURE — 84484 ASSAY OF TROPONIN QUANT: CPT

## 2024-07-24 PROCEDURE — 2700000000 HC OXYGEN THERAPY PER DAY

## 2024-07-24 RX ORDER — AMLODIPINE BESYLATE 5 MG/1
5 TABLET ORAL DAILY
Status: DISCONTINUED | OUTPATIENT
Start: 2024-07-24 | End: 2024-07-26 | Stop reason: HOSPADM

## 2024-07-24 RX ORDER — ENOXAPARIN SODIUM 100 MG/ML
40 INJECTION SUBCUTANEOUS DAILY
Status: DISCONTINUED | OUTPATIENT
Start: 2024-07-24 | End: 2024-07-24 | Stop reason: DRUGHIGH

## 2024-07-24 RX ORDER — POLYETHYLENE GLYCOL 3350 17 G/17G
17 POWDER, FOR SOLUTION ORAL DAILY PRN
Status: DISCONTINUED | OUTPATIENT
Start: 2024-07-24 | End: 2024-07-26 | Stop reason: HOSPADM

## 2024-07-24 RX ORDER — ENOXAPARIN SODIUM 100 MG/ML
30 INJECTION SUBCUTANEOUS DAILY
Status: DISCONTINUED | OUTPATIENT
Start: 2024-07-24 | End: 2024-07-26 | Stop reason: HOSPADM

## 2024-07-24 RX ADMIN — PANTOPRAZOLE SODIUM 40 MG: 40 TABLET, DELAYED RELEASE ORAL at 05:30

## 2024-07-24 RX ADMIN — CLINDAMYCIN HYDROCHLORIDE 450 MG: 300 CAPSULE ORAL at 14:03

## 2024-07-24 RX ADMIN — POLYETHYLENE GLYCOL 3350 17 G: 17 POWDER, FOR SOLUTION ORAL at 14:55

## 2024-07-24 RX ADMIN — ASPIRIN 81 MG: 81 TABLET, COATED ORAL at 08:44

## 2024-07-24 RX ADMIN — CLINDAMYCIN HYDROCHLORIDE 450 MG: 300 CAPSULE ORAL at 21:46

## 2024-07-24 RX ADMIN — LOSARTAN POTASSIUM 50 MG: 25 TABLET, FILM COATED ORAL at 08:44

## 2024-07-24 RX ADMIN — CLINDAMYCIN HYDROCHLORIDE 450 MG: 300 CAPSULE ORAL at 05:30

## 2024-07-24 RX ADMIN — CALCITRIOL CAPSULES 0.25 MCG 0.25 MCG: 0.25 CAPSULE ORAL at 08:44

## 2024-07-24 RX ADMIN — SODIUM CHLORIDE, PRESERVATIVE FREE 10 ML: 5 INJECTION INTRAVENOUS at 08:50

## 2024-07-24 RX ADMIN — SODIUM CHLORIDE, PRESERVATIVE FREE 10 ML: 5 INJECTION INTRAVENOUS at 21:48

## 2024-07-24 RX ADMIN — CLOPIDOGREL BISULFATE 75 MG: 75 TABLET ORAL at 08:43

## 2024-07-24 RX ADMIN — ENOXAPARIN SODIUM 30 MG: 100 INJECTION SUBCUTANEOUS at 08:47

## 2024-07-24 RX ADMIN — TAMSULOSIN HYDROCHLORIDE 0.4 MG: 0.4 CAPSULE ORAL at 21:46

## 2024-07-24 RX ADMIN — AMLODIPINE BESYLATE 5 MG: 5 TABLET ORAL at 08:44

## 2024-07-24 RX ADMIN — PANTOPRAZOLE SODIUM 40 MG: 40 TABLET, DELAYED RELEASE ORAL at 16:34

## 2024-07-24 RX ADMIN — EMPAGLIFLOZIN 10 MG: 10 TABLET, FILM COATED ORAL at 08:43

## 2024-07-24 RX ADMIN — LEVOTHYROXINE SODIUM 150 MCG: 75 TABLET ORAL at 05:30

## 2024-07-24 RX ADMIN — TAMSULOSIN HYDROCHLORIDE 0.4 MG: 0.4 CAPSULE ORAL at 08:44

## 2024-07-24 RX ADMIN — ATORVASTATIN CALCIUM 10 MG: 20 TABLET, FILM COATED ORAL at 08:43

## 2024-07-24 ASSESSMENT — ENCOUNTER SYMPTOMS
SHORTNESS OF BREATH: 0
ABDOMINAL DISTENTION: 0
VOMITING: 0
NAUSEA: 0
ABDOMINAL PAIN: 0
WHEEZING: 0
COUGH: 0
CHEST TIGHTNESS: 0
DIARRHEA: 0
STRIDOR: 0

## 2024-07-24 NOTE — PROGRESS NOTES
Occupational Therapy     07/24/24 1500   Subjective   Subjective Pt in bed upon arrival for therapy. Pt agreeable to participate. Pt wanting to put clothes on.   Pain Assessment   Pain Assessment None - Denies Pain   Vitals   O2 Device Nasal cannula   Cognition   Overall Cognitive Status Exceptions   Cognition Comment Awake, alert, verbal, but needs supervision for safety. Possible mild confusion but does well in a structured environment. [place].   Orientation   Overall Orientation Status WFL   Bed Mobility Training   Bed Mobility Training Yes   Overall Level of Assistance Minimum assistance;Moderate assistance   Interventions Verbal cues;Tactile cues;Manual cues   Supine to Sit Minimum assistance;Moderate assistance   Sit to Supine Minimum assistance;Moderate assistance   Scooting Minimum assistance;Moderate assistance   Transfer Training   Transfer Training Yes   Overall Level of Assistance Minimum assistance   Interventions Verbal cues;Tactile cues;Manual cues   Sit to Stand Minimum assistance   Stand to Sit Minimum assistance   Toilet Transfer Minimum assistance   Balance   Sitting Intact   Standing Impaired   Standing - Static Occasional   Standing - Dynamic Occasional;Poor   ADL   Feeding Supervision   Grooming Supervision;Setup   UE Bathing Minimal assistance   LE Bathing Minimal assistance;Moderate assistance   UE Dressing Minimal assistance   LE Dressing Moderate assistance   Toileting Minimal assistance   Functional Mobility Minimal assistance   Assessment   Assessment Tx focused on sitting EOB to perform dressing tasks including UB/LB dressing. Pt instructed on functional mobility using cane at first. Pt states \"that cane is what got me in here\". Pt provided a RW and perofrm mobility with improved confidence. Pt instructed on toileting task with Min assist. Pt assisted back to bed after tasks. Nursing notified of difficulty having a BM.   Activity Tolerance Patient tolerated treatment well   Discharge  Recommendations Patient would benefit from continued therapy after discharge;24 hour supervision or assist   Occupational Therapy Plan   Times Per Week 3-5   Times Per Day Once a day   OT Plan of Care   Wednesday X     Electronically signed by SHARLENE Parrish on 7/24/2024 at 3:38 PM

## 2024-07-24 NOTE — PROGRESS NOTES
Daily Progress Note  Atul Peoples  MRN: [id number] LOS: 1    Admit Date: 2024 7:03 AM    Subjective:         Interval History:    Reviewed overnight events and nursing notes.     Doing well, no complaints.  Sinus bradycardia improved.    Review of Systems   Constitutional:  Negative for chills, fatigue and fever.   Respiratory:  Negative for cough, chest tightness, shortness of breath, wheezing and stridor.    Cardiovascular:  Negative for chest pain, palpitations and leg swelling.   Gastrointestinal:  Negative for abdominal distention, abdominal pain, diarrhea, nausea and vomiting.       DIET:  ADULT DIET; Regular; NO GREEN BEANS OR COFFEE.  ADULT ORAL NUTRITION SUPPLEMENT; Breakfast, Lunch, Dinner; Standard High Calorie/High Protein Oral Supplement    Medications:      sodium chloride        sodium chloride flush  5-40 mL IntraVENous 2 times per day    aspirin  81 mg Oral Daily    atorvastatin  10 mg Oral Daily    calcitRIOL  0.25 mcg Oral Daily    clopidogrel  75 mg Oral Daily    empagliflozin  10 mg Oral Daily    [Held by provider] losartan  50 mg Oral Daily    pantoprazole  40 mg Oral BID AC    tamsulosin  0.4 mg Oral BID    levothyroxine  150 mcg Oral Daily    amLODIPine  5 mg Oral BID    clindamycin  450 mg Oral 3 times per day         Objective:     Vitals: BP (!) 127/47   Pulse (!) 40   Temp 97.2 °F (36.2 °C) (Temporal)   Resp 16   Ht 1.753 m (5' 9\")   Wt 71.7 kg (158 lb)   SpO2 97%   BMI 23.33 kg/m²    Intake/Output Summary (Last 24 hours) at 2024 0703  Last data filed at 2024 0418  Gross per 24 hour   Intake 450 ml   Output 775 ml   Net -325 ml    Temp (24hrs), Av.4 °F (36.3 °C), Min:97.2 °F (36.2 °C), Max:97.5 °F (36.4 °C)    Glucose:  Lab Results   Component Value Date    POCGLU 78 2024    POCGLU 99 2024    POCGLU 109 (H) 2023    POCGLU 76 2023     Physical Examination:   Objective:  General Appearance:  Comfortable and well-appearing.    Vital

## 2024-07-24 NOTE — PLAN OF CARE
Problem: Discharge Planning  Goal: Discharge to home or other facility with appropriate resources  7/24/2024 0937 by Frederic Casteloln, RN  Outcome: Progressing  7/24/2024 0011 by Dianna Cordero RN  Outcome: Progressing  Flowsheets (Taken 7/24/2024 0003)  Discharge to home or other facility with appropriate resources:   Identify barriers to discharge with patient and caregiver   Arrange for needed discharge resources and transportation as appropriate     Problem: Pain  Goal: Verbalizes/displays adequate comfort level or baseline comfort level  7/24/2024 0937 by Frederic Castellon, RN  Outcome: Progressing  7/24/2024 0011 by Dianna Cordero RN  Outcome: Progressing     Problem: Skin/Tissue Integrity  Goal: Absence of new skin breakdown  Description: 1.  Monitor for areas of redness and/or skin breakdown  2.  Assess vascular access sites hourly  3.  Every 4-6 hours minimum:  Change oxygen saturation probe site  4.  Every 4-6 hours:  If on nasal continuous positive airway pressure, respiratory therapy assess nares and determine need for appliance change or resting period.  7/24/2024 0937 by Frederic Castellon, RN  Outcome: Progressing  7/24/2024 0011 by Dianna Cordero RN  Outcome: Progressing     Problem: Safety - Adult  Goal: Free from fall injury  7/24/2024 0937 by Frederic Castellon, RN  Outcome: Progressing  7/24/2024 0011 by Dianna Cordero RN  Outcome: Progressing  Flowsheets (Taken 7/24/2024 0010)  Free From Fall Injury: Instruct family/caregiver on patient safety

## 2024-07-24 NOTE — PROGRESS NOTES
[facility] MEDICINE  - PROGRESS NOTE    Admit Date: 7/22/2024 7/24/2024    Subjective: Pt feeling better and wants to eat    Objective:   Vitals: BP (!) 133/50   Pulse (!) 44   Temp 97 °F (36.1 °C) (Temporal)   Resp 16   Ht 1.753 m (5' 9\")   Wt 71.7 kg (158 lb)   SpO2 96%   BMI 23.33 kg/m²   General appearance: alert, appears stated age and cooperative  Skin: Skin color, texture, turgor normal.   HEENT: Head: Normocephalic, no lesions, without obvious abnormality.  Neck: no adenopathy, no carotid bruit, no JVD, supple, symmetrical, trachea midline, and thyroid not enlarged, symmetric, no tenderness/mass/nodules  Lungs: clear to auscultation bilaterally  Heart: regular rate and rhythm, S1, S2 normal, no murmur, click, rub or gallop and pedro luis  Abdomen: soft, non-tender; bowel sounds normal; no masses,  no organomegaly  Extremities: extremities normal, atraumatic, no cyanosis or edema  Lymphatic: No significant lymph node enlargement papable  Neurologic: Mental status: Alert, oriented, thought content appropriate      Data:   Scheduled Meds: Reviewed  Continuous Infusions:   sodium chloride         Intake/Output Summary (Last 24 hours) at 7/24/2024 1043  Last data filed at 7/24/2024 0418  Gross per 24 hour   Intake 210 ml   Output 600 ml   Net -390 ml     CBC:   Recent Labs     07/22/24  1608   WBC 9.3   HGB 11.6*        BMP:  Recent Labs     07/24/24  0729      K 5.4*      CO2 23   BUN 46*   CREATININE 2.3*   GLUCOSE 81     ABGs:   Lab Results   Component Value Date/Time    PHART 7.380 08/24/2023 10:29 AM    PO2ART 55.0 08/24/2023 10:29 AM    SHF7PKZ 45.0 08/24/2023 10:29 AM     INR: No results for input(s): \"INR\" in the last 72 hours.  -----------------------------------------------------------------            Assessment/Plan    Patient Active Problem List:     ASHD (arteriosclerotic heart disease)     Hypertension

## 2024-07-24 NOTE — PROGRESS NOTES
Physical Therapy    Name: Atul Peoples  MRN: [id number]  Date of service: 7/24/2024 07/24/24 1530   Restrictions/Precautions   Restrictions/Precautions Fall Risk   General   Diagnosis BRADYCARDIA, FALL   General Comment   Comments Pt in bed   Subjective   Subjective Pt insisting on putting clothes on, only has jeans here. Pt okay with paper hospital clothes   Subjective   Subjective no complaints just cold   Bed mobility   Supine to Sit Supervision   Sit to Supine Supervision   Transfers   Sit to Stand Minimal Assistance   Stand to Sit Minimal Assistance   Comment toilet transfer, unable to have BM   Ambulation   Surface Level tile   Device Large Base Quad Cane;Rolling Walker   Assistance Contact guard assistance   Quality of Gait unsteady with quad cane, switched to RW   Gait Deviations Slow Marian;Decreased step length   Distance 20, 25, 15   Comments Rested between each. To hallway, back into room to bathroom, then back to bed   Short Term Goals   Time Frame for Short Term Goals 14 DAYS   Short Term Goal 1 BED MOB MOD IND   Short Term Goal 2 TRANSFERS SUPERVISION   Short Term Goal 3 ' AAD SUPERVISION   Activity Tolerance   Activity Tolerance Patient tolerated treatment well   Assessment   Assessment Pt is doing well. Ambulated shorts distances several times with rest breaks in between. Switched to RW due to unsteady with cane. Attempted to have BM, pt unable. Returned pt to bed with all needs in reach.   Discharge Recommendations Continue to assess pending progress;24 hour supervision or assist   Physical Therapy Plan   General Plan 5-7 times per week   Current Treatment Recommendations Strengthening;ROM;Balance training;Functional mobility training;Transfer training;Gait training;Safety education & training;Patient/Caregiver education & training;Endurance training   Additional Comments RW   PT Plan of Care   Wednesday X   Safety Devices   Type of Devices Left in bed;Bed alarm in place;Call light

## 2024-07-24 NOTE — CARE COORDINATION
Ifrah spoke to pt daughter Tootie who would like a referral to [facility]  PH: [phone]  F: [phone]. Sw informed pt daughter they are currently full and pt would have to choose other options as backups. Pt daughter states she has no preference for other SNF referrals .     [facility]  Ph [phone]  Fax [phone]  Referral Fax:  [phone]    [facility]  p [phone]  f [phone]    Awaiting approval/denial  Electronically signed by EMILE KINGSTON on 7/24/2024 at 10:19 AM    Sw spoke to pt about skilled therapy pt states he is okay with snf referrals  Electronically signed by EMILE KINGSTON on 7/24/2024 at 2:51 PM

## 2024-07-24 NOTE — PLAN OF CARE
Problem: Discharge Planning  Goal: Discharge to home or other facility with appropriate resources  7/24/2024 0011 by Dianna Cordero RN  Outcome: Progressing  Flowsheets (Taken 7/24/2024 0003)  Discharge to home or other facility with appropriate resources:   Identify barriers to discharge with patient and caregiver   Arrange for needed discharge resources and transportation as appropriate  7/23/2024 1828 by Jenifer Abdi RN  Outcome: Progressing  Flowsheets (Taken 7/23/2024 1716)  Discharge to home or other facility with appropriate resources: Identify barriers to discharge with patient and caregiver     Problem: Pain  Goal: Verbalizes/displays adequate comfort level or baseline comfort level  7/24/2024 0011 by Dianna Cordero RN  Outcome: Progressing  7/23/2024 1828 by Jenifer Abdi RN  Outcome: Progressing     Problem: Skin/Tissue Integrity  Goal: Absence of new skin breakdown  Description: 1.  Monitor for areas of redness and/or skin breakdown  2.  Assess vascular access sites hourly  3.  Every 4-6 hours minimum:  Change oxygen saturation probe site  4.  Every 4-6 hours:  If on nasal continuous positive airway pressure, respiratory therapy assess nares and determine need for appliance change or resting period.  7/24/2024 0011 by Dianna Cordero RN  Outcome: Progressing  7/23/2024 1828 by Jenifer Abdi RN  Outcome: Progressing     Problem: Safety - Adult  Goal: Free from fall injury  7/24/2024 0011 by Dianna Cordero RN  Outcome: Progressing  Flowsheets (Taken 7/24/2024 0010)  Free From Fall Injury: Instruct family/caregiver on patient safety  7/23/2024 1828 by Jenifer Abdi RN  Outcome: Progressing     Problem: ABCDS Injury Assessment  Goal: Absence of physical injury  7/24/2024 0011 by Dianna Cordero RN  Outcome: Progressing  Flowsheets (Taken 7/24/2024 0010)  Absence of Physical Injury: Implement safety measures based on patient assessment  7/23/2024 1828 by Jenifer Abdi RN  Outcome: Progressing

## 2024-07-24 NOTE — PROGRESS NOTES
Automatic Dose Adjustment of                Subcutaneous Anticoagulant for Prophylaxis    Atul Peoples is a 84 y.o. male.     Recent Labs     07/22/24  1608 07/23/24  0503   CREATININE 2.3* 1.9*       Estimated Creatinine Clearance: 28 mL/min (A) (based on SCr of 1.9 mg/dL (H)).    Weight:  Wt Readings from Last 1 Encounters:   07/24/24 71.7 kg (158 lb)           Pharmacy has adjusted subcutaneous anticoagulant for prophylaxis to Enoxaparin 30 mg SC daily based on the patient's weight and estimated CrCl per [facility] policy.               Electronically signed by Kaiser Quintero RPH on 7/24/2024 at 7:15 AM

## 2024-07-25 VITALS
BODY MASS INDEX: 23.72 KG/M2 | RESPIRATION RATE: 16 BRPM | TEMPERATURE: 98.6 F | OXYGEN SATURATION: 93 % | SYSTOLIC BLOOD PRESSURE: 160 MMHG | WEIGHT: 160.13 LBS | HEIGHT: 69 IN | HEART RATE: 66 BPM | DIASTOLIC BLOOD PRESSURE: 63 MMHG

## 2024-07-25 LAB
ANION GAP SERPL CALCULATED.3IONS-SCNC: 8 MMOL/L (ref 7–19)
BUN SERPL-MCNC: 48 MG/DL (ref 8–23)
CALCIUM SERPL-MCNC: 8.4 MG/DL (ref 8.8–10.2)
CHLORIDE SERPL-SCNC: 109 MMOL/L (ref 98–111)
CO2 SERPL-SCNC: 24 MMOL/L (ref 22–29)
CREAT SERPL-MCNC: 2.1 MG/DL (ref 0.5–1.2)
GLUCOSE SERPL-MCNC: 97 MG/DL (ref 74–109)
POTASSIUM SERPL-SCNC: 5.4 MMOL/L (ref 3.5–5)
SODIUM SERPL-SCNC: 141 MMOL/L (ref 136–145)

## 2024-07-25 PROCEDURE — 99232 SBSQ HOSP IP/OBS MODERATE 35: CPT | Performed by: INTERNAL MEDICINE

## 2024-07-25 PROCEDURE — 97535 SELF CARE MNGMENT TRAINING: CPT

## 2024-07-25 PROCEDURE — 97530 THERAPEUTIC ACTIVITIES: CPT

## 2024-07-25 PROCEDURE — 2700000000 HC OXYGEN THERAPY PER DAY

## 2024-07-25 PROCEDURE — 94760 N-INVAS EAR/PLS OXIMETRY 1: CPT

## 2024-07-25 PROCEDURE — 6360000002 HC RX W HCPCS: Performed by: STUDENT IN AN ORGANIZED HEALTH CARE EDUCATION/TRAINING PROGRAM

## 2024-07-25 PROCEDURE — 2580000003 HC RX 258: Performed by: STUDENT IN AN ORGANIZED HEALTH CARE EDUCATION/TRAINING PROGRAM

## 2024-07-25 PROCEDURE — 6370000000 HC RX 637 (ALT 250 FOR IP): Performed by: STUDENT IN AN ORGANIZED HEALTH CARE EDUCATION/TRAINING PROGRAM

## 2024-07-25 PROCEDURE — 2580000003 HC RX 258: Performed by: INTERNAL MEDICINE

## 2024-07-25 PROCEDURE — 2140000000 HC CCU INTERMEDIATE R&B

## 2024-07-25 PROCEDURE — 80048 BASIC METABOLIC PNL TOTAL CA: CPT

## 2024-07-25 PROCEDURE — 36415 COLL VENOUS BLD VENIPUNCTURE: CPT

## 2024-07-25 PROCEDURE — 97116 GAIT TRAINING THERAPY: CPT

## 2024-07-25 RX ORDER — LEVOTHYROXINE SODIUM 0.15 MG/1
150 TABLET ORAL DAILY
Qty: 30 TABLET | Refills: 3 | Status: SHIPPED | OUTPATIENT
Start: 2024-07-26

## 2024-07-25 RX ORDER — SODIUM CHLORIDE 0.9 % (FLUSH) 0.9 %
5-40 SYRINGE (ML) INJECTION PRN
Status: DISCONTINUED | OUTPATIENT
Start: 2024-07-25 | End: 2024-07-26 | Stop reason: HOSPADM

## 2024-07-25 RX ORDER — SODIUM CHLORIDE 9 MG/ML
INJECTION, SOLUTION INTRAVENOUS PRN
Status: DISCONTINUED | OUTPATIENT
Start: 2024-07-25 | End: 2024-07-26 | Stop reason: HOSPADM

## 2024-07-25 RX ORDER — SODIUM CHLORIDE 0.9 % (FLUSH) 0.9 %
5-40 SYRINGE (ML) INJECTION EVERY 12 HOURS SCHEDULED
Status: DISCONTINUED | OUTPATIENT
Start: 2024-07-25 | End: 2024-07-26 | Stop reason: HOSPADM

## 2024-07-25 RX ADMIN — PANTOPRAZOLE SODIUM 40 MG: 40 TABLET, DELAYED RELEASE ORAL at 16:44

## 2024-07-25 RX ADMIN — CALCITRIOL CAPSULES 0.25 MCG 0.25 MCG: 0.25 CAPSULE ORAL at 09:00

## 2024-07-25 RX ADMIN — ENOXAPARIN SODIUM 30 MG: 100 INJECTION SUBCUTANEOUS at 09:07

## 2024-07-25 RX ADMIN — CLINDAMYCIN HYDROCHLORIDE 450 MG: 300 CAPSULE ORAL at 14:36

## 2024-07-25 RX ADMIN — PANTOPRAZOLE SODIUM 40 MG: 40 TABLET, DELAYED RELEASE ORAL at 05:50

## 2024-07-25 RX ADMIN — CLINDAMYCIN HYDROCHLORIDE 450 MG: 300 CAPSULE ORAL at 05:50

## 2024-07-25 RX ADMIN — CLINDAMYCIN HYDROCHLORIDE 450 MG: 300 CAPSULE ORAL at 21:05

## 2024-07-25 RX ADMIN — AMLODIPINE BESYLATE 5 MG: 5 TABLET ORAL at 09:00

## 2024-07-25 RX ADMIN — SODIUM CHLORIDE, PRESERVATIVE FREE 10 ML: 5 INJECTION INTRAVENOUS at 09:08

## 2024-07-25 RX ADMIN — EMPAGLIFLOZIN 10 MG: 10 TABLET, FILM COATED ORAL at 09:00

## 2024-07-25 RX ADMIN — ASPIRIN 81 MG: 81 TABLET, COATED ORAL at 09:00

## 2024-07-25 RX ADMIN — ATORVASTATIN CALCIUM 10 MG: 20 TABLET, FILM COATED ORAL at 09:00

## 2024-07-25 RX ADMIN — LEVOTHYROXINE SODIUM 150 MCG: 75 TABLET ORAL at 05:50

## 2024-07-25 RX ADMIN — CLOPIDOGREL BISULFATE 75 MG: 75 TABLET ORAL at 09:00

## 2024-07-25 RX ADMIN — TAMSULOSIN HYDROCHLORIDE 0.4 MG: 0.4 CAPSULE ORAL at 21:05

## 2024-07-25 RX ADMIN — TAMSULOSIN HYDROCHLORIDE 0.4 MG: 0.4 CAPSULE ORAL at 09:00

## 2024-07-25 ASSESSMENT — ENCOUNTER SYMPTOMS
COUGH: 0
STRIDOR: 0
NAUSEA: 0
DIARRHEA: 0
ABDOMINAL DISTENTION: 0
CHEST TIGHTNESS: 0
WHEEZING: 0
SHORTNESS OF BREATH: 0
VOMITING: 0
ABDOMINAL PAIN: 0

## 2024-07-25 NOTE — PROGRESS NOTES
This  visited with pt to provide support and spiritual care. Pt says he may go home tomorrow. Provided spiritual care with sustaining presence, nurtured hope, and prayer. Pt expressed gratitude for spiritual care.       Electronically signed by Mary Behrens on 7/25/2024 at 11:52 AM

## 2024-07-25 NOTE — PLAN OF CARE
Problem: Discharge Planning  Goal: Discharge to home or other facility with appropriate resources  Outcome: Progressing  Flowsheets (Taken 7/24/2024 2145)  Discharge to home or other facility with appropriate resources:   Identify barriers to discharge with patient and caregiver   Arrange for needed discharge resources and transportation as appropriate     Problem: Pain  Goal: Verbalizes/displays adequate comfort level or baseline comfort level  Outcome: Progressing     Problem: Skin/Tissue Integrity  Goal: Absence of new skin breakdown  Description: 1.  Monitor for areas of redness and/or skin breakdown  2.  Assess vascular access sites hourly  3.  Every 4-6 hours minimum:  Change oxygen saturation probe site  4.  Every 4-6 hours:  If on nasal continuous positive airway pressure, respiratory therapy assess nares and determine need for appliance change or resting period.  Outcome: Progressing     Problem: Safety - Adult  Goal: Free from fall injury  Outcome: Progressing  Flowsheets (Taken 7/25/2024 0050)  Free From Fall Injury: Instruct family/caregiver on patient safety     Problem: ABCDS Injury Assessment  Goal: Absence of physical injury  Outcome: Progressing  Flowsheets (Taken 7/25/2024 0050)  Absence of Physical Injury: Implement safety measures based on patient assessment     Problem: Chronic Conditions and Co-morbidities  Goal: Patient's chronic conditions and co-morbidity symptoms are monitored and maintained or improved  Outcome: Progressing  Flowsheets (Taken 7/24/2024 2145)  Care Plan - Patient's Chronic Conditions and Co-Morbidity Symptoms are Monitored and Maintained or Improved:   Monitor and assess patient's chronic conditions and comorbid symptoms for stability, deterioration, or improvement   Collaborate with multidisciplinary team to address chronic and comorbid conditions and prevent exacerbation or deterioration     Problem: Nutrition Deficit:  Goal: Optimize nutritional status  Outcome:

## 2024-07-25 NOTE — PROGRESS NOTES
Daily Progress Note  Atul Peoples  MRN: [id number] LOS: 2    Admit Date: 2024 7:02 AM    Subjective:         Interval History:    Reviewed overnight events and nursing notes.     Review of Systems    DIET:  Diet NPO Exceptions are: Sips of Water with Meds    Medications:      sodium chloride      sodium chloride        sodium chloride flush  5-40 mL IntraVENous 2 times per day    amLODIPine  5 mg Oral Daily    enoxaparin  30 mg SubCUTAneous Daily    sodium chloride flush  5-40 mL IntraVENous 2 times per day    aspirin  81 mg Oral Daily    atorvastatin  10 mg Oral Daily    calcitRIOL  0.25 mcg Oral Daily    clopidogrel  75 mg Oral Daily    empagliflozin  10 mg Oral Daily    [Held by provider] losartan  50 mg Oral Daily    pantoprazole  40 mg Oral BID AC    tamsulosin  0.4 mg Oral BID    levothyroxine  150 mcg Oral Daily    clindamycin  450 mg Oral 3 times per day         Objective:     Vitals: BP (!) 157/48   Pulse 55   Temp 98.1 °F (36.7 °C) (Temporal)   Resp 16   Ht 1.753 m (5' 9\")   Wt 72.6 kg (160 lb 2 oz)   SpO2 93%   BMI 23.65 kg/m²    Intake/Output Summary (Last 24 hours) at 2024 0702  Last data filed at 2024 0441  Gross per 24 hour   Intake 240 ml   Output 450 ml   Net -210 ml    Temp (24hrs), Av.2 °F (36.2 °C), Min:96.8 °F (36 °C), Max:98.1 °F (36.7 °C)    Glucose:  Lab Results   Component Value Date    POCGLU 78 2024    POCGLU 99 2024    POCGLU 109 (H) 2023    POCGLU 76 2023     Physical Examination:   Objective:  Vital signs: (most recent): Blood pressure (!) 157/48, pulse 55, temperature 98.1 °F (36.7 °C), temperature source Temporal, resp. rate 16, height 1.753 m (5' 9\"), weight 72.6 kg (160 lb 2 oz), SpO2 93 %.         Labs:  No results found for: \"CHEMIS\", \"CBC\"     Imaging:  Echo (TTE) complete (PRN contrast/bubble/strain/3D)    Left Ventricle: Normal left ventricular systolic function with a   visually estimated EF of 50 - 55%. Left

## 2024-07-25 NOTE — DISCHARGE SUMMARY
Hospital Discharge Summary    Atul Peoples  :  1940  MRN:  [id number]    Admit date:  2024  Discharge date:      Admitting Physician:    Wilfredo Smith MD    Discharge Diagnoses:    Principal Problem:    Bradycardia  Active Problems:    Acute kidney injury (nontraumatic) (HCC)    COPD (chronic obstructive pulmonary disease) (HCC)    Altered mental status    Moderate malnutrition (HCC)    Myxedema coma (HCC)  Resolved Problems:    * No resolved hospital problems. *      Hospital Course:      84-year-old male with past medical history of congestive heart failure, COPD, and hypothyroidism who presented with myxedema coma.  He presented with symptoms consistent with all, and sinus bradycardia.  TSH was found to be 43 and it appears he probably not been taking his home Synthroid.  He was treated with IV Synthroid and his home Synthroid dose was increased to 150 from 125 mcg.  He subsequently improved and will be discharged to [place] to facilitate rehabilitation.    Additionally, his metoprolol was held for bradycardia and hyperkalemia.  These can both be restarted if heart rate and potassium aren't a problem.    Discharge Medications:         Medication List        CHANGE how you take these medications      levothyroxine 150 MCG tablet  Commonly known as: SYNTHROID  Take 1 tablet by mouth Daily  Start taking on: 2024  What changed:   medication strength  how much to take  when to take this            CONTINUE taking these medications      acetaminophen 500 MG tablet  Commonly known as: TYLENOL     amLODIPine 5 MG tablet  Commonly known as: NORVASC  Take 1 tablet by mouth daily     aspirin 81 MG EC tablet     atorvastatin 10 MG tablet  Commonly known as: LIPITOR     calcitRIOL 0.25 MCG capsule  Commonly known as: ROCALTROL  Take 1 capsule by mouth daily     CENTRUM SILVER PO     clopidogrel 75 MG tablet  Commonly known as: Plavix  Take 1 tablet by mouth daily Start after Xarelto done

## 2024-07-25 NOTE — PROGRESS NOTES
Physical Therapy    Name: Atul Peoples  MRN: [id number]  Date of service: 7/25/2024 07/25/24 1130   Restrictions/Precautions   Restrictions/Precautions Fall Risk   General   Diagnosis BRADYCARDIA, FALL   General Comment   Comments Pt in bed   Subjective   Subjective agreed to therapy, states he is weaker   Subjective   Subjective back always hurts   Pain chronic pain   Bed mobility   Supine to Sit Stand by assistance   Sit to Supine Stand by assistance   Transfers   Sit to Stand Minimal Assistance;Moderate Assistance   Stand to Sit Minimal Assistance   Comment toilet transfer, mod assist to stand from lower surface   Ambulation   Surface Level tile   Device Rolling Walker   Other Apparatus O2  (1.5L)   Assistance Minimal assistance   Quality of Gait unsteady, constant verbal cues for walker managment. RW too far anterior and keeping walker out to the side   Gait Deviations Slow Marian;Decreased step length;Decreased step height   Distance 20, 25   Comments in room to bathroom, then to recliner   Short Term Goals   Time Frame for Short Term Goals 14 DAYS   Short Term Goal 1 BED MOB MOD IND   Short Term Goal 2 TRANSFERS SUPERVISION   Short Term Goal 3 ' AAD SUPERVISION   Activity Tolerance   Activity Tolerance Patient tolerated treatment well   Assessment   Assessment Pt is doing well, is weaker today and requiring more cueing. Pt min/mod assist for transfers, ambulated into bathroom. Sat for 10+ minutes. Could not have BM. Then prepared recliner. Left pt in recliner with all needs in reach, table infront, and alarm on   Discharge Recommendations Continue to assess pending progress;24 hour supervision or assist   Physical Therapy Plan   General Plan 5-7 times per week   Current Treatment Recommendations Strengthening;ROM;Balance training;Functional mobility training;Transfer training;Gait training;Safety education & training;Patient/Caregiver education & training;Endurance training   Additional Comments NAVEEN

## 2024-07-25 NOTE — PROGRESS NOTES
Occupational Therapy     07/25/24 1149   Subjective   Subjective Pt in bed upon arrival for therapy. Pt agreeable to participate.   Pain Assessment   Pain Assessment None - Denies Pain   Vitals   Temp 97.7 °F (36.5 °C)   Pulse 54   Heart Rate Source Monitor   Respirations 16   SpO2 97 %   O2 Device Nasal cannula   BP (!) 148/46   MAP (Calculated) 80   BP Location Left upper arm   Patient Position Sitting   Cognition   Overall Cognitive Status Exceptions   Arousal/Alertness Appears intact   Following Commands Follows one step commands with repetition   Attention Span Appears intact   Memory Appears intact   Safety Judgement Decreased awareness of need for assistance;Decreased awareness of need for safety   Problem Solving Assistance required to implement solutions;Assistance required to correct errors made   Insights Impaired   Initiation Impaired   Sequencing Appears intact   Cognition Comment Awake, alert, verbal, but needs supervision for safety. Possible mild confusion but does well in a structured environment. [place].   Orientation   Overall Orientation Status WF   Bed Mobility Training   Bed Mobility Training Yes   Overall Level of Assistance Stand-by assistance   Interventions Verbal cues   Supine to Sit Stand-by assistance   Scooting Stand-by assistance   Transfer Training   Transfer Training Yes   Overall Level of Assistance Minimum assistance   Interventions Verbal cues;Tactile cues;Manual cues   Sit to Stand Minimum assistance   Stand to Sit Minimum assistance   Toilet Transfer Minimum assistance   Balance   Sitting Intact   Standing Impaired   Standing - Static Occasional   Standing - Dynamic Occasional;Poor   ADL   Feeding Supervision   Grooming Supervision;Setup   UE Bathing Minimal assistance   LE Bathing Minimal assistance;Moderate assistance   UE Dressing Minimal assistance   LE Dressing Moderate assistance   Toileting Minimal assistance   Functional Mobility Minimal assistance   Assessment

## 2024-07-25 NOTE — PROGRESS NOTES
Comprehensive Nutrition Assessment    Type and Reason for Visit:  Reassess    Nutrition Recommendations/Plan:   Modify diet to include potassium restriction.  Food preferences updated.     Malnutrition Assessment:  Malnutrition Status:  Moderate malnutrition (07/23/24 1352)    Context:  Acute Illness     Findings of the 6 clinical characteristics of malnutrition:  Energy Intake:  No significant decrease in energy intake  Weight Loss:  No significant weight loss     Body Fat Loss:  Mild body fat loss Orbital   Muscle Mass Loss:       Fluid Accumulation:  Mild Extremities   Strength:       Nutrition Assessment:    Documented intake adequate: %, 1-25%, 51-75%. Observed 51-75% of lunch and 100% of Ensure ONS consumed. Food preferences obtained for sweet tea and no rice or carrots. Noted K+ elevated at 5.4; modified diet to include K+ restriction. Will continue to monitor.    Nutrition Related Findings:    +1 RLE, +2 pitting LLE edema. K+ 5.4. Wound Type: Stage II       Current Nutrition Intake & Therapies:    Average Meal Intake: %, 1-25%, 51-75%  Average Supplements Intake: %  ADULT ORAL NUTRITION SUPPLEMENT; Breakfast, Lunch, Dinner; Standard High Calorie/High Protein Oral Supplement  ADULT DIET; Regular; No Added Salt (3-4 gm); Low Potassium (Less than 3000 mg/day); No Green Beans or Coffee    Anthropometric Measures:  Height: 175.3 cm (5' 9.02\")  Ideal Body Weight (IBW): 160 lbs (73 kg)    Current Body Weight: 72.6 kg (160 lb 0.9 oz), 100 % IBW.    Current BMI (kg/m2): 23.6  BMI Categories: Normal Weight (BMI 22.0 to 24.9) age over 65    Estimated Daily Nutrient Needs:  Energy Requirements Based On: Kcal/kg  Weight Used for Energy Requirements: Current  Energy (kcal/day): 9445-1654 (25-30 kcal/kg)  Weight Used for Protein Requirements: Current  Protein (g/day):  (1-2 g/kg)  Method Used for Fluid Requirements: 1 ml/kcal  Fluid (ml/day): 6599-9525    Nutrition Diagnosis:   Moderate  malnutrition related to acute injury/trauma as evidenced by Criteria as identified in malnutrition assessment    Nutrition Interventions:   Food and/or Nutrient Delivery: Continue Oral Nutrition Supplement, Modify Current Diet  Nutrition Education/Counseling: No recommendation at this time  Coordination of Nutrition Care: Continue to monitor while inpatient    Goals:  Previous Goal Met: Progressing toward Goal(s)  Goals: PO intake 75% or greater    Nutrition Monitoring and Evaluation:   Behavioral-Environmental Outcomes: None Identified  Food/Nutrient Intake Outcomes: Food and Nutrient Intake, Supplement Intake  Physical Signs/Symptoms Outcomes: Biochemical Data, Fluid Status or Edema, Nutrition Focused Physical Findings, Skin, Weight    Discharge Planning:    No discharge needs at this time     MARIANGEL WAY MS, RD, LD  Contact: [phone]

## 2024-07-25 NOTE — PROGRESS NOTES
[facility] MEDICINE  - PROGRESS NOTE    Admit Date: 7/22/2024 7/25/2024    Subjective: Patient feels fine this morning    Objective:   Vitals: BP (!) 157/48   Pulse 55   Temp 98.1 °F (36.7 °C) (Temporal)   Resp 16   Ht 1.753 m (5' 9\")   Wt 72.6 kg (160 lb 2 oz)   SpO2 93%   BMI 23.65 kg/m²   General appearance: alert, appears stated age and cooperative  Skin: Skin color, texture, turgor normal.   HEENT: Head: Normocephalic, no lesions, without obvious abnormality.  Neck: no adenopathy, no carotid bruit, no JVD, supple, symmetrical, trachea midline, and thyroid not enlarged, symmetric, no tenderness/mass/nodules  Lungs: clear to auscultation bilaterally  Heart: regular rate and rhythm, S1, S2 normal, no murmur, click, rub or gallop  Abdomen: soft, non-tender; bowel sounds normal; no masses,  no organomegaly  Extremities: extremities normal, atraumatic, no cyanosis or edema  Lymphatic: No significant lymph node enlargement papable  Neurologic: Mental status: Alert, oriented, thought content appropriate      Data:   Scheduled Meds: Reviewed  Continuous Infusions:   sodium chloride      sodium chloride         Intake/Output Summary (Last 24 hours) at 7/25/2024 0722  Last data filed at 7/25/2024 0441  Gross per 24 hour   Intake 240 ml   Output 450 ml   Net -210 ml     CBC:   Recent Labs     07/22/24  1608   WBC 9.3   HGB 11.6*        BMP:  Recent Labs     07/25/24  0416      K 5.4*      CO2 24   BUN 48*   CREATININE 2.1*   GLUCOSE 97     ABGs:   Lab Results   Component Value Date/Time    PHART 7.380 08/24/2023 10:29 AM    PO2ART 55.0 08/24/2023 10:29 AM    TAF1RHT 45.0 08/24/2023 10:29 AM     INR: No results for input(s): \"INR\" in the last 72 hours.  -----------------------------------------------------------------            Assessment/Plan    Patient Active Problem List:     ASHD (arteriosclerotic heart disease)     Hypertension

## 2024-07-25 NOTE — PROGRESS NOTES
Call placed to Dr Smith office to make sure they were still aware pt can DC today. Was told they would tell MD.

## 2024-07-25 NOTE — PLAN OF CARE
Problem: Discharge Planning  Goal: Discharge to home or other facility with appropriate resources  7/25/2024 1624 by Frederic Castellon, RN  Outcome: Adequate for Discharge  7/25/2024 1106 by Frederic Castellon, RN  Outcome: Progressing     Problem: Pain  Goal: Verbalizes/displays adequate comfort level or baseline comfort level  7/25/2024 1624 by Frederic Castellon, RN  Outcome: Adequate for Discharge  7/25/2024 1106 by Frederic Castellon, RN  Outcome: Progressing

## 2024-07-25 NOTE — PLAN OF CARE
Problem: Discharge Planning  Goal: Discharge to home or other facility with appropriate resources  7/25/2024 1106 by Frederic Castellon, RN  Outcome: Progressing  7/25/2024 0051 by Dianna Cordero RN  Outcome: Progressing  Flowsheets (Taken 7/24/2024 2145)  Discharge to home or other facility with appropriate resources:   Identify barriers to discharge with patient and caregiver   Arrange for needed discharge resources and transportation as appropriate     Problem: Pain  Goal: Verbalizes/displays adequate comfort level or baseline comfort level  7/25/2024 1106 by Frederic Castellon, RN  Outcome: Progressing  7/25/2024 0051 by Dianna Cordero RN  Outcome: Progressing     Problem: Skin/Tissue Integrity  Goal: Absence of new skin breakdown  Description: 1.  Monitor for areas of redness and/or skin breakdown  2.  Assess vascular access sites hourly  3.  Every 4-6 hours minimum:  Change oxygen saturation probe site  4.  Every 4-6 hours:  If on nasal continuous positive airway pressure, respiratory therapy assess nares and determine need for appliance change or resting period.  7/25/2024 1106 by Frederic Castellon, RN  Outcome: Progressing  7/25/2024 0051 by Dianna Cordero RN  Outcome: Progressing     Problem: Safety - Adult  Goal: Free from fall injury  7/25/2024 1106 by Frederic Castellon, RN  Outcome: Progressing  7/25/2024 0051 by Dianna Cordero RN  Outcome: Progressing  Flowsheets (Taken 7/25/2024 0050)  Free From Fall Injury: Instruct family/caregiver on patient safety

## 2024-07-25 NOTE — PROGRESS NOTES
Call to [facility] and gave report to Prema. All questions answered. Pt condition and progress was discussed.

## 2024-07-25 NOTE — CARE COORDINATION
Pre-cert approved for [facility]    Ph: [phone]  Fa: [phone]  Electronically signed by EMILE KINGSTON on 7/25/2024 at 11:19 AM      Sw spoke to pt about SNF bed offers. Pt has accepted a bed offer with [facility]    Ph: [phone]  Fa: [phone]  Pt pre-cert has started 7/25  Electronically signed by EMILE KINGSTON on 7/25/2024 at 9:27 AM

## 2024-07-26 LAB
A BAUMANNII DNA BLD POS QL NAA+NON-PROBE: NOT DETECTED
C ALBICANS DNA BLD POS QL NAA+NON-PROBE: NOT DETECTED
C AURIS DNA BLD POS QL NAA+PROBE: NOT DETECTED
C GLABRATA DNA BLD POS QL NAA+NON-PROBE: NOT DETECTED
C KRUSEI DNA BLD POS QL NAA+NON-PROBE: NOT DETECTED
C PARAP DNA BLD POS QL NAA+NON-PROBE: NOT DETECTED
C TROPICLS DNA BLD POS QL NAA+NON-PROBE: NOT DETECTED
CRYPTOCOCCUS NEOFORMANS/GATTII BY PCR: NOT DETECTED
E CLOAC COMP DNA BLD POS NAA+NON-PROBE: NOT DETECTED
E COLI DNA BLD POS QL NAA+NON-PROBE: NOT DETECTED
E FAECALIS DNA BLD POS QL NAA+PROBE: NOT DETECTED
E FAECIUM DNA BLD POS QL NAA+PROBE: NOT DETECTED
ENTEROBACT DNA BLD POS QL NAA+NON-PROBE: NOT DETECTED
ENTEROCOC DNA BLD POS QL NAA+NON-PROBE: NOT DETECTED
GN BLD CULTURE PNL BLD POS NAA+PROBE: NOT DETECTED
GP B STREP DNA BLD POS QL NAA+NON-PROBE: NOT DETECTED
K OXYTOCA DNA BLD POS QL NAA+NON-PROBE: NOT DETECTED
K PNEUMON DNA SPEC QL NAA+PROBE: NOT DETECTED
K. AEROGENES DNA SPEC QL NAA+PROBE: NOT DETECTED
L MONOCYTOG DNA BLD POS QL NAA+NON-PROBE: NOT DETECTED
N MEN DNA BLD POS QL NAA+NON-PROBE: NOT DETECTED
P AERUGINOSA DNA BLD POS NAA+NON-PROBE: NOT DETECTED
PROTEUS SP DNA BLD POS QL NAA+NON-PROBE: NOT DETECTED
S AUREUS DNA BLD POS QL NAA+NON-PROBE: NOT DETECTED
S AUREUS+CONS DNA BLD POS NAA+NON-PROBE: NOT DETECTED
S EPIDERMIDIS DNA BLD POS QL NAA+PROBE: NOT DETECTED
S LUGDUNENSIS DNA BLD POS QL NAA+PROBE: NOT DETECTED
S MALTOPH DNA BLD POS QL NAA+PROBE: NOT DETECTED
S MARCESCENS DNA BLD POS NAA+NON-PROBE: NOT DETECTED
S PNEUM DNA BLD POS QL NAA+NON-PROBE: NOT DETECTED
S PYO DNA BLD POS QL NAA+NON-PROBE: NOT DETECTED
SALMONELLA DNA BLD POS QL NAA+PROBE: NOT DETECTED
STREPTOCOCCUS DNA BLD POS NAA+NON-PROBE: NOT DETECTED

## 2024-07-26 PROCEDURE — 2700000000 HC OXYGEN THERAPY PER DAY

## 2024-07-26 NOTE — PROGRESS NOTES
Call placed to [facility] and spoke to Prema and told her of the Blood Culture and that it was faxed to them for review.

## 2024-07-26 NOTE — PROGRESS NOTES
Lab called with blood culture result and instead of letting the facility where the pt is staying know, call here where the pt is not at. Pt was DC yesterday. I did fax the result to [facility]. Fax report shows completed/sent and report placed in bin for medical records.

## 2024-07-27 LAB
BACTERIA BLD CULT ORG #2: ABNORMAL
BACTERIA BLD CULT ORG #2: ABNORMAL
BACTERIA BLD CULT: NORMAL
CORTIS F SERPL-MCNC: 1.49 UG/DL
ORGANISM: ABNORMAL

## 2024-08-16 ENCOUNTER — HOME HEALTH ADMISSION (OUTPATIENT)
Dept: HOME HEALTH SERVICES | Facility: HOME HEALTHCARE | Age: 84
End: 2024-08-16
Payer: COMMERCIAL

## 2024-08-21 ENCOUNTER — APPOINTMENT (OUTPATIENT)
Dept: CT IMAGING | Age: 84
End: 2024-08-21
Payer: MEDICARE

## 2024-08-21 ENCOUNTER — HOSPITAL ENCOUNTER (EMERGENCY)
Age: 84
Discharge: HOME | End: 2024-08-21
Attending: EMERGENCY MEDICINE
Payer: MEDICARE

## 2024-08-21 VITALS
DIASTOLIC BLOOD PRESSURE: 62 MMHG | RESPIRATION RATE: 14 BRPM | SYSTOLIC BLOOD PRESSURE: 176 MMHG | TEMPERATURE: 98.6 F | HEART RATE: 60 BPM | OXYGEN SATURATION: 97 %
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
## 2024-08-21 DIAGNOSIS — S09.90XA CLOSED HEAD INJURY, INITIAL ENCOUNTER: Primary | ICD-10-CM

## 2024-08-21 LAB
ALBUMIN SERPL-MCNC: 3.2 G/DL (ref 3.5–5.2)
ALP SERPL-CCNC: 87 U/L (ref 40–130)
ALT SERPL-CCNC: 19 U/L (ref 5–41)
ANION GAP SERPL CALCULATED.3IONS-SCNC: 11 MMOL/L (ref 7–19)
AST SERPL-CCNC: 52 U/L (ref 5–40)
BACTERIA URNS QL MICRO: NEGATIVE /HPF
BASOPHILS # BLD: 0.1 K/UL (ref 0–0.2)
BASOPHILS NFR BLD: 0.6 % (ref 0–1)
BILIRUB SERPL-MCNC: 0.6 MG/DL (ref 0.2–1.2)
BILIRUB UR QL STRIP: NEGATIVE
BUN SERPL-MCNC: 36 MG/DL (ref 8–23)
CALCIUM SERPL-MCNC: 9.1 MG/DL (ref 8.8–10.2)
CHLORIDE SERPL-SCNC: 103 MMOL/L (ref 98–111)
CK SERPL-CCNC: 789 U/L (ref 39–308)
CLARITY UR: CLEAR
CO2 SERPL-SCNC: 25 MMOL/L (ref 22–29)
COLOR UR: YELLOW
CREAT SERPL-MCNC: 1.7 MG/DL (ref 0.5–1.2)
CRYSTALS URNS MICRO: ABNORMAL /HPF
EOSINOPHIL # BLD: 0.1 K/UL (ref 0–0.6)
EOSINOPHIL NFR BLD: 0.7 % (ref 0–5)
EPI CELLS #/AREA URNS AUTO: 5 /HPF (ref 0–5)
ERYTHROCYTE [DISTWIDTH] IN BLOOD BY AUTOMATED COUNT: 18.4 % (ref 11.5–14.5)
GLUCOSE SERPL-MCNC: 100 MG/DL (ref 74–109)
GLUCOSE UR STRIP.AUTO-MCNC: 250 MG/DL
HCT VFR BLD AUTO: 36.7 % (ref 42–52)
HGB BLD-MCNC: 12.2 G/DL (ref 14–18)
HGB UR STRIP.AUTO-MCNC: ABNORMAL MG/L
HYALINE CASTS #/AREA URNS AUTO: 7 /HPF (ref 0–8)
IMM GRANULOCYTES # BLD: 0.1 K/UL
KETONES UR STRIP.AUTO-MCNC: NEGATIVE MG/DL
LEUKOCYTE ESTERASE UR QL STRIP.AUTO: ABNORMAL
LYMPHOCYTES # BLD: 1.7 K/UL (ref 1.1–4.5)
LYMPHOCYTES NFR BLD: 18.1 % (ref 20–40)
MCH RBC QN AUTO: 31.9 PG (ref 27–31)
MCHC RBC AUTO-ENTMCNC: 33.2 G/DL (ref 33–37)
MCV RBC AUTO: 95.8 FL (ref 80–94)
MONOCYTES # BLD: 1 K/UL (ref 0–0.9)
MONOCYTES NFR BLD: 10.4 % (ref 0–10)
NEUTROPHILS # BLD: 6.5 K/UL (ref 1.5–7.5)
NEUTS SEG NFR BLD: 69.1 % (ref 50–65)
NITRITE UR QL STRIP.AUTO: NEGATIVE
PH UR STRIP.AUTO: 7 [PH] (ref 5–8)
PLATELET # BLD AUTO: 164 K/UL (ref 130–400)
POTASSIUM SERPL-SCNC: 5 MMOL/L (ref 3.5–5)
PROT SERPL-MCNC: 6.6 G/DL (ref 6.6–8.7)
PROT UR STRIP.AUTO-MCNC: =>1000 MG/DL
RBC # BLD AUTO: 3.83 M/UL (ref 4.7–6.1)
RBC #/AREA URNS AUTO: 5 /HPF (ref 0–4)
SODIUM SERPL-SCNC: 139 MMOL/L (ref 136–145)
SP GR UR STRIP.AUTO: 1.02 (ref 1–1.03)
UROBILINOGEN UR STRIP.AUTO-MCNC: 0.2 E.U./DL
WBC # BLD AUTO: 9.5 K/UL (ref 4.8–10.8)
WBC #/AREA URNS AUTO: 2 /HPF (ref 0–5)

## 2024-08-21 PROCEDURE — 72125 CT NECK SPINE W/O DYE: CPT

## 2024-08-21 PROCEDURE — 36415 COLL VENOUS BLD VENIPUNCTURE: CPT

## 2024-08-21 PROCEDURE — 80053 COMPREHEN METABOLIC PANEL: CPT

## 2024-08-21 PROCEDURE — 72131 CT LUMBAR SPINE W/O DYE: CPT

## 2024-08-21 PROCEDURE — 99284 EMERGENCY DEPT VISIT MOD MDM: CPT

## 2024-08-21 PROCEDURE — 96374 THER/PROPH/DIAG INJ IV PUSH: CPT

## 2024-08-21 PROCEDURE — 72128 CT CHEST SPINE W/O DYE: CPT

## 2024-08-21 PROCEDURE — 85025 COMPLETE CBC W/AUTO DIFF WBC: CPT

## 2024-08-21 PROCEDURE — 6360000002 HC RX W HCPCS: Performed by: EMERGENCY MEDICINE

## 2024-08-21 PROCEDURE — 93005 ELECTROCARDIOGRAM TRACING: CPT | Performed by: EMERGENCY MEDICINE

## 2024-08-21 PROCEDURE — 70450 CT HEAD/BRAIN W/O DYE: CPT

## 2024-08-21 PROCEDURE — 81001 URINALYSIS AUTO W/SCOPE: CPT

## 2024-08-21 PROCEDURE — 82550 ASSAY OF CK (CPK): CPT

## 2024-08-21 RX ORDER — MORPHINE SULFATE 2 MG/ML
2 INJECTION, SOLUTION INTRAMUSCULAR; INTRAVENOUS ONCE
Status: COMPLETED | OUTPATIENT
Start: 2024-08-21 | End: 2024-08-21

## 2024-08-21 RX ADMIN — MORPHINE SULFATE 2 MG: 2 INJECTION, SOLUTION INTRAMUSCULAR; INTRAVENOUS at 20:10

## 2024-08-21 ASSESSMENT — ENCOUNTER SYMPTOMS
ABDOMINAL PAIN: 0
SHORTNESS OF BREATH: 0
BACK PAIN: 1
COUGH: 0
VOMITING: 0

## 2024-08-22 LAB
EKG P AXIS: 70 DEGREES
EKG P-R INTERVAL: 188 MS
EKG Q-T INTERVAL: 440 MS
EKG QRS DURATION: 148 MS
EKG QTC CALCULATION (BAZETT): 459 MS
EKG T AXIS: 62 DEGREES

## 2024-08-22 PROCEDURE — 93010 ELECTROCARDIOGRAM REPORT: CPT | Performed by: INTERNAL MEDICINE

## 2024-08-22 NOTE — ED NOTES
Contacted daughter who is listed on emergency contact. Per daughter she [place] in another [place] and will be making phone calls to arrange transportation. Daughter states that she will call back.

## 2024-08-22 NOTE — ED NOTES
Pt placed in ER 5. Pt had fall at home and was in the floor for approximately 24 hours. Pt presents with soiled clothing. Pt soiled with urine and stool. Pt has multiple wounds on buttock. See media for photos. Mepilex was placed on wounds. Pt was cleaned, gown and brief placed, new linens placed. Warm blankets provided.     Patient placed on cardiac monitor, continuous pulse oximeter, and NIBP monitor. Monitor alarms on.  The patient has been placed in a gown with Armband in place.  The call light has been placed on the bed.

## 2024-08-22 NOTE — ED PROVIDER NOTES
[facility] EMERGENCY DEPT  eMERGENCY dEPARTMENT eNCOUnter      Pt Name: Atul Peoples  MRN: [id number]  Birthdate 1940  Date of evaluation: 8/21/2024  Provider: Eitan Abreu MD    CHIEF COMPLAINT       Chief Complaint   Patient presents with    Fall     Pt found in home fallen off toilet. Pt states he has been there or over 24 hours.          HISTORY OF PRESENT ILLNESS   (Location/Symptom, Timing/Onset,Context/Setting, Quality, Duration, Modifying Factors, Severity)  Note limiting factors.   Atul Peoples is a 84 y.o. male who presents to the emergency department for evaluation regarding fall that occurred at home.  Patient's family stated that they found him in the [place] between his bedroom and bathroom and they think that he may have been laying on the ground for some time.  Patient estimates that he had a fall yesterday evening.  He tried several times to get up out of the floor but was unsuccessful.  He does complain of some pain in his low back area.  Does not believe that he sustained loss of consciousness with the fall.  Denies any upper extremity numbness or tingling.  He normally ambulates with the assistance of a walker.    HPI    NursingNotes were reviewed.    REVIEW OF SYSTEMS    (2-9 systems for level 4, 10 or more for level 5)     Review of Systems   Constitutional:  Negative for chills and fever.   Respiratory:  Negative for cough and shortness of breath.    Cardiovascular:  Negative for chest pain.   Gastrointestinal:  Negative for abdominal pain and vomiting.   Musculoskeletal:  Positive for back pain.   Skin:  Positive for wound (elbow).   Neurological:  Positive for headaches. Negative for numbness.   All other systems reviewed and are negative.           PAST MEDICALHISTORY     Past Medical History:   Diagnosis Date    AAA (abdominal aortic aneurysm) (HCC)     ASHD (arteriosclerotic heart disease)     S/P coronary intervention followed by [facility]    CAD (coronary artery disease)     Severe triple-vessel  anterolisthesis at L4-L5.   - Stable diffuse degenerative disc disease.   - Stable infrarenal aortic aneurysm.       .        All CT scans are performed using dose optimization techniques as appropriate to the performed exam and include    at least one of the following: Automated exposure control, adjustment of the mA and/or kV according to size, and the use of iterative reconstruction technique.        ______________________________________    Electronically signed by: ROSARIO SHABAZZ D.O.   Date:     08/21/2024   Time:    20:38       CT THORACIC SPINE WO CONTRAST   Final Result   1.  No acute osseous or alignment abnormality of the thoracic spine.   2.  Other chronic and non emergent findings as above.        All CT scans are performed using dose optimization techniques as appropriate to the performed exam and include    at least one of the following: Automated exposure control, adjustment of the mA and/or kV according to size, and the use of iterative reconstruction technique.        ______________________________________    Electronically signed by: MARY CESPEDES M.D.   Date:     08/21/2024   Time:    20:48       CT CERVICAL SPINE WO CONTRAST   Final Result       No fracture of the cervical spine.       All CT scans are performed using dose optimization techniques as appropriate to the performed exam and includes at least one of the following:  Automated exposure control, adjustment of the mA and/or kV according to size, and the use of iterative    reconstruction technique.        All CT scans are performed using dose optimization techniques as appropriate to the performed exam and include    at least one of the following: Automated exposure control, adjustment of the mA and/or kV according to size, and the use of iterative reconstruction technique.        ______________________________________    Electronically signed by: NICOLETTE GIRON M.D.   Date:     08/21/2024   Time:    20:37       CT HEAD WO CONTRAST    Final Result       1. No acute intracranial process.   2. Encephalomalacia in the right frontal parietal lobe.   3. Small vessel disease and age-related involution.   4. Chronic lacunar infarct in the right putamen.   5. Cephalhematomas suggested at the right lateral parietal scalp and left posterior parietal scalp without associated fractures.           .        All CT scans are performed using dose optimization techniques as appropriate to the performed exam and include    at least one of the following: Automated exposure control, adjustment of the mA and/or kV according to size, and the use of iterative reconstruction technique.        ______________________________________    Electronically signed by: ROSARIO SHABAZZ D.O.   Date:     08/21/2024   Time:    20:36               LABS:  Labs Reviewed   CBC WITH AUTO DIFFERENTIAL - Abnormal; Notable for the following components:       Result Value    RBC 3.83 (*)     Hemoglobin 12.2 (*)     Hematocrit 36.7 (*)     MCV 95.8 (*)     MCH 31.9 (*)     RDW 18.4 (*)     Neutrophils % 69.1 (*)     Lymphocytes % 18.1 (*)     Monocytes % 10.4 (*)     Monocytes Absolute 1.00 (*)     All other components within normal limits   COMPREHENSIVE METABOLIC PANEL W/ REFLEX TO MG FOR LOW K - Abnormal; Notable for the following components:    BUN 36 (*)     Creatinine 1.7 (*)     Est, Glom Filt Rate 39 (*)     Albumin 3.2 (*)     AST 52 (*)     All other components within normal limits   CK - Abnormal; Notable for the following components:    Total  (*)     All other components within normal limits   URINALYSIS WITH REFLEX TO CULTURE - Abnormal; Notable for the following components:    Glucose, Ur 250 (*)     Blood, Urine MODERATE (*)     Leukocyte Esterase, Urine TRACE (*)     All other components within normal limits   MICROSCOPIC URINALYSIS - Abnormal; Notable for the following components:    RBC, UA 5 (*)     All other components within normal limits       All other labs

## 2024-08-23 ENCOUNTER — APPOINTMENT (OUTPATIENT)
Dept: GENERAL RADIOLOGY | Age: 84
End: 2024-08-23
Payer: MEDICARE

## 2024-08-23 ENCOUNTER — HOSPITAL ENCOUNTER (INPATIENT)
Age: 84
LOS: 6 days | Discharge: NURSING, REHAB OR LONG-TERM CARE | End: 2024-08-29
Attending: EMERGENCY MEDICINE | Admitting: FAMILY MEDICINE
Payer: MEDICARE

## 2024-08-23 DIAGNOSIS — R29.6 RECURRENT FALLS: ICD-10-CM

## 2024-08-23 DIAGNOSIS — M54.50 CHRONIC LOW BACK PAIN, UNSPECIFIED BACK PAIN LATERALITY, UNSPECIFIED WHETHER SCIATICA PRESENT: ICD-10-CM

## 2024-08-23 DIAGNOSIS — R74.8 ELEVATED CK: ICD-10-CM

## 2024-08-23 DIAGNOSIS — S40.011A CONTUSION OF RIGHT SHOULDER, INITIAL ENCOUNTER: ICD-10-CM

## 2024-08-23 DIAGNOSIS — Z96.651 HISTORY OF ARTHROPLASTY OF RIGHT KNEE: ICD-10-CM

## 2024-08-23 DIAGNOSIS — G89.29 CHRONIC LOW BACK PAIN, UNSPECIFIED BACK PAIN LATERALITY, UNSPECIFIED WHETHER SCIATICA PRESENT: ICD-10-CM

## 2024-08-23 DIAGNOSIS — L89.152 PRESSURE INJURY OF SACRAL REGION, STAGE 2 (HCC): ICD-10-CM
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
## 2024-08-23 PROBLEM — M62.82 RHABDOMYOLYSIS: Status: ACTIVE | Noted: 2024-08-23

## 2024-08-23 LAB
ALBUMIN SERPL-MCNC: 3.1 G/DL (ref 3.5–5.2)
ALP SERPL-CCNC: 375 U/L (ref 40–129)
ALT SERPL-CCNC: 106 U/L (ref 5–41)
ANION GAP SERPL CALCULATED.3IONS-SCNC: 11 MMOL/L (ref 7–19)
AST SERPL-CCNC: 134 U/L (ref 5–40)
BACTERIA URNS QL MICRO: NEGATIVE /HPF
BASOPHILS # BLD: 0 K/UL (ref 0–0.2)
BASOPHILS NFR BLD: 0.4 % (ref 0–1)
BILIRUB SERPL-MCNC: 0.8 MG/DL (ref 0.2–1.2)
BILIRUB UR QL STRIP: NEGATIVE
BUN SERPL-MCNC: 41 MG/DL (ref 8–23)
CALCIUM SERPL-MCNC: 8.9 MG/DL (ref 8.8–10.2)
CHLORIDE SERPL-SCNC: 102 MMOL/L (ref 98–111)
CK SERPL-CCNC: 1234 U/L (ref 39–308)
CLARITY UR: CLEAR
CO2 SERPL-SCNC: 26 MMOL/L (ref 22–29)
COLOR UR: YELLOW
CREAT SERPL-MCNC: 1.8 MG/DL (ref 0.7–1.2)
CRYSTALS URNS MICRO: ABNORMAL /HPF
EOSINOPHIL # BLD: 0.1 K/UL (ref 0–0.6)
EOSINOPHIL NFR BLD: 1.1 % (ref 0–5)
EPI CELLS #/AREA URNS AUTO: 5 /HPF (ref 0–5)
ERYTHROCYTE [DISTWIDTH] IN BLOOD BY AUTOMATED COUNT: 18.1 % (ref 11.5–14.5)
GLUCOSE SERPL-MCNC: 86 MG/DL (ref 70–99)
GLUCOSE UR STRIP.AUTO-MCNC: 250 MG/DL
HCT VFR BLD AUTO: 36.3 % (ref 42–52)
HGB BLD-MCNC: 12.1 G/DL (ref 14–18)
HGB UR STRIP.AUTO-MCNC: ABNORMAL MG/L
HYALINE CASTS #/AREA URNS AUTO: 7 /HPF (ref 0–8)
IMM GRANULOCYTES # BLD: 0.1 K/UL
KETONES UR STRIP.AUTO-MCNC: ABNORMAL MG/DL
LEUKOCYTE ESTERASE UR QL STRIP.AUTO: ABNORMAL
LYMPHOCYTES # BLD: 1.4 K/UL (ref 1.1–4.5)
LYMPHOCYTES NFR BLD: 12.4 % (ref 20–40)
MCH RBC QN AUTO: 31.2 PG (ref 27–31)
MCHC RBC AUTO-ENTMCNC: 33.3 G/DL (ref 33–37)
MCV RBC AUTO: 93.6 FL (ref 80–94)
MONOCYTES # BLD: 0.9 K/UL (ref 0–0.9)
MONOCYTES NFR BLD: 7.8 % (ref 0–10)
NEUTROPHILS # BLD: 8.4 K/UL (ref 1.5–7.5)
NEUTS SEG NFR BLD: 77 % (ref 50–65)
NITRITE UR QL STRIP.AUTO: NEGATIVE
PH UR STRIP.AUTO: 6.5 [PH] (ref 5–8)
PLATELET # BLD AUTO: 122 K/UL (ref 130–400)
POTASSIUM SERPL-SCNC: 4.4 MMOL/L (ref 3.5–5)
PROT SERPL-MCNC: 7.1 G/DL (ref 6.6–8.7)
PROT UR STRIP.AUTO-MCNC: =>1000 MG/DL
RBC # BLD AUTO: 3.88 M/UL (ref 4.7–6.1)
RBC #/AREA URNS AUTO: 7 /HPF (ref 0–4)
SODIUM SERPL-SCNC: 139 MMOL/L (ref 136–145)
SP GR UR STRIP.AUTO: 1.02 (ref 1–1.03)
TSH SERPL DL<=0.005 MIU/L-ACNC: 3.72 UIU/ML (ref 0.27–4.2)
UROBILINOGEN UR STRIP.AUTO-MCNC: 1 E.U./DL
WBC # BLD AUTO: 11 K/UL (ref 4.8–10.8)
WBC #/AREA URNS AUTO: 2 /HPF (ref 0–5)

## 2024-08-23 PROCEDURE — 36415 COLL VENOUS BLD VENIPUNCTURE: CPT

## 2024-08-23 PROCEDURE — 6370000000 HC RX 637 (ALT 250 FOR IP): Performed by: FAMILY MEDICINE

## 2024-08-23 PROCEDURE — 84443 ASSAY THYROID STIM HORMONE: CPT

## 2024-08-23 PROCEDURE — 85025 COMPLETE CBC W/AUTO DIFF WBC: CPT

## 2024-08-23 PROCEDURE — 6370000000 HC RX 637 (ALT 250 FOR IP): Performed by: EMERGENCY MEDICINE

## 2024-08-23 PROCEDURE — 99285 EMERGENCY DEPT VISIT HI MDM: CPT

## 2024-08-23 PROCEDURE — 2580000003 HC RX 258: Performed by: EMERGENCY MEDICINE

## 2024-08-23 PROCEDURE — 82550 ASSAY OF CK (CPK): CPT

## 2024-08-23 PROCEDURE — 1200000000 HC SEMI PRIVATE

## 2024-08-23 PROCEDURE — 2580000003 HC RX 258: Performed by: FAMILY MEDICINE

## 2024-08-23 PROCEDURE — 80053 COMPREHEN METABOLIC PANEL: CPT

## 2024-08-23 PROCEDURE — 71045 X-RAY EXAM CHEST 1 VIEW: CPT

## 2024-08-23 PROCEDURE — 81001 URINALYSIS AUTO W/SCOPE: CPT

## 2024-08-23 PROCEDURE — 73030 X-RAY EXAM OF SHOULDER: CPT

## 2024-08-23 PROCEDURE — 93005 ELECTROCARDIOGRAM TRACING: CPT | Performed by: EMERGENCY MEDICINE

## 2024-08-23 PROCEDURE — 73502 X-RAY EXAM HIP UNI 2-3 VIEWS: CPT

## 2024-08-23 PROCEDURE — 73560 X-RAY EXAM OF KNEE 1 OR 2: CPT

## 2024-08-23 RX ORDER — POLYETHYLENE GLYCOL 3350 17 G/17G
17 POWDER, FOR SOLUTION ORAL DAILY PRN
Status: DISCONTINUED | OUTPATIENT
Start: 2024-08-23 | End: 2024-08-29 | Stop reason: HOSPADM

## 2024-08-23 RX ORDER — AMLODIPINE BESYLATE 5 MG/1
10 TABLET ORAL ONCE
Status: COMPLETED | OUTPATIENT
Start: 2024-08-23 | End: 2024-08-23

## 2024-08-23 RX ORDER — ONDANSETRON 4 MG/1
4 TABLET, ORALLY DISINTEGRATING ORAL EVERY 8 HOURS PRN
Status: DISCONTINUED | OUTPATIENT
Start: 2024-08-23 | End: 2024-08-27 | Stop reason: SDUPTHER

## 2024-08-23 RX ORDER — SODIUM CHLORIDE 9 MG/ML
INJECTION, SOLUTION INTRAVENOUS PRN
Status: DISCONTINUED | OUTPATIENT
Start: 2024-08-23 | End: 2024-08-27 | Stop reason: SDUPTHER

## 2024-08-23 RX ORDER — ENOXAPARIN SODIUM 100 MG/ML
30 INJECTION SUBCUTANEOUS EVERY 24 HOURS
Status: DISCONTINUED | OUTPATIENT
Start: 2024-08-23 | End: 2024-08-23

## 2024-08-23 RX ORDER — ACETAMINOPHEN 325 MG/1
650 TABLET ORAL EVERY 6 HOURS PRN
Status: DISCONTINUED | OUTPATIENT
Start: 2024-08-23 | End: 2024-08-27 | Stop reason: SDUPTHER

## 2024-08-23 RX ORDER — SODIUM CHLORIDE 0.9 % (FLUSH) 0.9 %
5-40 SYRINGE (ML) INJECTION EVERY 12 HOURS SCHEDULED
Status: DISCONTINUED | OUTPATIENT
Start: 2024-08-23 | End: 2024-08-27 | Stop reason: SDUPTHER

## 2024-08-23 RX ORDER — HYDROXYZINE PAMOATE 25 MG/1
50 CAPSULE ORAL 3 TIMES DAILY PRN
Status: DISCONTINUED | OUTPATIENT
Start: 2024-08-23 | End: 2024-08-29 | Stop reason: HOSPADM

## 2024-08-23 RX ORDER — MAGNESIUM SULFATE IN WATER 40 MG/ML
2000 INJECTION, SOLUTION INTRAVENOUS PRN
Status: DISCONTINUED | OUTPATIENT
Start: 2024-08-23 | End: 2024-08-29 | Stop reason: HOSPADM

## 2024-08-23 RX ORDER — CLONIDINE HYDROCHLORIDE 0.1 MG/1
0.1 TABLET ORAL ONCE
Status: COMPLETED | OUTPATIENT
Start: 2024-08-23 | End: 2024-08-23

## 2024-08-23 RX ORDER — POTASSIUM CHLORIDE 1500 MG/1
40 TABLET, EXTENDED RELEASE ORAL PRN
Status: DISCONTINUED | OUTPATIENT
Start: 2024-08-23 | End: 2024-08-29 | Stop reason: HOSPADM

## 2024-08-23 RX ORDER — LEVOTHYROXINE SODIUM 75 UG/1
150 TABLET ORAL DAILY
Status: DISCONTINUED | OUTPATIENT
Start: 2024-08-23 | End: 2024-08-29 | Stop reason: HOSPADM

## 2024-08-23 RX ORDER — ACETAMINOPHEN 500 MG
500 TABLET ORAL EVERY 6 HOURS PRN
Status: DISCONTINUED | OUTPATIENT
Start: 2024-08-23 | End: 2024-08-23 | Stop reason: SDUPTHER

## 2024-08-23 RX ORDER — ATORVASTATIN CALCIUM 10 MG/1
10 TABLET, FILM COATED ORAL DAILY
Status: DISCONTINUED | OUTPATIENT
Start: 2024-08-23 | End: 2024-08-29 | Stop reason: HOSPADM

## 2024-08-23 RX ORDER — SODIUM CHLORIDE 0.9 % (FLUSH) 0.9 %
5-40 SYRINGE (ML) INJECTION PRN
Status: DISCONTINUED | OUTPATIENT
Start: 2024-08-23 | End: 2024-08-27 | Stop reason: SDUPTHER

## 2024-08-23 RX ORDER — ONDANSETRON 2 MG/ML
4 INJECTION INTRAMUSCULAR; INTRAVENOUS EVERY 6 HOURS PRN
Status: DISCONTINUED | OUTPATIENT
Start: 2024-08-23 | End: 2024-08-27 | Stop reason: SDUPTHER

## 2024-08-23 RX ORDER — POTASSIUM CHLORIDE 7.45 MG/ML
10 INJECTION INTRAVENOUS PRN
Status: DISCONTINUED | OUTPATIENT
Start: 2024-08-23 | End: 2024-08-29 | Stop reason: HOSPADM

## 2024-08-23 RX ORDER — AMLODIPINE BESYLATE 5 MG/1
5 TABLET ORAL DAILY
Status: DISCONTINUED | OUTPATIENT
Start: 2024-08-23 | End: 2024-08-25

## 2024-08-23 RX ORDER — PANTOPRAZOLE SODIUM 40 MG/1
40 TABLET, DELAYED RELEASE ORAL
Status: DISCONTINUED | OUTPATIENT
Start: 2024-08-23 | End: 2024-08-26

## 2024-08-23 RX ORDER — SODIUM CHLORIDE 0.9 % (FLUSH) 0.9 %
5-40 SYRINGE (ML) INJECTION PRN
Status: DISCONTINUED | OUTPATIENT
Start: 2024-08-23 | End: 2024-08-29 | Stop reason: HOSPADM

## 2024-08-23 RX ORDER — ACETAMINOPHEN 325 MG/1
650 TABLET ORAL EVERY 4 HOURS PRN
Status: DISCONTINUED | OUTPATIENT
Start: 2024-08-23 | End: 2024-08-29 | Stop reason: HOSPADM

## 2024-08-23 RX ORDER — SODIUM CHLORIDE 0.9 % (FLUSH) 0.9 %
5-40 SYRINGE (ML) INJECTION EVERY 12 HOURS SCHEDULED
Status: DISCONTINUED | OUTPATIENT
Start: 2024-08-23 | End: 2024-08-29 | Stop reason: HOSPADM

## 2024-08-23 RX ORDER — TAMSULOSIN HYDROCHLORIDE 0.4 MG/1
0.4 CAPSULE ORAL 2 TIMES DAILY
Status: DISCONTINUED | OUTPATIENT
Start: 2024-08-23 | End: 2024-08-29 | Stop reason: HOSPADM

## 2024-08-23 RX ORDER — SODIUM CHLORIDE 9 MG/ML
INJECTION, SOLUTION INTRAVENOUS CONTINUOUS
Status: ACTIVE | OUTPATIENT
Start: 2024-08-23 | End: 2024-08-25

## 2024-08-23 RX ORDER — ONDANSETRON 2 MG/ML
4 INJECTION INTRAMUSCULAR; INTRAVENOUS EVERY 6 HOURS PRN
Status: DISCONTINUED | OUTPATIENT
Start: 2024-08-23 | End: 2024-08-29 | Stop reason: HOSPADM

## 2024-08-23 RX ORDER — CLOPIDOGREL BISULFATE 75 MG/1
75 TABLET ORAL DAILY
Status: DISCONTINUED | OUTPATIENT
Start: 2024-08-23 | End: 2024-08-29 | Stop reason: HOSPADM

## 2024-08-23 RX ORDER — ONDANSETRON 4 MG/1
4 TABLET, ORALLY DISINTEGRATING ORAL EVERY 8 HOURS PRN
Status: DISCONTINUED | OUTPATIENT
Start: 2024-08-23 | End: 2024-08-29 | Stop reason: HOSPADM

## 2024-08-23 RX ORDER — ASPIRIN 81 MG/1
81 TABLET ORAL DAILY
Status: DISCONTINUED | OUTPATIENT
Start: 2024-08-23 | End: 2024-08-29 | Stop reason: HOSPADM

## 2024-08-23 RX ORDER — CALCITRIOL 0.25 UG/1
0.25 CAPSULE, LIQUID FILLED ORAL DAILY
Status: DISCONTINUED | OUTPATIENT
Start: 2024-08-23 | End: 2024-08-29 | Stop reason: HOSPADM

## 2024-08-23 RX ORDER — ENOXAPARIN SODIUM 100 MG/ML
30 INJECTION SUBCUTANEOUS DAILY
Status: DISCONTINUED | OUTPATIENT
Start: 2024-08-23 | End: 2024-08-25

## 2024-08-23 RX ORDER — LANOLIN ALCOHOL/MO/W.PET/CERES
3 CREAM (GRAM) TOPICAL NIGHTLY
Status: DISCONTINUED | OUTPATIENT
Start: 2024-08-23 | End: 2024-08-29 | Stop reason: HOSPADM

## 2024-08-23 RX ORDER — SODIUM CHLORIDE, SODIUM LACTATE, POTASSIUM CHLORIDE, AND CALCIUM CHLORIDE .6; .31; .03; .02 G/100ML; G/100ML; G/100ML; G/100ML
1000 INJECTION, SOLUTION INTRAVENOUS ONCE
Status: COMPLETED | OUTPATIENT
Start: 2024-08-23 | End: 2024-08-23

## 2024-08-23 RX ORDER — SODIUM CHLORIDE 9 MG/ML
INJECTION, SOLUTION INTRAVENOUS PRN
Status: DISCONTINUED | OUTPATIENT
Start: 2024-08-23 | End: 2024-08-29 | Stop reason: HOSPADM

## 2024-08-23 RX ORDER — ACETAMINOPHEN 650 MG/1
650 SUPPOSITORY RECTAL EVERY 6 HOURS PRN
Status: DISCONTINUED | OUTPATIENT
Start: 2024-08-23 | End: 2024-08-29 | Stop reason: HOSPADM

## 2024-08-23 RX ADMIN — AMLODIPINE BESYLATE 10 MG: 5 TABLET ORAL at 12:41

## 2024-08-23 RX ADMIN — Medication 3 MG: at 20:20

## 2024-08-23 RX ADMIN — SODIUM CHLORIDE, POTASSIUM CHLORIDE, SODIUM LACTATE AND CALCIUM CHLORIDE 1000 ML: 600; 310; 30; 20 INJECTION, SOLUTION INTRAVENOUS at 12:13

## 2024-08-23 RX ADMIN — ACETAMINOPHEN 650 MG: 325 TABLET ORAL at 20:20

## 2024-08-23 RX ADMIN — TAMSULOSIN HYDROCHLORIDE 0.4 MG: 0.4 CAPSULE ORAL at 20:20

## 2024-08-23 RX ADMIN — SODIUM CHLORIDE: 9 INJECTION, SOLUTION INTRAVENOUS at 16:55

## 2024-08-23 RX ADMIN — CLONIDINE HYDROCHLORIDE 0.1 MG: 0.1 TABLET ORAL at 14:05

## 2024-08-23 ASSESSMENT — PAIN DESCRIPTION - LOCATION: LOCATION: SHOULDER;KNEE;HIP

## 2024-08-23 ASSESSMENT — PAIN DESCRIPTION - DESCRIPTORS: DESCRIPTORS: ACHING

## 2024-08-23 ASSESSMENT — PAIN DESCRIPTION - ORIENTATION: ORIENTATION: RIGHT

## 2024-08-23 ASSESSMENT — PAIN SCALES - GENERAL: PAINLEVEL_OUTOF10: 3

## 2024-08-23 NOTE — CARE COORDINATION
08/23/24 1458   Readmission Assessment   Number of Days since last admission? 8-30 days   Previous Disposition SNF   Who is being Interviewed Caregiver   What was the patient's/caregiver's perception as to why they think they needed to return back to the hospital? Other (Comment)  (fell at home, was down for over 24 hours)   Did you visit your Primary Care Physician after you left the hospital, before you returned this time? No   Why weren't you able to visit your PCP? Did not have an appointment   Did you see a specialist, such as Cardiac, Pulmonary, Orthopedic Physician, etc. after you left the hospital? No   Who advised the patient to return to the hospital? Self-referral   Does the patient report anything that got in the way of taking their medications? No   In our efforts to provide the best possible care to you and others like you, can you think of anything that we could have done to help you after you left the hospital the first time, so that you might not have needed to return so soon? Arrange for more help when leaving the hospital;Additional Community resources available for illness support;Identify patient's health literacy needs;Teach back instructions regarding management of illness     Electronically signed by HERNÁN Duran on 8/23/2024 at 3:00 PM

## 2024-08-23 NOTE — CARE COORDINATION
Case Management Assessment  Initial Evaluation    Date/Time of Evaluation: 8/23/2024 3:16 PM  Assessment Completed by: HERNÁN Duran    If patient is discharged prior to next notation, then this note serves as note for discharge by case management.    Patient Name: Atul Peoples                   YOB: 1940  Diagnosis: Rhabdomyolysis [M62.82]  Elevated CK [R74.8]  Recurrent falls [R29.6]  Contusion of right shoulder, initial encounter [S40.011A]  Fall from chair, initial encounter [W07.XXXA]  History of arthroplasty of right knee [Z96.651]  Pressure injury of sacral region, stage 2 (HCC) [L89.152]  Chronic low back pain, unspecified back pain laterality, unspecified whether sciatica present [M54.50, G89.29]                   Date / Time: 8/23/2024 10:16 AM    Patient Admission Status: Inpatient   Readmission Risk (Low < 19, Mod (19-27), High > 27): Readmission Risk Score: 20.9    Current PCP: Isrrael Horner MD  PCP verified by CM? Yes    Chart Reviewed: Yes      History Provided by: Patient  Patient Orientation: Alert and Oriented    Patient Cognition: Alert    Hospitalization in the last 30 days (Readmission):  Yes    If yes, Readmission Assessment in  Navigator will be completed.    Advance Directives:      Code Status: Full Code   Patient's Primary Decision Maker is: Legal Next of Kin    Primary Decision Maker: Barry Peoples - Child - [phone]    Secondary Decision Maker: [employer] - Child - [phone]    Discharge Planning:    Patient lives with: Alone Type of Home: Skilled Nursing Facility  Primary Care Giver: Self  Patient Support Systems include: Family Members, Children   Current Financial resources: Medicare  Current community resources: None  Current services prior to admission: Durable Medical Equipment            Current DME: Walker, Cane            Type of Home Care services:  None    ADLS  Prior functional level: Assistance with the following:, Mobility (uses a

## 2024-08-23 NOTE — ED NOTES
ED TO INPATIENT SBAR HANDOFF    Patient Name: Atul Peoples   : 1940  84 y.o.   Family/Caregiver Present: No  Code Status Order: Full Code    C-SSRS: Risk of Suicide: No Risk  Sitter No  Restraints:         Situation  Chief Complaint:   Chief Complaint   Patient presents with    Fall    Shoulder Pain     Right     Urinary Frequency     Patient Diagnosis: Fall from chair, initial encounter [W07.XXXA]  Rhabdomyolysis [M62.82]     Brief Description of Patient's Condition:  Pt presents to the emergency department for evaluation after he had a fall last night. Says that he slid out of his chair and down to the ground. Landed on his right shoulder and knee. Has pain in right shoulder and knee as well as little bit of pain right hip. Does not believe he hit his head. Patient was seen here 2 days ago after a fall. Had overall negative workup at that time and was able to ambulate with a walker and was discharged home. Takes BP meds, but has not taken them in \"a few days\" per pt. BP became elevated and he has been given a dose of amlodopine as well as clonidine with improvement.   Mental Status: oriented, alert, coherent, logical, thought processes intact, and able to concentrate and follow conversation  Arrived from: home    Imaging:   XR CHEST PORTABLE   Final Result       No acute radiographic abnormality.               ______________________________________    Electronically signed by: ZEFERINO SCHWARTZ M.D.   Date:     2024   Time:    11:26       XR SHOULDER RIGHT (MIN 2 VIEWS)   Final Result   The visualized osseous structures appear intact.  Anatomic alignment appears within normal limits.       There is no evidence of fracture or dislocation.  Moderate chronic degenerative change of the glenohumeral and acromioclavicular joint.       No acute osseous abnormality.           ______________________________________    Electronically signed by: IMELDA GERMAN M.D.   Date:     2024   Time:    11:27       XR  cholesterol.    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 June    S/P TKR (total knee replacement)     RIGHT    Thyroid disease        Assessment  Vitals: Level of Consciousness: Alert (0)   Vitals:    08/23/24 1331 08/23/24 1339 08/23/24 1405 08/23/24 1421   BP: (!) 197/63 (!) 196/68 (!) 200/76 (!) 188/64   Pulse: 58 66     Resp: 16 19     Temp:       TempSrc:       SpO2: 94% 96%     Weight:         Predictive Model Details          31 (Caution)  Factor Value    Calculated 8/23/2024 14:22 42% Age 84 years old    Deterioration Index Model 20% Systolic 188     13% Respiratory rate 19     7% Potassium 4.4 mmol/L     7% WBC count abnormal (11.0 K/uL)     5% Hematocrit abnormal (36.3 %)     3% BUN abnormal (41 mg/dL)     2% Platelet count abnormal (122 K/uL)     1% Sodium 139 mmol/L     1% Pulse 66     0% Pulse oximetry 96 %     0% Temperature 98.4 °F (36.9 °C)       NPO? No  O2 Flow Rate: O2 Device: None (Room air)    Cardiac Rhythm:    NIH Score: NIH     Active LDA's:   Peripheral IV 08/23/24 Right Antecubital (Active)   Site Assessment Clean, dry & intact 08/23/24 1020     Pertinent or High Risk Medications/Drips: no   If Yes, please provide details:    Blood Product Administration: no  If Yes, please provide details:    Sepsis Risk Score      Admitted with Sepsis? No    Recommendation  Incomplete orders:    Patient Belongings: With pt  Additional Comments:    If any further questions, please call Sending RN at [phone]    Electronically signed by: Electronically signed by Mikayla Palma RN on 8/23/2024 at 2:22 PM

## 2024-08-23 NOTE — PROGRESS NOTES
Atul Peoples arrived to room # 426.   Presented with: fall from chair  Mental Status: Patient is oriented and alert.   Vitals:    08/23/24 1513   BP: (!) 176/74   Pulse: 81   Resp: 16   Temp: 97.3 °F (36.3 °C)   SpO2: 92%     Patient safety contract and falls prevention contract reviewed with patient yes, unable to sign form r/t not having glasses w/ him.  Oriented Patient to room.  Call light within reach. Yes.  Needs, issues or concerns expressed at this time: no.      Electronically signed by Franchesca Kruger RN on 8/23/2024 at 3:28 PM

## 2024-08-23 NOTE — PROGRESS NOTES
4 Eyes Skin Assessment     NAME:  Atul Peoples  YOB: 1940  MEDICAL RECORD NUMBER:  [id number]    The patient is being assessed for  Admission    I agree that at least one RN has performed a thorough Head to Toe Skin Assessment on the patient. ALL assessment sites listed below have been assessed.      Areas assessed by both nurses:    Head, Face, Ears, Shoulders, Back, Chest, Arms, Elbows, Hands, Sacrum. Buttock, Coccyx, Ischium, and Legs. Feet and Heels        Does the Patient have a Wound? Yes wound(s) were present on assessment. LDA wound assessment was Initiated and completed by RN       Juan Diego Prevention initiated by RN: Yes  Wound Care Orders initiated by RN: Yes    Pressure Injury (Stage 3,4, Unstageable, DTI, NWPT, and Complex wounds) if present, place Wound referral order by RN under : Yes    New Ostomies, if present place, Ostomy referral order under : Yes     Nurse 1 eSignature: Electronically signed by Franchesca Kruger RN on 8/23/24 at 4:22 PM CDT    **SHARE this note so that the co-signing nurse can place an eSignature**    Nurse 2 eSignature: {Esignature:[id number]}

## 2024-08-23 NOTE — PROGRESS NOTES
Automatic Dose Adjustment of                Subcutaneous Anticoagulant for Prophylaxis    Atul Peoples is a 84 y.o. male.     Recent Labs     08/21/24  1842 08/23/24  1019   CREATININE 1.7* 1.8*       Estimated Creatinine Clearance: 29 mL/min (A) (based on SCr of 1.8 mg/dL (H)).    Weight:  Wt Readings from Last 1 Encounters:   08/23/24 72.6 kg (160 lb)           Pharmacy has adjusted subcutaneous anticoagulant for prophylaxis to Enoxaparin 30 mg SC daily based on the patient's weight and estimated CrCl per [facility] policy.             MASON BRAY, PHARM D, 8/23/2024, 3:20 PM

## 2024-08-23 NOTE — SIGNIFICANT EVENT
84 yr WM recently found down for ? 24 hr, w/u in ER unremarkable sent home found down again, admit for IV fluids, recheck Labs in AM, PT/OT yesica, palliative care consult.

## 2024-08-23 NOTE — ED PROVIDER NOTES
[facility] 4 ONCOLOGY UNIT  EMERGENCY DEPARTMENT ENCOUNTER      Pt Name: Atul Peoples  MRN: [id number]  Birthdate 1940  Date of evaluation: 8/23/2024  Provider: Rafael Anders Jr, MD    CHIEF COMPLAINT       Chief Complaint   Patient presents with    Fall    Shoulder Pain     Right     Urinary Frequency         HISTORY OF PRESENT ILLNESS   (Location/Symptom, Timing/Onset,Context/Setting, Quality, Duration, Modifying Factors, Severity)  Note limiting factors.   Atul Peoples is a 84 y.o. male who presents to the emergency department for evaluation after he had a fall last night.  Says that he slid out of his chair and down to the ground.  Landed on his right shoulder and knee.  Has pain in right shoulder and knee as well as little bit of pain right hip.  Does not believe he hit his head.  Patient was seen here 2 days ago after a fall.  Had overall negative workup at that time and was able to ambulate with a walker and was discharged home.  Has had ongoing back pain for the last several months.  Denies any acute worsening.  Had CT of the lumbar spine during the visit 2 days ago that did not show any acute injuries  On review of medical records, patient was hospitalized here last month with myxedema coma including sinus bradycardia.  TSH was 43.  Was treated with IV Synthroid    HPI    NursingNotes were reviewed.    REVIEW OF SYSTEMS    (2-9 systems for level 4, 10 or more for level 5)     Review of Systems   Constitutional: Negative.    HENT: Negative.     Eyes: Negative.    Respiratory: Negative.     Cardiovascular: Negative.    Gastrointestinal: Negative.    Genitourinary: Negative.    Musculoskeletal:  Positive for arthralgias and gait problem.   Skin: Negative.    Hematological: Negative.    Psychiatric/Behavioral: Negative.         A complete review of systems was performed and is negative except as noted above in the HPI.       PAST MEDICAL HISTORY     Past Medical History:   Diagnosis Date    AAA (abdominal aortic

## 2024-08-23 NOTE — H&P
[facility]  History and Physical    Patient:  Atul Peoples  MRN: [id number]    CHIEF COMPLAINT: Fall      PCP: Isrrael Horner MD    HISTORY OF PRESENT ILLNESS:   The patient is a 84 y.o. male who presents with a fall approximately a week ago when he was stuck between the commode and the wall and was there for approximately 24 hours.  Patient was brought into the emergency room at that time x-rays were unremarkable he was evaluated and subsequently sent home.  Unfortunately he fell again at home walking into the living room and landed on his right shoulder and right hip.  X-rays in the emergency room are unremarkable currently admitted for further evaluation.  Of note his creatinine kinase was remarkably elevated consistent with rhabdomyolysis and he was started on IV fluids for further delineation of care.  Patient is 84 years of age has recently been in skilled nursing facility and his mobility and independence has rapidly declined.  He has been followed by palliative care in the past.    REVIEW OF SYSTEMS:    Past Medical History:      Diagnosis Date    AAA (abdominal aortic aneurysm) ([facility])     ASHD (arteriosclerotic heart disease)     S/P coronary intervention followed by CBG    CAD (coronary artery disease)     Severe triple-vessel    CAD (coronary artery disease) 02/04/2015    COPD (chronic obstructive pulmonary disease) ([facility])     With tobacco abuse    GERD (gastroesophageal reflux disease)     HH (hiatus hernia)     History of cerebrovascular disease     Hyperlipidemia     PCP, Dr. REZA Horner manages cholesterol.    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 June    S/P TKR (total knee replacement)     RIGHT    Thyroid disease        Past Surgical History:      Procedure Laterality Date    ABDOMINAL AORTIC ANEURYSM REPAIR, OPEN      ADENOIDECTOMY      APPENDECTOMY       is no evidence of fracture or dislocation.  Moderate chronic degenerative change of the glenohumeral and acromioclavicular joint.  No acute osseous abnormality.   ______________________________________ Electronically signed by: IMELDA GERMAN M.D. Date:     08/23/2024 Time:    11:27     XR CHEST PORTABLE    Result Date: 8/23/2024  EXAM:  CHEST RADIOGRAPH (1 VIEW)  TECHNIQUE:  Frontal Chest Radiograph.  HISTORY:  Patient fell  COMPARISON:  07/22/2024  FINDINGS:  Lines, Tubes, Devices:  Cardiac monitoring leads are present  Lungs and Pleura:  No focal consolidation.  No pleural effusion.  No pneumothorax. No pulmonary edema.  Cardiomediastinum: Normal cardiomediastinal silhouette.  Atherosclerotic aortic calcifications.  Bones/Soft Tissues: No acute osseous abnormality.  Wires are present from median sternotomy..  Upper Abdomen: Within normal limits.        No acute radiographic abnormality.    ______________________________________ Electronically signed by: ZEFERINO SCHWARTZ M.D. Date:     08/23/2024 Time:    11:26       Assessment and Plan       Principal Problem:    Fall from chair, initial encounter  Active Problems:    Palliative care patient    Acute kidney injury (nontraumatic) ([facility])    ASHD (arteriosclerotic heart disease)    Hypertension    Hyperlipidemia    COPD (chronic obstructive pulmonary disease) ([facility])    History of cerebrovascular disease    AAA (abdominal aortic aneurysm)    GERD (gastroesophageal reflux disease)    S/P CABG x 4    Status post coronary artery stent placement    Altered mental status    Rhabdomyolysis  Resolved Problems:    * No resolved hospital problems. *    Multiple falls-will get PT and OT to evaluate for gait stability.  He does have a contusion to his right hip x-rays were unremarkable a bandage was applied.    Acute kidney injury with elevated CK-IV fluid rehydration recheck CK in AM.    COPD/emphysema-respiratory status is stable he does wear oxygen at night will order 2 L by nasal

## 2024-08-24 LAB
ANION GAP SERPL CALCULATED.3IONS-SCNC: 11 MMOL/L (ref 7–19)
BASOPHILS # BLD: 0 K/UL (ref 0–0.2)
BASOPHILS NFR BLD: 0.5 % (ref 0–1)
BUN SERPL-MCNC: 35 MG/DL (ref 8–23)
CALCIUM SERPL-MCNC: 7.9 MG/DL (ref 8.8–10.2)
CHLORIDE SERPL-SCNC: 102 MMOL/L (ref 98–111)
CK SERPL-CCNC: 687 U/L (ref 39–308)
CO2 SERPL-SCNC: 24 MMOL/L (ref 22–29)
CREAT SERPL-MCNC: 1.6 MG/DL (ref 0.7–1.2)
EOSINOPHIL # BLD: 0.4 K/UL (ref 0–0.6)
EOSINOPHIL NFR BLD: 4.7 % (ref 0–5)
ERYTHROCYTE [DISTWIDTH] IN BLOOD BY AUTOMATED COUNT: 18.5 % (ref 11.5–14.5)
GLUCOSE BLD-MCNC: 94 MG/DL (ref 70–99)
GLUCOSE BLD-MCNC: 98 MG/DL (ref 70–99)
GLUCOSE SERPL-MCNC: 73 MG/DL (ref 70–99)
HCT VFR BLD AUTO: 35 % (ref 42–52)
HGB BLD-MCNC: 11.6 G/DL (ref 14–18)
IMM GRANULOCYTES # BLD: 0.2 K/UL
LYMPHOCYTES # BLD: 0.9 K/UL (ref 1.1–4.5)
LYMPHOCYTES NFR BLD: 10 % (ref 20–40)
MCH RBC QN AUTO: 32.2 PG (ref 27–31)
MCHC RBC AUTO-ENTMCNC: 33.1 G/DL (ref 33–37)
MCV RBC AUTO: 97.2 FL (ref 80–94)
MONOCYTES # BLD: 0.6 K/UL (ref 0–0.9)
MONOCYTES NFR BLD: 6.4 % (ref 0–10)
NEUTROPHILS # BLD: 6.7 K/UL (ref 1.5–7.5)
NEUTS SEG NFR BLD: 76.4 % (ref 50–65)
PERFORMED ON: NORMAL
PERFORMED ON: NORMAL
PLATELET # BLD AUTO: 106 K/UL (ref 130–400)
POTASSIUM SERPL-SCNC: 4.1 MMOL/L (ref 3.5–5)
RBC # BLD AUTO: 3.6 M/UL (ref 4.7–6.1)
SODIUM SERPL-SCNC: 137 MMOL/L (ref 136–145)
WBC # BLD AUTO: 8.8 K/UL (ref 4.8–10.8)

## 2024-08-24 PROCEDURE — 97161 PT EVAL LOW COMPLEX 20 MIN: CPT

## 2024-08-24 PROCEDURE — 1200000000 HC SEMI PRIVATE

## 2024-08-24 PROCEDURE — 97530 THERAPEUTIC ACTIVITIES: CPT

## 2024-08-24 PROCEDURE — 36415 COLL VENOUS BLD VENIPUNCTURE: CPT

## 2024-08-24 PROCEDURE — 6360000002 HC RX W HCPCS: Performed by: FAMILY MEDICINE

## 2024-08-24 PROCEDURE — 80048 BASIC METABOLIC PNL TOTAL CA: CPT

## 2024-08-24 PROCEDURE — 2580000003 HC RX 258: Performed by: FAMILY MEDICINE

## 2024-08-24 PROCEDURE — 82550 ASSAY OF CK (CPK): CPT

## 2024-08-24 PROCEDURE — 6370000000 HC RX 637 (ALT 250 FOR IP): Performed by: FAMILY MEDICINE

## 2024-08-24 PROCEDURE — 82962 GLUCOSE BLOOD TEST: CPT

## 2024-08-24 PROCEDURE — 85025 COMPLETE CBC W/AUTO DIFF WBC: CPT

## 2024-08-24 PROCEDURE — 94760 N-INVAS EAR/PLS OXIMETRY 1: CPT

## 2024-08-24 RX ADMIN — EMPAGLIFLOZIN 10 MG: 10 TABLET, FILM COATED ORAL at 08:23

## 2024-08-24 RX ADMIN — HYDROXYZINE PAMOATE 50 MG: 25 CAPSULE ORAL at 04:08

## 2024-08-24 RX ADMIN — CLOPIDOGREL BISULFATE 75 MG: 75 TABLET ORAL at 08:23

## 2024-08-24 RX ADMIN — PANTOPRAZOLE SODIUM 40 MG: 40 TABLET, DELAYED RELEASE ORAL at 15:46

## 2024-08-24 RX ADMIN — SODIUM CHLORIDE, PRESERVATIVE FREE 10 ML: 5 INJECTION INTRAVENOUS at 08:23

## 2024-08-24 RX ADMIN — LEVOTHYROXINE SODIUM 150 MCG: 75 TABLET ORAL at 04:08

## 2024-08-24 RX ADMIN — ASPIRIN 81 MG: 81 TABLET, COATED ORAL at 08:23

## 2024-08-24 RX ADMIN — TAMSULOSIN HYDROCHLORIDE 0.4 MG: 0.4 CAPSULE ORAL at 08:22

## 2024-08-24 RX ADMIN — ATORVASTATIN CALCIUM 10 MG: 10 TABLET, FILM COATED ORAL at 08:22

## 2024-08-24 RX ADMIN — PANTOPRAZOLE SODIUM 40 MG: 40 TABLET, DELAYED RELEASE ORAL at 04:08

## 2024-08-24 RX ADMIN — ACETAMINOPHEN 650 MG: 325 TABLET ORAL at 14:02

## 2024-08-24 RX ADMIN — ENOXAPARIN SODIUM 30 MG: 100 INJECTION SUBCUTANEOUS at 08:23

## 2024-08-24 RX ADMIN — TAMSULOSIN HYDROCHLORIDE 0.4 MG: 0.4 CAPSULE ORAL at 19:44

## 2024-08-24 RX ADMIN — CALCITRIOL CAPSULES 0.25 MCG 0.25 MCG: 0.25 CAPSULE ORAL at 08:23

## 2024-08-24 RX ADMIN — Medication 3 MG: at 19:44

## 2024-08-24 RX ADMIN — ACETAMINOPHEN 650 MG: 325 TABLET ORAL at 19:44

## 2024-08-24 RX ADMIN — AMLODIPINE BESYLATE 5 MG: 5 TABLET ORAL at 08:23

## 2024-08-24 RX ADMIN — SODIUM CHLORIDE: 9 INJECTION, SOLUTION INTRAVENOUS at 05:47

## 2024-08-24 ASSESSMENT — PAIN DESCRIPTION - LOCATION: LOCATION: GENERALIZED

## 2024-08-24 ASSESSMENT — PAIN SCALES - GENERAL: PAINLEVEL_OUTOF10: 0

## 2024-08-24 NOTE — PLAN OF CARE
Problem: Safety - Adult  Goal: Free from fall injury  8/24/2024 1206 by Nely Asher RN  Outcome: Progressing  8/24/2024 0043 by Adrianna Han RN  Outcome: Progressing  8/23/2024 2303 by Roman Salcido LPN  Outcome: Progressing     Problem: ABCDS Injury Assessment  Goal: Absence of physical injury  8/24/2024 1206 by Nely Asher RN  Outcome: Progressing  8/24/2024 0043 by Adrianna Han RN  Outcome: Progressing  8/23/2024 2303 by Roman Salcido LPN  Outcome: Progressing     Problem: Skin/Tissue Integrity  Goal: Absence of new skin breakdown  Description: 1.  Monitor for areas of redness and/or skin breakdown  2.  Assess vascular access sites hourly  3.  Every 4-6 hours minimum:  Change oxygen saturation probe site  4.  Every 4-6 hours:  If on nasal continuous positive airway pressure, respiratory therapy assess nares and determine need for appliance change or resting period.  8/24/2024 1206 by Nely Asher RN  Outcome: Progressing  8/24/2024 0043 by Adrianna Han RN  Outcome: Progressing  8/23/2024 2303 by Roman Salcido LPN  Outcome: Progressing     Problem: Pain  Goal: Verbalizes/displays adequate comfort level or baseline comfort level  8/24/2024 1206 by Nely Asher RN  Outcome: Progressing  8/24/2024 0043 by Adrianna Han RN  Outcome: Progressing  8/23/2024 2303 by Roman Salcido LPN  Outcome: Progressing

## 2024-08-24 NOTE — PROGRESS NOTES
Daily Progress Note  Atul Peoples  MRN: [id number] LOS: 1    Admit Date: 2024 8:42 AM    Subjective:          Chief Complaint:  Chief Complaint   Patient presents with    Fall    Shoulder Pain     Right     Urinary Frequency       Interval History:    Reviewed overnight events and nursing notes.   Status:  improved  Pain:  some relief    No new complaints this AM    Review of Systems   Constitutional:  Positive for activity change.   HENT: Negative.     Respiratory: Negative.     Cardiovascular: Negative.    Gastrointestinal: Negative.    Musculoskeletal:  Positive for arthralgias.   Neurological:  Positive for weakness.       DIET:  ADULT DIET; Regular    Medications:      sodium chloride      sodium chloride      sodium chloride 75 mL/hr at 24 0547      sodium chloride flush  5-40 mL IntraVENous 2 times per day    enoxaparin  30 mg SubCUTAneous Daily    sodium chloride flush  5-40 mL IntraVENous 2 times per day    melatonin  3 mg Oral Nightly    amLODIPine  5 mg Oral Daily    aspirin  81 mg Oral Daily    atorvastatin  10 mg Oral Daily    calcitRIOL  0.25 mcg Oral Daily    clopidogrel  75 mg Oral Daily    empagliflozin  10 mg Oral Daily    levothyroxine  150 mcg Oral Daily    pantoprazole  40 mg Oral BID AC    tamsulosin  0.4 mg Oral BID       Data:     Code Status: Full Code    Family History   Problem Relation Age of Onset    Heart Disease Other     Heart Disease Mother      Social History     Socioeconomic History    Marital status:      Spouse name: Not on file    Number of children: Not on file    Years of education: Not on file    Highest education level: Not on file   Occupational History    Not on file   Tobacco Use    Smoking status: Former     Current packs/day: 0.00     Types: Cigarettes     Quit date: 1989     Years since quittin.6    Smokeless tobacco: Never   Vaping Use    Vaping status: Never Used   Substance and Sexual Activity    Alcohol use: Not Currently      input(s): \"CHOL\", \"HDL\" in the last 72 hours.    Invalid input(s): \"LDLCALCU\"  INR: No results for input(s): \"INR\" in the last 72 hours.    Objective:     Vitals: BP (!) 173/69   Pulse 63   Temp 98.1 °F (36.7 °C) (Temporal)   Resp 20   Ht 1.753 m (5' 9.02\")   Wt 67.5 kg (148 lb 14.4 oz)   SpO2 93%   BMI 21.98 kg/m²    Intake/Output Summary (Last 24 hours) at 2024 0842  Last data filed at 2024 0547  Gross per 24 hour   Intake 921.62 ml   Output 200 ml   Net 721.62 ml    Temp (24hrs), Av.2 °F (36.8 °C), Min:97.3 °F (36.3 °C), Max:99.2 °F (37.3 °C)    Glucose:  Recent Labs     24  0638   POCGLU 94       Physical Exam  Vitals and nursing note reviewed.   Constitutional:       Appearance: Normal appearance.   HENT:      Head: Normocephalic and atraumatic.   Eyes:      Pupils: Pupils are equal, round, and reactive to light.   Cardiovascular:      Rate and Rhythm: Normal rate and regular rhythm.      Pulses: Normal pulses.      Heart sounds: Normal heart sounds.   Pulmonary:      Effort: Pulmonary effort is normal.      Breath sounds: Normal breath sounds.   Abdominal:      General: Abdomen is flat. Bowel sounds are normal.      Palpations: Abdomen is soft.   Skin:     General: Skin is warm.      Capillary Refill: Capillary refill takes less than 2 seconds.   Neurological:      General: No focal deficit present.      Mental Status: He is alert.           Assessment and Plan:     Primary Problem:  Fall from chair, initial encounter    Hospital Problem list:  Principal Problem:    Fall from chair, initial encounter  Active Problems:    Palliative care patient    Acute kidney injury (nontraumatic) ([facility])    ASHD (arteriosclerotic heart disease)    Hypertension    Hyperlipidemia    COPD (chronic obstructive pulmonary disease) ([facility])    History of cerebrovascular disease    AAA (abdominal aortic aneurysm)    GERD (gastroesophageal reflux disease)    S/P CABG x 4    Status post coronary artery stent

## 2024-08-24 NOTE — PROGRESS NOTES
Physical Therapy  Facility/Department: [facility] ONCOLOGY UNIT  Physical Therapy Initial Assessment    Name: Atul Peoples  : 1940  MRN: [id number]  Date of Service: 2024    Discharge Recommendations:  Continue to assess pending progress          Patient Diagnosis(es): The primary encounter diagnosis was Fall from chair, initial encounter. Diagnoses of Recurrent falls, Chronic low back pain, unspecified back pain laterality, unspecified whether sciatica present, Contusion of right shoulder, initial encounter, History of arthroplasty of right knee, Pressure injury of sacral region, stage 2 (HCC), and Elevated CK were also pertinent to this visit.  Past Medical History:  has a past medical history of AAA (abdominal aortic aneurysm) ([facility]), ASHD (arteriosclerotic heart disease), CAD (coronary artery disease), CAD (coronary artery disease), COPD (chronic obstructive pulmonary disease) ([facility]), GERD (gastroesophageal reflux disease), HH (hiatus hernia), History of cerebrovascular disease, Hyperlipidemia, Hypertension, Obesity, Osteoarthritis, Palliative care patient, Renal insufficiency, S/P CABG x 3, S/P CABG x 4, S/P TKR (total knee replacement), and Thyroid disease.  Past Surgical History:  has a past surgical history that includes cardiovascular stress test (09, [facility]); cardiovascular stress test (06, [facility]); Tonsillectomy; Adenoidectomy; Appendectomy; Cholecystectomy; Coronary artery bypass graft (06, [place]); Diagnostic Cardiac Cath Lab Procedure (06, [facility]); Diagnostic Cardiac Cath Lab Procedure (03????, [facility]); Diagnostic Cardiac Cath Lab Procedure (1993, [facility]); Diagnostic Cardiac Cath Lab Procedure (05/15/91, [facility]); Percutaneous Transluminal Coronary Angio (91, [facility]); Total knee arthroplasty (2012); Splenectomy; AAA repair, open; Revision total knee arthroplasty (Right, 2017); pr arthroscopy knee lateral release (Right, 12/15/2017); Cardiac surgery; joint replacement; pr  revj total knee arthrp w/wo algrft 1 component (Right, 04/06/2018); and Upper gastrointestinal endoscopy (N/A, 06/23/2023).    Assessment  Body Structures, Functions, Activity Limitations Requiring Skilled Therapeutic Intervention: Decreased functional mobility ;Decreased ADL status;Decreased ROM;Decreased strength;Decreased safe awareness;Decreased endurance;Decreased balance;Increased pain;Decreased posture  Assessment: Pt ABLE TO STAND AND STEP TO RECLINER WITH ASSIST. UNSURE IF Pt WOULD BE SAFE TO RETURN HOME AT THIS LEVEL.  Requires PT Follow-Up: Yes  Activity Tolerance  Activity Tolerance: Patient tolerated evaluation without incident    Plan  Physical Therapy Plan  General Plan: 5-7 times per week  Current Treatment Recommendations: Strengthening, ROM, Balance training, Functional mobility training, Transfer training, Safety education & training, Patient/Caregiver education & training, Gait training, Endurance training  Safety Devices  Type of Devices: Call light within reach, Chair alarm in place, Nurse notified    Restrictions  Restrictions/Precautions  Restrictions/Precautions: Fall Risk     Subjective  Pain: REPORTS MINOR HEADACHE  General  Diagnosis: FALLS, BRIAN, COPD  Subjective  Subjective: Pt WILLING TO SIT UP FOR BREAKFAST         Social/Functional History  Social/Functional History  Lives With: Alone  Type of Home: House  Home Layout: Performs ADL's on one level, Two level  Home Access: Ramped entrance  Bathroom Shower/Tub: Tub/Shower unit  Bathroom Toilet: Standard  Bathroom Equipment: None  Bathroom Accessibility: Accessible  Home Equipment: Cane, Walker - Rolling  Receives Help From: Family  ADL Assistance: Independent  Homemaking Assistance: Independent  Homemaking Responsibilities: Yes  Ambulation Assistance: Independent (RW)  Transfer Assistance: Independent  Active : Yes  Mode of Transportation: Car  Occupation: Retired  Vision/Hearing  Vision  Vision: Impaired  Vision Exceptions: Wears  glasses at all times (STATES HE LOST THEM AT HOME DURING A RECENT FALL)  Hearing  Hearing: Exceptions to WFL  Hearing Exceptions: Hard of hearing/hearing concerns    Cognition   Orientation  Overall Orientation Status: Within Functional Limits  Cognition  Overall Cognitive Status: WFL    Objective  Temp: 98.1 °F (36.7 °C)  Pulse: 63  Heart Rate Source: Monitor  Respirations: 20  SpO2: 93 %  O2 Device: None (Room air)  BP: (!) 173/69  MAP (Calculated): 104  BP Location: Left upper arm  BP Method: Automatic  Patient Position: Supine     Observation/Palpation  Posture: Fair  Gross Assessment  AROM: Generally decreased, functional  Strength: Generally decreased, functional                   Bed mobility  Supine to Sit: Minimal assistance;Moderate assistance  Transfers  Sit to Stand: Moderate Assistance  Stand to Sit: Moderate Assistance  Bed to Chair: Moderate assistance  Stand Pivot Transfers: Moderate Assistance (STAND STEP RW)  Comment: CUES FOR WALKER PLACEMENT, Pt FEARFUL OF FALLING        Balance  Sitting - Dynamic: Good;-  Standing - Dynamic: Fair;-          OutComes Score                                                  AM-PAC - Mobility              Tinneti Score       Goals  Short Term Goals  Time Frame for Short Term Goals: 14 DAYS  Short Term Goal 1: BED MOB MOD IND  Short Term Goal 2: TRANSFERS SUPERVISION  Short Term Goal 3: ' RW SUPERVISION       Education  Patient Education  Education Given To: Patient  Education Provided: Role of Therapy;Plan of Care  Barriers to Learning: None      Therapy Time   Individual Concurrent Group Co-treatment   Time In           Time Out           Minutes                   Nallely Duncan, PT

## 2024-08-25 LAB
ANION GAP SERPL CALCULATED.3IONS-SCNC: 7 MMOL/L (ref 7–19)
BUN SERPL-MCNC: 33 MG/DL (ref 8–23)
CALCIUM SERPL-MCNC: 8 MG/DL (ref 8.8–10.2)
CHLORIDE SERPL-SCNC: 102 MMOL/L (ref 98–111)
CO2 SERPL-SCNC: 27 MMOL/L (ref 22–29)
CREAT SERPL-MCNC: 1.6 MG/DL (ref 0.7–1.2)
GLUCOSE BLD-MCNC: 123 MG/DL (ref 70–99)
GLUCOSE BLD-MCNC: 130 MG/DL (ref 70–99)
GLUCOSE BLD-MCNC: 154 MG/DL (ref 70–99)
GLUCOSE BLD-MCNC: 94 MG/DL (ref 70–99)
GLUCOSE SERPL-MCNC: 116 MG/DL (ref 70–99)
PERFORMED ON: ABNORMAL
PERFORMED ON: NORMAL
POTASSIUM SERPL-SCNC: 3.6 MMOL/L (ref 3.5–5)
SODIUM SERPL-SCNC: 136 MMOL/L (ref 136–145)

## 2024-08-25 PROCEDURE — 1200000000 HC SEMI PRIVATE

## 2024-08-25 PROCEDURE — 36415 COLL VENOUS BLD VENIPUNCTURE: CPT

## 2024-08-25 PROCEDURE — 6360000002 HC RX W HCPCS: Performed by: FAMILY MEDICINE

## 2024-08-25 PROCEDURE — 82962 GLUCOSE BLOOD TEST: CPT

## 2024-08-25 PROCEDURE — 6370000000 HC RX 637 (ALT 250 FOR IP): Performed by: FAMILY MEDICINE

## 2024-08-25 PROCEDURE — 6370000000 HC RX 637 (ALT 250 FOR IP): Performed by: PHYSICIAN ASSISTANT

## 2024-08-25 PROCEDURE — 94760 N-INVAS EAR/PLS OXIMETRY 1: CPT

## 2024-08-25 PROCEDURE — 80048 BASIC METABOLIC PNL TOTAL CA: CPT

## 2024-08-25 RX ORDER — HYDROCODONE BITARTRATE AND ACETAMINOPHEN 5; 325 MG/1; MG/1
1 TABLET ORAL EVERY 6 HOURS PRN
Status: DISCONTINUED | OUTPATIENT
Start: 2024-08-25 | End: 2024-08-29 | Stop reason: HOSPADM

## 2024-08-25 RX ORDER — AMLODIPINE BESYLATE 10 MG/1
10 TABLET ORAL DAILY
Status: DISCONTINUED | OUTPATIENT
Start: 2024-08-26 | End: 2024-08-26

## 2024-08-25 RX ORDER — ENOXAPARIN SODIUM 100 MG/ML
40 INJECTION SUBCUTANEOUS DAILY
Status: DISCONTINUED | OUTPATIENT
Start: 2024-08-26 | End: 2024-08-26

## 2024-08-25 RX ADMIN — ASPIRIN 81 MG: 81 TABLET, COATED ORAL at 08:37

## 2024-08-25 RX ADMIN — CLOPIDOGREL BISULFATE 75 MG: 75 TABLET ORAL at 08:37

## 2024-08-25 RX ADMIN — PANTOPRAZOLE SODIUM 40 MG: 40 TABLET, DELAYED RELEASE ORAL at 05:44

## 2024-08-25 RX ADMIN — LEVOTHYROXINE SODIUM 150 MCG: 75 TABLET ORAL at 05:44

## 2024-08-25 RX ADMIN — ACETAMINOPHEN 650 MG: 325 TABLET ORAL at 05:47

## 2024-08-25 RX ADMIN — HYDROCODONE BITARTRATE AND ACETAMINOPHEN 1 TABLET: 5; 325 TABLET ORAL at 19:52

## 2024-08-25 RX ADMIN — ENOXAPARIN SODIUM 30 MG: 100 INJECTION SUBCUTANEOUS at 08:37

## 2024-08-25 RX ADMIN — CALCITRIOL CAPSULES 0.25 MCG 0.25 MCG: 0.25 CAPSULE ORAL at 08:37

## 2024-08-25 RX ADMIN — HYDROCODONE BITARTRATE AND ACETAMINOPHEN 1 TABLET: 5; 325 TABLET ORAL at 11:00

## 2024-08-25 RX ADMIN — EMPAGLIFLOZIN 10 MG: 10 TABLET, FILM COATED ORAL at 08:37

## 2024-08-25 RX ADMIN — TAMSULOSIN HYDROCHLORIDE 0.4 MG: 0.4 CAPSULE ORAL at 08:37

## 2024-08-25 RX ADMIN — Medication 3 MG: at 19:52

## 2024-08-25 RX ADMIN — TAMSULOSIN HYDROCHLORIDE 0.4 MG: 0.4 CAPSULE ORAL at 19:52

## 2024-08-25 RX ADMIN — PANTOPRAZOLE SODIUM 40 MG: 40 TABLET, DELAYED RELEASE ORAL at 15:49

## 2024-08-25 RX ADMIN — AMLODIPINE BESYLATE 5 MG: 5 TABLET ORAL at 08:37

## 2024-08-25 RX ADMIN — ATORVASTATIN CALCIUM 10 MG: 10 TABLET, FILM COATED ORAL at 08:37

## 2024-08-25 ASSESSMENT — PAIN SCALES - GENERAL
PAINLEVEL_OUTOF10: 9
PAINLEVEL_OUTOF10: 6

## 2024-08-25 ASSESSMENT — PAIN DESCRIPTION - LOCATION
LOCATION: NECK;SHOULDER;KNEE
LOCATION: LEG;BACK;GENERALIZED

## 2024-08-25 ASSESSMENT — PAIN DESCRIPTION - ORIENTATION: ORIENTATION: RIGHT

## 2024-08-25 NOTE — PROGRESS NOTES
Pharmacy Renal Dose Adjustment      Recent Labs     08/23/24  1019 08/24/24  0403   BUN 41* 35*       Recent Labs     08/23/24  1019 08/24/24  0403   CREATININE 1.8* 1.6*       Estimated Creatinine Clearance: 32 mL/min (A) (based on SCr of 1.6 mg/dL (H)).      Plan: Changed Lovenox to 40 mg SC daily due to patients renal function.  Pharmacy will continue to follow and make adjustments as needed.    Electronically signed by Rafael Posey RPH on 8/25/2024 at 10:02 AM

## 2024-08-25 NOTE — PROGRESS NOTES
Pt coccyx wound noted to have increased drainage this afternoon. Wound cleansed & barrier applied. Pt turned & repositioned for comfort. Wound picture added to chart.

## 2024-08-25 NOTE — PROGRESS NOTES
Daily Progress Note  Atul Peoples  MRN: [id number] LOS: 2    Admit Date: 2024 10:17 AM    Subjective:          Chief Complaint:  Chief Complaint   Patient presents with    Fall    Shoulder Pain     Right     Urinary Frequency       Interval History:    Reviewed overnight events and nursing notes.   Status:  improved  Pain:  some relief    No new complaints this AM    Review of Systems   Constitutional:  Positive for activity change.   HENT: Negative.     Respiratory: Negative.     Cardiovascular: Negative.    Gastrointestinal: Negative.    Musculoskeletal:  Positive for arthralgias.   Neurological:  Positive for weakness.       DIET:  ADULT DIET; Regular    Medications:      sodium chloride      sodium chloride      sodium chloride 75 mL/hr at 24 0547      [START ON 2024] enoxaparin  40 mg SubCUTAneous Daily    sodium chloride flush  5-40 mL IntraVENous 2 times per day    sodium chloride flush  5-40 mL IntraVENous 2 times per day    melatonin  3 mg Oral Nightly    amLODIPine  5 mg Oral Daily    aspirin  81 mg Oral Daily    atorvastatin  10 mg Oral Daily    calcitRIOL  0.25 mcg Oral Daily    clopidogrel  75 mg Oral Daily    empagliflozin  10 mg Oral Daily    levothyroxine  150 mcg Oral Daily    pantoprazole  40 mg Oral BID AC    tamsulosin  0.4 mg Oral BID       Data:     Code Status: Full Code    Family History   Problem Relation Age of Onset    Heart Disease Other     Heart Disease Mother      Social History     Socioeconomic History    Marital status:      Spouse name: Not on file    Number of children: Not on file    Years of education: Not on file    Highest education level: Not on file   Occupational History    Not on file   Tobacco Use    Smoking status: Former     Current packs/day: 0.00     Types: Cigarettes     Quit date: 1989     Years since quittin.6    Smokeless tobacco: Never   Vaping Use    Vaping status: Never Used   Substance and Sexual Activity    Alcohol

## 2024-08-26 LAB
ANISOCYTOSIS BLD QL SMEAR: ABNORMAL
BASOPHILS # BLD: 0 K/UL (ref 0–0.2)
BASOPHILS NFR BLD: 0 % (ref 0–1)
EKG P AXIS: 72 DEGREES
EKG P-R INTERVAL: 194 MS
EKG Q-T INTERVAL: 430 MS
EKG QRS DURATION: 150 MS
EKG QTC CALCULATION (BAZETT): 444 MS
EKG T AXIS: 48 DEGREES
EOSINOPHIL # BLD: 0.87 K/UL (ref 0–0.6)
EOSINOPHIL NFR BLD: 9 % (ref 0–5)
ERYTHROCYTE [DISTWIDTH] IN BLOOD BY AUTOMATED COUNT: 18.6 % (ref 11.5–14.5)
GLUCOSE BLD-MCNC: 118 MG/DL (ref 70–99)
GLUCOSE BLD-MCNC: 132 MG/DL (ref 70–99)
GLUCOSE BLD-MCNC: 83 MG/DL (ref 70–99)
HCT VFR BLD AUTO: 31.6 % (ref 42–52)
HGB BLD-MCNC: 10.5 G/DL (ref 14–18)
IMM GRANULOCYTES # BLD: 0.5 K/UL
LYMPHOCYTES # BLD: 1 K/UL (ref 1.1–4.5)
LYMPHOCYTES NFR BLD: 9 % (ref 20–40)
MACROCYTES BLD QL SMEAR: ABNORMAL
MCH RBC QN AUTO: 31.6 PG (ref 27–31)
MCHC RBC AUTO-ENTMCNC: 33.2 G/DL (ref 33–37)
MCV RBC AUTO: 95.2 FL (ref 80–94)
METAMYELOCYTES NFR BLD MANUAL: 2 %
MONOCYTES # BLD: 0.7 K/UL (ref 0–0.9)
MONOCYTES NFR BLD: 7 % (ref 0–10)
NEUTROPHILS # BLD: 7.2 K/UL (ref 1.5–7.5)
NEUTS BAND NFR BLD MANUAL: 8 % (ref 0–5)
NEUTS SEG NFR BLD: 64 % (ref 50–65)
PERFORMED ON: ABNORMAL
PERFORMED ON: ABNORMAL
PERFORMED ON: NORMAL
PLATELET # BLD AUTO: 63 K/UL (ref 130–400)
PLATELET SLIDE REVIEW: ABNORMAL
RBC # BLD AUTO: 3.32 M/UL (ref 4.7–6.1)
VARIANT LYMPHS NFR BLD: 1 % (ref 0–8)
WBC # BLD AUTO: 9.7 K/UL (ref 4.8–10.8)

## 2024-08-26 PROCEDURE — 6370000000 HC RX 637 (ALT 250 FOR IP): Performed by: FAMILY MEDICINE

## 2024-08-26 PROCEDURE — 97530 THERAPEUTIC ACTIVITIES: CPT

## 2024-08-26 PROCEDURE — 97110 THERAPEUTIC EXERCISES: CPT

## 2024-08-26 PROCEDURE — 2580000003 HC RX 258: Performed by: FAMILY MEDICINE

## 2024-08-26 PROCEDURE — 6370000000 HC RX 637 (ALT 250 FOR IP): Performed by: PHYSICIAN ASSISTANT

## 2024-08-26 PROCEDURE — 36415 COLL VENOUS BLD VENIPUNCTURE: CPT

## 2024-08-26 PROCEDURE — 93010 ELECTROCARDIOGRAM REPORT: CPT | Performed by: INTERNAL MEDICINE

## 2024-08-26 PROCEDURE — 94760 N-INVAS EAR/PLS OXIMETRY 1: CPT

## 2024-08-26 PROCEDURE — 1200000000 HC SEMI PRIVATE

## 2024-08-26 PROCEDURE — 97165 OT EVAL LOW COMPLEX 30 MIN: CPT

## 2024-08-26 PROCEDURE — 99223 1ST HOSP IP/OBS HIGH 75: CPT | Performed by: PHYSICIAN ASSISTANT

## 2024-08-26 PROCEDURE — 82962 GLUCOSE BLOOD TEST: CPT

## 2024-08-26 PROCEDURE — 85025 COMPLETE CBC W/AUTO DIFF WBC: CPT

## 2024-08-26 PROCEDURE — 6360000002 HC RX W HCPCS: Performed by: FAMILY MEDICINE

## 2024-08-26 RX ORDER — AMLODIPINE BESYLATE 5 MG/1
5 TABLET ORAL DAILY
Status: DISCONTINUED | OUTPATIENT
Start: 2024-08-27 | End: 2024-08-29 | Stop reason: HOSPADM

## 2024-08-26 RX ORDER — LOSARTAN POTASSIUM 50 MG/1
50 TABLET ORAL DAILY
Status: DISCONTINUED | OUTPATIENT
Start: 2024-08-26 | End: 2024-08-29 | Stop reason: HOSPADM

## 2024-08-26 RX ORDER — PANTOPRAZOLE SODIUM 40 MG/1
40 TABLET, DELAYED RELEASE ORAL
Status: DISCONTINUED | OUTPATIENT
Start: 2024-08-27 | End: 2024-08-29 | Stop reason: HOSPADM

## 2024-08-26 RX ADMIN — Medication 3 MG: at 20:05

## 2024-08-26 RX ADMIN — LOSARTAN POTASSIUM 50 MG: 50 TABLET, FILM COATED ORAL at 20:05

## 2024-08-26 RX ADMIN — CLOPIDOGREL BISULFATE 75 MG: 75 TABLET ORAL at 09:19

## 2024-08-26 RX ADMIN — TAMSULOSIN HYDROCHLORIDE 0.4 MG: 0.4 CAPSULE ORAL at 09:19

## 2024-08-26 RX ADMIN — PANTOPRAZOLE SODIUM 40 MG: 40 TABLET, DELAYED RELEASE ORAL at 05:15

## 2024-08-26 RX ADMIN — HYDROCODONE BITARTRATE AND ACETAMINOPHEN 1 TABLET: 5; 325 TABLET ORAL at 20:06

## 2024-08-26 RX ADMIN — SODIUM CHLORIDE, PRESERVATIVE FREE 10 ML: 5 INJECTION INTRAVENOUS at 09:19

## 2024-08-26 RX ADMIN — LEVOTHYROXINE SODIUM 150 MCG: 75 TABLET ORAL at 05:15

## 2024-08-26 RX ADMIN — HYDROCODONE BITARTRATE AND ACETAMINOPHEN 1 TABLET: 5; 325 TABLET ORAL at 11:39

## 2024-08-26 RX ADMIN — ASPIRIN 81 MG: 81 TABLET, COATED ORAL at 09:19

## 2024-08-26 RX ADMIN — ATORVASTATIN CALCIUM 10 MG: 10 TABLET, FILM COATED ORAL at 09:19

## 2024-08-26 RX ADMIN — AMLODIPINE BESYLATE 10 MG: 10 TABLET ORAL at 09:19

## 2024-08-26 RX ADMIN — SODIUM CHLORIDE, PRESERVATIVE FREE 10 ML: 5 INJECTION INTRAVENOUS at 20:06

## 2024-08-26 RX ADMIN — TAMSULOSIN HYDROCHLORIDE 0.4 MG: 0.4 CAPSULE ORAL at 20:06

## 2024-08-26 RX ADMIN — ENOXAPARIN SODIUM 40 MG: 100 INJECTION SUBCUTANEOUS at 09:19

## 2024-08-26 RX ADMIN — CALCITRIOL CAPSULES 0.25 MCG 0.25 MCG: 0.25 CAPSULE ORAL at 09:19

## 2024-08-26 RX ADMIN — EMPAGLIFLOZIN 10 MG: 10 TABLET, FILM COATED ORAL at 09:19

## 2024-08-26 RX ADMIN — PANTOPRAZOLE SODIUM 40 MG: 40 TABLET, DELAYED RELEASE ORAL at 17:16

## 2024-08-26 RX ADMIN — HYDROCODONE BITARTRATE AND ACETAMINOPHEN 1 TABLET: 5; 325 TABLET ORAL at 02:30

## 2024-08-26 ASSESSMENT — PAIN DESCRIPTION - DESCRIPTORS
DESCRIPTORS: OTHER (COMMENT)
DESCRIPTORS: ACHING

## 2024-08-26 ASSESSMENT — PAIN DESCRIPTION - ORIENTATION: ORIENTATION: RIGHT;LEFT

## 2024-08-26 ASSESSMENT — PAIN SCALES - GENERAL
PAINLEVEL_OUTOF10: 9
PAINLEVEL_OUTOF10: 7
PAINLEVEL_OUTOF10: 3
PAINLEVEL_OUTOF10: 5

## 2024-08-26 ASSESSMENT — PAIN DESCRIPTION - LOCATION: LOCATION: ARM;SHOULDER

## 2024-08-26 NOTE — CONSULTS
Palliative Care Consult Note    8/26/2024 9:51 AM  Subjective:  Admit Date: 8/23/2024  PCP: Isrrael Horner MD    Date of Service: 8/26/2024    Reason for Consultation:  Goals of Care, Code Status, Family Support     History Obtained From: EMR/Patient and their Family    History Of Present Illness:   The patient is a 84 y.o. male with PMH CAD, s/p CABG, CKD IIIb, AAA, COPD, GERD, HTN, HLD, hypothyroidism w/ recent history myxedema coma who presented to [facility] ED on 08/23/2024 w/ concern for right shoulder, knee, and hip pain after a fall to the floor from his chair. He has had multiple recent falls at home. Work up included CXR reported as no acute abnormality. R shoulder films negative for fx or dislocation. Hip negative for fracture. R knee reported arthroplasty intact w/ hardware in anatomic alignment and chronic patellar fracture w/ distraction noted. He was found to have elevated CK w/ BRIAN in addition to a coccyx wound and was admitted to his PCP service for IVFs and further delineation of care. Palliative care consulted to assist w/ code status discussion.     Past Medical History:        Diagnosis Date    AAA (abdominal aortic aneurysm) (HCC)     ASHD (arteriosclerotic heart disease)     S/P coronary intervention followed by CBG    CAD (coronary artery disease)     Severe triple-vessel    CAD (coronary artery disease) 02/04/2015    COPD (chronic obstructive pulmonary disease) (HCC)     With tobacco abuse    GERD (gastroesophageal reflux disease)     HH (hiatus hernia)     History of cerebrovascular disease     Hyperlipidemia     PCP, Dr. REZA Horner manages cholesterol.    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 June    S/P TKR (total knee replacement)     RIGHT    Thyroid disease        Past Surgical History:        Procedure Laterality Date    ABDOMINAL AORTIC ANEURYSM REPAIR, OPEN  Provider   levothyroxine (SYNTHROID) 150 MCG tablet Take 1 tablet by mouth Daily 7/26/24  Yes Wilfredo Smith MD   calcitRIOL (ROCALTROL) 0.25 MCG capsule Take 1 capsule by mouth daily 6/26/23  Yes Isrrael Horner MD   tamsulosin (FLOMAX) 0.4 MG capsule Take 1 capsule by mouth in the morning and at bedtime 6/26/23  Yes Isrrael Horner MD   pantoprazole (PROTONIX) 40 MG tablet Take 1 tablet by mouth 2 times daily (before meals) 6/26/23  Yes Isrrael Horner MD   dapagliflozin (FARXIGA) 5 MG tablet Take 1 tablet by mouth every morning   Yes Frankie Aguero MD   atorvastatin (LIPITOR) 10 MG tablet Take 1 tablet by mouth daily   Yes Frankie Aguero MD   amLODIPine (NORVASC) 5 MG tablet Take 1 tablet by mouth daily 7/14/22  Yes Isrrael Horner MD   aspirin 81 MG EC tablet Take 1 tablet by mouth daily   Yes Frankie Aguero MD   clopidogrel (PLAVIX) 75 MG tablet Take 1 tablet by mouth daily Start after Xarelto done 1/10/17  Yes Isrrael Horner MD   hydrOXYzine pamoate (VISTARIL) 50 MG capsule Take 1 capsule by mouth 3 times daily as needed for Itching    Frankie Aguero MD   Multiple Vitamins-Minerals (CENTRUM SILVER PO) Take by mouth  Patient not taking: Reported on 8/23/2024    Frankie Aguero MD   acetaminophen (TYLENOL) 500 MG tablet Take 1 tablet by mouth every 6 hours as needed for Pain    Frankie Aguero MD       Allergies:    Pcn [penicillins], Azithromycin, Doxycycline, Tetracyclines & related, and Vibramycin [doxycycline calcium]    Social History:    The patient currently lives at home independently  Tobacco:   reports that he quit smoking about 35 years ago. His smoking use included cigarettes. He has never used smokeless tobacco.  Alcohol:   reports that he does not currently use alcohol.  Illicit Drugs: none    Family History:      Problem Relation Age of Onset    Heart Disease Other     Heart Disease Mother        Review of Systems:   + fatigue + myalgias  pt/family support                                     Total Time Spent with patient assessment, interview of independent historian/HCS, workup/treatment review, discussion with medical team, review of current and home medications, review of care everywhere and placement of orders/preparation of this note: 78 minutes      Electronically signed by Nallely Kapadia PA-C on 8/26/2024 at 9:51 AM    (Please note that portions of this note were completed with a voice recognition program.  Efforts were made to edit the dictations but occasionally words are mis-transcribed.)

## 2024-08-26 NOTE — CARE COORDINATION
Referrals sent to the following, will be a precert  [facility]   P   F  [facility] can not offer   P   F ()   F (Ref to [facility])  [facility]   P   F  Electronically signed by HERNÁN Duran on 8/26/2024 at 10:31 AM     Referrals sent to the following, will be precert, waiting on response   [facility] can not offer   [phone] P    F   [facility] can not offer    P   [phone] E-fax   Electronically signed by HERNÁN Duran on 8/23/2024 at 3:16 P

## 2024-08-26 NOTE — CARE COORDINATION
Pt has used 24 days of his Medicare advantage days at a SNF, he is now at the copay stage, for [facility] it will cost $203 a day. He would like me to speak with his friend Dileep, [phone] as the pt doesn't know what to do and said he can not afford that cost. Spoke to Dileep and he is going to talk with some of the family to see if pt can stay with them, he will call me in the morning  Electronically signed by HERNÁN Duran on 8/26/2024 at 1:06 PM

## 2024-08-26 NOTE — PROGRESS NOTES
Occupational Therapy Initial Assessment  Date: 2024   Patient Name: Atul Peoples  MRN: [id number]     : 1940    Date of Service: 2024    Discharge Recommendations:  Patient would benefit from continued therapy after discharge     Assessment   Performance deficits / Impairments: Decreased functional mobility ;Decreased ADL status;Decreased ROM;Decreased strength;Decreased endurance;Decreased high-level IADLs;Decreased balance  Assessment: OT evaluation completed and treatment initiated.  Pt is pleasant and cooperative throughout evaluation.  He requires increased assistance for adl and functional mobility tasks.  He would benefit from skilled OT to improve strength/endurance/activity tolerance to increase safety and independence with adl and functional mobility routines.  Treatment Diagnosis: s/p fall  Prognosis: Good  REQUIRES OT FOLLOW-UP: Yes  Activity Tolerance  Activity Tolerance: Patient Tolerated treatment well         Patient Diagnosis(es): The primary encounter diagnosis was Fall from chair, initial encounter. Diagnoses of Recurrent falls, Chronic low back pain, unspecified back pain laterality, unspecified whether sciatica present, Contusion of right shoulder, initial encounter, History of arthroplasty of right knee, Pressure injury of sacral region, stage 2 ([facility]), and Elevated CK were also pertinent to this visit.    Past Medical History:   Past Medical History:   Diagnosis Date    AAA (abdominal aortic aneurysm) ([facility])     ASHD (arteriosclerotic heart disease)     S/P coronary intervention followed by CBG    CAD (coronary artery disease)     Severe triple-vessel    CAD (coronary artery disease) 2015    COPD (chronic obstructive pulmonary disease) ([facility])     With tobacco abuse    GERD (gastroesophageal reflux disease)     HH (hiatus hernia)     History of cerebrovascular disease     Hyperlipidemia     PCP, Dr. REZA Horner manages cholesterol.    Hypertension     Obesity

## 2024-08-26 NOTE — PROGRESS NOTES
This  did a follow up visit to assist pt with completing a new LW. Pt was seen by Nallely Kapadia Palliative care PA. She and this  both spoke and interacted with the pt. She feels pt has capacity to complete the document. This  identified the pt with his armband with his  and MRN. Provided the document and discussed the decisions. Pt named his friend Dileep Cassidy to be primary medical decision maker. Pt says he will also have his son Barry Peoples and his daughter Tootie Adame as his secondary decision makers. Pt also made advance care planning decisions. He initialed and signed the document and signed the document and the notary log. This  witnessed the pt initial and sign and notarized the document. Original and copies were given to pt and a copy was emailed to Lyric Schmidt to be scanned into pt's EMR. Pt expressed gratitude for assistance with completing a new LW. Nallely Kapadia asked this  to provide an EMS DNR for pt to consider. After forgetting to bring to the first visit, this  went back to pt's room but he was asleep. This  left the document on his table tray for him to look over and consider.        Electronically signed by Mary Behrens on 2024 at 3:14 PM

## 2024-08-26 NOTE — PROGRESS NOTES
Progress Note  Date:2024       Room:0426/426-02  Patient Name:Atul Peoples     YOB: 1940     Age:84 y.o.    Multiple falls at home and left down for several hours at a time.  Will need rehab/therapy prior to discharge home.    Subjective    Subjective:  Symptoms:  Stable.    Diet:  Poor intake.    Activity level: Impaired due to weakness.    Pain:  He complains of pain that is mild.  He reports pain is improving.  Pain is well controlled.       Review of Systems  Objective         Vitals Last 24 Hours:  TEMPERATURE:  Temp  Av.7 °F (36.5 °C)  Min: 97.3 °F (36.3 °C)  Max: 98.4 °F (36.9 °C)  RESPIRATIONS RANGE: Resp  Av.8  Min: 16  Max: 20  PULSE OXIMETRY RANGE: SpO2  Av.5 %  Min: 90 %  Max: 94 %  PULSE RANGE: Pulse  Av.6  Min: 62  Max: 90  BLOOD PRESSURE RANGE: Systolic (24hrs), Av , Min:135 , Max:168   ; Diastolic (24hrs), Av, Min:56, Max:70    I/O (24Hr):    Intake/Output Summary (Last 24 hours) at 2024 0719  Last data filed at 2024 0619  Gross per 24 hour   Intake 480 ml   Output 1725 ml   Net -1245 ml     Objective:  General Appearance:  Comfortable and well-appearing.    Vital signs: (most recent): Blood pressure (!) 135/56, pulse 66, temperature 97.6 °F (36.4 °C), temperature source Temporal, resp. rate 17, height 1.753 m (5' 9.02\"), weight 71.8 kg (158 lb 3.2 oz), SpO2 92%.  Vital signs are normal.    Output: Producing urine and minimal stool output.    HEENT: Normal HEENT exam.    Lungs:  Normal effort and normal respiratory rate.    Heart: Normal rate.  Regular rhythm.    Abdomen: Abdomen is soft.  Bowel sounds are normal.   There is no abdominal tenderness.     Extremities: Decreased range of motion.    Neurological: Patient is alert.    Skin:  Warm and dry.      Labs/Imaging/Diagnostics    Labs:  CBC:  Recent Labs     24  1019 24  0403 24  0357   WBC 11.0* 8.8 9.7   RBC 3.88* 3.60* 3.32*   HGB 12.1* 11.6* 10.5*   HCT 36.3* 35.0*  31.6*   MCV 93.6 97.2* 95.2*   RDW 18.1* 18.5* 18.6*   * 106* 63*     CHEMISTRIES:  Recent Labs     08/23/24  1019 08/24/24  0403 08/25/24  1111    137 136   K 4.4 4.1 3.6    102 102   CO2 26 24 27   BUN 41* 35* 33*   CREATININE 1.8* 1.6* 1.6*   GLUCOSE 86 73 116*     PT/INR:No results for input(s): \"PROTIME\", \"INR\" in the last 72 hours.  APTT:No results for input(s): \"APTT\" in the last 72 hours.  LIVER PROFILE:  Recent Labs     08/23/24  1019   *   *   BILITOT 0.8   ALKPHOS 375*       Imaging Last 24 Hours:  No results found.  Assessment//Plan           Hospital Problems             Last Modified POA    * (Principal) Fall from chair, initial encounter 8/23/2024 Yes    Palliative care patient 8/23/2024 Yes    Acute kidney injury (nontraumatic) ([facility]) 8/23/2024 Yes    ASHD (arteriosclerotic heart disease) 8/23/2024 Yes    Hypertension 8/23/2024 Yes    Hyperlipidemia 8/23/2024 Yes    COPD (chronic obstructive pulmonary disease) ([facility]) 8/23/2024 Yes    History of cerebrovascular disease 8/23/2024 Yes    AAA (abdominal aortic aneurysm) 8/23/2024 Yes    GERD (gastroesophageal reflux disease) 8/23/2024 Yes    S/P CABG x 4 8/23/2024 Yes    Overview Signed 2/4/2015  1:42 PM by Safia Major APRN     12/1/06 by Dr. June         Status post coronary artery stent placement 8/23/2024 Yes    Altered mental status 8/23/2024 Yes    Rhabdomyolysis 8/23/2024 Yes         Plan:   Encourage ambulation and per physical therapy.   (    Multiple falls and being found down for a while-patient unstable on his feet will need short-term rehab before returning home due to risk of falls.    Chronic kidney disease 3A-avoid nephrotoxic medications maintain adequate hydration.  Suspect the rhabdomyolysis bumped his creatinine but it is back down to normal now.    Known arteriosclerotic heart disease-appears euvolemic and no signs of chest pain.    Leukocytosis-stress reaction has returned to normal.  Anemia is  multifactorial suspect related to chronic disease we will check substrates in the morning as well.    Will recheck labs in a.m. and work on discharge planning.    Should be ready for discharge skilled nurse facility 1-2 days.).       Electronically signed by Isrrael Horner MD on 8/26/24 at 7:19 AM CDT

## 2024-08-26 NOTE — PLAN OF CARE
Problem: Safety - Adult  Goal: Free from fall injury  8/26/2024 0947 by Azalea Kruger RN  Outcome: Progressing  Flowsheets (Taken 8/26/2024 0946)  Free From Fall Injury: Instruct family/caregiver on patient safety  8/25/2024 2233 by Roman Salcido LPN  Outcome: Progressing     Problem: ABCDS Injury Assessment  Goal: Absence of physical injury  8/26/2024 0947 by Azalea Kruger RN  Outcome: Progressing  Flowsheets (Taken 8/26/2024 0946)  Absence of Physical Injury: Implement safety measures based on patient assessment  8/25/2024 2233 by Roman Salcido LPN  Outcome: Progressing     Problem: Skin/Tissue Integrity  Goal: Absence of new skin breakdown  Description: 1.  Monitor for areas of redness and/or skin breakdown  2.  Assess vascular access sites hourly  3.  Every 4-6 hours minimum:  Change oxygen saturation probe site  4.  Every 4-6 hours:  If on nasal continuous positive airway pressure, respiratory therapy assess nares and determine need for appliance change or resting period.  8/26/2024 0947 by Azalea Kruger RN  Outcome: Progressing  8/25/2024 2233 by Roman Salcido LPN  Outcome: Progressing     Problem: Pain  Goal: Verbalizes/displays adequate comfort level or baseline comfort level  8/26/2024 0947 by Azalea Kruger RN  Outcome: Progressing  8/25/2024 2233 by Roman Salcido LPN  Outcome: Progressing     Problem: Chronic Conditions and Co-morbidities  Goal: Patient's chronic conditions and co-morbidity symptoms are monitored and maintained or improved  Outcome: Progressing

## 2024-08-26 NOTE — PROGRESS NOTES
Physical Therapy  Name: Atul Peoples  MRN:  [id number]  Date of service:  8/26/2024 08/26/24 0925   Restrictions/Precautions   Restrictions/Precautions Fall Risk   Subjective   Subjective i wish we could just do it in bed so I didn' have to get out.   Oxygen Therapy   O2 Device None (Room air)   Bed Mobility   Supine to Sit Minimal assistance   Transfers   Sit to Stand Moderate Assistance   Stand to Sit Moderate Assistance   Bed to Chair Moderate assistance   Comment CUES FOR WALKER PLACEMENT, Pt FEARFUL OF FALLING   Ambulation   WB Status 4 feet to chair with RW mod A   Short Term Goals   Time Frame for Short Term Goals 14 DAYS   Short Term Goal 1 BED MOB MOD IND   Short Term Goal 2 TRANSFERS SUPERVISION   Short Term Goal 3 ' RW SUPERVISION   Conditions Requiring Skilled Therapeutic Intervention   Body Structures, Functions, Activity Limitations Requiring Skilled Therapeutic Intervention Decreased functional mobility ;Decreased ADL status;Decreased ROM;Decreased strength;Decreased safe awareness;Decreased endurance;Decreased balance;Increased pain;Decreased posture   Discharge Recommendations Continue to assess pending progress   Activity Tolerance   Activity Tolerance Patient tolerated treatment well   Physical Therapy Plan   General Plan 5-7 times per week   Current Treatment Recommendations Strengthening;ROM;Balance training;Functional mobility training;Transfer training;Safety education & training;Patient/Caregiver education & training;Gait training;Endurance training   Safety Devices   Type of Devices Call light within reach;Left in chair;Nurse notified  (left with nursing students in room)       Electronically signed by Dalila Mccord PTA on 8/26/2024 at 10:04 AM

## 2024-08-26 NOTE — PROGRESS NOTES
Pt was a spiritual services consult and this  visited with him and discussed his decision makers and provided spiritual care. Pt has a LW with his son Barry Peoples named as his primary decision maker. In his chart he has his daughter Tootie Adame as secondary. Pt says he wants a friend of his to be his medical decision maker. This  will assist the pt this afternoon with completing a new LW. Also provided spiritual care with sustaining presence, nurtured hope, and prayer. Pt expressed gratitude for spiritual care.       Electronically signed by Mary Behrens on 8/26/2024 at 11:16 AM

## 2024-08-26 NOTE — WOUND CARE
[facility]ound  Nurse  Consult Note       NAME:  Atul Peoples  MEDICAL RECORD NUMBER:  [id number]  AGE: 84 y.o.   GENDER: male  : 1940  TODAY'S DATE:  2024    Subjective   Reason for Wound Nurse Evaluation and Assessment: Worsening DTI to coccyx      Atul Peoples is a 84 y.o. male referred by:   [] Physician  [x] Nursing  [] Other:     Wound Identification:  Wound Type: pressure  Contributing Factors: chronic pressure and decreased mobility    Wound History: patient has been seen at [facility] for this wound in the past.     Current Wound Care Treatment:  Cleanse area with soap and water. Apply barrier film (skin prep) to luz wound skin, allow to dry. Apply [employer] cut slightly larger than the wound. Secure with a silicone border. Change every 48 hours and PRN for soiling. Dark purple DTI noted in center with pale granulation tissue pink/red wound bed noted    Patient Goal of Care:  [x] Wound Healing  [] Odor Control  [] Palliative Care  [] Pain Control   [] Other:         PAST MEDICAL HISTORY        Diagnosis Date    AAA (abdominal aortic aneurysm) (HCC)     ASHD (arteriosclerotic heart disease)     S/P coronary intervention followed by CBG    CAD (coronary artery disease)     Severe triple-vessel    CAD (coronary artery disease) 2015    COPD (chronic obstructive pulmonary disease) ([facility])     With tobacco abuse    GERD (gastroesophageal reflux disease)     HH (hiatus hernia)     History of cerebrovascular disease     Hyperlipidemia     PCP, Dr. REZA Horner manages cholesterol.    Hypertension     Obesity     Osteoarthritis     Palliative care patient 2022    Renal insufficiency     S/P CABG x 3     S/P CABG x 4 2015 by Dr. June    S/P TKR (total knee replacement)     RIGHT    Thyroid disease        PAST SURGICAL HISTORY    Past Surgical History:   Procedure Laterality Date    ABDOMINAL AORTIC ANEURYSM REPAIR, OPEN      ADENOIDECTOMY      APPENDECTOMY      CARDIAC SURGERY       Smoking status: Former     Current packs/day: 0.00     Types: Cigarettes     Quit date: 1989     Years since quittin.6    Smokeless tobacco: Never   Vaping Use    Vaping status: Never Used   Substance Use Topics    Alcohol use: Not Currently     Comment: RARELY    Drug use: Never       ALLERGIES    Allergies   Allergen Reactions    Pcn [Penicillins] Anaphylaxis    Azithromycin      Other reaction(s): Other (See Comments)  UNKNOWN     Doxycycline Other (See Comments)     UNKNOWN REACTION    Tetracyclines & Related      Other reaction(s): Other (See Comments)  UNKNOWN REACTION  Not sure    Vibramycin [Doxycycline Calcium]      Not sure       MEDICATIONS    No current facility-administered medications on file prior to encounter.     Current Outpatient Medications on File Prior to Encounter   Medication Sig Dispense Refill    levothyroxine (SYNTHROID) 150 MCG tablet Take 1 tablet by mouth Daily 30 tablet 3    calcitRIOL (ROCALTROL) 0.25 MCG capsule Take 1 capsule by mouth daily 30 capsule 3    tamsulosin (FLOMAX) 0.4 MG capsule Take 1 capsule by mouth in the morning and at bedtime 30 capsule 3    pantoprazole (PROTONIX) 40 MG tablet Take 1 tablet by mouth 2 times daily (before meals) 30 tablet 3    dapagliflozin (FARXIGA) 5 MG tablet Take 1 tablet by mouth every morning      atorvastatin (LIPITOR) 10 MG tablet Take 1 tablet by mouth daily      amLODIPine (NORVASC) 5 MG tablet Take 1 tablet by mouth daily 30 tablet 3    aspirin 81 MG EC tablet Take 1 tablet by mouth daily      clopidogrel (PLAVIX) 75 MG tablet Take 1 tablet by mouth daily Start after Xarelto done 30 tablet 3    hydrOXYzine pamoate (VISTARIL) 50 MG capsule Take 1 capsule by mouth 3 times daily as needed for Itching      Multiple Vitamins-Minerals (CENTRUM SILVER PO) Take by mouth (Patient not taking: Reported on 2024)      acetaminophen (TYLENOL) 500 MG tablet Take 1 tablet by mouth every 6 hours as needed for Pain         Objective    BP

## 2024-08-27 LAB
ALBUMIN SERPL-MCNC: 2.3 G/DL (ref 3.5–5.2)
ALP SERPL-CCNC: 187 U/L (ref 40–129)
ALT SERPL-CCNC: 36 U/L (ref 5–41)
ANION GAP SERPL CALCULATED.3IONS-SCNC: 7 MMOL/L (ref 7–19)
ANISOCYTOSIS BLD QL SMEAR: ABNORMAL
AST SERPL-CCNC: 35 U/L (ref 5–40)
BASOPHILS # BLD: 0.1 K/UL (ref 0–0.2)
BASOPHILS NFR BLD: 0.6 % (ref 0–1)
BILIRUB SERPL-MCNC: 0.2 MG/DL (ref 0.2–1.2)
BUN SERPL-MCNC: 29 MG/DL (ref 8–23)
BURR CELLS BLD QL SMEAR: ABNORMAL
CALCIUM SERPL-MCNC: 7.7 MG/DL (ref 8.8–10.2)
CHLORIDE SERPL-SCNC: 103 MMOL/L (ref 98–111)
CO2 SERPL-SCNC: 25 MMOL/L (ref 22–29)
CREAT SERPL-MCNC: 1.7 MG/DL (ref 0.7–1.2)
EOSINOPHIL # BLD: 1.2 K/UL (ref 0–0.6)
EOSINOPHIL NFR BLD: 10 % (ref 0–5)
ERYTHROCYTE [DISTWIDTH] IN BLOOD BY AUTOMATED COUNT: 19 % (ref 11.5–14.5)
FOLATE SERPL-MCNC: 8.8 NG/ML (ref 4.5–32.2)
GLUCOSE SERPL-MCNC: 74 MG/DL (ref 70–99)
HCT VFR BLD AUTO: 29.8 % (ref 42–52)
HGB BLD-MCNC: 9.7 G/DL (ref 14–18)
IMM GRANULOCYTES # BLD: 0.6 K/UL
IRON SATN MFR SERPL: 12 % (ref 14–50)
IRON SERPL-MCNC: 18 UG/DL (ref 59–158)
LYMPHOCYTES # BLD: 2.6 K/UL (ref 1.1–4.5)
LYMPHOCYTES NFR BLD: 21.2 % (ref 20–40)
MACROCYTES BLD QL SMEAR: ABNORMAL
MCH RBC QN AUTO: 32.2 PG (ref 27–31)
MCHC RBC AUTO-ENTMCNC: 32.6 G/DL (ref 33–37)
MCV RBC AUTO: 99 FL (ref 80–94)
MONOCYTES # BLD: 1.5 K/UL (ref 0–0.9)
MONOCYTES NFR BLD: 12.1 % (ref 0–10)
NEUTROPHILS # BLD: 6.2 K/UL (ref 1.5–7.5)
NEUTS SEG NFR BLD: 51.6 % (ref 50–65)
PLATELET # BLD AUTO: 99 K/UL (ref 130–400)
PLATELET SLIDE REVIEW: ABNORMAL
POTASSIUM SERPL-SCNC: 4.2 MMOL/L (ref 3.5–5)
PROT SERPL-MCNC: 5.2 G/DL (ref 6.6–8.7)
RBC # BLD AUTO: 3.01 M/UL (ref 4.7–6.1)
SCHISTOCYTES BLD QL SMEAR: ABNORMAL
SODIUM SERPL-SCNC: 135 MMOL/L (ref 136–145)
TIBC SERPL-MCNC: 152 UG/DL (ref 250–400)
VIT B12 SERPL-MCNC: 490 PG/ML (ref 211–946)
WBC # BLD AUTO: 12.1 K/UL (ref 4.8–10.8)

## 2024-08-27 PROCEDURE — 6370000000 HC RX 637 (ALT 250 FOR IP): Performed by: PHYSICIAN ASSISTANT

## 2024-08-27 PROCEDURE — 94760 N-INVAS EAR/PLS OXIMETRY 1: CPT

## 2024-08-27 PROCEDURE — 99232 SBSQ HOSP IP/OBS MODERATE 35: CPT | Performed by: PHYSICIAN ASSISTANT

## 2024-08-27 PROCEDURE — 82607 VITAMIN B-12: CPT

## 2024-08-27 PROCEDURE — 85025 COMPLETE CBC W/AUTO DIFF WBC: CPT

## 2024-08-27 PROCEDURE — 36415 COLL VENOUS BLD VENIPUNCTURE: CPT

## 2024-08-27 PROCEDURE — 83540 ASSAY OF IRON: CPT

## 2024-08-27 PROCEDURE — 80053 COMPREHEN METABOLIC PANEL: CPT

## 2024-08-27 PROCEDURE — 6370000000 HC RX 637 (ALT 250 FOR IP): Performed by: FAMILY MEDICINE

## 2024-08-27 PROCEDURE — 83550 IRON BINDING TEST: CPT

## 2024-08-27 PROCEDURE — 1200000000 HC SEMI PRIVATE

## 2024-08-27 PROCEDURE — 2580000003 HC RX 258: Performed by: FAMILY MEDICINE

## 2024-08-27 PROCEDURE — 82746 ASSAY OF FOLIC ACID SERUM: CPT

## 2024-08-27 RX ADMIN — TAMSULOSIN HYDROCHLORIDE 0.4 MG: 0.4 CAPSULE ORAL at 08:10

## 2024-08-27 RX ADMIN — LOSARTAN POTASSIUM 50 MG: 50 TABLET, FILM COATED ORAL at 08:10

## 2024-08-27 RX ADMIN — SODIUM CHLORIDE, PRESERVATIVE FREE 10 ML: 5 INJECTION INTRAVENOUS at 08:15

## 2024-08-27 RX ADMIN — EMPAGLIFLOZIN 10 MG: 10 TABLET, FILM COATED ORAL at 08:10

## 2024-08-27 RX ADMIN — HYDROCODONE BITARTRATE AND ACETAMINOPHEN 1 TABLET: 5; 325 TABLET ORAL at 21:02

## 2024-08-27 RX ADMIN — CALCITRIOL CAPSULES 0.25 MCG 0.25 MCG: 0.25 CAPSULE ORAL at 08:11

## 2024-08-27 RX ADMIN — ATORVASTATIN CALCIUM 10 MG: 10 TABLET, FILM COATED ORAL at 08:10

## 2024-08-27 RX ADMIN — SODIUM CHLORIDE, PRESERVATIVE FREE 10 ML: 5 INJECTION INTRAVENOUS at 21:03

## 2024-08-27 RX ADMIN — PANTOPRAZOLE SODIUM 40 MG: 40 TABLET, DELAYED RELEASE ORAL at 05:46

## 2024-08-27 RX ADMIN — LEVOTHYROXINE SODIUM 150 MCG: 75 TABLET ORAL at 05:46

## 2024-08-27 RX ADMIN — TAMSULOSIN HYDROCHLORIDE 0.4 MG: 0.4 CAPSULE ORAL at 21:03

## 2024-08-27 RX ADMIN — CLOPIDOGREL BISULFATE 75 MG: 75 TABLET ORAL at 08:11

## 2024-08-27 RX ADMIN — ASPIRIN 81 MG: 81 TABLET, COATED ORAL at 08:10

## 2024-08-27 RX ADMIN — AMLODIPINE BESYLATE 5 MG: 5 TABLET ORAL at 08:11

## 2024-08-27 RX ADMIN — Medication 3 MG: at 21:02

## 2024-08-27 ASSESSMENT — ENCOUNTER SYMPTOMS
NAUSEA: 0
CHEST TIGHTNESS: 0
STRIDOR: 0
DIARRHEA: 0
ABDOMINAL DISTENTION: 0
WHEEZING: 0
ABDOMINAL PAIN: 0
VOMITING: 0
COUGH: 0
SHORTNESS OF BREATH: 0

## 2024-08-27 ASSESSMENT — PAIN DESCRIPTION - ORIENTATION: ORIENTATION: RIGHT

## 2024-08-27 ASSESSMENT — PAIN SCALES - GENERAL: PAINLEVEL_OUTOF10: 5

## 2024-08-27 ASSESSMENT — PAIN DESCRIPTION - LOCATION: LOCATION: SHOULDER

## 2024-08-27 NOTE — PROGRESS NOTES
This  did a follow up visit with pt to assist him with completing an EMS DNR. Pt was willing to complete the document. He knew the document meant that if something happened to him, like his heart stopped, that providers would not resuscitate him. Pt also called his primary decision maker, Dileep Cassidy. This  discussed with pt and his decision maker the importance of the document and informed him there would be a copy for him. Pt initialed and signed the document and signed the notary log. This  witnessed the pt initial and sign and notarized the document. Pt expressed gratitude for assistance completing his document.     Electronically signed by Mary Behrens on 8/27/2024 at 2:16 PM

## 2024-08-27 NOTE — PROGRESS NOTES
Palliative Care Progress Note  8/27/2024 12:05 PM    Patient:  Atul Peoples  YOB: 1940  Primary Care Physician: Isrrael Horner MD  Advance Directive: Full Code  Admit Date: 8/23/2024       Hospital Day: 4  Portions of this note have been copied forward, however, changed to reflect the most current clinical status of this patient.    CHIEF COMPLAINT/REASON FOR CONSULTATION Goals of care, family support, Code status, symptom management     SUBJECTIVE:  Mr. Peoples complains of generalized weakness/soreness.    HPI:  The patient is a 84 y.o. male with PMH CAD, s/p CABG, CKD IIIb, AAA, COPD, GERD, HTN, HLD, hypothyroidism w/ recent history myxedema coma who presented to [facility] ED on 08/23/2024 w/ concern for right shoulder, knee, and hip pain after a fall to the floor from his chair. He has had multiple recent falls at home. Work up included CXR reported as no acute abnormality. R shoulder films negative for fx or dislocation. Hip negative for fracture. R knee reported arthroplasty intact w/ hardware in anatomic alignment and chronic patellar fracture w/ distraction noted. He was found to have elevated CK w/ BRIAN in addition to a coccyx wound and was admitted to his PCP service for IVFs and further delineation of care. Palliative care consulted to assist w/ code status discussion.     Review of Systems:   14 point review of systems is negative except as specifically addressed above.    Objective:   VITALS:  BP (!) 164/64   Pulse 69   Temp 97 °F (36.1 °C) (Temporal)   Resp 16   Ht 1.753 m (5' 9.02\")   Wt 71.8 kg (158 lb 3.2 oz)   SpO2 97%   BMI 23.35 kg/m²   24HR INTAKE/OUTPUT:    Intake/Output Summary (Last 24 hours) at 8/27/2024 1205  Last data filed at 8/27/2024 1018  Gross per 24 hour   Intake 480 ml   Output 650 ml   Net -170 ml       General appearance: 85 yo male, no acute distress  Head: Normocephalic, without obvious abnormality, atraumatic  Eyes: conjunctivae/corneas clear. PERRL, EOM's  not wanting to be a burden on his children if his healthcare continues to decline. He has discussed quality of life as well. Dileep indicates he will be supportive of the patient's decision. They would like to speak with  Patt again and at that time will decide if they want to complete an EMS DNR which is at bedside and I reviewed this with them.     Palliative team will follow as needed.    Recommendations:   Palliative Care-[facility] considering placement at [place] depending on insurance also discussing home w/ caregivers/family  Reviewed EMS DNR with them today   Rhabdomyolysis w/ BRIAN/CKDIII-resolving w/ IVFs, mgmt per Attending   Multiple falls at home/generalized weakness-PT/OT, agree w/ placement, did approach possible need for long term placement with him in the future but he is not yet receptive to that, remains willing to consider short term rehab     Thank you for consulting Palliative Care and allowing us to participate in the care of this patient.     Total Time Spent with patient assessment, interview of independent historian/HCS, workup/treatment review, discussion with medical team, review of current and home medications, and placement of orders/preparation of this note: 42 minutes      Electronically signed by aNllely Kapadia PA-C on 8/27/2024 at 12:05 PM    (Please note that portions of this note were completed with a voice recognition program.  Effortswere made to edit the dictations but occasionally words are mis-transcribed.)

## 2024-08-27 NOTE — CARE COORDINATION
LISSA met with pt friend, Dileep, who pt named as health care decision maker, he asked for information about life alert, gave pt information for lifeline and cost. Pt thinks he has Medicaid, asked Keli Palmer to check and pt does not have Medicaid. Spoke to Nallely with [place] who stated their facility may have a contract with [facility] come Thursday, she went ahead and started the precert. Wait to see if insurance will accept, if not will get more options, or pt will go home with family, neighbors, and friends checking on him daily  Electronically signed by HERNÁN Duran on 8/27/2024 at 10:51 AM

## 2024-08-27 NOTE — PROGRESS NOTES
Daily Progress Note  Atul Peoples  MRN: [id number] LOS: 4    Admit Date: 2024 5:37 PM    Subjective:         Interval History:    Reviewed overnight events and nursing notes.     Doing well, no new complaints.    Review of Systems   Constitutional:  Negative for chills, fatigue and fever.   Respiratory:  Negative for cough, chest tightness, shortness of breath, wheezing and stridor.    Cardiovascular:  Negative for chest pain, palpitations and leg swelling.   Gastrointestinal:  Negative for abdominal distention, abdominal pain, diarrhea, nausea and vomiting.       DIET:  ADULT DIET; Regular    Medications:      sodium chloride        pantoprazole  40 mg Oral QAM AC    losartan  50 mg Oral Daily    amLODIPine  5 mg Oral Daily    sodium chloride flush  5-40 mL IntraVENous 2 times per day    melatonin  3 mg Oral Nightly    aspirin  81 mg Oral Daily    atorvastatin  10 mg Oral Daily    calcitRIOL  0.25 mcg Oral Daily    clopidogrel  75 mg Oral Daily    empagliflozin  10 mg Oral Daily    levothyroxine  150 mcg Oral Daily    tamsulosin  0.4 mg Oral BID         Objective:     Vitals: BP (!) 165/66   Pulse 61   Temp 97.9 °F (36.6 °C) (Temporal)   Resp 20   Ht 1.753 m (5' 9.02\")   Wt 71.8 kg (158 lb 3.2 oz)   SpO2 91%   BMI 23.35 kg/m²    Intake/Output Summary (Last 24 hours) at 2024 1737  Last data filed at 2024 1544  Gross per 24 hour   Intake 480 ml   Output 500 ml   Net -20 ml    Temp (24hrs), Av.7 °F (36.5 °C), Min:97 °F (36.1 °C), Max:98.5 °F (36.9 °C)    Glucose:  Lab Results   Component Value Date    POCGLU 118 (H) 2024    POCGLU 132 (H) 2024    POCGLU 83 2024    POCGLU 154 (H) 2024     Physical Examination:   Objective:  General Appearance:  Comfortable and well-appearing.    Vital signs: (most recent): Blood pressure (!) 165/66, pulse 61, temperature 97.9 °F (36.6 °C), temperature source Temporal, resp. rate 20, height 1.753 m (5' 9.02\"), weight 71.8 kg

## 2024-08-27 NOTE — PLAN OF CARE
Problem: Safety - Adult  Goal: Free from fall injury  8/27/2024 0942 by Juan Knight LPN  Outcome: Progressing  8/27/2024 0025 by Clotilde Monge LPN  Outcome: Progressing     Problem: ABCDS Injury Assessment  Goal: Absence of physical injury  8/27/2024 0942 by Juan Knight LPN  Outcome: Progressing  8/27/2024 0025 by Clotilde Monge LPN  Outcome: Progressing     Problem: Skin/Tissue Integrity  Goal: Absence of new skin breakdown  Description: 1.  Monitor for areas of redness and/or skin breakdown  2.  Assess vascular access sites hourly  3.  Every 4-6 hours minimum:  Change oxygen saturation probe site  4.  Every 4-6 hours:  If on nasal continuous positive airway pressure, respiratory therapy assess nares and determine need for appliance change or resting period.  8/27/2024 0942 by Juan Knight LPN  Outcome: Progressing  8/27/2024 0025 by Clotilde Monge LPN  Outcome: Progressing     Problem: Pain  Goal: Verbalizes/displays adequate comfort level or baseline comfort level  8/27/2024 0942 by Juan Knight LPN  Outcome: Progressing  8/27/2024 0025 by Clotilde Monge LPN  Outcome: Progressing     Problem: Chronic Conditions and Co-morbidities  Goal: Patient's chronic conditions and co-morbidity symptoms are monitored and maintained or improved  8/27/2024 0942 by Juan Knight LPN  Outcome: Progressing  8/27/2024 0025 by Clotilde Monge LPN  Outcome: Progressing

## 2024-08-28 PROCEDURE — 2580000003 HC RX 258: Performed by: FAMILY MEDICINE

## 2024-08-28 PROCEDURE — 1200000000 HC SEMI PRIVATE

## 2024-08-28 PROCEDURE — 97116 GAIT TRAINING THERAPY: CPT

## 2024-08-28 PROCEDURE — 6370000000 HC RX 637 (ALT 250 FOR IP): Performed by: FAMILY MEDICINE

## 2024-08-28 PROCEDURE — 6370000000 HC RX 637 (ALT 250 FOR IP): Performed by: PHYSICIAN ASSISTANT

## 2024-08-28 PROCEDURE — 97530 THERAPEUTIC ACTIVITIES: CPT

## 2024-08-28 PROCEDURE — 94760 N-INVAS EAR/PLS OXIMETRY 1: CPT

## 2024-08-28 PROCEDURE — 97535 SELF CARE MNGMENT TRAINING: CPT

## 2024-08-28 RX ADMIN — TAMSULOSIN HYDROCHLORIDE 0.4 MG: 0.4 CAPSULE ORAL at 20:34

## 2024-08-28 RX ADMIN — SODIUM CHLORIDE, PRESERVATIVE FREE 10 ML: 5 INJECTION INTRAVENOUS at 09:16

## 2024-08-28 RX ADMIN — PANTOPRAZOLE SODIUM 40 MG: 40 TABLET, DELAYED RELEASE ORAL at 05:49

## 2024-08-28 RX ADMIN — ATORVASTATIN CALCIUM 10 MG: 10 TABLET, FILM COATED ORAL at 09:16

## 2024-08-28 RX ADMIN — EMPAGLIFLOZIN 10 MG: 10 TABLET, FILM COATED ORAL at 09:16

## 2024-08-28 RX ADMIN — CALCITRIOL CAPSULES 0.25 MCG 0.25 MCG: 0.25 CAPSULE ORAL at 09:16

## 2024-08-28 RX ADMIN — CLOPIDOGREL BISULFATE 75 MG: 75 TABLET ORAL at 09:16

## 2024-08-28 RX ADMIN — HYDROCODONE BITARTRATE AND ACETAMINOPHEN 1 TABLET: 5; 325 TABLET ORAL at 20:34

## 2024-08-28 RX ADMIN — LOSARTAN POTASSIUM 50 MG: 50 TABLET, FILM COATED ORAL at 09:15

## 2024-08-28 RX ADMIN — SODIUM CHLORIDE, PRESERVATIVE FREE 10 ML: 5 INJECTION INTRAVENOUS at 21:00

## 2024-08-28 RX ADMIN — TAMSULOSIN HYDROCHLORIDE 0.4 MG: 0.4 CAPSULE ORAL at 09:15

## 2024-08-28 RX ADMIN — LEVOTHYROXINE SODIUM 150 MCG: 75 TABLET ORAL at 05:49

## 2024-08-28 RX ADMIN — Medication 3 MG: at 20:34

## 2024-08-28 RX ADMIN — ASPIRIN 81 MG: 81 TABLET, COATED ORAL at 09:16

## 2024-08-28 RX ADMIN — AMLODIPINE BESYLATE 5 MG: 5 TABLET ORAL at 09:16

## 2024-08-28 ASSESSMENT — ENCOUNTER SYMPTOMS
VOMITING: 0
DIARRHEA: 0
ABDOMINAL DISTENTION: 0
NAUSEA: 0
COUGH: 0
STRIDOR: 0
WHEEZING: 0
SHORTNESS OF BREATH: 0
ABDOMINAL PAIN: 0
CHEST TIGHTNESS: 0

## 2024-08-28 ASSESSMENT — PAIN DESCRIPTION - ORIENTATION
ORIENTATION: RIGHT
ORIENTATION: RIGHT

## 2024-08-28 ASSESSMENT — PAIN DESCRIPTION - LOCATION: LOCATION: LEG

## 2024-08-28 ASSESSMENT — PAIN SCALES - GENERAL
PAINLEVEL_OUTOF10: 8
PAINLEVEL_OUTOF10: 7

## 2024-08-28 ASSESSMENT — PAIN DESCRIPTION - DESCRIPTORS
DESCRIPTORS: ACHING
DESCRIPTORS: ACHING

## 2024-08-28 NOTE — CARE COORDINATION
[facility]  Notification of Admission Decision      [] Patient has been accepted for admit to [facility] on :       Please write discharge orders and summary prior to discharge.    [] Patient acceptance to Rehab pending the following :    [] Eval in progress       [x] Patient determined to be ineligible for services at [facility] because : No rehab dx      We recommend you consider: SNF as already planned        Thank you for your referral, we appreciate you. If you have any questions, please feel   free to contact me at [phone].  Electronically Signed by Ashley Ellsworth, Admissions Coordinator 8/28/2024 7:54 AM

## 2024-08-28 NOTE — PROGRESS NOTES
Physician Progress Note      PATIENT:               MACK GUERRERO  CSN #:                  [id number]  :                       1940  ADMIT DATE:       2024 10:16 AM  DISCH DATE:  RESPONDING  PROVIDER #:        Isrrael Horner MD          QUERY TEXT:    Pt admitted with after having a fall.  Pt noted to have rhabdomyolysis.  If   possible, please document in progress notes and discharge summary if you are   evaluating and/or treating any of the following:    The medical record reflects the following:  Risk Factors: fall  Clinical Indicators: () CK 1234, ()   Treatment: NS @ 75 ml/hr, serial CK levels    Thank you,  Chele RN, CCDS  [phone]    Per https://www.[employer]/contents/klopwx-zk-njhpzrwwmnkkcc  Traumatic rhabdomyolysis cause examples: crush syndrome, prolonged   immobilization  Nontraumatic rhabdomyolysis cause examples:  marked exertion, hyperthermia,   metabolic myopathy, drugs or toxins, infections, electrolyte disorders.  Options provided:  -- Traumatic rhabdomyolysis  -- Nontraumatic rhabdomyolysis  -- Other - I will add my own diagnosis  -- Disagree - Not applicable / Not valid  -- Disagree - Clinically unable to determine / Unknown  -- Refer to Clinical Documentation Reviewer    PROVIDER RESPONSE TEXT:    This patient has traumatic rhabdomyolysis.    Query created by: Chele Molina on 2024 10:38 AM      QUERY TEXT:    Patient admitted after a fall, rhabdomyolysis.  Noted documentation of Acute   Kidney Injury in H/P dated  and subsequent pnotes.  In order to support   the diagnosis of BRIAN, please include additional clinical indicators in your   documentation.? Or please document if the diagnosis of BRIAN has been ruled out   after further study.    The medical record reflects the following:  Risk Factors: rhabdomyolysis, htn, CKD  Clinical Indicators: () crea 1.8, () crea 1.6, () crea 1.6,  Treatment: serial crea levels, NS @ 75 ml/hr    Thank

## 2024-08-28 NOTE — PROGRESS NOTES
Daily Progress Note  Atul Peoples  MRN: [id number] LOS: 5    Admit Date: 2024 7:29 AM    Subjective:         Interval History:    Reviewed overnight events and nursing notes.     Doing well, stable condition, no new complaints    Review of Systems   Constitutional:  Negative for chills, fatigue and fever.   Respiratory:  Negative for cough, chest tightness, shortness of breath, wheezing and stridor.    Cardiovascular:  Negative for chest pain, palpitations and leg swelling.   Gastrointestinal:  Negative for abdominal distention, abdominal pain, diarrhea, nausea and vomiting.       DIET:  ADULT DIET; Regular    Medications:      sodium chloride        pantoprazole  40 mg Oral QAM AC    losartan  50 mg Oral Daily    amLODIPine  5 mg Oral Daily    sodium chloride flush  5-40 mL IntraVENous 2 times per day    melatonin  3 mg Oral Nightly    aspirin  81 mg Oral Daily    atorvastatin  10 mg Oral Daily    calcitRIOL  0.25 mcg Oral Daily    clopidogrel  75 mg Oral Daily    empagliflozin  10 mg Oral Daily    levothyroxine  150 mcg Oral Daily    tamsulosin  0.4 mg Oral BID         Objective:     Vitals: BP (!) 156/64   Pulse 68   Temp 97.8 °F (36.6 °C) (Temporal)   Resp 16   Ht 1.753 m (5' 9.02\")   Wt 71.8 kg (158 lb 3.2 oz)   SpO2 90%   BMI 23.35 kg/m²    Intake/Output Summary (Last 24 hours) at 2024 0729  Last data filed at 2024 0520  Gross per 24 hour   Intake 240 ml   Output 1350 ml   Net -1110 ml    Temp (24hrs), Av.9 °F (36.6 °C), Min:97.8 °F (36.6 °C), Max:97.9 °F (36.6 °C)    Glucose:  Lab Results   Component Value Date    POCGLU 118 (H) 2024    POCGLU 132 (H) 2024    POCGLU 83 2024    POCGLU 154 (H) 2024     Physical Examination:   Objective:  General Appearance:  Comfortable and well-appearing.    Vital signs: (most recent): Blood pressure (!) 156/64, pulse 68, temperature 97.8 °F (36.6 °C), temperature source Temporal, resp. rate 16, height 1.753 m (5'

## 2024-08-28 NOTE — PROGRESS NOTES
Occupational Therapy     08/28/24 1600   Subjective   Subjective Pt in bed upon arrival for therapy. Pt agreeable to participate. Family friend present.   Pain Assessment   Pain Assessment None - Denies Pain   Vitals   O2 Device Nasal cannula   Cognition   Overall Cognitive Status Exceptions   Safety Judgement Impaired   Problem Solving Assistance required to implement solutions;Assistance required to correct errors made   Insights Impaired   Cognition Comment Very [place].   Orientation   Overall Orientation Status WFL   Bed Mobility Training   Bed Mobility Training Yes   Overall Level of Assistance Stand-by assistance;Minimum assistance   Interventions Verbal cues;Tactile cues;Manual cues   Supine to Sit Stand-by assistance   Sit to Supine Minimum assistance   Scooting Stand-by assistance   Transfer Training   Transfer Training Yes   Overall Level of Assistance Minimum assistance;Contact-guard assistance   Interventions Verbal cues;Tactile cues;Manual cues   Sit to Stand Minimum assistance;Contact-guard assistance   Stand to Sit Minimum assistance;Contact-guard assistance   Toilet Transfer Minimum assistance;Contact-guard assistance   Balance   Sitting Intact   Standing With support   ADL   Feeding Independent   Grooming Contact guard assistance   Grooming Skilled Clinical Factors standing at sink for shaving and oral hygiene.   UE Bathing Supervision;Stand by assistance   UE Bathing Skilled Clinical Factors seated   LE Bathing Minimal assistance   LE Bathing Skilled Clinical Factors seated and standing with grab bar   UE Dressing Setup;Supervision   LE Dressing Minimal assistance   Toileting Minimal assistance   Functional Mobility Minimal assistance   Functional Mobility Skilled Clinical Factors RW, cues for sequencing   Patient Education   Education Given To Patient   Education Provided ADL Adaptive Strategies   Education Provided Comments Urinal use and strategies to prevent leakage therefore causing skin break

## 2024-08-28 NOTE — PLAN OF CARE
Problem: Safety - Adult  Goal: Free from fall injury  Outcome: Progressing     Problem: ABCDS Injury Assessment  Goal: Absence of physical injury  Outcome: Progressing     Problem: Skin/Tissue Integrity  Goal: Absence of new skin breakdown  Description: 1.  Monitor for areas of redness and/or skin breakdown  2.  Assess vascular access sites hourly  3.  Every 4-6 hours minimum:  Change oxygen saturation probe site  4.  Every 4-6 hours:  If on nasal continuous positive airway pressure, respiratory therapy assess nares and determine need for appliance change or resting period.  Outcome: Progressing     Problem: Pain  Goal: Verbalizes/displays adequate comfort level or baseline comfort level  Outcome: Progressing     Problem: Chronic Conditions and Co-morbidities  Goal: Patient's chronic conditions and co-morbidity symptoms are monitored and maintained or improved  Outcome: Progressing

## 2024-08-28 NOTE — PROGRESS NOTES
08/28/24 1100   Subjective   Subjective Patient in bed agrees to therapy.   Pain Assessment   Pain Assessment 0-10   Pain Level 8   Pain Location Leg   Pain Orientation Right   Pain Descriptors Aching   Non-Pharmaceutical Pain Intervention(s) Rest   Cognition   Overall Cognitive Status WFL   Orientation   Overall Orientation Status WFL   Vitals   O2 Device Nasal cannula  (Gait on RA)   Bed Mobility Training   Bed Mobility Training Yes   Supine to Sit Minimum assistance  (HOB elevated & use of BR)   Sit to Supine Minimum assistance  (Assist with LE's)   Scooting Minimum assistance  (EOB)   Balance   Sitting Intact   Standing With support  (RW)   Transfer Training   Transfer Training Yes   Sit to Stand Minimum assistance   Stand to Sit Minimum assistance   Gait Training   Gait Training Yes   Gait   Overall Level of Assistance Minimum assistance   Distance (ft) 50 Feet   Assistive Device Walker, rolling  (Multiple cues to stay closer to RW)   Interventions Demonstration;Manual cues;Safety awareness training;Tactile cues;Verbal cues  (Multiple cues to stay closer to RW.   RW  too far in front.)   Speed/Marian Slow   Patient Education   Education Given To Patient   Education Provided Role of Therapy;Plan of Care;Transfer Training;Energy Conservation;Fall Prevention Strategies   Education Provided Comments Use call light staff assist.   Education Method Demonstration;Verbal   Barriers to Learning Hearing   Education Outcome Verbalized understanding;Unable to demonstrate understanding;Continued education needed   Other Specialty Interventions   Other Treatments/Modalities Per patients request BTB call light needs in reach alarm set.   Assessment   Activity Tolerance Patient tolerated treatment well   PT Plan of Care   Wednesday X       Electronically signed by Riky Ibarra PTA on 8/28/2024 at 12:08 PM

## 2024-08-29 VITALS
OXYGEN SATURATION: 91 % | WEIGHT: 158.2 LBS | HEIGHT: 69 IN | SYSTOLIC BLOOD PRESSURE: 112 MMHG | DIASTOLIC BLOOD PRESSURE: 59 MMHG | HEART RATE: 73 BPM | BODY MASS INDEX: 23.43 KG/M2 | RESPIRATION RATE: 17 BRPM | TEMPERATURE: 97.7 F

## 2024-08-29 PROCEDURE — 2580000003 HC RX 258: Performed by: FAMILY MEDICINE

## 2024-08-29 PROCEDURE — 6370000000 HC RX 637 (ALT 250 FOR IP): Performed by: FAMILY MEDICINE

## 2024-08-29 PROCEDURE — 97530 THERAPEUTIC ACTIVITIES: CPT

## 2024-08-29 PROCEDURE — 97116 GAIT TRAINING THERAPY: CPT

## 2024-08-29 PROCEDURE — 94760 N-INVAS EAR/PLS OXIMETRY 1: CPT

## 2024-08-29 RX ORDER — LOSARTAN POTASSIUM 50 MG/1
50 TABLET ORAL DAILY
Qty: 30 TABLET | Refills: 3 | Status: SHIPPED | OUTPATIENT
Start: 2024-08-30

## 2024-08-29 RX ADMIN — EMPAGLIFLOZIN 10 MG: 10 TABLET, FILM COATED ORAL at 08:47

## 2024-08-29 RX ADMIN — POLYETHYLENE GLYCOL 3350 17 G: 17 POWDER, FOR SOLUTION ORAL at 08:48

## 2024-08-29 RX ADMIN — PANTOPRAZOLE SODIUM 40 MG: 40 TABLET, DELAYED RELEASE ORAL at 05:38

## 2024-08-29 RX ADMIN — ASPIRIN 81 MG: 81 TABLET, COATED ORAL at 08:47

## 2024-08-29 RX ADMIN — CLOPIDOGREL BISULFATE 75 MG: 75 TABLET ORAL at 08:47

## 2024-08-29 RX ADMIN — ATORVASTATIN CALCIUM 10 MG: 10 TABLET, FILM COATED ORAL at 08:47

## 2024-08-29 RX ADMIN — CALCITRIOL CAPSULES 0.25 MCG 0.25 MCG: 0.25 CAPSULE ORAL at 08:47

## 2024-08-29 RX ADMIN — LEVOTHYROXINE SODIUM 150 MCG: 75 TABLET ORAL at 05:38

## 2024-08-29 RX ADMIN — TAMSULOSIN HYDROCHLORIDE 0.4 MG: 0.4 CAPSULE ORAL at 08:48

## 2024-08-29 RX ADMIN — SODIUM CHLORIDE, PRESERVATIVE FREE 10 ML: 5 INJECTION INTRAVENOUS at 08:47

## 2024-08-29 RX ADMIN — AMLODIPINE BESYLATE 5 MG: 5 TABLET ORAL at 08:48

## 2024-08-29 RX ADMIN — LOSARTAN POTASSIUM 50 MG: 50 TABLET, FILM COATED ORAL at 08:47

## 2024-08-29 ASSESSMENT — ENCOUNTER SYMPTOMS
ABDOMINAL DISTENTION: 0
SHORTNESS OF BREATH: 0
STRIDOR: 0
VOMITING: 0
WHEEZING: 0
CHEST TIGHTNESS: 0
NAUSEA: 0
COUGH: 0
DIARRHEA: 0
ABDOMINAL PAIN: 0

## 2024-08-29 NOTE — PROGRESS NOTES
Daily Progress Note  Atul Peoples  MRN: [id number] LOS: 6    Admit Date: 2024 9:16 AM    Subjective:         Interval History:    Reviewed overnight events and nursing notes.     Doing well this morning.  Ambulation seems to be improving.  No new changes.    Review of Systems   Constitutional:  Negative for chills, fatigue and fever.   Respiratory:  Negative for cough, chest tightness, shortness of breath, wheezing and stridor.    Cardiovascular:  Negative for chest pain, palpitations and leg swelling.   Gastrointestinal:  Negative for abdominal distention, abdominal pain, diarrhea, nausea and vomiting.     DIET:  ADULT DIET; Regular    Medications:     • sodium chloride       • pantoprazole  40 mg Oral QAM AC   • losartan  50 mg Oral Daily   • amLODIPine  5 mg Oral Daily   • sodium chloride flush  5-40 mL IntraVENous 2 times per day   • melatonin  3 mg Oral Nightly   • aspirin  81 mg Oral Daily   • atorvastatin  10 mg Oral Daily   • calcitRIOL  0.25 mcg Oral Daily   • clopidogrel  75 mg Oral Daily   • empagliflozin  10 mg Oral Daily   • levothyroxine  150 mcg Oral Daily   • tamsulosin  0.4 mg Oral BID         Objective:     Vitals: BP (!) 166/61 Comment: rn aware  Pulse 63   Temp 97.1 °F (36.2 °C) (Temporal)   Resp 17   Ht 1.753 m (5' 9.02\")   Wt 71.8 kg (158 lb 3.2 oz)   SpO2 91%   BMI 23.35 kg/m²    Intake/Output Summary (Last 24 hours) at 2024 0916  Last data filed at 2024 0843  Gross per 24 hour   Intake 236 ml   Output 1350 ml   Net -1114 ml    Temp (24hrs), Av.3 °F (36.3 °C), Min:97.1 °F (36.2 °C), Max:97.7 °F (36.5 °C)    Glucose:  Lab Results   Component Value Date    POCGLU 118 (H) 2024    POCGLU 132 (H) 2024    POCGLU 83 2024    POCGLU 154 (H) 2024     Physical Examination:   Objective:  General Appearance:  Comfortable and well-appearing.    Vital signs: (most recent): Blood pressure (!) 166/61, pulse 63, temperature 97.1 °F (36.2 °C),  temperature source Temporal, resp. rate 17, height 1.753 m (5' 9.02\"), weight 71.8 kg (158 lb 3.2 oz), SpO2 91%.  No fever.    Lungs:  Normal effort and normal respiratory rate.  Breath sounds clear to auscultation.  He is not in respiratory distress.    Heart: Normal rate.  Regular rhythm.  No murmur.   Abdomen: Abdomen is soft and non-distended.  Bowel sounds are normal.   There is no abdominal tenderness.          Labs:  No results found for: \"CHEMIS\", \"CBC\"     Imaging:  XR KNEE RIGHT (1-2 VIEWS)  Narrative: EXAMINATION:  TWO VIEWS RIGHT KNEE.     HISTORY:  Fall.  Pain.     COMPARISON:  10/25/2019.     FINDINGS:  The full extent of the knee prosthesis is not visualized on the frontal view.     Status post long stem knee arthroplasty as previously seen.  Hardware appears intact in anatomic alignment.  Atherosclerotic vascular disease.     Chronic patellar fracture with distraction is again noted without significant change.     Lucency noted through the proximal fibula on the lateral view without overlying cortical disruption and without abnormality in the frontal view.  This most likely represents artifact.     No joint effusion.     The bones are mildly osteopenic.  No other evidence of acute fracture.     Impression: Right knee arthroplasty is again noted with intact hardware in anatomic alignment.     Chronic patellar fracture with distraction again noted.     No definitive acute fracture, however, there is a lucency noted through the proximal fibula on the lateral view but without overlying cortical disruption or abnormality in the frontal view.  This most likely represents artifact, however, fracture is not   definitively excluded.        ______________________________________   Electronically signed by: MINH DUONG M.D.  Date:     08/23/2024  Time:    11:27   XR HIP 2-3 VW W PELVIS RIGHT  Narrative: EXAM: RIGHT HIP AND PELVIS RADIOGRAPH (3 VIEW)     TECHNIQUE: 3 views.  Frontal pelvis.  Frontal and  encounter    Hospital Problem list:  Principal Problem:    Fall from chair, initial encounter  Active Problems:    Palliative care patient    Acute kidney injury (nontraumatic) ([facility])    ASHD (arteriosclerotic heart disease)    Hypertension    Hyperlipidemia    COPD (chronic obstructive pulmonary disease) ([facility])    History of cerebrovascular disease    AAA (abdominal aortic aneurysm)    GERD (gastroesophageal reflux disease)    S/P CABG x 4    Status post coronary artery stent placement    Altered mental status    Rhabdomyolysis  Resolved Problems:    * No resolved hospital problems. *      Assessment: 84-year-old male with past medical history of coronary artery disease status post CABG, GERD, COPD, and hypertension who presents for fall complicated by acute kidney injury.     Plan:     Multiple falls  Continue working with PT/OT.  Planning for discharge to skilled nursing facility as he has had multiple falls in the past and would like to get him some rehab or possibly long-term care.     CAD status post CABG  Stable, no changes.     CKD  Stable, no changes.     Medically stable for discharge when he has found a place per social work.      Reviewed treatment plans with the patient and/or family.     Code Status: DNR    Electronically signed by Wilfredo Smith MD on 8/29/2024 at 9:16 AM

## 2024-08-29 NOTE — CARE COORDINATION
08/29/24 1053   IMM Letter   IMM Letter given to Patient/Family/Significant other/Guardian/POA/by: HERNÁN Duran   IMM Letter date given: 08/29/24   IMM Letter time given: 1015     Second IMM given to patient friend, Dileep, he gave verbal understanding  All questions and concerns addressed     Signed letter placed in pt soft chart   Electronically signed by HERNÁN Duran on 8/29/2024 at 10:54 AM

## 2024-08-29 NOTE — DISCHARGE SUMMARY
Hospital Discharge Summary    Atul Peoples  :  1940  MRN:  [id number]    Admit date:  2024  Discharge date:  2024    Admitting Physician:    Isrrael Horner MD    Discharge Diagnoses:    Principal Problem:    Fall from chair, initial encounter  Active Problems:    Palliative care patient    Acute kidney injury (nontraumatic) ([facility])    ASHD (arteriosclerotic heart disease)    Hypertension    Hyperlipidemia    COPD (chronic obstructive pulmonary disease) ([facility])    History of cerebrovascular disease    AAA (abdominal aortic aneurysm)    GERD (gastroesophageal reflux disease)    S/P CABG x 4    Status post coronary artery stent placement    Altered mental status    Rhabdomyolysis  Resolved Problems:    * No resolved hospital problems. *      Hospital Course:      85 yo M w/ h/o CAD s/p CABG, COPD, HTN, HLD, and dementia who presents with fall complicated by rhabdomyolysis and acute kidney injury.  He was treated with IV fluids with resolution of his acute kidney injury to his baseline creatinine.  He underwent physical and Occupational Therapy evaluation determine he be best suited to complete a symptom rehab plus or minus transition to long-term care.    Discharge Medications:         Medication List        START taking these medications      losartan 50 MG tablet  Commonly known as: COZAAR  Take 1 tablet by mouth daily  Start taking on: 2024            CONTINUE taking these medications      acetaminophen 500 MG tablet  Commonly known as: TYLENOL     amLODIPine 5 MG tablet  Commonly known as: NORVASC  Take 1 tablet by mouth daily     aspirin 81 MG EC tablet     atorvastatin 10 MG tablet  Commonly known as: LIPITOR     calcitRIOL 0.25 MCG capsule  Commonly known as: ROCALTROL  Take 1 capsule by mouth daily     clopidogrel 75 MG tablet  Commonly known as: Plavix  Take 1 tablet by mouth daily Start after Xarelto done     dapagliflozin 5 MG tablet  Commonly known as: FARXIGA     hydrOXYzine

## 2024-08-29 NOTE — PROGRESS NOTES
Physical Therapy     08/29/24 1000   Restrictions/Precautions   Restrictions/Precautions Fall Risk   General   Diagnosis FALLS, BRIAN, COPD   Subjective   Subjective pt agreable to tx   Subjective   Pain REPORTS MINOR HEADACHE   Bed mobility   Supine to Sit Contact guard assistance   Bed Mobility Comments with HOB elevated and heavy use of R rail; increased time allowed for task intiation/completion due to soreness/stiff. pt sat EOB for several minutes and required Frantz to don shoes.   Transfers   Sit to Stand Minimal Assistance   Stand to Sit Minimal Assistance   Bed to Chair Minimal assistance   Comment cues for hand placement and fwd weight shift to improve upward propulsion from EOB to RW.   Ambulation   Surface Level tile   Device Rolling Walker   Assistance Minimal assistance   Quality of Gait very fwd flex   Gait Deviations Slow Marian;Decreased step length;Decreased step height   Distance 30'   Comments L/R turns; Frantz to keep RW closer to self   Assessment   Assessment pt would benefit from further rehab to address bed mobility and STS deficits. currently at risk for falls due to limited strength and mobility. left in chair with alarm on and all needs in reach.   PT Plan of Care   Thursday X   Safety Devices   Type of Devices Call light within reach;Chair alarm in place;Gait belt;Left in chair     Electronically signed by Dominick Dyer PTA on 8/29/2024 at 10:27 AM

## 2024-08-29 NOTE — PLAN OF CARE
Problem: Safety - Adult  Goal: Free from fall injury  8/29/2024 1147 by Madison Ramirez LPN  Outcome: Progressing  8/28/2024 2313 by Clotilde Monge LPN  Outcome: Progressing     Problem: ABCDS Injury Assessment  Goal: Absence of physical injury  8/29/2024 1147 by Madison Ramirez LPN  Outcome: Progressing  8/28/2024 2313 by Clotilde Monge LPN  Outcome: Progressing     Problem: Skin/Tissue Integrity  Goal: Absence of new skin breakdown  Description: 1.  Monitor for areas of redness and/or skin breakdown  2.  Assess vascular access sites hourly  3.  Every 4-6 hours minimum:  Change oxygen saturation probe site  4.  Every 4-6 hours:  If on nasal continuous positive airway pressure, respiratory therapy assess nares and determine need for appliance change or resting period.  8/29/2024 1147 by Madison Ramirez LPN  Outcome: Progressing  8/28/2024 2313 by Clotilde Monge LPN  Outcome: Progressing     Problem: Pain  Goal: Verbalizes/displays adequate comfort level or baseline comfort level  8/29/2024 1147 by Madison Ramirez LPN  Outcome: Progressing  8/28/2024 2313 by Clotilde Monge LPN  Outcome: Progressing     Problem: Chronic Conditions and Co-morbidities  Goal: Patient's chronic conditions and co-morbidity symptoms are monitored and maintained or improved  8/29/2024 1147 by Madison Ramirez LPN  Outcome: Progressing  8/28/2024 2313 by Clotilde Monge LPN  Outcome: Progressing

## 2024-12-09 ENCOUNTER — HOSPITAL ENCOUNTER (INPATIENT)
Age: 84
LOS: 7 days | Discharge: NURSING, REHAB OR LONG-TERM CARE | DRG: 189 | End: 2024-12-16
Attending: EMERGENCY MEDICINE | Admitting: FAMILY MEDICINE
Payer: MEDICARE

## 2024-12-09 ENCOUNTER — APPOINTMENT (OUTPATIENT)
Dept: GENERAL RADIOLOGY | Age: 84
DRG: 189 | End: 2024-12-09
Payer: MEDICARE

## 2024-12-09 DIAGNOSIS — F01.50 VASCULAR DEMENTIA WITHOUT BEHAVIORAL DISTURBANCE, PSYCHOTIC DISTURBANCE, MOOD DISTURBANCE, OR ANXIETY, UNSPECIFIED DEMENTIA SEVERITY (HCC): ICD-10-CM

## 2024-12-09 DIAGNOSIS — N17.9 AKI (ACUTE KIDNEY INJURY) (HCC): ICD-10-CM

## 2024-12-09 DIAGNOSIS — J96.01 ACUTE RESPIRATORY FAILURE WITH HYPOXEMIA: Primary | ICD-10-CM

## 2024-12-09 DIAGNOSIS — R00.1 BRADYCARDIA: ICD-10-CM

## 2024-12-09 DIAGNOSIS — I50.20 SYSTOLIC CONGESTIVE HEART FAILURE, UNSPECIFIED HF CHRONICITY (HCC): ICD-10-CM

## 2024-12-09 PROBLEM — N18.31 CKD STAGE 3A, GFR 45-59 ML/MIN (HCC): Status: ACTIVE | Noted: 2024-12-09

## 2024-12-09 PROBLEM — J96.20 ACUTE ON CHRONIC RESPIRATORY FAILURE: Status: ACTIVE | Noted: 2024-12-09

## 2024-12-09 LAB
ALBUMIN SERPL-MCNC: 3.3 G/DL (ref 3.5–5.2)
ALP SERPL-CCNC: 79 U/L (ref 40–129)
ALT SERPL-CCNC: 13 U/L (ref 5–41)
ANION GAP SERPL CALCULATED.3IONS-SCNC: 10 MMOL/L (ref 7–19)
AST SERPL-CCNC: 32 U/L (ref 5–40)
BASOPHILS # BLD: 0.1 K/UL (ref 0–0.2)
BASOPHILS NFR BLD: 0.9 % (ref 0–1)
BILIRUB SERPL-MCNC: 0.2 MG/DL (ref 0.2–1.2)
BNP BLD-MCNC: ABNORMAL PG/ML (ref 0–449)
BUN SERPL-MCNC: 38 MG/DL (ref 8–23)
CALCIUM SERPL-MCNC: 8.6 MG/DL (ref 8.8–10.2)
CHLORIDE SERPL-SCNC: 101 MMOL/L (ref 98–111)
CO2 SERPL-SCNC: 28 MMOL/L (ref 22–29)
CREAT SERPL-MCNC: 2.8 MG/DL (ref 0.7–1.2)
EKG P AXIS: NORMAL DEGREES
EKG P-R INTERVAL: NORMAL MS
EKG Q-T INTERVAL: 524 MS
EKG QRS DURATION: 116 MS
EKG QTC CALCULATION (BAZETT): 510 MS
EKG T AXIS: 61 DEGREES
EOSINOPHIL # BLD: 0.5 K/UL (ref 0–0.6)
EOSINOPHIL NFR BLD: 5.6 % (ref 0–5)
ERYTHROCYTE [DISTWIDTH] IN BLOOD BY AUTOMATED COUNT: 18.3 % (ref 11.5–14.5)
FLUAV AG NPH QL: NEGATIVE
FLUBV AG NPH QL: NEGATIVE
GLUCOSE SERPL-MCNC: 80 MG/DL (ref 70–99)
HCT VFR BLD AUTO: 39.1 % (ref 42–52)
HGB BLD-MCNC: 12.3 G/DL (ref 14–18)
IMM GRANULOCYTES # BLD: 0.8 K/UL
LACTATE BLDV-SCNC: 1 MMOL/L (ref 0.5–1.9)
LYMPHOCYTES # BLD: 3.3 K/UL (ref 1.1–4.5)
LYMPHOCYTES NFR BLD: 37.4 % (ref 20–40)
MCH RBC QN AUTO: 34.8 PG (ref 27–31)
MCHC RBC AUTO-ENTMCNC: 31.5 G/DL (ref 33–37)
MCV RBC AUTO: 110.8 FL (ref 80–94)
MONOCYTES # BLD: 0.9 K/UL (ref 0–0.9)
MONOCYTES NFR BLD: 10.3 % (ref 0–10)
NEUTROPHILS # BLD: 3.2 K/UL (ref 1.5–7.5)
NEUTS SEG NFR BLD: 36.6 % (ref 50–65)
PLATELET # BLD AUTO: 179 K/UL (ref 130–400)
PMV BLD AUTO: 10.9 FL (ref 9.4–12.4)
POTASSIUM SERPL-SCNC: 4.7 MMOL/L (ref 3.5–5)
PROT SERPL-MCNC: 6.9 G/DL (ref 6.4–8.3)
RBC # BLD AUTO: 3.53 M/UL (ref 4.7–6.1)
SARS-COV-2 RDRP RESP QL NAA+PROBE: NOT DETECTED
SODIUM SERPL-SCNC: 139 MMOL/L (ref 136–145)
T4 FREE SERPL-MCNC: 0.1 NG/DL (ref 0.93–1.7)
TROPONIN, HIGH SENSITIVITY: 208 NG/L (ref 0–22)
TSH SERPL DL<=0.005 MIU/L-ACNC: 67.34 UIU/ML (ref 0.27–4.2)
WBC # BLD AUTO: 8.7 K/UL (ref 4.8–10.8)

## 2024-12-09 PROCEDURE — 6360000002 HC RX W HCPCS: Performed by: STUDENT IN AN ORGANIZED HEALTH CARE EDUCATION/TRAINING PROGRAM

## 2024-12-09 PROCEDURE — 93010 ELECTROCARDIOGRAM REPORT: CPT | Performed by: INTERNAL MEDICINE

## 2024-12-09 PROCEDURE — 99285 EMERGENCY DEPT VISIT HI MDM: CPT

## 2024-12-09 PROCEDURE — 87635 SARS-COV-2 COVID-19 AMP PRB: CPT

## 2024-12-09 PROCEDURE — 1200000000 HC SEMI PRIVATE

## 2024-12-09 PROCEDURE — 87804 INFLUENZA ASSAY W/OPTIC: CPT

## 2024-12-09 PROCEDURE — 83880 ASSAY OF NATRIURETIC PEPTIDE: CPT

## 2024-12-09 PROCEDURE — 84443 ASSAY THYROID STIM HORMONE: CPT

## 2024-12-09 PROCEDURE — 6370000000 HC RX 637 (ALT 250 FOR IP): Performed by: FAMILY MEDICINE

## 2024-12-09 PROCEDURE — 36415 COLL VENOUS BLD VENIPUNCTURE: CPT

## 2024-12-09 PROCEDURE — 84484 ASSAY OF TROPONIN QUANT: CPT

## 2024-12-09 PROCEDURE — 71045 X-RAY EXAM CHEST 1 VIEW: CPT

## 2024-12-09 PROCEDURE — 6370000000 HC RX 637 (ALT 250 FOR IP): Performed by: STUDENT IN AN ORGANIZED HEALTH CARE EDUCATION/TRAINING PROGRAM

## 2024-12-09 PROCEDURE — 85025 COMPLETE CBC W/AUTO DIFF WBC: CPT

## 2024-12-09 PROCEDURE — 93005 ELECTROCARDIOGRAM TRACING: CPT | Performed by: EMERGENCY MEDICINE

## 2024-12-09 PROCEDURE — 83605 ASSAY OF LACTIC ACID: CPT

## 2024-12-09 PROCEDURE — 84439 ASSAY OF FREE THYROXINE: CPT

## 2024-12-09 PROCEDURE — 6360000002 HC RX W HCPCS: Performed by: FAMILY MEDICINE

## 2024-12-09 PROCEDURE — 87040 BLOOD CULTURE FOR BACTERIA: CPT

## 2024-12-09 PROCEDURE — 80053 COMPREHEN METABOLIC PANEL: CPT

## 2024-12-09 RX ORDER — ONDANSETRON 2 MG/ML
4 INJECTION INTRAMUSCULAR; INTRAVENOUS EVERY 6 HOURS PRN
Status: DISCONTINUED | OUTPATIENT
Start: 2024-12-09 | End: 2024-12-16 | Stop reason: HOSPADM

## 2024-12-09 RX ORDER — CLOPIDOGREL BISULFATE 75 MG/1
75 TABLET ORAL DAILY
Status: DISCONTINUED | OUTPATIENT
Start: 2024-12-09 | End: 2024-12-16 | Stop reason: HOSPADM

## 2024-12-09 RX ORDER — SODIUM CHLORIDE 0.9 % (FLUSH) 0.9 %
5-40 SYRINGE (ML) INJECTION EVERY 12 HOURS SCHEDULED
Status: DISCONTINUED | OUTPATIENT
Start: 2024-12-09 | End: 2024-12-16 | Stop reason: HOSPADM

## 2024-12-09 RX ORDER — SODIUM CHLORIDE 9 MG/ML
INJECTION, SOLUTION INTRAVENOUS PRN
Status: DISCONTINUED | OUTPATIENT
Start: 2024-12-09 | End: 2024-12-16 | Stop reason: HOSPADM

## 2024-12-09 RX ORDER — ENOXAPARIN SODIUM 100 MG/ML
30 INJECTION SUBCUTANEOUS DAILY
Status: DISCONTINUED | OUTPATIENT
Start: 2024-12-09 | End: 2024-12-16 | Stop reason: HOSPADM

## 2024-12-09 RX ORDER — LEVOTHYROXINE SODIUM 75 UG/1
150 TABLET ORAL
Status: DISCONTINUED | OUTPATIENT
Start: 2024-12-10 | End: 2024-12-16 | Stop reason: HOSPADM

## 2024-12-09 RX ORDER — ONDANSETRON 4 MG/1
4 TABLET, ORALLY DISINTEGRATING ORAL EVERY 8 HOURS PRN
Status: DISCONTINUED | OUTPATIENT
Start: 2024-12-09 | End: 2024-12-16 | Stop reason: HOSPADM

## 2024-12-09 RX ORDER — CLONIDINE HYDROCHLORIDE 0.1 MG/1
0.1 TABLET ORAL EVERY 4 HOURS PRN
Status: DISCONTINUED | OUTPATIENT
Start: 2024-12-09 | End: 2024-12-16 | Stop reason: HOSPADM

## 2024-12-09 RX ORDER — ATORVASTATIN CALCIUM 10 MG/1
10 TABLET, FILM COATED ORAL NIGHTLY
Status: DISCONTINUED | OUTPATIENT
Start: 2024-12-09 | End: 2024-12-16 | Stop reason: HOSPADM

## 2024-12-09 RX ORDER — PANTOPRAZOLE SODIUM 40 MG/1
40 TABLET, DELAYED RELEASE ORAL
Status: DISCONTINUED | OUTPATIENT
Start: 2024-12-09 | End: 2024-12-12

## 2024-12-09 RX ORDER — SODIUM CHLORIDE 9 MG/ML
INJECTION, SOLUTION INTRAVENOUS CONTINUOUS
Status: ACTIVE | OUTPATIENT
Start: 2024-12-09 | End: 2024-12-10

## 2024-12-09 RX ORDER — LEVOTHYROXINE SODIUM 20 UG/ML
100 INJECTION, SOLUTION INTRAVENOUS ONCE
Status: COMPLETED | OUTPATIENT
Start: 2024-12-09 | End: 2024-12-09

## 2024-12-09 RX ORDER — AMLODIPINE BESYLATE 5 MG/1
5 TABLET ORAL DAILY
Status: DISCONTINUED | OUTPATIENT
Start: 2024-12-09 | End: 2024-12-16 | Stop reason: HOSPADM

## 2024-12-09 RX ORDER — ACETAMINOPHEN 325 MG/1
650 TABLET ORAL EVERY 4 HOURS PRN
Status: DISCONTINUED | OUTPATIENT
Start: 2024-12-09 | End: 2024-12-16 | Stop reason: HOSPADM

## 2024-12-09 RX ORDER — LOSARTAN POTASSIUM 50 MG/1
50 TABLET ORAL DAILY
Status: DISCONTINUED | OUTPATIENT
Start: 2024-12-09 | End: 2024-12-09

## 2024-12-09 RX ORDER — SODIUM CHLORIDE 0.9 % (FLUSH) 0.9 %
5-40 SYRINGE (ML) INJECTION PRN
Status: DISCONTINUED | OUTPATIENT
Start: 2024-12-09 | End: 2024-12-16 | Stop reason: HOSPADM

## 2024-12-09 RX ADMIN — LEVOTHYROXINE SODIUM 100 MCG: 20 INJECTION, SOLUTION INTRAVENOUS at 23:51

## 2024-12-09 RX ADMIN — ENOXAPARIN SODIUM 30 MG: 100 INJECTION SUBCUTANEOUS at 21:50

## 2024-12-09 RX ADMIN — PANTOPRAZOLE SODIUM 40 MG: 40 TABLET, DELAYED RELEASE ORAL at 21:50

## 2024-12-09 RX ADMIN — AMLODIPINE BESYLATE 5 MG: 5 TABLET ORAL at 21:50

## 2024-12-09 RX ADMIN — CLONIDINE HYDROCHLORIDE 0.1 MG: 0.1 TABLET ORAL at 21:50

## 2024-12-09 RX ADMIN — ATORVASTATIN CALCIUM 10 MG: 10 TABLET, FILM COATED ORAL at 21:50

## 2024-12-09 RX ADMIN — CLOPIDOGREL BISULFATE 75 MG: 75 TABLET ORAL at 21:50

## 2024-12-09 NOTE — ED NOTES
Spo2 decreased to 84% on RA. Pt placed on 2L/min NC which is baseline for pt. Spo2 still at 84% with good waveform. O2 increased to 4L/min and spo2 increased to 93%

## 2024-12-09 NOTE — ED NOTES
ED TO INPATIENT SBAR HANDOFF    Patient Name: Atul Peoples   : 1940  84 y.o.   Family/Caregiver Present: No  Code Status Order: DNR    C-SSRS:    Sitter No  Restraints:         Situation  Chief Complaint:   Chief Complaint   Patient presents with    Shortness of Breath     Normally wears 2L/min NC at baseline. When EMS got to pt he was satting in the 60's. Pt was placed on NRB with not much improvement. Pt given duoneb en route to ED and sats came up to 90's.    Altered Mental Status     POA states pt has been more altered than usual      Patient Diagnosis: Acute on chronic respiratory failure [J96.20]     Brief Description of Patient's Condition: pt here with increased ams from baseline and sob ems state on arrival spo2 was in the 60's pt placed on NRB and given duo-neb with improvement.  Pt has been confused entire time in ER did remove o2 and all monitoring devices.  Pt currently on oxygen at 4lpm is bradycardic in the 40's to 50's  Mental Status: disoriented and alert  Arrived from: home    Imaging:   XR CHEST PORTABLE   Final Result   1.  Interstitial disease bilaterally in the mid and lower lung zones may indicate pulmonary edema.   2.  Cardiomegaly and atherosclerosis.   3.  Previous median sternotomy.   4.  Possible small bilateral pleural effusions.   5.  Otherwise unremarkable chest radiograph.               ______________________________________    Electronically signed by: KERWIN COFFMAN M.D.   Date:     2024   Time:    15:28         COVID-19 Results:   Internal Administration   First Dose COVID-19, MODERNA BLUE border, Primary or Immunocompromised, (age 12y+), IM, 100 mcg/0.5mL  2021   Second Dose COVID-19, MODERNA BLUE border, Primary or Immunocompromised, (age 12y+), IM, 100 mcg/0.5mL   2021       Last COVID Lab SARS-CoV-2, PCR (no units)   Date Value   2024 Not Detected     SARS-CoV-2, NAAT (no units)   Date Value   2024 Not Detected           Abnormal labs:

## 2024-12-09 NOTE — ED PROVIDER NOTES
[facility] 4 ONCOLOGY UNIT  eMERGENCY dEPARTMENT eNCOUnter      Pt Name: Atul Peoples  MRN: [id number]  Birthdate 1940  Date of evaluation: 12/9/2024  Provider: Sean Clayton MD    CHIEF COMPLAINT       Chief Complaint   Patient presents with    Shortness of Breath     Normally wears 2L/min NC at baseline. When EMS got to pt he was satting in the 60's. Pt was placed on NRB with not much improvement. Pt given duoneb en route to ED and sats came up to 90's.    Altered Mental Status     POA states pt has been more altered than usual          HISTORY OF PRESENT ILLNESS   (Location/Symptom, Timing/Onset,Context/Setting, Quality, Duration, Modifying Factors, Severity)  Note limiting factors.   Atul Peoples is a 84 y.o. male who presents to the emergency department shortness of breath     HPI    Patient is a 84-year-old white male with history of COVID-19 infection; COPD; abdominal aortic aneurysm; diabetes mellitus; reflux disease BRIAN; decubitus ulcer of the coccyx; atherosclerotic heart disease status post CABG; peripheral vascular disease hypertension; hyperlipidemia; anemia; myxedema coma; total knee replacement; who presents with as per report greater confusion than baseline.  When EMS arrived he was satting in the 60s and off his baseline 3 L oxygen.  They increased his oxygen with a nonrebreather and gave him a DuoNeb and he improved and since then has been maintained on 4 L by nasal cannula.  He is a poor historian.  Does not appear to be in any respiratory distress.    NursingNotes were reviewed.    REVIEW OF SYSTEMS    (2-9 systems for level 4, 10 or more for level 5)     Review of Systems   Unable to perform ROS: Dementia            PAST MEDICALHISTORY     Past Medical History:   Diagnosis Date    AAA (abdominal aortic aneurysm) (HCC)     ASHD (arteriosclerotic heart disease)     S/P coronary intervention followed by CBG    CAD (coronary artery disease)     Severe triple-vessel    CAD (coronary artery disease)

## 2024-12-09 NOTE — ED NOTES
This nurse to bedside to round on pt and obtain covid/flu swab. Pt oxygen and monitoring equipment off pt.  Oxygen placed on pt and spo2 noted to be in the 60's% pt placed on NRB came up slowly to mid 90's  pt wears 3 lpm at bedside

## 2024-12-10 LAB
ANION GAP SERPL CALCULATED.3IONS-SCNC: 10 MMOL/L (ref 7–19)
BASOPHILS # BLD: 0.1 K/UL (ref 0–0.2)
BASOPHILS NFR BLD: 0.8 % (ref 0–1)
BUN SERPL-MCNC: 37 MG/DL (ref 8–23)
CALCIUM SERPL-MCNC: 8.5 MG/DL (ref 8.8–10.2)
CHLORIDE SERPL-SCNC: 101 MMOL/L (ref 98–111)
CO2 SERPL-SCNC: 28 MMOL/L (ref 22–29)
CREAT SERPL-MCNC: 2.6 MG/DL (ref 0.7–1.2)
EOSINOPHIL # BLD: 0.5 K/UL (ref 0–0.6)
EOSINOPHIL NFR BLD: 6.8 % (ref 0–5)
ERYTHROCYTE [DISTWIDTH] IN BLOOD BY AUTOMATED COUNT: 17.9 % (ref 11.5–14.5)
FOLATE SERPL-MCNC: 10.2 NG/ML (ref 4.5–32.2)
GLUCOSE BLD-MCNC: 105 MG/DL (ref 70–99)
GLUCOSE BLD-MCNC: 60 MG/DL (ref 70–99)
GLUCOSE BLD-MCNC: 68 MG/DL (ref 70–99)
GLUCOSE BLD-MCNC: 92 MG/DL (ref 70–99)
GLUCOSE SERPL-MCNC: 66 MG/DL (ref 70–99)
HCT VFR BLD AUTO: 37.2 % (ref 42–52)
HGB BLD-MCNC: 11.5 G/DL (ref 14–18)
IMM GRANULOCYTES # BLD: 0.5 K/UL
IRON SATN MFR SERPL: 18 % (ref 14–50)
IRON SERPL-MCNC: 47 UG/DL (ref 59–158)
LYMPHOCYTES # BLD: 2.4 K/UL (ref 1.1–4.5)
LYMPHOCYTES NFR BLD: 34.2 % (ref 20–40)
MCH RBC QN AUTO: 34.2 PG (ref 27–31)
MCHC RBC AUTO-ENTMCNC: 30.9 G/DL (ref 33–37)
MCV RBC AUTO: 110.7 FL (ref 80–94)
MONOCYTES # BLD: 0.7 K/UL (ref 0–0.9)
MONOCYTES NFR BLD: 10.3 % (ref 0–10)
NEUTROPHILS # BLD: 3 K/UL (ref 1.5–7.5)
NEUTS SEG NFR BLD: 41.6 % (ref 50–65)
PERFORMED ON: ABNORMAL
PERFORMED ON: NORMAL
PLATELET # BLD AUTO: 178 K/UL (ref 130–400)
PMV BLD AUTO: 11.7 FL (ref 9.4–12.4)
POTASSIUM SERPL-SCNC: 4.6 MMOL/L (ref 3.5–5)
RBC # BLD AUTO: 3.36 M/UL (ref 4.7–6.1)
SODIUM SERPL-SCNC: 139 MMOL/L (ref 136–145)
TIBC SERPL-MCNC: 256 UG/DL (ref 250–400)
VIT B12 SERPL-MCNC: 675 PG/ML (ref 232–1245)
WBC # BLD AUTO: 7.1 K/UL (ref 4.8–10.8)

## 2024-12-10 PROCEDURE — 82607 VITAMIN B-12: CPT

## 2024-12-10 PROCEDURE — 2580000003 HC RX 258: Performed by: FAMILY MEDICINE

## 2024-12-10 PROCEDURE — 85025 COMPLETE CBC W/AUTO DIFF WBC: CPT

## 2024-12-10 PROCEDURE — 83540 ASSAY OF IRON: CPT

## 2024-12-10 PROCEDURE — 51798 US URINE CAPACITY MEASURE: CPT

## 2024-12-10 PROCEDURE — 6370000000 HC RX 637 (ALT 250 FOR IP): Performed by: FAMILY MEDICINE

## 2024-12-10 PROCEDURE — 80048 BASIC METABOLIC PNL TOTAL CA: CPT

## 2024-12-10 PROCEDURE — 83550 IRON BINDING TEST: CPT

## 2024-12-10 PROCEDURE — 93005 ELECTROCARDIOGRAM TRACING: CPT | Performed by: STUDENT IN AN ORGANIZED HEALTH CARE EDUCATION/TRAINING PROGRAM

## 2024-12-10 PROCEDURE — 6370000000 HC RX 637 (ALT 250 FOR IP): Performed by: STUDENT IN AN ORGANIZED HEALTH CARE EDUCATION/TRAINING PROGRAM

## 2024-12-10 PROCEDURE — 6360000002 HC RX W HCPCS: Performed by: FAMILY MEDICINE

## 2024-12-10 PROCEDURE — 36415 COLL VENOUS BLD VENIPUNCTURE: CPT

## 2024-12-10 PROCEDURE — 1200000000 HC SEMI PRIVATE

## 2024-12-10 PROCEDURE — 82962 GLUCOSE BLOOD TEST: CPT

## 2024-12-10 PROCEDURE — 82746 ASSAY OF FOLIC ACID SERUM: CPT

## 2024-12-10 PROCEDURE — 99223 1ST HOSP IP/OBS HIGH 75: CPT | Performed by: PHYSICIAN ASSISTANT

## 2024-12-10 RX ORDER — TAMSULOSIN HYDROCHLORIDE 0.4 MG/1
0.4 CAPSULE ORAL DAILY
Status: DISCONTINUED | OUTPATIENT
Start: 2024-12-10 | End: 2024-12-16 | Stop reason: HOSPADM

## 2024-12-10 RX ORDER — BETHANECHOL CHLORIDE 25 MG/1
25 TABLET ORAL 3 TIMES DAILY
Status: DISCONTINUED | OUTPATIENT
Start: 2024-12-10 | End: 2024-12-12

## 2024-12-10 RX ADMIN — CLOPIDOGREL BISULFATE 75 MG: 75 TABLET ORAL at 09:04

## 2024-12-10 RX ADMIN — LEVOTHYROXINE SODIUM 150 MCG: 75 TABLET ORAL at 05:28

## 2024-12-10 RX ADMIN — BETHANECHOL CHLORIDE 25 MG: 25 TABLET ORAL at 09:04

## 2024-12-10 RX ADMIN — SODIUM CHLORIDE, PRESERVATIVE FREE 10 ML: 5 INJECTION INTRAVENOUS at 09:04

## 2024-12-10 RX ADMIN — AMLODIPINE BESYLATE 5 MG: 5 TABLET ORAL at 09:04

## 2024-12-10 RX ADMIN — BETHANECHOL CHLORIDE 25 MG: 25 TABLET ORAL at 20:27

## 2024-12-10 RX ADMIN — BETHANECHOL CHLORIDE 25 MG: 25 TABLET ORAL at 15:48

## 2024-12-10 RX ADMIN — TAMSULOSIN HYDROCHLORIDE 0.4 MG: 0.4 CAPSULE ORAL at 09:04

## 2024-12-10 RX ADMIN — SODIUM CHLORIDE, PRESERVATIVE FREE 10 ML: 5 INJECTION INTRAVENOUS at 20:27

## 2024-12-10 RX ADMIN — PANTOPRAZOLE SODIUM 40 MG: 40 TABLET, DELAYED RELEASE ORAL at 15:48

## 2024-12-10 RX ADMIN — ENOXAPARIN SODIUM 30 MG: 100 INJECTION SUBCUTANEOUS at 17:39

## 2024-12-10 RX ADMIN — PANTOPRAZOLE SODIUM 40 MG: 40 TABLET, DELAYED RELEASE ORAL at 05:28

## 2024-12-10 RX ADMIN — ATORVASTATIN CALCIUM 10 MG: 10 TABLET, FILM COATED ORAL at 20:27

## 2024-12-10 NOTE — CARE COORDINATION
Case Management Assessment  Initial Evaluation    Date/Time of Evaluation: 12/10/2024 9:19 AM  Assessment Completed by: HERNÁN Duran    If patient is discharged prior to next notation, then this note serves as note for discharge by case management.    Patient Name: Atul Peoples                   YOB: 1940  Diagnosis: Bradycardia [R00.1]  BRIAN (acute kidney injury) (HCC) [N17.9]  Acute on chronic respiratory failure [J96.20]  Acute respiratory failure with hypoxemia [J96.01]  Systolic congestive heart failure, unspecified HF chronicity (HCC) [I50.20]  Vascular dementia without behavioral disturbance, psychotic disturbance, mood disturbance, or anxiety, unspecified dementia severity ([facility]) [F01.50]                   Date / Time: 12/9/2024  2:19 PM    Patient Admission Status: Inpatient   Readmission Risk (Low < 19, Mod (19-27), High > 27): Readmission Risk Score: 22.2    Current PCP: Isrrael Horner MD  PCP verified by CM? Yes    Chart Reviewed: Yes      History Provided by: Medical Record  Patient Orientation: Alert and Oriented    Patient Cognition: Alert    Hospitalization in the last 30 days (Readmission):  No    If yes, Readmission Assessment in  Navigator will be completed.    Advance Directives:      Code Status: DNR   Patient's Primary Decision Maker is: Legal Next of Kin    Primary Decision Maker: ROSEMARIE Cassidy - Friend - [phone]    Secondary Decision Maker: Tootie Adame - Child - [phone]    Secondary Decision Maker: Barry Peoples - Child - [phone]    Discharge Planning:    Patient lives with: Alone Type of Home: Skilled Nursing Facility  Primary Care Giver: Self (his neighbor checks on him 2x daily)  Patient Support Systems include: Friends/Neighbors   Current Financial resources: Medicare  Current community resources: None  Current services prior to admission: Oxygen Therapy, Durable Medical Equipment            Current DME: Walker            Type of Home Care

## 2024-12-10 NOTE — ACP (ADVANCE CARE PLANNING)
Advance Care Planning      Palliative Medicine Provider  Advance Care Planning (ACP) Conversation      Date of Conversation: 12/10/24  The patient and/or authorized decision maker consented to a voluntary Advance Care Planning conversation.   Individuals present for the conversation:   Patient and Legal healthcare agent named below    Legal Healthcare Agent(s):    Primary Decision Maker: ROSEMARIE Cassidy Dileep - Friend - [phone]    Secondary Decision Maker: Tootie Adame - Child - [phone]    Secondary Decision Maker: Barry Peoples - Child - [phone]    ACP documents available in EMR prior to discussion:  -Living Will  -Portable DNR    Resuscitation Status:    Code Status: DNR      I spent 15 minutes providing ACP services with the patient and/or surrogate decision maker in a voluntary, in-person conversation discussing the patient's wishes and goals as detailed in the above note.       Nallely Kapadia PA-C

## 2024-12-10 NOTE — H&P
negative outside listed above and HPI      Physical Exam:    Vitals: BP (!) 159/65   Pulse (!) 42   Temp (!) 95 °F (35 °C) (Rectal)   Resp 16   Wt 74.8 kg (165 lb)   SpO2 90%   BMI 24.35 kg/m²     GENERAL: WD/WN not in acute distress, resting well in bed, more alert than described in the emergency room last night  HEENT: NC/AT PERRL, EOMI grossly, TM's clear, OP negative without sig erythema or exudate, neck is supple without masses or carotid bruit noted    CV: normal S1 and S2, no sig murmur, lift or gallop, normal PMI  CHEST: clear to auscultation both anterior and posterior, no rales rhonchi or wheeze appreciated.  ABD: soft NT/ND with normoactive BS noted. No guarding or rebounding noted  /rectal: deferred  EXT: no cyanosis or clubbing noted, normal ROM  DERM: skin is warm and dry without sig rashes on exposed areas  PSYCH: Flat affect and depressed since the passing of his wife many years ago  NEURO: Hard of hearing which makes communication difficulty moves all extremities       CBC:   Recent Labs     12/09/24  1456 12/10/24  0230   WBC 8.7 7.1   HGB 12.3* 11.5*    178     BMP:    Recent Labs     12/09/24  1456 12/10/24  0230    139   K 4.7 4.6    101   CO2 28 28   BUN 38* 37*   CREATININE 2.8* 2.6*   GLUCOSE 80 66*     Hepatic:   Recent Labs     12/09/24  1456   AST 32   ALT 13   BILITOT 0.2   ALKPHOS 79     Troponin T: No results for input(s): \"TROPONINI\" in the last 72 hours.  Pro-BNP: No results for input(s): \"BNP\" in the last 72 hours.  Lipids: No results for input(s): \"CHOL\", \"HDL\" in the last 72 hours.    Invalid input(s): \"LDLCALCU\"  ABGs:   Lab Results   Component Value Date/Time    PHART 7.380 08/24/2023 10:29 AM    PO2ART 55.0 08/24/2023 10:29 AM    WIK6FWP 45.0 08/24/2023 10:29 AM     INR: No results for input(s): \"INR\" in the last 72 hours.  -----------------------------------------------------------------  EKG: No acute changes  Radiology:     XR CHEST

## 2024-12-10 NOTE — PLAN OF CARE
Problem: Chronic Conditions and Co-morbidities  Goal: Patient's chronic conditions and co-morbidity symptoms are monitored and maintained or improved  Outcome: Progressing  Flowsheets  Taken 12/9/2024 2150 by Nanette Almaguer LPN  Care Plan - Patient's Chronic Conditions and Co-Morbidity Symptoms are Monitored and Maintained or Improved: Monitor and assess patient's chronic conditions and comorbid symptoms for stability, deterioration, or improvement  Taken 12/9/2024 1846 by Sussy Horton RN  Care Plan - Patient's Chronic Conditions and Co-Morbidity Symptoms are Monitored and Maintained or Improved:   Monitor and assess patient's chronic conditions and comorbid symptoms for stability, deterioration, or improvement   Collaborate with multidisciplinary team to address chronic and comorbid conditions and prevent exacerbation or deterioration   Update acute care plan with appropriate goals if chronic or comorbid symptoms are exacerbated and prevent overall improvement and discharge     Problem: Safety - Adult  Goal: Free from fall injury  Outcome: Progressing     Problem: Skin/Tissue Integrity  Goal: Absence of new skin breakdown  Description: 1.  Monitor for areas of redness and/or skin breakdown  2.  Assess vascular access sites hourly  3.  Every 4-6 hours minimum:  Change oxygen saturation probe site  4.  Every 4-6 hours:  If on nasal continuous positive airway pressure, respiratory therapy assess nares and determine need for appliance change or resting period.  Outcome: Progressing

## 2024-12-10 NOTE — CONSULTS
Palliative Care Consult Note    12/10/2024 11:17 AM  Subjective:  Admit Date: 12/9/2024  PCP: Isrrael Horner MD    Date of Service: 12/10/2024    Reason for Consultation:  Goals of Care, Code Status, Family Support     History Obtained From: EMR/Patient and their Family    History Of Present Illness:   The patient is a 84 y.o. 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 [place] ED on 12/09/2024 w/ concern for dyspnea and hypoxia. Was noted to have oxygen saturation in the 60's on EMS arrival and was not wearing his oxygen.  Chest x-ray reported interstitial disease bilaterally in the mid and lower lung zones may indicate pulmonary edema, cardiomegaly and atherosclerosis, previous median sternotomy, possible small bilateral pleural effusions. He's been admitted to his PCP service.  TSH 67.34, free T40.10.  He received 100 mcg IV levothyroxine and has been continued on 150 mcg oral levothyroxine.  He has been hypothermic today with some urinary retention.  Palliative care has been consulted for goals of care.    Past Medical History:        Diagnosis Date    AAA (abdominal aortic aneurysm) (HCC)     ASHD (arteriosclerotic heart disease)     S/P coronary intervention followed by CBG    CAD (coronary artery disease)     Severe triple-vessel    CAD (coronary artery disease) 02/04/2015    COPD (chronic obstructive pulmonary disease) (HCC)     With tobacco abuse    GERD (gastroesophageal reflux disease)     HH (hiatus hernia)     History of cerebrovascular disease     Hyperlipidemia     PCP, Dr. REZA Horner manages cholesterol.    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 June    S/P TKR (total knee replacement)     RIGHT    Thyroid disease        Past Surgical History:

## 2024-12-10 NOTE — PLAN OF CARE
Problem: Chronic Conditions and Co-morbidities  Goal: Patient's chronic conditions and co-morbidity symptoms are monitored and maintained or improved  12/10/2024 0834 by Juan Knight LPN  Outcome: Progressing  12/10/2024 0034 by Nanette Almaguer LPN  Outcome: Progressing  Flowsheets  Taken 12/9/2024 2150 by Nanette Almaguer LPN  Care Plan - Patient's Chronic Conditions and Co-Morbidity Symptoms are Monitored and Maintained or Improved: Monitor and assess patient's chronic conditions and comorbid symptoms for stability, deterioration, or improvement  Taken 12/9/2024 1846 by Sussy Horton, RN  Care Plan - Patient's Chronic Conditions and Co-Morbidity Symptoms are Monitored and Maintained or Improved:   Monitor and assess patient's chronic conditions and comorbid symptoms for stability, deterioration, or improvement   Collaborate with multidisciplinary team to address chronic and comorbid conditions and prevent exacerbation or deterioration   Update acute care plan with appropriate goals if chronic or comorbid symptoms are exacerbated and prevent overall improvement and discharge     Problem: Safety - Adult  Goal: Free from fall injury  12/10/2024 0834 by Juan Knight LPN  Outcome: Progressing  12/10/2024 0034 by Nanette Almaguer LPN  Outcome: Progressing     Problem: Skin/Tissue Integrity  Goal: Absence of new skin breakdown  Description: 1.  Monitor for areas of redness and/or skin breakdown  2.  Assess vascular access sites hourly  3.  Every 4-6 hours minimum:  Change oxygen saturation probe site  4.  Every 4-6 hours:  If on nasal continuous positive airway pressure, respiratory therapy assess nares and determine need for appliance change or resting period.  12/10/2024 0834 by Juan Knight LPN  Outcome: Progressing  12/10/2024 0034 by Nanette Almaguer LPN  Outcome: Progressing

## 2024-12-11 ENCOUNTER — APPOINTMENT (OUTPATIENT)
Dept: GENERAL RADIOLOGY | Age: 84
DRG: 189 | End: 2024-12-11
Attending: FAMILY MEDICINE
Payer: MEDICARE

## 2024-12-11 ENCOUNTER — APPOINTMENT (OUTPATIENT)
Dept: GENERAL RADIOLOGY | Age: 84
DRG: 189 | End: 2024-12-11
Payer: MEDICARE

## 2024-12-11 LAB
ANION GAP SERPL CALCULATED.3IONS-SCNC: 9 MMOL/L (ref 7–19)
BASOPHILS # BLD: 0.1 K/UL (ref 0–0.2)
BASOPHILS NFR BLD: 0.6 % (ref 0–1)
BUN SERPL-MCNC: 37 MG/DL (ref 8–23)
CALCIUM SERPL-MCNC: 8.1 MG/DL (ref 8.8–10.2)
CHLORIDE SERPL-SCNC: 102 MMOL/L (ref 98–111)
CO2 SERPL-SCNC: 29 MMOL/L (ref 22–29)
CREAT SERPL-MCNC: 2.9 MG/DL (ref 0.7–1.2)
EKG P AXIS: 91 DEGREES
EKG P-R INTERVAL: 202 MS
EKG Q-T INTERVAL: 540 MS
EKG QRS DURATION: 112 MS
EKG QTC CALCULATION (BAZETT): 511 MS
EKG T AXIS: 56 DEGREES
EOSINOPHIL # BLD: 0.4 K/UL (ref 0–0.6)
EOSINOPHIL NFR BLD: 4.7 % (ref 0–5)
ERYTHROCYTE [DISTWIDTH] IN BLOOD BY AUTOMATED COUNT: 17.6 % (ref 11.5–14.5)
GLUCOSE BLD-MCNC: 100 MG/DL (ref 70–99)
GLUCOSE BLD-MCNC: 103 MG/DL (ref 70–99)
GLUCOSE BLD-MCNC: 83 MG/DL (ref 70–99)
GLUCOSE SERPL-MCNC: 76 MG/DL (ref 70–99)
HCT VFR BLD AUTO: 34.9 % (ref 42–52)
HGB BLD-MCNC: 11.1 G/DL (ref 14–18)
IMM GRANULOCYTES # BLD: 0.4 K/UL
LYMPHOCYTES # BLD: 1.8 K/UL (ref 1.1–4.5)
LYMPHOCYTES NFR BLD: 21.6 % (ref 20–40)
MCH RBC QN AUTO: 34.7 PG (ref 27–31)
MCHC RBC AUTO-ENTMCNC: 31.8 G/DL (ref 33–37)
MCV RBC AUTO: 109.1 FL (ref 80–94)
MONOCYTES # BLD: 0.7 K/UL (ref 0–0.9)
MONOCYTES NFR BLD: 8.5 % (ref 0–10)
NEUTROPHILS # BLD: 5.1 K/UL (ref 1.5–7.5)
NEUTS SEG NFR BLD: 60.2 % (ref 50–65)
PERFORMED ON: ABNORMAL
PERFORMED ON: ABNORMAL
PERFORMED ON: NORMAL
PLATELET # BLD AUTO: 171 K/UL (ref 130–400)
PMV BLD AUTO: 11.9 FL (ref 9.4–12.4)
POTASSIUM SERPL-SCNC: 4.4 MMOL/L (ref 3.5–5)
RBC # BLD AUTO: 3.2 M/UL (ref 4.7–6.1)
SODIUM SERPL-SCNC: 140 MMOL/L (ref 136–145)
WBC # BLD AUTO: 8.4 K/UL (ref 4.8–10.8)

## 2024-12-11 PROCEDURE — 6360000002 HC RX W HCPCS: Performed by: FAMILY MEDICINE

## 2024-12-11 PROCEDURE — 94760 N-INVAS EAR/PLS OXIMETRY 1: CPT

## 2024-12-11 PROCEDURE — 71045 X-RAY EXAM CHEST 1 VIEW: CPT

## 2024-12-11 PROCEDURE — 2700000000 HC OXYGEN THERAPY PER DAY

## 2024-12-11 PROCEDURE — 6370000000 HC RX 637 (ALT 250 FOR IP): Performed by: FAMILY MEDICINE

## 2024-12-11 PROCEDURE — 97162 PT EVAL MOD COMPLEX 30 MIN: CPT

## 2024-12-11 PROCEDURE — 82962 GLUCOSE BLOOD TEST: CPT

## 2024-12-11 PROCEDURE — 97110 THERAPEUTIC EXERCISES: CPT

## 2024-12-11 PROCEDURE — 99232 SBSQ HOSP IP/OBS MODERATE 35: CPT | Performed by: PHYSICIAN ASSISTANT

## 2024-12-11 PROCEDURE — 85025 COMPLETE CBC W/AUTO DIFF WBC: CPT

## 2024-12-11 PROCEDURE — 2580000003 HC RX 258: Performed by: FAMILY MEDICINE

## 2024-12-11 PROCEDURE — 6370000000 HC RX 637 (ALT 250 FOR IP): Performed by: STUDENT IN AN ORGANIZED HEALTH CARE EDUCATION/TRAINING PROGRAM

## 2024-12-11 PROCEDURE — 94640 AIRWAY INHALATION TREATMENT: CPT

## 2024-12-11 PROCEDURE — 80048 BASIC METABOLIC PNL TOTAL CA: CPT

## 2024-12-11 PROCEDURE — 93010 ELECTROCARDIOGRAM REPORT: CPT | Performed by: INTERNAL MEDICINE

## 2024-12-11 PROCEDURE — 1200000000 HC SEMI PRIVATE

## 2024-12-11 PROCEDURE — 97535 SELF CARE MNGMENT TRAINING: CPT

## 2024-12-11 PROCEDURE — 36415 COLL VENOUS BLD VENIPUNCTURE: CPT

## 2024-12-11 PROCEDURE — 97166 OT EVAL MOD COMPLEX 45 MIN: CPT

## 2024-12-11 RX ORDER — IPRATROPIUM BROMIDE AND ALBUTEROL SULFATE 2.5; .5 MG/3ML; MG/3ML
1 SOLUTION RESPIRATORY (INHALATION) EVERY 4 HOURS PRN
Status: DISCONTINUED | OUTPATIENT
Start: 2024-12-11 | End: 2024-12-12

## 2024-12-11 RX ORDER — FUROSEMIDE 10 MG/ML
20 INJECTION INTRAMUSCULAR; INTRAVENOUS ONCE
Status: COMPLETED | OUTPATIENT
Start: 2024-12-11 | End: 2024-12-11

## 2024-12-11 RX ADMIN — PANTOPRAZOLE SODIUM 40 MG: 40 TABLET, DELAYED RELEASE ORAL at 05:42

## 2024-12-11 RX ADMIN — FUROSEMIDE 20 MG: 10 INJECTION, SOLUTION INTRAMUSCULAR; INTRAVENOUS at 09:03

## 2024-12-11 RX ADMIN — LEVOTHYROXINE SODIUM 150 MCG: 75 TABLET ORAL at 05:42

## 2024-12-11 RX ADMIN — TAMSULOSIN HYDROCHLORIDE 0.4 MG: 0.4 CAPSULE ORAL at 09:03

## 2024-12-11 RX ADMIN — BETHANECHOL CHLORIDE 25 MG: 25 TABLET ORAL at 09:04

## 2024-12-11 RX ADMIN — PANTOPRAZOLE SODIUM 40 MG: 40 TABLET, DELAYED RELEASE ORAL at 16:06

## 2024-12-11 RX ADMIN — BETHANECHOL CHLORIDE 25 MG: 25 TABLET ORAL at 20:11

## 2024-12-11 RX ADMIN — ENOXAPARIN SODIUM 30 MG: 100 INJECTION SUBCUTANEOUS at 16:06

## 2024-12-11 RX ADMIN — IPRATROPIUM BROMIDE AND ALBUTEROL SULFATE 1 DOSE: 2.5; .5 SOLUTION RESPIRATORY (INHALATION) at 14:34

## 2024-12-11 RX ADMIN — ATORVASTATIN CALCIUM 10 MG: 10 TABLET, FILM COATED ORAL at 20:11

## 2024-12-11 RX ADMIN — CLOPIDOGREL BISULFATE 75 MG: 75 TABLET ORAL at 09:04

## 2024-12-11 RX ADMIN — SODIUM CHLORIDE, PRESERVATIVE FREE 10 ML: 5 INJECTION INTRAVENOUS at 20:12

## 2024-12-11 NOTE — PLAN OF CARE
Problem: Chronic Conditions and Co-morbidities  Goal: Patient's chronic conditions and co-morbidity symptoms are monitored and maintained or improved  12/11/2024 1233 by Azalea Kruger RN  Outcome: Progressing  12/10/2024 2257 by Nanette Almaguer LPN  Outcome: Progressing  Flowsheets (Taken 12/10/2024 2027)  Care Plan - Patient's Chronic Conditions and Co-Morbidity Symptoms are Monitored and Maintained or Improved: Monitor and assess patient's chronic conditions and comorbid symptoms for stability, deterioration, or improvement     Problem: Safety - Adult  Goal: Free from fall injury  12/11/2024 1233 by Azalea Kruger RN  Outcome: Progressing  Flowsheets (Taken 12/11/2024 1233)  Free From Fall Injury: Instruct family/caregiver on patient safety  12/10/2024 2257 by Nanette Almaguer LPN  Outcome: Progressing     Problem: Skin/Tissue Integrity  Goal: Absence of new skin breakdown  Description: 1.  Monitor for areas of redness and/or skin breakdown  2.  Assess vascular access sites hourly  3.  Every 4-6 hours minimum:  Change oxygen saturation probe site  4.  Every 4-6 hours:  If on nasal continuous positive airway pressure, respiratory therapy assess nares and determine need for appliance change or resting period.  12/11/2024 1233 by Azalea Kruger RN  Outcome: Progressing  12/10/2024 2257 by Nanette Almaguer LPN  Outcome: Progressing     Problem: ABCDS Injury Assessment  Goal: Absence of physical injury  Outcome: Progressing

## 2024-12-11 NOTE — CARE COORDINATION
[facility] 12/12/2024 through 12/18/2024.  [facility] (formerly [facility])   [phone] P   [phone] F   [phone] Referral fax number for   Electronically signed by Keli Werner RN on 12/11/2024 at 4:20 PM

## 2024-12-11 NOTE — PLAN OF CARE
Problem: Chronic Conditions and Co-morbidities  Goal: Patient's chronic conditions and co-morbidity symptoms are monitored and maintained or improved  Outcome: Progressing  Flowsheets (Taken 12/10/2024 2027)  Care Plan - Patient's Chronic Conditions and Co-Morbidity Symptoms are Monitored and Maintained or Improved: Monitor and assess patient's chronic conditions and comorbid symptoms for stability, deterioration, or improvement     Problem: Safety - Adult  Goal: Free from fall injury  Outcome: Progressing     Problem: Skin/Tissue Integrity  Goal: Absence of new skin breakdown  Description: 1.  Monitor for areas of redness and/or skin breakdown  2.  Assess vascular access sites hourly  3.  Every 4-6 hours minimum:  Change oxygen saturation probe site  4.  Every 4-6 hours:  If on nasal continuous positive airway pressure, respiratory therapy assess nares and determine need for appliance change or resting period.  Outcome: Progressing

## 2024-12-12 LAB
ANION GAP SERPL CALCULATED.3IONS-SCNC: 8 MMOL/L (ref 7–19)
BASOPHILS # BLD: 0 K/UL (ref 0–0.2)
BASOPHILS NFR BLD: 0.5 % (ref 0–1)
BUN SERPL-MCNC: 37 MG/DL (ref 8–23)
CALCIUM SERPL-MCNC: 8.1 MG/DL (ref 8.8–10.2)
CHLORIDE SERPL-SCNC: 101 MMOL/L (ref 98–111)
CO2 SERPL-SCNC: 31 MMOL/L (ref 22–29)
CREAT SERPL-MCNC: 2.7 MG/DL (ref 0.7–1.2)
EOSINOPHIL # BLD: 0.5 K/UL (ref 0–0.6)
EOSINOPHIL NFR BLD: 7 % (ref 0–5)
ERYTHROCYTE [DISTWIDTH] IN BLOOD BY AUTOMATED COUNT: 17.7 % (ref 11.5–14.5)
GLUCOSE SERPL-MCNC: 82 MG/DL (ref 70–99)
HCT VFR BLD AUTO: 33.2 % (ref 42–52)
HGB BLD-MCNC: 10.5 G/DL (ref 14–18)
IMM GRANULOCYTES # BLD: 0.1 K/UL
LYMPHOCYTES # BLD: 1.6 K/UL (ref 1.1–4.5)
LYMPHOCYTES NFR BLD: 25.3 % (ref 20–40)
MCH RBC QN AUTO: 33.9 PG (ref 27–31)
MCHC RBC AUTO-ENTMCNC: 31.6 G/DL (ref 33–37)
MCV RBC AUTO: 107.1 FL (ref 80–94)
MONOCYTES # BLD: 0.5 K/UL (ref 0–0.9)
MONOCYTES NFR BLD: 8.1 % (ref 0–10)
NEUTROPHILS # BLD: 3.6 K/UL (ref 1.5–7.5)
NEUTS SEG NFR BLD: 56.9 % (ref 50–65)
PLATELET # BLD AUTO: 127 K/UL (ref 130–400)
PMV BLD AUTO: 12.6 FL (ref 9.4–12.4)
POTASSIUM SERPL-SCNC: 4 MMOL/L (ref 3.5–5)
RBC # BLD AUTO: 3.1 M/UL (ref 4.7–6.1)
SODIUM SERPL-SCNC: 140 MMOL/L (ref 136–145)
WBC # BLD AUTO: 6.4 K/UL (ref 4.8–10.8)

## 2024-12-12 PROCEDURE — 6370000000 HC RX 637 (ALT 250 FOR IP): Performed by: FAMILY MEDICINE

## 2024-12-12 PROCEDURE — 94640 AIRWAY INHALATION TREATMENT: CPT

## 2024-12-12 PROCEDURE — 85025 COMPLETE CBC W/AUTO DIFF WBC: CPT

## 2024-12-12 PROCEDURE — 97110 THERAPEUTIC EXERCISES: CPT

## 2024-12-12 PROCEDURE — 2580000003 HC RX 258: Performed by: FAMILY MEDICINE

## 2024-12-12 PROCEDURE — 97530 THERAPEUTIC ACTIVITIES: CPT

## 2024-12-12 PROCEDURE — 94760 N-INVAS EAR/PLS OXIMETRY 1: CPT

## 2024-12-12 PROCEDURE — 97116 GAIT TRAINING THERAPY: CPT

## 2024-12-12 PROCEDURE — 99232 SBSQ HOSP IP/OBS MODERATE 35: CPT | Performed by: PHYSICIAN ASSISTANT

## 2024-12-12 PROCEDURE — 36415 COLL VENOUS BLD VENIPUNCTURE: CPT

## 2024-12-12 PROCEDURE — 1200000000 HC SEMI PRIVATE

## 2024-12-12 PROCEDURE — 6370000000 HC RX 637 (ALT 250 FOR IP): Performed by: STUDENT IN AN ORGANIZED HEALTH CARE EDUCATION/TRAINING PROGRAM

## 2024-12-12 PROCEDURE — 80048 BASIC METABOLIC PNL TOTAL CA: CPT

## 2024-12-12 PROCEDURE — 2700000000 HC OXYGEN THERAPY PER DAY

## 2024-12-12 PROCEDURE — 6360000002 HC RX W HCPCS: Performed by: FAMILY MEDICINE

## 2024-12-12 RX ORDER — IPRATROPIUM BROMIDE AND ALBUTEROL SULFATE 2.5; .5 MG/3ML; MG/3ML
1 SOLUTION RESPIRATORY (INHALATION)
Status: DISCONTINUED | OUTPATIENT
Start: 2024-12-12 | End: 2024-12-16 | Stop reason: HOSPADM

## 2024-12-12 RX ORDER — BUMETANIDE 1 MG/1
1 TABLET ORAL DAILY
Status: DISCONTINUED | OUTPATIENT
Start: 2024-12-12 | End: 2024-12-16 | Stop reason: HOSPADM

## 2024-12-12 RX ORDER — FAMOTIDINE 20 MG/1
10 TABLET, FILM COATED ORAL 2 TIMES DAILY
Status: DISCONTINUED | OUTPATIENT
Start: 2024-12-12 | End: 2024-12-15

## 2024-12-12 RX ADMIN — FAMOTIDINE 10 MG: 20 TABLET ORAL at 08:24

## 2024-12-12 RX ADMIN — SODIUM CHLORIDE, PRESERVATIVE FREE 10 ML: 5 INJECTION INTRAVENOUS at 08:24

## 2024-12-12 RX ADMIN — ATORVASTATIN CALCIUM 10 MG: 10 TABLET, FILM COATED ORAL at 21:33

## 2024-12-12 RX ADMIN — IPRATROPIUM BROMIDE AND ALBUTEROL SULFATE 1 DOSE: 2.5; .5 SOLUTION RESPIRATORY (INHALATION) at 14:05

## 2024-12-12 RX ADMIN — BUMETANIDE 1 MG: 1 TABLET ORAL at 08:24

## 2024-12-12 RX ADMIN — PANTOPRAZOLE SODIUM 40 MG: 40 TABLET, DELAYED RELEASE ORAL at 05:24

## 2024-12-12 RX ADMIN — IPRATROPIUM BROMIDE AND ALBUTEROL SULFATE 1 DOSE: 2.5; .5 SOLUTION RESPIRATORY (INHALATION) at 10:02

## 2024-12-12 RX ADMIN — FAMOTIDINE 10 MG: 20 TABLET ORAL at 21:33

## 2024-12-12 RX ADMIN — TAMSULOSIN HYDROCHLORIDE 0.4 MG: 0.4 CAPSULE ORAL at 08:22

## 2024-12-12 RX ADMIN — AMLODIPINE BESYLATE 5 MG: 5 TABLET ORAL at 08:22

## 2024-12-12 RX ADMIN — CLOPIDOGREL BISULFATE 75 MG: 75 TABLET ORAL at 08:22

## 2024-12-12 RX ADMIN — ENOXAPARIN SODIUM 30 MG: 100 INJECTION SUBCUTANEOUS at 17:56

## 2024-12-12 RX ADMIN — LEVOTHYROXINE SODIUM 150 MCG: 75 TABLET ORAL at 05:24

## 2024-12-12 RX ADMIN — IPRATROPIUM BROMIDE AND ALBUTEROL SULFATE 1 DOSE: 2.5; .5 SOLUTION RESPIRATORY (INHALATION) at 19:06

## 2024-12-12 RX ADMIN — SODIUM CHLORIDE, PRESERVATIVE FREE 10 ML: 5 INJECTION INTRAVENOUS at 21:34

## 2024-12-12 NOTE — PLAN OF CARE
Problem: Chronic Conditions and Co-morbidities  Goal: Patient's chronic conditions and co-morbidity symptoms are monitored and maintained or improved  12/11/2024 2339 by Blanca Cutler RN  Outcome: Progressing  12/11/2024 1233 by Azalea Kruger RN  Outcome: Progressing     Problem: Safety - Adult  Goal: Free from fall injury  12/11/2024 2339 by Blanca Cutler RN  Outcome: Progressing  12/11/2024 1233 by Azalea Kruger RN  Outcome: Progressing  Flowsheets (Taken 12/11/2024 1233)  Free From Fall Injury: Instruct family/caregiver on patient safety     Problem: Skin/Tissue Integrity  Goal: Absence of new skin breakdown  Description: 1.  Monitor for areas of redness and/or skin breakdown  2.  Assess vascular access sites hourly  3.  Every 4-6 hours minimum:  Change oxygen saturation probe site  4.  Every 4-6 hours:  If on nasal continuous positive airway pressure, respiratory therapy assess nares and determine need for appliance change or resting period.  12/11/2024 2339 by Blanca Cutler RN  Outcome: Progressing  12/11/2024 1233 by Azalea Kruger RN  Outcome: Progressing     Problem: ABCDS Injury Assessment  Goal: Absence of physical injury  12/11/2024 2339 by Blanca Cutler RN  Outcome: Progressing  12/11/2024 1233 by Azalea Kruger RN  Outcome: Progressing

## 2024-12-12 NOTE — CARE COORDINATION
In order for pt to discharge to [facility] his O2 needs to be 5L or less, will inform nurse to inform physician  Electronically signed by HERNÁN Duran on 12/12/2024 at 9:23 AM

## 2024-12-13 PROCEDURE — 94640 AIRWAY INHALATION TREATMENT: CPT

## 2024-12-13 PROCEDURE — 6370000000 HC RX 637 (ALT 250 FOR IP): Performed by: FAMILY MEDICINE

## 2024-12-13 PROCEDURE — 2700000000 HC OXYGEN THERAPY PER DAY

## 2024-12-13 PROCEDURE — 6370000000 HC RX 637 (ALT 250 FOR IP): Performed by: STUDENT IN AN ORGANIZED HEALTH CARE EDUCATION/TRAINING PROGRAM

## 2024-12-13 PROCEDURE — 1200000000 HC SEMI PRIVATE

## 2024-12-13 PROCEDURE — 94760 N-INVAS EAR/PLS OXIMETRY 1: CPT

## 2024-12-13 PROCEDURE — 6360000002 HC RX W HCPCS: Performed by: FAMILY MEDICINE

## 2024-12-13 PROCEDURE — 2580000003 HC RX 258: Performed by: FAMILY MEDICINE

## 2024-12-13 RX ADMIN — FAMOTIDINE 10 MG: 20 TABLET ORAL at 20:32

## 2024-12-13 RX ADMIN — IPRATROPIUM BROMIDE AND ALBUTEROL SULFATE 1 DOSE: 2.5; .5 SOLUTION RESPIRATORY (INHALATION) at 18:57

## 2024-12-13 RX ADMIN — CLOPIDOGREL BISULFATE 75 MG: 75 TABLET ORAL at 09:03

## 2024-12-13 RX ADMIN — SODIUM CHLORIDE, PRESERVATIVE FREE 10 ML: 5 INJECTION INTRAVENOUS at 09:07

## 2024-12-13 RX ADMIN — ATORVASTATIN CALCIUM 10 MG: 10 TABLET, FILM COATED ORAL at 20:32

## 2024-12-13 RX ADMIN — LEVOTHYROXINE SODIUM 150 MCG: 75 TABLET ORAL at 08:05

## 2024-12-13 RX ADMIN — SODIUM CHLORIDE, PRESERVATIVE FREE 10 ML: 5 INJECTION INTRAVENOUS at 20:32

## 2024-12-13 RX ADMIN — AMLODIPINE BESYLATE 5 MG: 5 TABLET ORAL at 09:03

## 2024-12-13 RX ADMIN — BUMETANIDE 1 MG: 1 TABLET ORAL at 09:03

## 2024-12-13 RX ADMIN — TAMSULOSIN HYDROCHLORIDE 0.4 MG: 0.4 CAPSULE ORAL at 09:03

## 2024-12-13 RX ADMIN — FAMOTIDINE 10 MG: 20 TABLET ORAL at 09:03

## 2024-12-13 RX ADMIN — IPRATROPIUM BROMIDE AND ALBUTEROL SULFATE 1 DOSE: 2.5; .5 SOLUTION RESPIRATORY (INHALATION) at 06:17

## 2024-12-13 RX ADMIN — ENOXAPARIN SODIUM 30 MG: 100 INJECTION SUBCUTANEOUS at 17:55

## 2024-12-13 RX ADMIN — IPRATROPIUM BROMIDE AND ALBUTEROL SULFATE 1 DOSE: 2.5; .5 SOLUTION RESPIRATORY (INHALATION) at 10:06

## 2024-12-13 NOTE — PLAN OF CARE
Problem: Chronic Conditions and Co-morbidities  Goal: Patient's chronic conditions and co-morbidity symptoms are monitored and maintained or improved  Outcome: Progressing  Flowsheets (Taken 12/13/2024 0059)  Care Plan - Patient's Chronic Conditions and Co-Morbidity Symptoms are Monitored and Maintained or Improved:   Monitor and assess patient's chronic conditions and comorbid symptoms for stability, deterioration, or improvement   Collaborate with multidisciplinary team to address chronic and comorbid conditions and prevent exacerbation or deterioration   Update acute care plan with appropriate goals if chronic or comorbid symptoms are exacerbated and prevent overall improvement and discharge     Problem: Safety - Adult  Goal: Free from fall injury  Outcome: Progressing     Problem: Skin/Tissue Integrity  Goal: Absence of new skin breakdown  Description: 1.  Monitor for areas of redness and/or skin breakdown  2.  Assess vascular access sites hourly  3.  Every 4-6 hours minimum:  Change oxygen saturation probe site  4.  Every 4-6 hours:  If on nasal continuous positive airway pressure, respiratory therapy assess nares and determine need for appliance change or resting period.  Outcome: Progressing     Problem: ABCDS Injury Assessment  Goal: Absence of physical injury  Outcome: Progressing

## 2024-12-14 LAB
BACTERIA BLD CULT ORG #2: NORMAL
BACTERIA BLD CULT: NORMAL

## 2024-12-14 PROCEDURE — 94760 N-INVAS EAR/PLS OXIMETRY 1: CPT

## 2024-12-14 PROCEDURE — 2700000000 HC OXYGEN THERAPY PER DAY

## 2024-12-14 PROCEDURE — 2580000003 HC RX 258: Performed by: FAMILY MEDICINE

## 2024-12-14 PROCEDURE — 6370000000 HC RX 637 (ALT 250 FOR IP): Performed by: FAMILY MEDICINE

## 2024-12-14 PROCEDURE — 94640 AIRWAY INHALATION TREATMENT: CPT

## 2024-12-14 PROCEDURE — 1200000000 HC SEMI PRIVATE

## 2024-12-14 PROCEDURE — 6370000000 HC RX 637 (ALT 250 FOR IP): Performed by: STUDENT IN AN ORGANIZED HEALTH CARE EDUCATION/TRAINING PROGRAM

## 2024-12-14 PROCEDURE — 6360000002 HC RX W HCPCS: Performed by: FAMILY MEDICINE

## 2024-12-14 RX ADMIN — CLOPIDOGREL BISULFATE 75 MG: 75 TABLET ORAL at 08:47

## 2024-12-14 RX ADMIN — IPRATROPIUM BROMIDE AND ALBUTEROL SULFATE 1 DOSE: 2.5; .5 SOLUTION RESPIRATORY (INHALATION) at 14:49

## 2024-12-14 RX ADMIN — LEVOTHYROXINE SODIUM 150 MCG: 75 TABLET ORAL at 08:47

## 2024-12-14 RX ADMIN — IPRATROPIUM BROMIDE AND ALBUTEROL SULFATE 1 DOSE: 2.5; .5 SOLUTION RESPIRATORY (INHALATION) at 06:38

## 2024-12-14 RX ADMIN — ACETAMINOPHEN 650 MG: 325 TABLET ORAL at 20:31

## 2024-12-14 RX ADMIN — FAMOTIDINE 10 MG: 20 TABLET ORAL at 20:31

## 2024-12-14 RX ADMIN — BUMETANIDE 1 MG: 1 TABLET ORAL at 08:47

## 2024-12-14 RX ADMIN — SODIUM CHLORIDE, PRESERVATIVE FREE 10 ML: 5 INJECTION INTRAVENOUS at 20:30

## 2024-12-14 RX ADMIN — AMLODIPINE BESYLATE 5 MG: 5 TABLET ORAL at 08:47

## 2024-12-14 RX ADMIN — TAMSULOSIN HYDROCHLORIDE 0.4 MG: 0.4 CAPSULE ORAL at 08:47

## 2024-12-14 RX ADMIN — FAMOTIDINE 10 MG: 20 TABLET ORAL at 08:48

## 2024-12-14 RX ADMIN — ENOXAPARIN SODIUM 30 MG: 100 INJECTION SUBCUTANEOUS at 17:40

## 2024-12-14 RX ADMIN — ATORVASTATIN CALCIUM 10 MG: 10 TABLET, FILM COATED ORAL at 20:31

## 2024-12-14 RX ADMIN — IPRATROPIUM BROMIDE AND ALBUTEROL SULFATE 1 DOSE: 2.5; .5 SOLUTION RESPIRATORY (INHALATION) at 18:45

## 2024-12-14 RX ADMIN — SODIUM CHLORIDE, PRESERVATIVE FREE 10 ML: 5 INJECTION INTRAVENOUS at 08:47

## 2024-12-14 RX ADMIN — IPRATROPIUM BROMIDE AND ALBUTEROL SULFATE 1 DOSE: 2.5; .5 SOLUTION RESPIRATORY (INHALATION) at 10:23

## 2024-12-14 ASSESSMENT — PAIN DESCRIPTION - LOCATION: LOCATION: HEAD

## 2024-12-14 ASSESSMENT — PAIN SCALES - GENERAL: PAINLEVEL_OUTOF10: 3

## 2024-12-15 LAB
ANION GAP SERPL CALCULATED.3IONS-SCNC: 7 MMOL/L (ref 7–19)
BUN SERPL-MCNC: 33 MG/DL (ref 8–23)
CALCIUM SERPL-MCNC: 8.4 MG/DL (ref 8.8–10.2)
CHLORIDE SERPL-SCNC: 98 MMOL/L (ref 98–111)
CO2 SERPL-SCNC: 39 MMOL/L (ref 22–29)
CREAT SERPL-MCNC: 1.9 MG/DL (ref 0.7–1.2)
GLUCOSE SERPL-MCNC: 74 MG/DL (ref 70–99)
POTASSIUM SERPL-SCNC: 4.3 MMOL/L (ref 3.5–5)
SODIUM SERPL-SCNC: 144 MMOL/L (ref 136–145)

## 2024-12-15 PROCEDURE — 1200000000 HC SEMI PRIVATE

## 2024-12-15 PROCEDURE — 6370000000 HC RX 637 (ALT 250 FOR IP): Performed by: FAMILY MEDICINE

## 2024-12-15 PROCEDURE — 80048 BASIC METABOLIC PNL TOTAL CA: CPT

## 2024-12-15 PROCEDURE — 2700000000 HC OXYGEN THERAPY PER DAY

## 2024-12-15 PROCEDURE — 97530 THERAPEUTIC ACTIVITIES: CPT

## 2024-12-15 PROCEDURE — 94640 AIRWAY INHALATION TREATMENT: CPT

## 2024-12-15 PROCEDURE — 94760 N-INVAS EAR/PLS OXIMETRY 1: CPT

## 2024-12-15 PROCEDURE — 6370000000 HC RX 637 (ALT 250 FOR IP): Performed by: STUDENT IN AN ORGANIZED HEALTH CARE EDUCATION/TRAINING PROGRAM

## 2024-12-15 PROCEDURE — 97116 GAIT TRAINING THERAPY: CPT

## 2024-12-15 PROCEDURE — 36415 COLL VENOUS BLD VENIPUNCTURE: CPT

## 2024-12-15 PROCEDURE — 2580000003 HC RX 258: Performed by: FAMILY MEDICINE

## 2024-12-15 PROCEDURE — 6360000002 HC RX W HCPCS: Performed by: FAMILY MEDICINE

## 2024-12-15 RX ORDER — FAMOTIDINE 20 MG/1
10 TABLET, FILM COATED ORAL DAILY
Status: DISCONTINUED | OUTPATIENT
Start: 2024-12-16 | End: 2024-12-16 | Stop reason: HOSPADM

## 2024-12-15 RX ADMIN — FAMOTIDINE 10 MG: 20 TABLET ORAL at 08:04

## 2024-12-15 RX ADMIN — ENOXAPARIN SODIUM 30 MG: 100 INJECTION SUBCUTANEOUS at 16:52

## 2024-12-15 RX ADMIN — ATORVASTATIN CALCIUM 10 MG: 10 TABLET, FILM COATED ORAL at 21:15

## 2024-12-15 RX ADMIN — IPRATROPIUM BROMIDE AND ALBUTEROL SULFATE 1 DOSE: 2.5; .5 SOLUTION RESPIRATORY (INHALATION) at 07:03

## 2024-12-15 RX ADMIN — IPRATROPIUM BROMIDE AND ALBUTEROL SULFATE 1 DOSE: 2.5; .5 SOLUTION RESPIRATORY (INHALATION) at 10:44

## 2024-12-15 RX ADMIN — ACETAMINOPHEN 650 MG: 325 TABLET ORAL at 21:15

## 2024-12-15 RX ADMIN — CLOPIDOGREL BISULFATE 75 MG: 75 TABLET ORAL at 08:04

## 2024-12-15 RX ADMIN — AMLODIPINE BESYLATE 5 MG: 5 TABLET ORAL at 08:04

## 2024-12-15 RX ADMIN — SODIUM CHLORIDE, PRESERVATIVE FREE 10 ML: 5 INJECTION INTRAVENOUS at 08:03

## 2024-12-15 RX ADMIN — LEVOTHYROXINE SODIUM 150 MCG: 75 TABLET ORAL at 08:04

## 2024-12-15 RX ADMIN — TAMSULOSIN HYDROCHLORIDE 0.4 MG: 0.4 CAPSULE ORAL at 08:04

## 2024-12-15 RX ADMIN — BUMETANIDE 1 MG: 1 TABLET ORAL at 08:04

## 2024-12-15 RX ADMIN — SODIUM CHLORIDE, PRESERVATIVE FREE 10 ML: 5 INJECTION INTRAVENOUS at 21:15

## 2024-12-15 RX ADMIN — IPRATROPIUM BROMIDE AND ALBUTEROL SULFATE 1 DOSE: 2.5; .5 SOLUTION RESPIRATORY (INHALATION) at 14:39

## 2024-12-15 RX ADMIN — IPRATROPIUM BROMIDE AND ALBUTEROL SULFATE 1 DOSE: 2.5; .5 SOLUTION RESPIRATORY (INHALATION) at 19:57

## 2024-12-15 ASSESSMENT — PAIN DESCRIPTION - LOCATION: LOCATION: HEAD

## 2024-12-15 ASSESSMENT — PAIN SCALES - GENERAL: PAINLEVEL_OUTOF10: 3

## 2024-12-15 NOTE — PLAN OF CARE
Problem: Chronic Conditions and Co-morbidities  Goal: Patient's chronic conditions and co-morbidity symptoms are monitored and maintained or improved  12/15/2024 0925 by Selene Wells RN  Outcome: Progressing  12/14/2024 2100 by Marianne Natarajan RN  Outcome: Progressing     Problem: Safety - Adult  Goal: Free from fall injury  12/15/2024 0925 by Selene Wells RN  Outcome: Progressing  12/14/2024 2100 by Marianne Natarajan RN  Outcome: Progressing     Problem: Skin/Tissue Integrity  Goal: Absence of new skin breakdown  Description: 1.  Monitor for areas of redness and/or skin breakdown  2.  Assess vascular access sites hourly  3.  Every 4-6 hours minimum:  Change oxygen saturation probe site  4.  Every 4-6 hours:  If on nasal continuous positive airway pressure, respiratory therapy assess nares and determine need for appliance change or resting period.  12/15/2024 0925 by Selene Wells RN  Outcome: Progressing  12/14/2024 2100 by Marianne Natarajan RN  Outcome: Progressing     Problem: ABCDS Injury Assessment  Goal: Absence of physical injury  12/15/2024 0925 by Selene Wells RN  Outcome: Progressing  12/14/2024 2100 by Marianne Natarajan RN  Outcome: Progressing

## 2024-12-16 VITALS
WEIGHT: 165 LBS | DIASTOLIC BLOOD PRESSURE: 80 MMHG | OXYGEN SATURATION: 93 % | SYSTOLIC BLOOD PRESSURE: 165 MMHG | HEIGHT: 69 IN | BODY MASS INDEX: 24.44 KG/M2 | HEART RATE: 75 BPM | TEMPERATURE: 97.9 F | RESPIRATION RATE: 20 BRPM

## 2024-12-16 LAB
ALBUMIN SERPL-MCNC: 3 G/DL (ref 3.5–5.2)
ALP SERPL-CCNC: 78 U/L (ref 40–129)
ALT SERPL-CCNC: 9 U/L (ref 5–41)
ANION GAP SERPL CALCULATED.3IONS-SCNC: 8 MMOL/L (ref 7–19)
AST SERPL-CCNC: 28 U/L (ref 5–40)
BASOPHILS # BLD: 0.1 K/UL (ref 0–0.2)
BASOPHILS NFR BLD: 0.8 % (ref 0–1)
BILIRUB SERPL-MCNC: 0.3 MG/DL (ref 0.2–1.2)
BUN SERPL-MCNC: 37 MG/DL (ref 8–23)
CALCIUM SERPL-MCNC: 8.6 MG/DL (ref 8.8–10.2)
CHLORIDE SERPL-SCNC: 96 MMOL/L (ref 98–111)
CO2 SERPL-SCNC: 40 MMOL/L (ref 22–29)
CREAT SERPL-MCNC: 2 MG/DL (ref 0.7–1.2)
EOSINOPHIL # BLD: 0.5 K/UL (ref 0–0.6)
EOSINOPHIL NFR BLD: 6.2 % (ref 0–5)
ERYTHROCYTE [DISTWIDTH] IN BLOOD BY AUTOMATED COUNT: 17.6 % (ref 11.5–14.5)
GLUCOSE SERPL-MCNC: 81 MG/DL (ref 70–99)
HCT VFR BLD AUTO: 33.3 % (ref 42–52)
HGB BLD-MCNC: 10.6 G/DL (ref 14–18)
IMM GRANULOCYTES # BLD: 0.1 K/UL
LYMPHOCYTES # BLD: 2.4 K/UL (ref 1.1–4.5)
LYMPHOCYTES NFR BLD: 33 % (ref 20–40)
MCH RBC QN AUTO: 33.9 PG (ref 27–31)
MCHC RBC AUTO-ENTMCNC: 31.8 G/DL (ref 33–37)
MCV RBC AUTO: 106.4 FL (ref 80–94)
MONOCYTES # BLD: 0.9 K/UL (ref 0–0.9)
MONOCYTES NFR BLD: 12.6 % (ref 0–10)
NEUTROPHILS # BLD: 3.4 K/UL (ref 1.5–7.5)
NEUTS SEG NFR BLD: 46.7 % (ref 50–65)
PLATELET # BLD AUTO: 93 K/UL (ref 130–400)
PMV BLD AUTO: 10.8 FL (ref 9.4–12.4)
POTASSIUM SERPL-SCNC: 4.2 MMOL/L (ref 3.5–5)
PROT SERPL-MCNC: 6.3 G/DL (ref 6.4–8.3)
RBC # BLD AUTO: 3.13 M/UL (ref 4.7–6.1)
SODIUM SERPL-SCNC: 144 MMOL/L (ref 136–145)
WBC # BLD AUTO: 7.2 K/UL (ref 4.8–10.8)

## 2024-12-16 PROCEDURE — 6370000000 HC RX 637 (ALT 250 FOR IP): Performed by: STUDENT IN AN ORGANIZED HEALTH CARE EDUCATION/TRAINING PROGRAM

## 2024-12-16 PROCEDURE — 94640 AIRWAY INHALATION TREATMENT: CPT

## 2024-12-16 PROCEDURE — 6370000000 HC RX 637 (ALT 250 FOR IP): Performed by: FAMILY MEDICINE

## 2024-12-16 PROCEDURE — 36415 COLL VENOUS BLD VENIPUNCTURE: CPT

## 2024-12-16 PROCEDURE — 85025 COMPLETE CBC W/AUTO DIFF WBC: CPT

## 2024-12-16 PROCEDURE — 2580000003 HC RX 258: Performed by: FAMILY MEDICINE

## 2024-12-16 PROCEDURE — 2700000000 HC OXYGEN THERAPY PER DAY

## 2024-12-16 PROCEDURE — 94760 N-INVAS EAR/PLS OXIMETRY 1: CPT

## 2024-12-16 PROCEDURE — 80053 COMPREHEN METABOLIC PANEL: CPT

## 2024-12-16 RX ORDER — ONDANSETRON 4 MG/1
4 TABLET, ORALLY DISINTEGRATING ORAL EVERY 8 HOURS PRN
Qty: 30 TABLET | Refills: 0 | Status: SHIPPED | OUTPATIENT
Start: 2024-12-16

## 2024-12-16 RX ORDER — CLONIDINE HYDROCHLORIDE 0.1 MG/1
0.1 TABLET ORAL EVERY 4 HOURS PRN
Qty: 60 TABLET | Refills: 3 | Status: SHIPPED | OUTPATIENT
Start: 2024-12-16

## 2024-12-16 RX ORDER — FAMOTIDINE 10 MG
10 TABLET ORAL DAILY
Qty: 60 TABLET | Refills: 3 | Status: SHIPPED | OUTPATIENT
Start: 2024-12-16

## 2024-12-16 RX ORDER — IPRATROPIUM BROMIDE AND ALBUTEROL SULFATE 2.5; .5 MG/3ML; MG/3ML
3 SOLUTION RESPIRATORY (INHALATION)
Qty: 360 ML | Refills: 1 | Status: SHIPPED | OUTPATIENT
Start: 2024-12-16

## 2024-12-16 RX ORDER — BUMETANIDE 1 MG/1
1 TABLET ORAL DAILY
Qty: 30 TABLET | Refills: 3 | Status: SHIPPED | OUTPATIENT
Start: 2024-12-16

## 2024-12-16 RX ORDER — TAMSULOSIN HYDROCHLORIDE 0.4 MG/1
0.4 CAPSULE ORAL DAILY
Qty: 30 CAPSULE | Refills: 3 | Status: SHIPPED | OUTPATIENT
Start: 2024-12-16

## 2024-12-16 RX ADMIN — SODIUM CHLORIDE, PRESERVATIVE FREE 10 ML: 5 INJECTION INTRAVENOUS at 08:32

## 2024-12-16 RX ADMIN — IPRATROPIUM BROMIDE AND ALBUTEROL SULFATE 1 DOSE: 2.5; .5 SOLUTION RESPIRATORY (INHALATION) at 06:05

## 2024-12-16 RX ADMIN — FAMOTIDINE 10 MG: 20 TABLET ORAL at 08:31

## 2024-12-16 RX ADMIN — BUMETANIDE 1 MG: 1 TABLET ORAL at 08:31

## 2024-12-16 RX ADMIN — LEVOTHYROXINE SODIUM 150 MCG: 75 TABLET ORAL at 08:32

## 2024-12-16 RX ADMIN — AMLODIPINE BESYLATE 5 MG: 5 TABLET ORAL at 08:31

## 2024-12-16 RX ADMIN — IPRATROPIUM BROMIDE AND ALBUTEROL SULFATE 1 DOSE: 2.5; .5 SOLUTION RESPIRATORY (INHALATION) at 10:14

## 2024-12-16 RX ADMIN — CLOPIDOGREL BISULFATE 75 MG: 75 TABLET ORAL at 08:31

## 2024-12-16 RX ADMIN — TAMSULOSIN HYDROCHLORIDE 0.4 MG: 0.4 CAPSULE ORAL at 08:32

## 2024-12-16 NOTE — PLAN OF CARE
Problem: Chronic Conditions and Co-morbidities  Goal: Patient's chronic conditions and co-morbidity symptoms are monitored and maintained or improved  12/15/2024 2306 by Marianne Natarajan RN  Outcome: Progressing  12/15/2024 0925 by Selene Wells RN  Outcome: Progressing     Problem: Safety - Adult  Goal: Free from fall injury  12/15/2024 2306 by Marianne Natarajan, RN  Outcome: Progressing  12/15/2024 0925 by Selene Wells RN  Outcome: Progressing     Problem: Skin/Tissue Integrity  Goal: Absence of new skin breakdown  Description: 1.  Monitor for areas of redness and/or skin breakdown  2.  Assess vascular access sites hourly  3.  Every 4-6 hours minimum:  Change oxygen saturation probe site  4.  Every 4-6 hours:  If on nasal continuous positive airway pressure, respiratory therapy assess nares and determine need for appliance change or resting period.  12/15/2024 2306 by Marianne Natarajan, RN  Outcome: Progressing  12/15/2024 0925 by Selene Wells RN  Outcome: Progressing     Problem: ABCDS Injury Assessment  Goal: Absence of physical injury  12/15/2024 2306 by Marianne Natarajan RN  Outcome: Progressing  12/15/2024 0925 by Selene Wells RN  Outcome: Progressing

## 2024-12-16 NOTE — DISCHARGE SUMMARY
Hospital Discharge Summary    Atul Peoples  :  1940  MRN:  [id number]    Admit date:  2024  Discharge date: 2024     Admitting Physician:    Isrrael Horner MD    Discharge Diagnoses:    Principal Problem:    Acute on chronic respiratory failure  Active Problems:    Palliative care patient    Acute kidney injury (nontraumatic) ([facility])    ASHD (arteriosclerotic heart disease)    Hypertension    Hyperlipidemia    COPD (chronic obstructive pulmonary disease) ([facility])    S/P CABG x 4    Decubitus ulcer of coccygeal region, stage 3 ([facility])    CKD stage 3a, GFR 45-59 ml/min ([facility])  Resolved Problems:    * No resolved hospital problems. *      Hospital Course:   84-year-old gentleman who has been living alone and actually driving his car up until a week prior to presentation where he was profoundly weak and not acting himself.  A neighbor recently became his power of  has been helpful in coordinating his medical care and medications.  He presented to the emergency room via EMS after not being himself at home.  He was hypoxic and bradycardic.  He was found to be profoundly hypothyroid and thought to be a contributing cause to his change.  He was given IV thyroid in the emergency room and the patient was admitted for the above noted medical/surgical issues. Please see daily progress note for further details concerning their stay.  The patient required help with activities of daily living and mobility.  His creatinine was profoundly elevated as well and resolved back to baseline with IV fluids.  The patient improved throughout their stay and reached maximum medical improvement on the day of discharge. The patient/family agree with the treatment plan as outlined above.  Plan for rehab at [facility] and then returning home.  All questions concerning their stay were answered prior to discharge. They understand the importance of follow up concerning any abnormal test results.     Discharge Medications:

## 2024-12-16 NOTE — PLAN OF CARE
Problem: Chronic Conditions and Co-morbidities  Goal: Patient's chronic conditions and co-morbidity symptoms are monitored and maintained or improved  12/16/2024 0900 by Malia Matos RN  Outcome: Progressing  12/15/2024 2306 by Marianne Natarajan RN  Outcome: Progressing     Problem: Safety - Adult  Goal: Free from fall injury  12/16/2024 0900 by aMlia Matos RN  Outcome: Progressing  12/15/2024 2306 by Marianne Natarajan RN  Outcome: Progressing     Problem: Skin/Tissue Integrity  Goal: Absence of new skin breakdown  Description: 1.  Monitor for areas of redness and/or skin breakdown  2.  Assess vascular access sites hourly  3.  Every 4-6 hours minimum:  Change oxygen saturation probe site  4.  Every 4-6 hours:  If on nasal continuous positive airway pressure, respiratory therapy assess nares and determine need for appliance change or resting period.  12/16/2024 0900 by Malia Matos RN  Outcome: Progressing  12/15/2024 2306 by Marianne Natarajan RN  Outcome: Progressing     Problem: ABCDS Injury Assessment  Goal: Absence of physical injury  12/16/2024 0900 by Malia Matos RN  Outcome: Progressing  12/15/2024 2306 by Marianne Natarajan RN  Outcome: Progressing

## 2024-12-16 NOTE — PROGRESS NOTES
DIS Nurse Note  SUBJECTIVE: Patient assessed secondary to RN/provider concern - hypothermic, lethargy.      Deterioration Index Score:  Predictive Model Details          51 (Warning)  Factor Value    Calculated 12/10/2024 11:19 26% Age 84 years old    Deterioration Index Model 22% Supplemental oxygen Nasal cannula     18% Neurological exam X     8% Cardiac rhythm Sinus pedro luis     6% Systolic 159     6% Pulse oximetry 90 %     6% Potassium 4.6 mmol/L     4% Pulse 42     2% Hematocrit abnormal (37.2 %)     2% BUN abnormal (37 mg/dL)     1% Sodium 139 mmol/L     0% Temperature 93.4 °F (34.1 °C)     0% Respiratory rate 16     0% WBC count 7.1 K/uL        Vital Signs:  Vitals:    12/10/24 0726 12/10/24 0734 12/10/24 0930 12/10/24 1115   BP: (!) 159/65      Pulse: (!) 42      Resp: 16      Temp: (!) 95.2 °F (35.1 °C) (!) 95 °F (35 °C) (!) 94.7 °F (34.8 °C) (!) 93.4 °F (34.1 °C)   TempSrc: Temporal Rectal Rectal Rectal   SpO2: 90%      Weight:            Provider Notified: Reviewed patient status  & DIS score with Provider Evens, Primary nurse called, awaiting orders.    ASSESSMENT:   Patient lethargic, oriented, rectal temperature obtained before and during Елена Hugger treatment. Temperature has dropped since application of warmer, now 93.4 rectal. Patient straight cathed, 600ml out. Nurse awaiting callback at this time.     Plan of Care: care ongoing, awaiting new orders.     Electronically signed by Anna Mcbride RN   
   12/10/24 1338   Encounter Summary   Encounter Overview/Reason Initial Encounter;Spiritual/Emotional Needs   Encounter Code  Assessment by  services   Service Provided For Patient not available  (I spoke with pt's POA, and healthcare surrogate ROSEMARIE Cassidy.)   Referral/Consult From Palliative Care   Support System Friends/neighbors;Family members   Complexity of Encounter Moderate   Begin Time 1305   End Time  1335   Total Time Calculated 30 min   Spiritual/Emotional needs   Type Spiritual Support   Palliative Care   Type Palliative Care, Initial/Spiritual Assessment   Advance Care Planning   Type Care Preferences Addressed   Assessment/Intervention/Outcome   Assessment Concerns with suffering;Compromised coping   Intervention Active listening;Nurtured Hope;Sustaining Presence/Ministry of presence   Outcome Acceptance;Expressed Gratitude;Receptive   Plan and Referrals   Plan/Referrals Continue to visit, (comment);Continue Support (comment)   Does the patient have a [facility] PCP? Yes    to follow up after discharge? No         Palliative Care initiation: this  attempted to meet with pt but he was asleep. Called and spoke with pt's POA and Healthcare surrogate ROSEMARIE Cassidy. He says pt was having trouble breathing prior to coming to the hospital but also had AMS. He says pt calls himself a Zoroastrianism and his darío is important to him.         Advance Directives: Pt has a LW and his primary decision maker is ROSEMARIE Cassidy. Pt is DNR and does not want CPR or Ventilator. SEE ACP NOTE.         Pain/other symptoms: Pt is not complaining of pain. Pt's nurse Juan says the pt's temperature is low and they are checking it regularly.       Spiritual: Pr previously attended [facility].        Pt/family discussion r/t goals: Pt lives at home. His friend and healthcare decision maker checks on him several times a day. Pt ambulates with a walker and can care for himself 
   12/12/24 1000   Subjective   Subjective Now where am I ?  (Patient in recliner TR by NSG > chair via SS.)   Cognition   Cognition Comment Patient very [place] difficult to asses cognition.   Orientation   Overall Orientation Status X   Vitals   O2 Device High flow nasal cannula   Transfer Training   Transfer Training Yes   Sit to Stand Moderate assistance;Minimum assistance  (Posterior lean with standing needs VC & TC to correct.  Patient stands about 1 minute to correct before gait.)   Stand to Sit Minimum assistance   Gait Training   Gait Training Yes   Gait   Overall Level of Assistance Minimum assistance   Distance (ft) 10 Feet   Assistive Device Walker, rolling   PT Exercises   A/AROM Exercises BL LE EX sitting X 20 Reps .  Marched in place holding to RW X  1 minute.   Patient Education   Education Given To Patient   Education Provided Role of Therapy;Plan of Care;Home Exercise Program;Fall Prevention Strategies;Mobility Training;Transfer Training   Education Provided Comments Use of call light, staff Assist   Education Method Demonstration;Verbal   Barriers to Learning Hearing   Education Outcome Continued education needed   Other Specialty Interventions   Other Treatments/Modalities In recliner alarm attached call light all needs in reach.   Assessment   Activity Tolerance Patient tolerated treatment well   PT Plan of Care   Thursday X       Electronically signed by Riky Ibarra PTA on 12/12/2024 at 10:10 AM   
   12/12/24 1200   Subjective   Subjective Patient up in chair wants to stay up for lunch   Agrees to walk & return to chair.  Melissa Dorado. OT present.   Cognition   Cognition Comment Patient very [place] difficult to asses cognition.   Orientation   Overall Orientation Status X   Vitals   O2 Device High flow nasal cannula   Comment Atempt to wean O2 down.  Gait on 6 L NC,  SATS dropped in 80's Back to 7 L NC   Transfer Training   Sit to Stand Moderate assistance;Minimum assistance   Stand to Sit Minimum assistance   Gait Training   Gait Training Yes   Gait   Overall Level of Assistance Minimum assistance;Assist X2  (For safety,)   Distance (ft) 40 Feet   Assistive Device Walker, rolling   Interventions   (Cues to stay closer to RW.)   Patient Education   Education Given To Patient   Education Provided Role of Therapy;Plan of Care;Home Exercise Program;Fall Prevention Strategies;Mobility Training;Transfer Training   Education Provided Comments Use of call light, staff Assist   Education Method Demonstration;Verbal   Barriers to Learning Hearing   Education Outcome Continued education needed   Other Specialty Interventions   Other Treatments/Modalities In recliner alarm attached call light all needs in reach.  Lunch tray set up.   Assessment   Activity Tolerance Patient tolerated treatment well   PT Plan of Care   Thursday X       Electronically signed by Riky Ibarra PTA on 12/12/2024 at 12:31 PM     
   12/13/24 1524   Encounter Summary   Encounter Overview/Reason Spiritual/Emotional Needs   Encounter Code  Assessment by  services   Service Provided For Patient   Referral/Consult From Palliative Care   Support System Friends/neighbors   Complexity of Encounter Moderate   Begin Time 1455   End Time  1510   Total Time Calculated 15 min   Spiritual/Emotional needs   Type Spiritual Support   Palliative Care   Type Palliative Care, Follow-up   Assessment/Intervention/Outcome   Assessment Compromised coping   Intervention Active listening;Prayer (assurance of)/[place];Sustaining Presence/Ministry of presence   Outcome Acceptance;Expressed Gratitude;Receptive   Plan and Referrals   Plan/Referrals Continue to visit, (comment);Continue Support (comment)   Does the patient have a [facility] PCP? Yes    to follow up after discharge? No         This  visited with pt to follow up with palliative care and provide spiritual care. Pt says he is some better but going nuts saying, \"I thought I was getting out of here.\" Pt says he previously attended a [facility] [employer] but attends [facility] now. He says his Sikh was closed down. This  provided spiritual care with a listening ear, sustaining presence, and prayer. Pt expressed gratitude for spiritual care.       Spiritual Health History and Assessment/Progress Note  [facility]    (P) Spiritual/Emotional Needs,  ,  ,      Name: Atul Peoples MRN: [id number]    Age: 84 y.o.     Sex: male   Language: English   Islam: Presbyterian   Acute on chronic respiratory failure     Date: 12/13/2024            Total Time Calculated: (P) 15 min              Spiritual Assessment continued in [facility] 4 ONCOLOGY UNIT        Referral/Consult From: (P) Palliative Care   Encounter Overview/Reason: (P) Spiritual/Emotional Needs  Service Provided For: (P) Patient    Jenifer, Belief, Meaning:   Patient identifies as spiritual and is connected with a 
   Pharmacy Renal Adjustment    Atul Peoples is a 84 y.o. male. Pharmacy has renally adjusted medications per protocol.    Recent Labs     12/15/24  0246   BUN 33*       Recent Labs     12/15/24  0246   CREATININE 1.9*       Estimated Creatinine Clearance: 28 mL/min (A) (based on SCr of 1.9 mg/dL (H)).    Height:   Ht Readings from Last 1 Encounters:   08/23/24 1.753 m (5' 9.02\")     Weight:  Wt Readings from Last 1 Encounters:   12/09/24 74.8 kg (165 lb)       Plan: Adjust the following medications based on renal function:           Famotidine to 10 mg PO daily    Electronically signed by OREN CARVAJAL RPH on 12/15/2024 at 3:27 PM     
  Palliative Care Progress Note  12/12/2024 1:45 PM    Patient:  Atul Peoples  YOB: 1940  Primary Care Physician: Isrrael Horner MD  Advance Directive: DNR  Admit Date: 12/9/2024       Hospital Day: 3  Portions of this note have been copied forward, however, changed to reflect the most current clinical status of this patient.    CHIEF COMPLAINT/REASON FOR CONSULTATION Goals of care, family support, Code status, symptom management     SUBJECTIVE:  Mr. Peoples is more alert and interactive today. Tells me he isn't eating much. Would be willing to try some chocolate protein shakes.    HPI:  The patient is a 84 y.o. male with PMH CAD s/p CABG, CKD IIIb, AAA, COPD dependent on 3L NC O2, GERD, HTN, HLD, hypothyroidism w/ hx myxedema coma, R chronic patellar fracture who presented to [facility] ED on 12/09/2024 w/ concern for dyspnea and hypoxia. Was noted to have oxygen saturation in the 60's on EMS arrival and was not wearing his oxygen.  Chest x-ray reported interstitial disease bilaterally in the mid and lower lung zones may indicate pulmonary edema, cardiomegaly and atherosclerosis, previous median sternotomy, possible small bilateral pleural effusions. He's been admitted to his PCP service.  TSH 67.34, free T40.10.  He received 100 mcg IV levothyroxine and has been continued on 150 mcg oral levothyroxine.  He has been hypothermic today with some urinary retention.  Palliative care has been consulted for goals of care.     Review of Systems:   14 point review of systems is negative except as specifically addressed above.    Objective:   VITALS:  /70   Pulse 66   Temp 97 °F (36.1 °C) (Temporal)   Resp 15   Wt 74.8 kg (165 lb)   SpO2 91%   BMI 24.35 kg/m²   24HR INTAKE/OUTPUT:    Intake/Output Summary (Last 24 hours) at 12/12/2024 1345  Last data filed at 12/12/2024 0825  Gross per 24 hour   Intake --   Output 1425 ml   Net -1425 ml       General appearance: 85 yo male, no acute distress, laying 
  Physician Progress Note      PATIENT:               MACK GUERRERO  CSN #:                  [id number]  :                       1940  ADMIT DATE:       2024 2:19 PM  DISCH DATE:  RESPONDING  PROVIDER #:        Isrrael HAY MD          QUERY TEXT:    Pt admitted with acute on chronic respiratory failure and has systolic CHF   documented. If possible, please document in progress notes and discharge   summary further specificity regarding the type and acuity of CHF:    The medical record reflects the following:  Risk Factors: HTN DM CAD  Clinical Indicators: Presented SOB. CXR , 1.Interstitial disease   bilaterally in the mid and lower lung zones may indicate pulmonary edema.   2.Cardiomegaly and atherosclerosis. 3.Previous median sternotomy. 4.Possible   small bilateral pleural effusions. CXR , 1.Stable cardiomegaly and small   bilateral pleural effusions, with interval improvement in the pulmonary venous   congestion and pulmonary edema. NT Pro-BNP >35,000.  Treatment: Lasix 20 mg IV x 1, Bumex 1 mg PO daily.  Options provided:  -- Acute on Chronic Systolic CHF/HFrEF  -- Acute on Chronic Diastolic CHF/HFpEF  -- Acute on Chronic Systolic and Diastolic CHF  -- Chronic Systolic CHF/HFrEF  -- Chronic Diastolic CHF/HFpEF  -- Chronic Systolic and Diastolic CHF  -- Other - I will add my own diagnosis  -- Disagree - Not applicable / Not valid  -- Disagree - Clinically unable to determine / Unknown  -- Refer to Clinical Documentation Reviewer    PROVIDER RESPONSE TEXT:    This patient is in acute on chronic diastolic CHF/HFpEF.    Query created by: Samantha Carmichael on 2024 11:55 AM      QUERY TEXT:    Pt admitted with acute on chronic respiratory failure. Pt noted to have   creatinine of 2.8 @ admission and history of CKD. If possible, please document   in the progress notes and discharge summary if you are evaluating and/or   treating any of the following:    The medical record reflects the 
0900 PULSE OX RESULTS ON 5 L/M NC 89% SAT INCREASED TO 6 L/M  
4 Eyes Skin Assessment     NAME:  Atul Peoples  YOB: 1940  MEDICAL RECORD NUMBER:  [id number]    The patient is being assessed for  Admission    I agree that at least one RN has performed a thorough Head to Toe Skin Assessment on the patient. ALL assessment sites listed below have been assessed.      Areas assessed by both nurses:    Head, Face, Ears, Shoulders, Back, Chest, Arms, Elbows, Hands, Sacrum. Buttock, Coccyx, Ischium, and Legs. Feet and Heels        Does the Patient have a Wound? Yes wound(s) were present on assessment. LDA wound assessment was Initiated and completed by RN       Juan Diego Prevention initiated by RN: Yes  Wound Care Orders initiated by RN: Yes    Pressure Injury (Stage 3,4, Unstageable, DTI, NWPT, and Complex wounds) if present, place Wound referral order by RN under : Yes    New Ostomies, if present place, Ostomy referral order under : No     Nurse 1 eSignature: Electronically signed by Sussy Horton RN on 12/9/24 at 6:46 PM CST    **SHARE this note so that the co-signing nurse can place an eSignature**    Nurse 2 eSignature: Electronically signed by Cheri Colón RN on 12/9/24 at 6:47 PM CST    
Comprehensive Nutrition Assessment    Type and Reason for Visit:  Initial, LOS, Positive nutrition screen, Wound    Nutrition Recommendations/Plan:   Continue POC.     Malnutrition Assessment:  Malnutrition Status:  At risk for malnutrition (12/16/24 0948)    Context:  Acute Illness     Findings of the 6 clinical characteristics of malnutrition:  Energy Intake:  Unable to assess  Weight Loss:  No weight loss     Body Fat Loss:  Unable to assess     Muscle Mass Loss:  Unable to assess    Fluid Accumulation:  No fluid accumulation Extremities   Strength:  Not Performed    Nutrition Assessment:    LOS day 7. +NS for wound: st 2 PI to coccyx. Pt is ordered a Cardiac diet w/Ensure Plus ONS BID. No PO intake documented. Wt hx shows no significant wt loss- noted current wt is estimated. Aware of d/c summary and order for d/c today.    Nutrition Related Findings:    BM 12/14. Trace BLE edema. BUN 37, creat 2.0, GFR 32. Wound Type: Stage II (coccyx)       Current Nutrition Intake & Therapies:    Average Meal Intake: Unable to assess  Average Supplements Intake: Unable to assess  ADULT DIET; Regular; Low Fat/Low Chol/High Fiber/YEIMY  ADULT ORAL NUTRITION SUPPLEMENT; Lunch, Dinner; Standard High Calorie/High Protein Oral Supplement    Anthropometric Measures:  Height: 175.3 cm (5' 9.02\")  Ideal Body Weight (IBW): 160 lbs (73 kg)    Current Body Weight: 74.8 kg (164 lb 14.5 oz), 103.1 % IBW. Weight Source: Other (estimated)  Current BMI (kg/m2): 24.3  Usual Body Weight: 71.8 kg (158 lb 4.6 oz) (8/26/24)  % Weight Change (Calculated): 4.2  BMI Categories: Normal Weight (BMI 22.0 to 24.9) age over 65    Estimated Daily Nutrient Needs:  Energy Requirements Based On: Kcal/kg  Weight Used for Energy Requirements: Current  Energy (kcal/day): 8240-2488 (25-30 kcal/kg)  Weight Used for Protein Requirements: Current  Protein (g/day): 105-150 (1.4-2 g/kg)  Method Used for Fluid Requirements: 1 ml/kcal  Fluid (ml/day): 
Occupational Therapy Treatment  Date: 2024   Patient Name: Atul Peoples  MRN: [id number]     : 1940    Date of Service: 2024    Discharge Recommendations:  Patient would benefit from continued therapy after discharge     Assessment   Assessment: Pt is awake and alert; he follows directions well when able to hear and fully participates in all requested therapy tasks.  Decreased posterior lean with sit to stand this date.  He requires min assist of 2 for safety for functional mobility tasks.  SP02 drops with exertion and requires extended time to return  Treatment Diagnosis: Respiratory Failure  Prognosis: Good  Activity Tolerance  Activity Tolerance: Patient Tolerated treatment well         Patient Diagnosis(es): The primary encounter diagnosis was Acute respiratory failure with hypoxemia. Diagnoses of Systolic congestive heart failure, unspecified HF chronicity ([facility]), Bradycardia, Vascular dementia without behavioral disturbance, psychotic disturbance, mood disturbance, or anxiety, unspecified dementia severity ([facility]), and BRIAN (acute kidney injury) ([facility]) were also pertinent to this visit.    Past Medical History:   Past Medical History:   Diagnosis Date    AAA (abdominal aortic aneurysm) ([facility])     ASHD (arteriosclerotic heart disease)     S/P coronary intervention followed by CBG    CAD (coronary artery disease)     Severe triple-vessel    CAD (coronary artery disease) 2015    COPD (chronic obstructive pulmonary disease) ([facility])     With tobacco abuse    GERD (gastroesophageal reflux disease)     HH (hiatus hernia)     History of cerebrovascular disease     Hyperlipidemia     PCP, Dr. REZA Horner manages cholesterol.    Hypertension     Obesity     Osteoarthritis     Palliative care patient 2022    Renal insufficiency     S/P CABG x 3     S/P CABG x 4 2015 by Dr. June    S/P TKR (total knee replacement)     RIGHT    Thyroid disease      Past Surgical History:   Past 
Physical Therapy  Name: Atul Peoples  MRN:  [id number]  Date of service:  12/15/2024       12/15/24 1621   Restrictions/Precautions   Restrictions/Precautions Fall Risk   Subjective   Subjective Pt agreeable to therapy.   Pain Assessment   Pain Assessment None - Denies Pain  (c/o of headache after amb but initially had no pain)   Oxygen Therapy   O2 Device Nasal cannula   O2 Flow Rate (L/min) 4 L/min   Transfers   Sit to Stand Moderate Assistance   Stand to Sit Minimal Assistance   Comment required 2-3 attempts to fully stand   Ambulation   Surface Level tile   Device Rolling Walker   Other Apparatus O2   Assistance Minimal assistance   Quality of Gait unsteady, wide ULISES, v/c's to step closer to RW   Gait Deviations Slow Marian;Decreased step length;Decreased step height   Distance 40'   Patient Goals    Patient Goals  go home   Short Term Goals   Time Frame for Short Term Goals 2 wks   Short Term Goal 1 supine to sit indep   Short Term Goal 2 sit to stand indep   Short Term Goal 3 amb. 30' with RW SBA   Short Term Goal 4 bed to chair SBA   Conditions Requiring Skilled Therapeutic Intervention   Body Structures, Functions, Activity Limitations Requiring Skilled Therapeutic Intervention Decreased functional mobility ;Decreased ADL status;Decreased cognition;Decreased safe awareness;Decreased strength;Decreased balance;Decreased endurance;Increased pain   Assessment Pt amb short distance with RW and O2, improved stability over distance. Maintained wide ULISES and keeps RW too far ahead even with cueing. Assisted up to chair with all needs in reach.   Activity Tolerance   Activity Tolerance Patient tolerated treatment well   PT Plan of Care   Sunday X   Safety Devices   Type of Devices Call light within reach;Chair alarm in place;Left in chair  (family member present)         Electronically signed by Yoselyn Blanco PTA on 12/15/2024 at 4:28 PM    
Progress Note  Date:12/15/2024       Room:0412/412-02  Patient Name:Atul Peoples     YOB: 1940     Age:84 y.o.    More alert today.  Reviewed social service note-plan on skilled nursing facility, pre-CERT good through .  Will keep through the weekend to make sure his renal function returns to normal and hopefully can decrease his oxygen requirements.      Subjective    Subjective:  Symptoms:  Stable.  He reports shortness of breath and weakness.    Diet:  Poor intake.    Activity level: Impaired due to weakness.    Pain:  He reports no pain.       Review of Systems   Respiratory:  Positive for shortness of breath.    Neurological:  Positive for weakness.     Objective         Vitals Last 24 Hours:  TEMPERATURE:  Temp  Av.4 °F (36.3 °C)  Min: 96.8 °F (36 °C)  Max: 98.2 °F (36.8 °C)  RESPIRATIONS RANGE: Resp  Av.8  Min: 16  Max: 20  PULSE OXIMETRY RANGE: SpO2  Av.4 %  Min: 90 %  Max: 95 %  PULSE RANGE: Pulse  Av.8  Min: 59  Max: 70  BLOOD PRESSURE RANGE: Systolic (24hrs), Av , Min:143 , Max:166   ; Diastolic (24hrs), Av, Min:59, Max:77    I/O (24Hr):  No intake or output data in the 24 hours ending 12/15/24 0817      Objective:  General Appearance:  Comfortable, ill-appearing, in no acute distress and not in pain.    Vital signs: (most recent): Blood pressure (!) 148/64, pulse 65, temperature 97.6 °F (36.4 °C), temperature source Temporal, resp. rate 20, weight 74.8 kg (165 lb), SpO2 90%.  Vital signs are normal.    Output: Producing urine and minimal stool output.    HEENT: Normal HEENT exam.    Lungs:  Increased effort.  There are rales and decreased breath sounds.    Heart: Normal rate.  Regular rhythm.    Abdomen: Bowel sounds are normal.     Extremities: Decreased range of motion.    Neurological: (Does not appear as alert today).    Skin:  Warm.      Labs/Imaging/Diagnostics    Labs:  CBC:  No results for input(s): \"WBC\", \"RBC\", \"HGB\", \"HCT\", \"MCV\", \"RDW\", \"PLT\" 
Progress Note  Date:2024       Room:0412/412-02  Patient Name:Atul Peoples     YOB: 1940     Age:84 y.o.    More alert today.  Reviewed social service note-plan on skilled nursing facility, pre-CERT good through .  Will keep through the weekend to make sure his renal function returns to normal and hopefully can decrease his oxygen requirements.      Subjective    Subjective:  Symptoms:  Stable.  He reports shortness of breath and weakness.    Diet:  Poor intake.    Activity level: Impaired due to weakness.    Pain:  He reports no pain.       Review of Systems   Respiratory:  Positive for shortness of breath.    Neurological:  Positive for weakness.     Objective         Vitals Last 24 Hours:  TEMPERATURE:  Temp  Av.9 °F (36.1 °C)  Min: 96.7 °F (35.9 °C)  Max: 97.1 °F (36.2 °C)  RESPIRATIONS RANGE: Resp  Av  Min: 16  Max: 20  PULSE OXIMETRY RANGE: SpO2  Av.4 %  Min: 86 %  Max: 95 %  PULSE RANGE: Pulse  Av.7  Min: 55  Max: 93  BLOOD PRESSURE RANGE: Systolic (24hrs), Av , Min:116 , Max:140   ; Diastolic (24hrs), Av, Min:66, Max:80    I/O (24Hr):  No intake or output data in the 24 hours ending 24 0809    Objective:  General Appearance:  Comfortable, ill-appearing, in no acute distress and not in pain.    Vital signs: (most recent): Blood pressure 124/68, pulse 57, temperature 96.9 °F (36.1 °C), temperature source Temporal, resp. rate 20, weight 74.8 kg (165 lb), SpO2 94%.  Vital signs are normal.    Output: Producing urine and minimal stool output.    HEENT: Normal HEENT exam.    Lungs:  Increased effort.  There are rales and decreased breath sounds.    Heart: Normal rate.  Regular rhythm.    Abdomen: Bowel sounds are normal.     Extremities: Decreased range of motion.    Neurological: (Does not appear as alert today).    Skin:  Warm.      Labs/Imaging/Diagnostics    Labs:  CBC:  Recent Labs     12/10/24  0230 24  0328 24  0433   WBC 7.1 8.4 6.4 
Unable to complete admission questions upon arrival to 4th floor pt is confused and poor historian, unable to verify home meds, attempted to call POA with no success, night shift nurse notified of this.  Electronically signed by Sussy Horton RN on 12/9/2024 at 7:18 PM    
Елена gonzales applied, patient requests to be left alone.  
  .8* 110.7* 109.1*   RDW 18.3* 17.9* 17.6*    178 171     CHEMISTRIES:  Recent Labs     12/09/24  1456 12/10/24  0230 12/11/24  0328    139 140   K 4.7 4.6 4.4    101 102   CO2 28 28 29   BUN 38* 37* 37*   CREATININE 2.8* 2.6* 2.9*   GLUCOSE 80 66* 76     PT/INR:No results for input(s): \"PROTIME\", \"INR\" in the last 72 hours.  APTT:No results for input(s): \"APTT\" in the last 72 hours.  LIVER PROFILE:  Recent Labs     12/09/24  1456   AST 32   ALT 13   BILITOT 0.2   ALKPHOS 79       Imaging Last 24 Hours:  XR CHEST PORTABLE    Result Date: 12/9/2024  EXAM: CHEST RADIOGRAPH  TECHNIQUE: Single frontal chest radiograph.  HISTORY: Shortness of breath.  COMPARISON: 08/23/2024.  FINDINGS: Mild interstitial disease in the mid and lower lung zones.  Lungs are otherwise clear. The heart size is normal.  There is calcification in the aorta consistent with atherosclerosis. Previous median sternotomy. Possible small bilateral pleural effusions. There is no pneumothorax.      1.  Interstitial disease bilaterally in the mid and lower lung zones may indicate pulmonary edema. 2.  Cardiomegaly and atherosclerosis. 3.  Previous median sternotomy. 4.  Possible small bilateral pleural effusions. 5.  Otherwise unremarkable chest radiograph.    ______________________________________ Electronically signed by: KERWIN COFFMAN M.D. Date:     12/09/2024 Time:    15:28     Assessment//Plan           Hospital Problems             Last Modified POA    * (Principal) Acute on chronic respiratory failure 12/9/2024 Yes    Palliative care patient 12/10/2024 Yes    Acute kidney injury (nontraumatic) ([facility]) 12/9/2024 Yes    ASHD (arteriosclerotic heart disease) 12/9/2024 Yes    Hypertension 12/9/2024 Yes    Hyperlipidemia 12/9/2024 Yes    COPD (chronic obstructive pulmonary disease) (HCC) 12/9/2024 Yes    S/P CABG x 4 12/9/2024 Yes    Overview Signed 2/4/2015  1:42 PM by Safia Major, APRN     12/1/06 by Dr. Shaylee Pickering 
Minimal assistance  Grooming: Minimal assistance  UE Bathing: Moderate assistance  LE Bathing: Moderate assistance  UE Dressing: Minimal assistance  LE Dressing: Moderate assistance  Toileting: Moderate assistance  Product Used : Bath wipes        Bed mobility  Rolling to Right: Minimal assistance  Supine to Sit: Minimal assistance  Transfers  Stand Step Transfers: Minimal assistance;2 Person assistance  Transfer Comments: with RW     Cognition  Overall Cognitive Status: Exceptions  Arousal/Alertness: Delayed responses to stimuli (Resighini)  Following Commands: Impaired;Follows one step commands with repetition;Follows one step commands with increased time  Attention Span: Impaired  Memory: Impaired  Safety Judgement: Impaired;Decreased awareness of need for assistance;Decreased awareness of need for safety  Problem Solving: Assistance required to generate solutions;Assistance required to implement solutions  Insights: Impaired  Initiation: Impaired  Sequencing: Impaired  Cognition Comment: Mild lethargy and confusion.  May appear more confused than he is due to significant Resighini.                 LUE AROM (degrees)  LUE AROM : WFL  RUE AROM (degrees)  RUE AROM : WFL                    Plan   Occupational Therapy Plan  Times Per Week: 3-5    Goals  Short Term Goals  Short Term Goal 1: Perform dressing and toileting with min A  Short Term Goal 2: Perform standing and transfers with min A  Short Term Goal 3: Upgrade activity to include light ambulatory ADL  Short Term Goal 4: Patient and family will be independent with therapeutic activity recommendations, positioning,and safety recommendations         Tx initiated:  The patient participated in light ambulatory ADL with min A of two with RW.  Instructed in therapeutic activity recommendations.  (15 mins)          Patt Wise OT/RAGHU  Electronically signed by Patt Wise OT on 12/11/2024 at 3:34 PM   
Shaylee         Decubitus ulcer of coccygeal region, stage 3 (HCC) (Chronic) 12/9/2024 Yes    CKD stage 3a, GFR 45-59 ml/min (HCC) 12/9/2024 Yes     Assessment:    Condition: In stable condition.  Unchanged.       Plan:    (      Profound hypothyroidism-IV thyroid given in the ER/admission-restart home dose.  Compliance to medications been in question.  Restart home dose at 150 mcg, will check TSH in 3 to 4 weeks.        CKD 3A-recheck creatinine in AM.  Change PPI to H2 blocker, creatinine improved to 2.7 will monitor throughout the weekend with some hydration to watch for fluid overload and make sure his creatinine gets closer to baseline    Work with PT OT today in hopes of discharge to skilled nursing facility Monday a.m. for gait stabilization and strengthening.  Appreciate  finding bed at [facility] will be ready Monday morning pre-CERT is good through 12/18/2024    Appreciate palliative care input.).       JAVIER BEY     I have discussed the care of Atul Peoples, including pertinent history and exam findings with the ARNP/PA.  I have seen and examined the patient and the key elements of all parts of the encounter have been performed by me. I agree with the assessment and plan as outlined by the ARNP/PA. Please refer to my separate note for complete documentation.     Electronically signed by Isrrael Castellon MD on 12/14/2024 at 1:35 PM      
Impaired;Decreased awareness of need for assistance;Decreased awareness of need for safety  Problem Solving: Assistance required to generate solutions;Assistance required to implement solutions  Insights: Impaired  Initiation: Impaired  Sequencing: Impaired    Objective  Temp: 97.1 °F (36.2 °C)  Pulse: 55  Heart Rate Source: Monitor  Respirations: 16  SpO2: (!) 89 %  O2 Device: Nasal cannula (6L)  BP: 116/80  MAP (Calculated): 92  BP Location: Left upper arm  BP Method: Automatic  Patient Position: Sitting     Observation/Palpation  Posture: Fair  Observation: NATHALIE magdaleno       AROM RLE (degrees)  RLE AROM: WFL  AROM LLE (degrees)  LLE AROM : WFL  Strength RLE  Strength RLE: WFL  Comment: 3+/5  Strength LLE  Strength LLE: WFL  Comment: 3+/5           Bed mobility  Rolling to Right: Minimal assistance  Supine to Sit: Minimal assistance  Sit to Supine: Unable to assess  Scooting: Minimal assistance  Bed Mobility Comments: sat on EOB x 3-4 mins SBA.  Transfers  Sit to Stand: Minimal Assistance;2 Person Assistance  Stand to Sit: Minimal Assistance;2 Person Assistance  Comment: Pt. with heavy posterior lean upon standing. Pt. needed to stand x 30 sec and MIP to gain ability to balance self.  Ambulation  Surface: Level tile  Device: Rolling Walker  Other Apparatus: O2  Assistance: Minimal assistance;Contact guard assistance;2 Person assistance  Quality of Gait: unsteady initially with slight posterior lean. Pt. then needed cues to keep RW close to him.  Gait Deviations: Slow Marian;Decreased step length;Decreased step height  Distance: 15'  Comments: Min A x 2 for turns     Balance  Posture: Fair  Sitting - Static: Fair;+  Sitting - Dynamic: Fair;-  Standing - Static: Poor;+  Standing - Dynamic: Poor;+          OutComes Score                                                  AM-PAC - Mobility              Tinneti Score       Goals  Short Term Goals  Time Frame for Short Term Goals: 2 wks  Short Term Goal 1: supine to sit 
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     Total Time Spent with patient assessment, interview of independent historian/HCS, workup/treatment review, discussion with medical team, review of current and home medications, and placement of orders/preparation of this note: 35 minutes                                 Electronically signed by Nalelly Kapadia PA-C on 12/11/2024 at 11:54 AM    (Please note that portions of this note were completed with a voice recognition program.  Effortswere made to edit the dictations but occasionally words are mis-transcribed.)

## 2025-01-09 ENCOUNTER — APPOINTMENT (OUTPATIENT)
Dept: CT IMAGING | Age: 85
End: 2025-01-09
Payer: MEDICARE

## 2025-01-09 ENCOUNTER — HOSPITAL ENCOUNTER (EMERGENCY)
Age: 85
Discharge: HOME | End: 2025-01-09
Attending: STUDENT IN AN ORGANIZED HEALTH CARE EDUCATION/TRAINING PROGRAM
Payer: MEDICARE

## 2025-01-09 VITALS
OXYGEN SATURATION: 93 % | SYSTOLIC BLOOD PRESSURE: 146 MMHG | TEMPERATURE: 97.7 F | RESPIRATION RATE: 18 BRPM | HEART RATE: 68 BPM | DIASTOLIC BLOOD PRESSURE: 88 MMHG

## 2025-01-09 DIAGNOSIS — M54.6 ACUTE MIDLINE THORACIC BACK PAIN: ICD-10-CM
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
## 2025-01-09 PROCEDURE — 6370000000 HC RX 637 (ALT 250 FOR IP): Performed by: STUDENT IN AN ORGANIZED HEALTH CARE EDUCATION/TRAINING PROGRAM

## 2025-01-09 PROCEDURE — 99284 EMERGENCY DEPT VISIT MOD MDM: CPT

## 2025-01-09 PROCEDURE — 70450 CT HEAD/BRAIN W/O DYE: CPT

## 2025-01-09 PROCEDURE — 72128 CT CHEST SPINE W/O DYE: CPT

## 2025-01-09 PROCEDURE — 72125 CT NECK SPINE W/O DYE: CPT

## 2025-01-09 RX ORDER — ACETAMINOPHEN 500 MG
1000 TABLET ORAL ONCE
Status: COMPLETED | OUTPATIENT
Start: 2025-01-09 | End: 2025-01-09

## 2025-01-09 RX ADMIN — ACETAMINOPHEN 1000 MG: 500 TABLET ORAL at 17:41

## 2025-01-09 ASSESSMENT — ENCOUNTER SYMPTOMS
SHORTNESS OF BREATH: 0
EYE REDNESS: 0
DIARRHEA: 0
NAUSEA: 0
ABDOMINAL PAIN: 0
COUGH: 0
EYE PAIN: 0
VOMITING: 0
BLOOD IN STOOL: 0
BACK PAIN: 1
SORE THROAT: 0
CHEST TIGHTNESS: 0

## 2025-01-10 NOTE — DISCHARGE INSTRUCTIONS
The examination and treatment you have received in the Emergency Department has been given on an emergency basis only.    This limited encounter is not a replacement for the comprehensive services provided by a primary care physician. We recommend follow up for further preventative and ongoing yaz screening, especially if your symptoms persists, worsen, or change in quality or character. When calling for a follow up appointment, please remember to inform the office that you were seen in the Emergency Department and that you are requesting a follow up visit.    Again, it is very important that you return immediately if your condition worsens, any new symptoms develop, or you do not recover as expected.

## 2025-01-10 NOTE — ED PROVIDER NOTES
[facility] EMERGENCY DEPARTMENT  eMERGENCYdEPARTMENT eNCOUnter      Pt Name: Atul Peoples  MRN: [id number]  Birthdate 1940  Date of evaluation: 1/9/2025  Provider:Dereck Valadez MD    Emergency Department care of this patient was assumed at 1830 from Dr. Delvalle.  We have discussed the case and the plan of care.  I have seen and evaluated patient and reviewed ED course.      CHIEF COMPLAINT       Chief Complaint   Patient presents with    Fall    Back Pain     Pt was sitting on wheelchair and leaned over to grab the bed to transfer and fell out of wheelchair. Denies hitting head or LOC. Pt in c-collar from EMS          PHYSICAL EXAM    (up to 7 for level 4, 8 or more for level 5)     ED Triage Vitals [01/09/25 1703]   BP Systolic BP Percentile Diastolic BP Percentile Temp Temp src Pulse Respirations SpO2   (!) 161/67 -- -- 97.7 °F (36.5 °C) -- 68 18 91 %      Height Weight         -- --             Physical Exam  Constitutional:       General: He is not in acute distress.  HENT:      Head: Normocephalic and atraumatic.   Eyes:      Extraocular Movements: Extraocular movements intact.      Pupils: Pupils are equal, round, and reactive to light.   Cardiovascular:      Rate and Rhythm: Normal rate and regular rhythm.   Pulmonary:      Effort: Pulmonary effort is normal. No respiratory distress.   Abdominal:      General: Abdomen is flat. There is no distension.   Musculoskeletal:      Cervical back: Normal range of motion and neck supple.      Right lower leg: No edema.      Left lower leg: No edema.   Skin:     General: Skin is dry.      Coloration: Skin is not pale.   Neurological:      General: No focal deficit present.      Mental Status: He is alert. Mental status is at baseline.         DIAGNOSTIC RESULTS     EKG: All EKG's are interpreted by the Emergency Department Physician who either signs or Co-signs this chart inthe absence of a cardiologist.      RADIOLOGY:   Non-plain film imagessuch as CT, 
wound.   Neurological:  Negative for dizziness, seizures, light-headedness and headaches.   Psychiatric/Behavioral:  Negative for behavioral problems and confusion.             PAST MEDICALHISTORY     Past Medical History:   Diagnosis Date    AAA (abdominal aortic aneurysm) (HCC)     ASHD (arteriosclerotic heart disease)     S/P coronary intervention followed by CBG    CAD (coronary artery disease)     Severe triple-vessel    CAD (coronary artery disease) 02/04/2015    COPD (chronic obstructive pulmonary disease) (HCC)     With tobacco abuse    GERD (gastroesophageal reflux disease)     HH (hiatus hernia)     History of cerebrovascular disease     Hyperlipidemia     PCP, Dr. REZA Horner manages cholesterol.    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 June    S/P TKR (total knee replacement)     RIGHT    Thyroid disease          SURGICAL HISTORY       Past Surgical History:   Procedure Laterality Date    ABDOMINAL AORTIC ANEURYSM REPAIR, OPEN      ADENOIDECTOMY      APPENDECTOMY      CARDIAC SURGERY      CARDIOVASCULAR STRESS TEST  12/07/09, [facility]    Stress ECho    CARDIOVASCULAR STRESS TEST  11/02/06, [facility]    Adenosine thallium treadmill    CHOLECYSTECTOMY      CORONARY ARTERY BYPASS GRAFT  12/01/06, [place]    CABG x 4 with sequential FAYE to diagonal, LAD, SVG, to obtuse marginal, posterior descending endoscopic vein harvesting.    DIAGNOSTIC CARDIAC CATH LAB PROCEDURE  11/14/06, [facility]    Left cath, arteriography of bilateral native internal mammary arteries.    DIAGNOSTIC CARDIAC CATH LAB PROCEDURE  06/27/03????, [facility]    Left cath, selective CA, primary stent placement two circumflex marginal branches.    DIAGNOSTIC CARDIAC CATH LAB PROCEDURE  02/24/1993, [facility]    Left cath    DIAGNOSTIC CARDIAC CATH LAB PROCEDURE  05/15/91, [facility]    Left cath, SARABIA/Danish left ventric., selective coronary arteriography.

## 2025-01-27 ENCOUNTER — OFFICE VISIT (OUTPATIENT)
Dept: VASCULAR SURGERY | Age: 85
End: 2025-01-27
Payer: MEDICARE

## 2025-01-27 ENCOUNTER — HOSPITAL ENCOUNTER (OUTPATIENT)
Dept: CT IMAGING | Age: 85
Discharge: HOME | End: 2025-01-27
Payer: MEDICARE

## 2025-01-27 VITALS
TEMPERATURE: 97.9 F | OXYGEN SATURATION: 97 % | DIASTOLIC BLOOD PRESSURE: 48 MMHG | SYSTOLIC BLOOD PRESSURE: 80 MMHG | HEART RATE: 75 BPM

## 2025-01-27 DIAGNOSIS — I71.43 INFRARENAL ABDOMINAL AORTIC ANEURYSM (AAA) WITHOUT RUPTURE (HCC): ICD-10-CM

## 2025-01-27 DIAGNOSIS — K55.1 SUPERIOR MESENTERIC ARTERY STENOSIS (HCC): ICD-10-CM

## 2025-01-27 DIAGNOSIS — I73.9 CLAUDICATION (HCC): Primary | ICD-10-CM

## 2025-01-27 PROCEDURE — 99214 OFFICE O/P EST MOD 30 MIN: CPT | Performed by: NURSE PRACTITIONER

## 2025-01-27 PROCEDURE — 1159F MED LIST DOCD IN RCRD: CPT | Performed by: NURSE PRACTITIONER

## 2025-01-27 PROCEDURE — 74176 CT ABD & PELVIS W/O CONTRAST: CPT

## 2025-01-27 PROCEDURE — 1123F ACP DISCUSS/DSCN MKR DOCD: CPT | Performed by: NURSE PRACTITIONER

## 2025-01-27 NOTE — PROGRESS NOTES
ARTHROSCOPY LATERAL PATELLA REALIGNMENT performed by Mickey Morataya DO at [facility] OR    OR REVJ TOTAL KNEE ARTHRP W/WO ALGRFT 1 COMPONENT Right 2018    KNEE TOTAL ARTHROPLASTY PATELLOFEMORALREVISION performed by Mickey Morataya DO at [facility] OR    PTCA  91, [facility]    PTCA to circumflex marginal branch.    REVISION TOTAL KNEE ARTHROPLASTY Right 2017    KNEE TOTAL ARTHROPLASTY REVISION performed by Prashanth Cowan MD at [facility] OR    SPLENECTOMY      TONSILLECTOMY      TOTAL KNEE ARTHROPLASTY  2012    Left  knee     UPPER GASTROINTESTINAL ENDOSCOPY N/A 2023    Dr Regalado-Large duodenal bulb ulcer at the junction of duodenal bulb and postbulbar area, narrowing at the postbulbar area could be due to edema, unable to advance the scope into the second part duodenum, narrowing could also be due to diaphragm disease secondary to NSAIDs-Repeat in 2 months     Family History   Problem Relation Age of Onset    Heart Disease Other     Heart Disease Mother      Social History     Tobacco Use    Smoking status: Former     Current packs/day: 0.00     Types: Cigarettes     Quit date: 1989     Years since quittin.0    Smokeless tobacco: Never   Substance Use Topics    Alcohol use: Not Currently     Comment: RARELY         ROS  Eyes - no sudden vision change or amaurosis.  Respiratory - has shortness of breath, on 2l oxygen  Cardiovascular - no chest pain or syncope.  No  significant leg swelling.  has claudication.  Musculoskeletal - no gait disturbance  Skin -has new wound.  Neurologic -  No speech difficulty or lateralizing weakness.  All other review of systems are negative.    Physical Exam    BP (!) 80/48 (Site: Right Upper Arm, Position: Sitting, Cuff Size: Medium Adult)   Pulse 75   Temp 97.9 °F (36.6 °C)   SpO2 97%       Neck- carotid pulses 2+ to palpation with no bruit  Cardiovascular - Regular rate and rhythm.    Pulmonary - effort appears normal.  No respiratory distress.    Lungs - Breath

## 2025-02-03 ENCOUNTER — LAB REQUISITION (OUTPATIENT)
Dept: LAB | Facility: HOSPITAL | Age: 85
End: 2025-02-03
Payer: MEDICARE

## 2025-02-03 DIAGNOSIS — R35.0 FREQUENCY OF MICTURITION: ICD-10-CM

## 2025-02-03 LAB
ACANTHOCYTES BLD QL SMEAR: ABNORMAL
ALBUMIN SERPL-MCNC: 3.7 G/DL (ref 3.5–5.2)
ALBUMIN/GLOB SERPL: 0.9 G/DL
ALP SERPL-CCNC: 92 U/L (ref 39–117)
ALT SERPL W P-5'-P-CCNC: 7 U/L (ref 1–41)
ANION GAP SERPL CALCULATED.3IONS-SCNC: 12 MMOL/L (ref 5–15)
AST SERPL-CCNC: 17 U/L (ref 1–40)
BACTERIA UR QL AUTO: ABNORMAL /HPF
BASOPHILS # BLD MANUAL: 0 10*3/MM3 (ref 0–0.2)
BASOPHILS NFR BLD MANUAL: 0 % (ref 0–1.5)
BILIRUB SERPL-MCNC: 0.3 MG/DL (ref 0–1.2)
BILIRUB UR QL STRIP: NEGATIVE
BUN SERPL-MCNC: 38 MG/DL (ref 8–23)
BUN/CREAT SERPL: 19.4 (ref 7–25)
CALCIUM SPEC-SCNC: 9.5 MG/DL (ref 8.6–10.5)
CHLORIDE SERPL-SCNC: 99 MMOL/L (ref 98–107)
CLARITY UR: CLEAR
CO2 SERPL-SCNC: 29 MMOL/L (ref 22–29)
COLOR UR: YELLOW
CREAT SERPL-MCNC: 1.96 MG/DL (ref 0.76–1.27)
DEPRECATED RDW RBC AUTO: 55.9 FL (ref 37–54)
EGFRCR SERPLBLD CKD-EPI 2021: 32.7 ML/MIN/1.73
EOSINOPHIL # BLD MANUAL: 1 10*3/MM3 (ref 0–0.4)
EOSINOPHIL NFR BLD MANUAL: 11.5 % (ref 0.3–6.2)
ERYTHROCYTE [DISTWIDTH] IN BLOOD BY AUTOMATED COUNT: 14.1 % (ref 12.3–15.4)
GIANT PLATELETS: ABNORMAL
GLOBULIN UR ELPH-MCNC: 4 GM/DL
GLUCOSE SERPL-MCNC: 85 MG/DL (ref 65–99)
GLUCOSE UR STRIP-MCNC: ABNORMAL MG/DL
HCT VFR BLD AUTO: 33.4 % (ref 37.5–51)
HGB BLD-MCNC: 10.7 G/DL (ref 13–17.7)
HGB UR QL STRIP.AUTO: NEGATIVE
HYALINE CASTS UR QL AUTO: ABNORMAL /LPF
HYPOCHROMIA BLD QL: ABNORMAL
KETONES UR QL STRIP: NEGATIVE
LEUKOCYTE ESTERASE UR QL STRIP.AUTO: ABNORMAL
LYMPHOCYTES # BLD MANUAL: 2.99 10*3/MM3 (ref 0.7–3.1)
LYMPHOCYTES NFR BLD MANUAL: 7.3 % (ref 5–12)
MCH RBC QN AUTO: 34.6 PG (ref 26.6–33)
MCHC RBC AUTO-ENTMCNC: 32 G/DL (ref 31.5–35.7)
MCV RBC AUTO: 108.1 FL (ref 79–97)
MONOCYTES # BLD: 0.64 10*3/MM3 (ref 0.1–0.9)
NEUTROPHILS # BLD AUTO: 4.08 10*3/MM3 (ref 1.7–7)
NEUTROPHILS NFR BLD MANUAL: 46.9 % (ref 42.7–76)
NITRITE UR QL STRIP: NEGATIVE
PH UR STRIP.AUTO: 7 [PH] (ref 5–8)
PLATELET # BLD AUTO: 152 10*3/MM3 (ref 140–450)
PMV BLD AUTO: 11.7 FL (ref 6–12)
POIKILOCYTOSIS BLD QL SMEAR: ABNORMAL
POTASSIUM SERPL-SCNC: 4.4 MMOL/L (ref 3.5–5.2)
PROT SERPL-MCNC: 7.7 G/DL (ref 6–8.5)
PROT UR QL STRIP: ABNORMAL
RBC # BLD AUTO: 3.09 10*6/MM3 (ref 4.14–5.8)
RBC # UR STRIP: ABNORMAL /HPF
REF LAB TEST METHOD: ABNORMAL
SCHISTOCYTES BLD QL SMEAR: ABNORMAL
SODIUM SERPL-SCNC: 140 MMOL/L (ref 136–145)
SP GR UR STRIP: 1.02 (ref 1–1.03)
SQUAMOUS #/AREA URNS HPF: ABNORMAL /HPF
T4 FREE SERPL-MCNC: 1.6 NG/DL (ref 0.93–1.7)
TSH SERPL DL<=0.05 MIU/L-ACNC: 0.57 UIU/ML (ref 0.27–4.2)
UROBILINOGEN UR QL STRIP: ABNORMAL
VARIANT LYMPHS NFR BLD MANUAL: 1 % (ref 0–5)
VARIANT LYMPHS NFR BLD MANUAL: 33.3 % (ref 19.6–45.3)
WBC # UR STRIP: ABNORMAL /HPF
WBC MORPH BLD: NORMAL
WBC NRBC COR # BLD AUTO: 8.71 10*3/MM3 (ref 3.4–10.8)

## 2025-02-03 PROCEDURE — 85025 COMPLETE CBC W/AUTO DIFF WBC: CPT | Performed by: FAMILY MEDICINE

## 2025-02-03 PROCEDURE — 87186 SC STD MICRODIL/AGAR DIL: CPT | Performed by: FAMILY MEDICINE

## 2025-02-03 PROCEDURE — 81001 URINALYSIS AUTO W/SCOPE: CPT | Performed by: FAMILY MEDICINE

## 2025-02-03 PROCEDURE — 84443 ASSAY THYROID STIM HORMONE: CPT | Performed by: FAMILY MEDICINE

## 2025-02-03 PROCEDURE — 80053 COMPREHEN METABOLIC PANEL: CPT | Performed by: FAMILY MEDICINE

## 2025-02-03 PROCEDURE — 87086 URINE CULTURE/COLONY COUNT: CPT | Performed by: FAMILY MEDICINE

## 2025-02-03 PROCEDURE — 84439 ASSAY OF FREE THYROXINE: CPT | Performed by: FAMILY MEDICINE

## 2025-02-03 PROCEDURE — 87077 CULTURE AEROBIC IDENTIFY: CPT | Performed by: FAMILY MEDICINE

## 2025-02-06 LAB — BACTERIA SPEC AEROBE CULT: ABNORMAL

## 2025-02-19 ENCOUNTER — LAB REQUISITION (OUTPATIENT)
Dept: LAB | Facility: HOSPITAL | Age: 85
End: 2025-02-19
Payer: MEDICARE

## 2025-02-19 DIAGNOSIS — E03.9 HYPOTHYROIDISM, UNSPECIFIED: ICD-10-CM

## 2025-02-19 LAB
T4 FREE SERPL-MCNC: 1.82 NG/DL (ref 0.93–1.7)
TSH SERPL DL<=0.05 MIU/L-ACNC: 0.2 UIU/ML (ref 0.27–4.2)

## 2025-02-19 PROCEDURE — 84439 ASSAY OF FREE THYROXINE: CPT

## 2025-02-19 PROCEDURE — 84443 ASSAY THYROID STIM HORMONE: CPT

## 2025-02-19 PROCEDURE — 36415 COLL VENOUS BLD VENIPUNCTURE: CPT

## 2025-02-24 ENCOUNTER — TELEPHONE (OUTPATIENT)
Dept: VASCULAR SURGERY | Age: 85
End: 2025-02-24

## 2025-02-24 NOTE — TELEPHONE ENCOUNTER
Called and spoke to Dileep on the chart and he stated that the patient's dementia has gotten so bad that he is now in [facility] full time.  I let Dileep know the findings and he asked that I call  [facility] to let them know.  He stated that he wasn't sure at this point if they would be able to transport him as his dementia has made him very angry.  I looked in his chart and he saw Carl[facility] back in 2018.  Dileep stated that he has had kidney trouble for years.  I have also called [facility] and spoke to Tatiana RN.  She stated that the patients wounds were healed and only had a small scab.  She said she would report the CT findings but at this time he isn't able to make an appointment to urology.              ----- Message from JENNY Tobar sent at 2/24/2025  6:12 AM CST -----  Please ask if his wound has healed.  Also, let him know final ct shows a cystic area on kidney.  We would like to refer him back to urology (he has seen before) to evaluate this

## 2025-02-28 ENCOUNTER — LAB REQUISITION (OUTPATIENT)
Dept: LAB | Facility: HOSPITAL | Age: 85
End: 2025-02-28
Payer: MEDICARE

## 2025-02-28 DIAGNOSIS — E03.9 HYPOTHYROIDISM, UNSPECIFIED: ICD-10-CM

## 2025-02-28 LAB
ALBUMIN SERPL-MCNC: 3.2 G/DL (ref 3.5–5.2)
ALBUMIN/GLOB SERPL: 1 G/DL
ALP SERPL-CCNC: 64 U/L (ref 39–117)
ALT SERPL W P-5'-P-CCNC: 11 U/L (ref 1–41)
ANION GAP SERPL CALCULATED.3IONS-SCNC: 11 MMOL/L (ref 5–15)
ANISOCYTOSIS BLD QL: ABNORMAL
AST SERPL-CCNC: 25 U/L (ref 1–40)
BASOPHILS # BLD MANUAL: 0.07 10*3/MM3 (ref 0–0.2)
BASOPHILS NFR BLD MANUAL: 1 % (ref 0–1.5)
BILIRUB SERPL-MCNC: 0.2 MG/DL (ref 0–1.2)
BUN SERPL-MCNC: 41 MG/DL (ref 8–23)
BUN/CREAT SERPL: 16.9 (ref 7–25)
BURR CELLS BLD QL SMEAR: ABNORMAL
CALCIUM SPEC-SCNC: 9.1 MG/DL (ref 8.6–10.5)
CHLORIDE SERPL-SCNC: 96 MMOL/L (ref 98–107)
CO2 SERPL-SCNC: 28 MMOL/L (ref 22–29)
CREAT SERPL-MCNC: 2.43 MG/DL (ref 0.76–1.27)
DEPRECATED RDW RBC AUTO: 51.1 FL (ref 37–54)
EGFRCR SERPLBLD CKD-EPI 2021: 25.3 ML/MIN/1.73
EOSINOPHIL # BLD MANUAL: 0 10*3/MM3 (ref 0–0.4)
EOSINOPHIL NFR BLD MANUAL: 0 % (ref 0.3–6.2)
ERYTHROCYTE [DISTWIDTH] IN BLOOD BY AUTOMATED COUNT: 13.2 % (ref 12.3–15.4)
GLOBULIN UR ELPH-MCNC: 3.1 GM/DL
GLUCOSE SERPL-MCNC: 82 MG/DL (ref 65–99)
HCT VFR BLD AUTO: 29 % (ref 37.5–51)
HGB BLD-MCNC: 9.6 G/DL (ref 13–17.7)
LYMPHOCYTES # BLD MANUAL: 3.21 10*3/MM3 (ref 0.7–3.1)
LYMPHOCYTES NFR BLD MANUAL: 10 % (ref 5–12)
MACROCYTES BLD QL SMEAR: ABNORMAL
MCH RBC QN AUTO: 34.2 PG (ref 26.6–33)
MCHC RBC AUTO-ENTMCNC: 33.1 G/DL (ref 31.5–35.7)
MCV RBC AUTO: 103.2 FL (ref 79–97)
MONOCYTES # BLD: 0.68 10*3/MM3 (ref 0.1–0.9)
NEUTROPHILS # BLD AUTO: 2.87 10*3/MM3 (ref 1.7–7)
NEUTROPHILS NFR BLD MANUAL: 40 % (ref 42.7–76)
NEUTS BAND NFR BLD MANUAL: 2 % (ref 0–5)
OVALOCYTES BLD QL SMEAR: ABNORMAL
PLATELET # BLD AUTO: 109 10*3/MM3 (ref 140–450)
PMV BLD AUTO: 12 FL (ref 6–12)
POIKILOCYTOSIS BLD QL SMEAR: ABNORMAL
POLYCHROMASIA BLD QL SMEAR: ABNORMAL
POTASSIUM SERPL-SCNC: 3.9 MMOL/L (ref 3.5–5.2)
PROT SERPL-MCNC: 6.3 G/DL (ref 6–8.5)
RBC # BLD AUTO: 2.81 10*6/MM3 (ref 4.14–5.8)
SMALL PLATELETS BLD QL SMEAR: ABNORMAL
SODIUM SERPL-SCNC: 135 MMOL/L (ref 136–145)
TARGETS BLD QL SMEAR: ABNORMAL
VARIANT LYMPHS NFR BLD MANUAL: 2 % (ref 0–5)
VARIANT LYMPHS NFR BLD MANUAL: 45 % (ref 19.6–45.3)
WBC MORPH BLD: NORMAL
WBC NRBC COR # BLD AUTO: 6.83 10*3/MM3 (ref 3.4–10.8)

## 2025-02-28 PROCEDURE — 80053 COMPREHEN METABOLIC PANEL: CPT

## 2025-02-28 PROCEDURE — 36415 COLL VENOUS BLD VENIPUNCTURE: CPT

## 2025-02-28 PROCEDURE — 85025 COMPLETE CBC W/AUTO DIFF WBC: CPT

## 2025-03-07 ENCOUNTER — LAB REQUISITION (OUTPATIENT)
Dept: LAB | Facility: HOSPITAL | Age: 85
End: 2025-03-07
Payer: MEDICARE

## 2025-03-07 DIAGNOSIS — N18.9 CHRONIC KIDNEY DISEASE, UNSPECIFIED: ICD-10-CM

## 2025-03-07 LAB
ANION GAP SERPL CALCULATED.3IONS-SCNC: 11 MMOL/L (ref 5–15)
BUN SERPL-MCNC: 38 MG/DL (ref 8–23)
BUN/CREAT SERPL: 14.4 (ref 7–25)
CALCIUM SPEC-SCNC: 9.4 MG/DL (ref 8.6–10.5)
CHLORIDE SERPL-SCNC: 98 MMOL/L (ref 98–107)
CO2 SERPL-SCNC: 28 MMOL/L (ref 22–29)
CREAT SERPL-MCNC: 2.64 MG/DL (ref 0.76–1.27)
EGFRCR SERPLBLD CKD-EPI 2021: 22.9 ML/MIN/1.73
GLUCOSE SERPL-MCNC: 77 MG/DL (ref 65–99)
POTASSIUM SERPL-SCNC: 3.9 MMOL/L (ref 3.5–5.2)
SODIUM SERPL-SCNC: 137 MMOL/L (ref 136–145)

## 2025-03-07 PROCEDURE — 80048 BASIC METABOLIC PNL TOTAL CA: CPT

## 2025-03-07 PROCEDURE — 36415 COLL VENOUS BLD VENIPUNCTURE: CPT

## 2025-03-10 ENCOUNTER — LAB REQUISITION (OUTPATIENT)
Dept: LAB | Facility: HOSPITAL | Age: 85
End: 2025-03-10
Payer: MEDICARE

## 2025-03-10 DIAGNOSIS — J44.9 CHRONIC OBSTRUCTIVE PULMONARY DISEASE, UNSPECIFIED: ICD-10-CM

## 2025-03-10 LAB
ANION GAP SERPL CALCULATED.3IONS-SCNC: 9 MMOL/L (ref 5–15)
BUN SERPL-MCNC: 41 MG/DL (ref 8–23)
BUN/CREAT SERPL: 14.5 (ref 7–25)
CALCIUM SPEC-SCNC: 8.9 MG/DL (ref 8.6–10.5)
CHLORIDE SERPL-SCNC: 99 MMOL/L (ref 98–107)
CO2 SERPL-SCNC: 28 MMOL/L (ref 22–29)
CREAT SERPL-MCNC: 2.82 MG/DL (ref 0.76–1.27)
EGFRCR SERPLBLD CKD-EPI 2021: 21.1 ML/MIN/1.73
GLUCOSE SERPL-MCNC: 87 MG/DL (ref 65–99)
POTASSIUM SERPL-SCNC: 4.3 MMOL/L (ref 3.5–5.2)
SODIUM SERPL-SCNC: 136 MMOL/L (ref 136–145)

## 2025-03-10 PROCEDURE — 80048 BASIC METABOLIC PNL TOTAL CA: CPT | Performed by: STUDENT IN AN ORGANIZED HEALTH CARE EDUCATION/TRAINING PROGRAM

## 2025-03-12 ENCOUNTER — LAB REQUISITION (OUTPATIENT)
Dept: LAB | Facility: HOSPITAL | Age: 85
End: 2025-03-12
Payer: MEDICARE

## 2025-03-12 DIAGNOSIS — N18.9 CHRONIC KIDNEY DISEASE, UNSPECIFIED: ICD-10-CM

## 2025-03-12 DIAGNOSIS — I11.9 HYPERTENSIVE HEART DISEASE WITHOUT HEART FAILURE: ICD-10-CM

## 2025-03-12 LAB
ANION GAP SERPL CALCULATED.3IONS-SCNC: 13 MMOL/L (ref 5–15)
BUN SERPL-MCNC: 34 MG/DL (ref 8–23)
BUN/CREAT SERPL: 14.5 (ref 7–25)
CALCIUM SPEC-SCNC: 9 MG/DL (ref 8.6–10.5)
CHLORIDE SERPL-SCNC: 99 MMOL/L (ref 98–107)
CO2 SERPL-SCNC: 24 MMOL/L (ref 22–29)
CREAT SERPL-MCNC: 2.34 MG/DL (ref 0.76–1.27)
EGFRCR SERPLBLD CKD-EPI 2021: 26.4 ML/MIN/1.73
GLUCOSE SERPL-MCNC: 95 MG/DL (ref 65–99)
NT-PROBNP SERPL-MCNC: 3718 PG/ML (ref 0–1800)
POTASSIUM SERPL-SCNC: 4.3 MMOL/L (ref 3.5–5.2)
SODIUM SERPL-SCNC: 136 MMOL/L (ref 136–145)

## 2025-03-12 PROCEDURE — 80048 BASIC METABOLIC PNL TOTAL CA: CPT | Performed by: INTERNAL MEDICINE

## 2025-03-12 PROCEDURE — 36415 COLL VENOUS BLD VENIPUNCTURE: CPT | Performed by: INTERNAL MEDICINE

## 2025-03-12 PROCEDURE — 83880 ASSAY OF NATRIURETIC PEPTIDE: CPT | Performed by: INTERNAL MEDICINE

## 2025-03-14 ENCOUNTER — LAB REQUISITION (OUTPATIENT)
Dept: LAB | Facility: HOSPITAL | Age: 85
End: 2025-03-14
Payer: MEDICARE

## 2025-03-14 DIAGNOSIS — N18.9 CHRONIC KIDNEY DISEASE, UNSPECIFIED: ICD-10-CM

## 2025-03-14 LAB
ANION GAP SERPL CALCULATED.3IONS-SCNC: 12 MMOL/L (ref 5–15)
BUN SERPL-MCNC: 30 MG/DL (ref 8–23)
BUN/CREAT SERPL: 13.3 (ref 7–25)
CALCIUM SPEC-SCNC: 9.1 MG/DL (ref 8.6–10.5)
CHLORIDE SERPL-SCNC: 100 MMOL/L (ref 98–107)
CO2 SERPL-SCNC: 25 MMOL/L (ref 22–29)
CREAT SERPL-MCNC: 2.26 MG/DL (ref 0.76–1.27)
EGFRCR SERPLBLD CKD-EPI 2021: 27.6 ML/MIN/1.73
GLUCOSE SERPL-MCNC: 81 MG/DL (ref 65–99)
NT-PROBNP SERPL-MCNC: 3043 PG/ML (ref 0–1800)
POTASSIUM SERPL-SCNC: 5.1 MMOL/L (ref 3.5–5.2)
SODIUM SERPL-SCNC: 137 MMOL/L (ref 136–145)

## 2025-03-14 PROCEDURE — 80048 BASIC METABOLIC PNL TOTAL CA: CPT

## 2025-03-14 PROCEDURE — 83880 ASSAY OF NATRIURETIC PEPTIDE: CPT

## 2025-03-19 ENCOUNTER — LAB REQUISITION (OUTPATIENT)
Dept: LAB | Facility: HOSPITAL | Age: 85
End: 2025-03-19
Payer: MEDICARE

## 2025-03-19 DIAGNOSIS — E03.9 HYPOTHYROIDISM, UNSPECIFIED: ICD-10-CM

## 2025-03-19 LAB
T4 FREE SERPL-MCNC: 1.39 NG/DL (ref 0.93–1.7)
TSH SERPL DL<=0.05 MIU/L-ACNC: 0.66 UIU/ML (ref 0.27–4.2)

## 2025-03-19 PROCEDURE — 84439 ASSAY OF FREE THYROXINE: CPT

## 2025-03-19 PROCEDURE — 84443 ASSAY THYROID STIM HORMONE: CPT

## 2025-03-19 PROCEDURE — 36415 COLL VENOUS BLD VENIPUNCTURE: CPT

## 2025-03-24 ENCOUNTER — LAB REQUISITION (OUTPATIENT)
Dept: LAB | Facility: HOSPITAL | Age: 85
End: 2025-03-24
Payer: MEDICARE

## 2025-03-24 DIAGNOSIS — E03.9 HYPOTHYROIDISM, UNSPECIFIED: ICD-10-CM

## 2025-03-24 LAB
T4 FREE SERPL-MCNC: 1.21 NG/DL (ref 0.93–1.7)
TSH SERPL DL<=0.05 MIU/L-ACNC: 0.66 UIU/ML (ref 0.27–4.2)

## 2025-03-24 PROCEDURE — 84443 ASSAY THYROID STIM HORMONE: CPT | Performed by: INTERNAL MEDICINE

## 2025-03-24 PROCEDURE — 84439 ASSAY OF FREE THYROXINE: CPT | Performed by: INTERNAL MEDICINE

## 2025-03-24 PROCEDURE — 36415 COLL VENOUS BLD VENIPUNCTURE: CPT | Performed by: INTERNAL MEDICINE

## 2025-04-04 ENCOUNTER — APPOINTMENT (OUTPATIENT)
Dept: CT IMAGING | Age: 85
End: 2025-04-04
Payer: MEDICARE

## 2025-04-04 ENCOUNTER — HOSPITAL ENCOUNTER (EMERGENCY)
Age: 85
Discharge: NURSING, REHAB OR LONG-TERM CARE | End: 2025-04-04
Attending: EMERGENCY MEDICINE
Payer: MEDICARE

## 2025-04-04 ENCOUNTER — APPOINTMENT (OUTPATIENT)
Dept: GENERAL RADIOLOGY | Age: 85
End: 2025-04-04
Payer: MEDICARE

## 2025-04-04 VITALS
OXYGEN SATURATION: 90 % | DIASTOLIC BLOOD PRESSURE: 50 MMHG | HEART RATE: 68 BPM | BODY MASS INDEX: 24.44 KG/M2 | HEIGHT: 69 IN | SYSTOLIC BLOOD PRESSURE: 118 MMHG | TEMPERATURE: 97.8 F | RESPIRATION RATE: 18 BRPM | WEIGHT: 165 LBS

## 2025-04-04 DIAGNOSIS — Z86.59 HISTORY OF DEMENTIA: ICD-10-CM

## 2025-04-04 DIAGNOSIS — S70.02XA CONTUSION OF LEFT HIP, INITIAL ENCOUNTER: ICD-10-CM

## 2025-04-04 DIAGNOSIS — S01.01XA LACERATION OF SCALP, INITIAL ENCOUNTER: ICD-10-CM
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
## 2025-04-04 DIAGNOSIS — S09.90XA CLOSED HEAD INJURY, INITIAL ENCOUNTER: Primary | ICD-10-CM

## 2025-04-04 PROCEDURE — 12001 RPR S/N/AX/GEN/TRNK 2.5CM/<: CPT

## 2025-04-04 PROCEDURE — 99284 EMERGENCY DEPT VISIT MOD MDM: CPT

## 2025-04-04 PROCEDURE — 6360000002 HC RX W HCPCS: Performed by: EMERGENCY MEDICINE

## 2025-04-04 PROCEDURE — 70450 CT HEAD/BRAIN W/O DYE: CPT

## 2025-04-04 PROCEDURE — 6370000000 HC RX 637 (ALT 250 FOR IP): Performed by: EMERGENCY MEDICINE

## 2025-04-04 PROCEDURE — 73502 X-RAY EXAM HIP UNI 2-3 VIEWS: CPT

## 2025-04-04 PROCEDURE — 72125 CT NECK SPINE W/O DYE: CPT

## 2025-04-04 PROCEDURE — 90714 TD VACC NO PRESV 7 YRS+ IM: CPT | Performed by: EMERGENCY MEDICINE

## 2025-04-04 PROCEDURE — 90471 IMMUNIZATION ADMIN: CPT | Performed by: EMERGENCY MEDICINE

## 2025-04-04 RX ORDER — HYDROCODONE BITARTRATE AND ACETAMINOPHEN 7.5; 325 MG/1; MG/1
1 TABLET ORAL ONCE
Refills: 0 | Status: COMPLETED | OUTPATIENT
Start: 2025-04-04 | End: 2025-04-04

## 2025-04-04 RX ADMIN — CLOSTRIDIUM TETANI TOXOID ANTIGEN (FORMALDEHYDE INACTIVATED) AND CORYNEBACTERIUM DIPHTHERIAE TOXOID ANTIGEN (FORMALDEHYDE INACTIVATED) 0.5 ML: 5; 2 INJECTION, SUSPENSION INTRAMUSCULAR at 16:08

## 2025-04-04 RX ADMIN — HYDROCODONE BITARTRATE AND ACETAMINOPHEN 1 TABLET: 7.5; 325 TABLET ORAL at 16:31

## 2025-04-04 NOTE — ED PROVIDER NOTES
[facility] EMERGENCY DEPARTMENT  eMERGENCY dEPARTMENT eNCOUnter      Pt Name: Atul Peoples  MRN: [id number]  Birthdate 1940  Date of evaluation: 4/4/2025  Provider: Eitan Abreu MD    CHIEF COMPLAINT       Chief Complaint   Patient presents with    Fall     Unwitnessed, hit head, bilateral leg pain, back pain         HISTORY OF PRESENT ILLNESS   (Location/Symptom, Timing/Onset,Context/Setting, Quality, Duration, Modifying Factors, Severity)  Note limiting factors.   Atul Peoples is a 84 y.o. male who presents to the emergency department via EMS for evaluation regarding a fall.  Patient is a resident at [facility].  He does have a prior history of dementia and history regarding the events surrounding the fall are fairly limited.  He arrived to the ED in C-spine immobilization.  Stated that he fell backward and at this time is complaining of a moderate severity headache.  He denies any neck or back pain.  He is not experiencing any upper extremity numbness or tingling.  Denies chest pain or shortness of breath.  In review of his home medications he is maintained on Plavix therapy.    HPI    NursingNotes were reviewed.    REVIEW OF SYSTEMS    (2-9 systems for level 4, 10 or more for level 5)     Review of Systems   Unable to perform ROS: Dementia            PAST MEDICALHISTORY     Past Medical History:   Diagnosis Date    AAA (abdominal aortic aneurysm)     ASHD (arteriosclerotic heart disease)     S/P coronary intervention followed by CBG    CAD (coronary artery disease)     Severe triple-vessel    CAD (coronary artery disease) 02/04/2015    COPD (chronic obstructive pulmonary disease) (HCC)     With tobacco abuse    GERD (gastroesophageal reflux disease)     HH (hiatus hernia)     History of cerebrovascular disease     Hyperlipidemia     PCP, Dr. REZA Horner manages cholesterol.    Hypertension     Obesity     Osteoarthritis     Palliative care patient 07/12/2022    Renal insufficiency      initial encounter    5. History of dementia          DISPOSITION/PLAN   DISPOSITION Decision To Discharge 04/04/2025 05:48:15 PM   DISPOSITION CONDITION Stable       DISCHARGE MEDICATIONS:  Discharge Medication List as of 4/4/2025  7:42 PM             (Please note that portions of this note were completed with a voice recognition program.  Efforts were made to edit thedictations but occasionally words are mis-transcribed.)    Eitan Abreu MD (electronically signed)  Attending Emergency Physician          Eitan Abreu MD  04/05/25 0292

## 2025-04-04 NOTE — ED NOTES
This RN and CHARLOTTE Hood at bedside to take head dsg down. Wound cleaned. MD Brady at bedside for wound closure. Two staples placed to back of pt head by MD Brady.

## 2025-04-05 NOTE — PROGRESS NOTES
EMS arrived to transport patient back to [facility]. Insured pt was clean and a new incontinence brief was place on pt.    Electronically signed by Sean Hairston RN on 4/4/2025 at 8:13 PM

## 2025-04-05 NOTE — PROGRESS NOTES
Report called to [facility]. Accepting nurse does not express any concerns at this time.     Electronically signed by Sean Hairston RN on 4/4/2025 at 7:09 PM

## 2025-04-07 ENCOUNTER — LAB REQUISITION (OUTPATIENT)
Dept: LAB | Facility: HOSPITAL | Age: 85
End: 2025-04-07
Payer: MEDICARE

## 2025-04-07 DIAGNOSIS — N18.9 CHRONIC KIDNEY DISEASE, UNSPECIFIED: ICD-10-CM

## 2025-04-07 LAB
ANION GAP SERPL CALCULATED.3IONS-SCNC: 13 MMOL/L (ref 5–15)
BUN SERPL-MCNC: 27 MG/DL (ref 8–23)
BUN/CREAT SERPL: 16.5 (ref 7–25)
CALCIUM SPEC-SCNC: 9.3 MG/DL (ref 8.6–10.5)
CHLORIDE SERPL-SCNC: 98 MMOL/L (ref 98–107)
CO2 SERPL-SCNC: 26 MMOL/L (ref 22–29)
CREAT SERPL-MCNC: 1.64 MG/DL (ref 0.76–1.27)
EGFRCR SERPLBLD CKD-EPI 2021: 40.5 ML/MIN/1.73
GLUCOSE SERPL-MCNC: 71 MG/DL (ref 65–99)
POTASSIUM SERPL-SCNC: 4 MMOL/L (ref 3.5–5.2)
SODIUM SERPL-SCNC: 137 MMOL/L (ref 136–145)

## 2025-04-07 PROCEDURE — 80048 BASIC METABOLIC PNL TOTAL CA: CPT | Performed by: STUDENT IN AN ORGANIZED HEALTH CARE EDUCATION/TRAINING PROGRAM

## 2025-04-07 PROCEDURE — 36415 COLL VENOUS BLD VENIPUNCTURE: CPT | Performed by: STUDENT IN AN ORGANIZED HEALTH CARE EDUCATION/TRAINING PROGRAM

## 2025-04-09 ENCOUNTER — LAB REQUISITION (OUTPATIENT)
Dept: LAB | Facility: HOSPITAL | Age: 85
End: 2025-04-09
Payer: MEDICARE

## 2025-04-09 DIAGNOSIS — J96.11 CHRONIC RESPIRATORY FAILURE WITH HYPOXIA: ICD-10-CM

## 2025-04-09 DIAGNOSIS — R33.9 RETENTION OF URINE, UNSPECIFIED: ICD-10-CM

## 2025-04-09 LAB
BASOPHILS # BLD AUTO: 0.05 10*3/MM3 (ref 0–0.2)
BASOPHILS NFR BLD AUTO: 0.6 % (ref 0–1.5)
DEPRECATED RDW RBC AUTO: 54.5 FL (ref 37–54)
EOSINOPHIL # BLD AUTO: 0.83 10*3/MM3 (ref 0–0.4)
EOSINOPHIL NFR BLD AUTO: 9.5 % (ref 0.3–6.2)
ERYTHROCYTE [DISTWIDTH] IN BLOOD BY AUTOMATED COUNT: 14.8 % (ref 12.3–15.4)
HCT VFR BLD AUTO: 28.9 % (ref 37.5–51)
HGB BLD-MCNC: 9.5 G/DL (ref 13–17.7)
IMM GRANULOCYTES # BLD AUTO: 0.05 10*3/MM3 (ref 0–0.05)
IMM GRANULOCYTES NFR BLD AUTO: 0.6 % (ref 0–0.5)
LYMPHOCYTES # BLD AUTO: 2.89 10*3/MM3 (ref 0.7–3.1)
LYMPHOCYTES NFR BLD AUTO: 33 % (ref 19.6–45.3)
MCH RBC QN AUTO: 32.6 PG (ref 26.6–33)
MCHC RBC AUTO-ENTMCNC: 32.9 G/DL (ref 31.5–35.7)
MCV RBC AUTO: 99.3 FL (ref 79–97)
MONOCYTES # BLD AUTO: 1.3 10*3/MM3 (ref 0.1–0.9)
MONOCYTES NFR BLD AUTO: 14.9 % (ref 5–12)
NEUTROPHILS NFR BLD AUTO: 3.63 10*3/MM3 (ref 1.7–7)
NEUTROPHILS NFR BLD AUTO: 41.4 % (ref 42.7–76)
NRBC BLD AUTO-RTO: 0 /100 WBC (ref 0–0.2)
PLATELET # BLD AUTO: 221 10*3/MM3 (ref 140–450)
PMV BLD AUTO: 11.4 FL (ref 6–12)
RBC # BLD AUTO: 2.91 10*6/MM3 (ref 4.14–5.8)
WBC NRBC COR # BLD AUTO: 8.75 10*3/MM3 (ref 3.4–10.8)

## 2025-04-09 PROCEDURE — 36415 COLL VENOUS BLD VENIPUNCTURE: CPT | Performed by: INTERNAL MEDICINE

## 2025-04-09 PROCEDURE — 85025 COMPLETE CBC W/AUTO DIFF WBC: CPT | Performed by: INTERNAL MEDICINE

## 2025-04-18 ENCOUNTER — LAB REQUISITION (OUTPATIENT)
Dept: LAB | Facility: HOSPITAL | Age: 85
End: 2025-04-18
Payer: MEDICARE

## 2025-04-18 DIAGNOSIS — R82.90 UNSPECIFIED ABNORMAL FINDINGS IN URINE: ICD-10-CM

## 2025-04-18 LAB
BACTERIA UR QL AUTO: ABNORMAL /HPF
BILIRUB UR QL STRIP: NEGATIVE
CLARITY UR: ABNORMAL
COD CRY URNS QL: ABNORMAL /HPF
COLOR UR: YELLOW
GLUCOSE UR STRIP-MCNC: ABNORMAL MG/DL
HGB UR QL STRIP.AUTO: ABNORMAL
HYALINE CASTS UR QL AUTO: ABNORMAL /LPF
KETONES UR QL STRIP: NEGATIVE
LEUKOCYTE ESTERASE UR QL STRIP.AUTO: ABNORMAL
NITRITE UR QL STRIP: NEGATIVE
PH UR STRIP.AUTO: 5.5 [PH] (ref 5–8)
PROT UR QL STRIP: ABNORMAL
RBC # UR STRIP: ABNORMAL /HPF
REF LAB TEST METHOD: ABNORMAL
SP GR UR STRIP: 1.02 (ref 1–1.03)
SQUAMOUS #/AREA URNS HPF: ABNORMAL /HPF
UROBILINOGEN UR QL STRIP: ABNORMAL
WBC # UR STRIP: ABNORMAL /HPF

## 2025-04-18 PROCEDURE — 87077 CULTURE AEROBIC IDENTIFY: CPT | Performed by: NURSE PRACTITIONER

## 2025-04-18 PROCEDURE — 81001 URINALYSIS AUTO W/SCOPE: CPT | Performed by: NURSE PRACTITIONER

## 2025-04-18 PROCEDURE — 87186 SC STD MICRODIL/AGAR DIL: CPT | Performed by: NURSE PRACTITIONER

## 2025-04-18 PROCEDURE — 87086 URINE CULTURE/COLONY COUNT: CPT | Performed by: NURSE PRACTITIONER

## 2025-04-20 LAB — BACTERIA SPEC AEROBE CULT: ABNORMAL

## 2025-04-29 ENCOUNTER — HOSPITAL ENCOUNTER (EMERGENCY)
Age: 85
Discharge: HOME | End: 2025-04-29
Payer: MEDICARE

## 2025-04-29 ENCOUNTER — APPOINTMENT (OUTPATIENT)
Dept: GENERAL RADIOLOGY | Age: 85
End: 2025-04-29
Payer: MEDICARE

## 2025-04-29 VITALS
WEIGHT: 165 LBS | HEIGHT: 69 IN | RESPIRATION RATE: 18 BRPM | SYSTOLIC BLOOD PRESSURE: 127 MMHG | OXYGEN SATURATION: 100 % | HEART RATE: 75 BPM | BODY MASS INDEX: 24.44 KG/M2 | DIASTOLIC BLOOD PRESSURE: 61 MMHG | TEMPERATURE: 97.5 F

## 2025-04-29 DIAGNOSIS — N30.00 ACUTE CYSTITIS WITHOUT HEMATURIA: Primary | ICD-10-CM

## 2025-04-29 LAB
ALBUMIN SERPL-MCNC: 3 G/DL (ref 3.5–5.2)
ALP SERPL-CCNC: 250 U/L (ref 40–129)
ALT SERPL-CCNC: 24 U/L (ref 10–50)
ANION GAP SERPL CALCULATED.3IONS-SCNC: 9 MMOL/L (ref 8–16)
AST SERPL-CCNC: 39 U/L (ref 10–50)
BACTERIA #/AREA URNS HPF: ABNORMAL /HPF
BASOPHILS # BLD: 0.1 K/UL (ref 0–0.2)
BASOPHILS NFR BLD: 0.4 % (ref 0–1)
BILIRUB SERPL-MCNC: 0.3 MG/DL (ref 0.2–1.2)
BILIRUB UR QL STRIP: ABNORMAL
BUN SERPL-MCNC: 34 MG/DL (ref 8–23)
CALCIUM SERPL-MCNC: 10.2 MG/DL (ref 8.8–10.2)
CHARACTER UR: ABNORMAL
CHLORIDE SERPL-SCNC: 97 MMOL/L (ref 98–107)
CLARITY UR: ABNORMAL
CO2 SERPL-SCNC: 28 MMOL/L (ref 22–29)
COLOR UR: ABNORMAL
CREAT SERPL-MCNC: 2.2 MG/DL (ref 0.7–1.2)
EOSINOPHIL # BLD: 0.1 K/UL (ref 0–0.6)
EOSINOPHIL NFR BLD: 0.9 % (ref 0–5)
ERYTHROCYTE [DISTWIDTH] IN BLOOD BY AUTOMATED COUNT: 14.7 % (ref 11.5–14.5)
GLUCOSE SERPL-MCNC: 103 MG/DL (ref 70–99)
GLUCOSE UR STRIP.AUTO-MCNC: 250 MG/DL
HCT VFR BLD AUTO: 29.8 % (ref 42–52)
HGB BLD-MCNC: 10 G/DL (ref 14–18)
HGB UR STRIP.AUTO-MCNC: ABNORMAL MG/L
IMM GRANULOCYTES # BLD: 0.3 K/UL
KETONES UR STRIP.AUTO-MCNC: ABNORMAL MG/DL
LEUKOCYTE ESTERASE UR QL STRIP.AUTO: ABNORMAL
LYMPHOCYTES # BLD: 1 K/UL (ref 1.1–4.5)
LYMPHOCYTES NFR BLD: 7.4 % (ref 20–40)
MCH RBC QN AUTO: 32.6 PG (ref 27–31)
MCHC RBC AUTO-ENTMCNC: 33.6 G/DL (ref 33–37)
MCV RBC AUTO: 97.1 FL (ref 80–94)
MONOCYTES # BLD: 1.1 K/UL (ref 0–0.9)
MONOCYTES NFR BLD: 8.5 % (ref 0–10)
NEUTROPHILS # BLD: 10.3 K/UL (ref 1.5–7.5)
NEUTS SEG NFR BLD: 80.7 % (ref 50–65)
NITRITE UR QL STRIP.AUTO: NEGATIVE
PH UR STRIP.AUTO: 6.5 [PH] (ref 5–8)
PLATELET # BLD AUTO: 314 K/UL (ref 130–400)
PMV BLD AUTO: 10 FL (ref 9.4–12.4)
POTASSIUM SERPL-SCNC: 4.7 MMOL/L (ref 3.5–5)
PROT SERPL-MCNC: 6.8 G/DL (ref 6.4–8.3)
PROT UR STRIP.AUTO-MCNC: 300 MG/DL
RBC # BLD AUTO: 3.07 M/UL (ref 4.7–6.1)
RBC #/AREA URNS HPF: ABNORMAL /HPF (ref 0–2)
SODIUM SERPL-SCNC: 134 MMOL/L (ref 136–145)
SP GR UR STRIP.AUTO: 1.02 (ref 1–1.03)
SQUAMOUS #/AREA URNS HPF: ABNORMAL /HPF
UROBILINOGEN UR STRIP.AUTO-MCNC: 1 E.U./DL
WBC # BLD AUTO: 12.8 K/UL (ref 4.8–10.8)
WBC #/AREA URNS HPF: ABNORMAL /HPF (ref 0–5)

## 2025-04-29 PROCEDURE — 96374 THER/PROPH/DIAG INJ IV PUSH: CPT

## 2025-04-29 PROCEDURE — 99285 EMERGENCY DEPT VISIT HI MDM: CPT

## 2025-04-29 PROCEDURE — 2500000003 HC RX 250 WO HCPCS: Performed by: PHYSICIAN ASSISTANT

## 2025-04-29 PROCEDURE — 71045 X-RAY EXAM CHEST 1 VIEW: CPT

## 2025-04-29 PROCEDURE — 85025 COMPLETE CBC W/AUTO DIFF WBC: CPT

## 2025-04-29 PROCEDURE — 6360000002 HC RX W HCPCS: Performed by: PHYSICIAN ASSISTANT

## 2025-04-29 PROCEDURE — 87186 SC STD MICRODIL/AGAR DIL: CPT

## 2025-04-29 PROCEDURE — 80053 COMPREHEN METABOLIC PANEL: CPT

## 2025-04-29 PROCEDURE — 2580000003 HC RX 258: Performed by: PHYSICIAN ASSISTANT

## 2025-04-29 PROCEDURE — 87077 CULTURE AEROBIC IDENTIFY: CPT

## 2025-04-29 PROCEDURE — 81001 URINALYSIS AUTO W/SCOPE: CPT

## 2025-04-29 PROCEDURE — 87086 URINE CULTURE/COLONY COUNT: CPT

## 2025-04-29 PROCEDURE — 36415 COLL VENOUS BLD VENIPUNCTURE: CPT

## 2025-04-29 RX ORDER — 0.9 % SODIUM CHLORIDE 0.9 %
1000 INTRAVENOUS SOLUTION INTRAVENOUS ONCE
Status: COMPLETED | OUTPATIENT
Start: 2025-04-29 | End: 2025-04-29

## 2025-04-29 RX ORDER — CEFDINIR 300 MG/1
300 CAPSULE ORAL 2 TIMES DAILY
Qty: 14 CAPSULE | Refills: 0 | Status: ON HOLD | OUTPATIENT
Start: 2025-04-29 | End: 2025-05-06

## 2025-04-29 RX ADMIN — CEFTRIAXONE 1000 MG: 1 INJECTION, POWDER, FOR SOLUTION INTRAMUSCULAR; INTRAVENOUS at 17:58

## 2025-04-29 RX ADMIN — SODIUM CHLORIDE 1000 ML: 0.9 INJECTION, SOLUTION INTRAVENOUS at 17:58

## 2025-04-29 ASSESSMENT — ENCOUNTER SYMPTOMS
EYE DISCHARGE: 0
BACK PAIN: 0
COLOR CHANGE: 0
SHORTNESS OF BREATH: 0
PHOTOPHOBIA: 0
COUGH: 0
APNEA: 0
EYE ITCHING: 0

## 2025-04-29 NOTE — ED PROVIDER NOTES
branch.    REVISION TOTAL KNEE ARTHROPLASTY Right 01/06/2017    KNEE TOTAL ARTHROPLASTY REVISION performed by Prashanth Cowan MD at [facility] OR    SPLENECTOMY      TONSILLECTOMY      TOTAL KNEE ARTHROPLASTY  04/2012    Left  knee     UPPER GASTROINTESTINAL ENDOSCOPY N/A 06/23/2023    Dr Regalado-Large duodenal bulb ulcer at the junction of duodenal bulb and postbulbar area, narrowing at the postbulbar area could be due to edema, unable to advance the scope into the second part duodenum, narrowing could also be due to diaphragm disease secondary to NSAIDs-Repeat in 2 months         CURRENT MEDICATIONS       Discharge Medication List as of 4/29/2025 11:23 PM        CONTINUE these medications which have NOT CHANGED    Details   ipratropium 0.5 mg-albuterol 2.5 mg (DUONEB) 0.5-2.5 (3) MG/3ML SOLN nebulizer solution Inhale 3 mLs into the lungs 4 times daily, Disp-360 mL, R-1Normal      cloNIDine (CATAPRES) 0.1 MG tablet Take 1 tablet by mouth every 4 hours as needed for High Blood Pressure (Greater than 160/90), Disp-60 tablet, R-3Normal      bumetanide (BUMEX) 1 MG tablet Take 1 tablet by mouth daily, Disp-30 tablet, R-3Normal      tamsulosin (FLOMAX) 0.4 MG capsule Take 1 capsule by mouth daily, Disp-30 capsule, R-3Normal      famotidine (PEPCID) 10 MG tablet Take 1 tablet by mouth daily, Disp-60 tablet, R-3Normal      ondansetron (ZOFRAN-ODT) 4 MG disintegrating tablet Take 1 tablet by mouth every 8 hours as needed for Nausea or Vomiting, Disp-30 tablet, R-0Normal      losartan (COZAAR) 50 MG tablet Take 1 tablet by mouth daily, Disp-30 tablet, R-3Normal      levothyroxine (SYNTHROID) 150 MCG tablet Take 1 tablet by mouth Daily, Disp-30 tablet, R-3Normal      calcitRIOL (ROCALTROL) 0.25 MCG capsule Take 1 capsule by mouth daily, Disp-30 capsule, R-3Normal      dapagliflozin (FARXIGA) 5 MG tablet Take 1 tablet by mouth every morningHistorical Med      atorvastatin (LIPITOR) 10 MG tablet Take 1 tablet by mouth

## 2025-05-01 ENCOUNTER — RESULTS FOLLOW-UP (OUTPATIENT)
Dept: EMERGENCY DEPT | Age: 85
End: 2025-05-01

## 2025-05-01 LAB
BACTERIA UR CULT: ABNORMAL
ORGANISM: ABNORMAL
ORGANISM: ABNORMAL

## 2025-05-03 LAB
EKG P AXIS: 27 DEGREES
EKG P-R INTERVAL: 206 MS
EKG Q-T INTERVAL: 464 MS
EKG QRS DURATION: 148 MS
EKG QTC CALCULATION (BAZETT): 472 MS
EKG T AXIS: 29 DEGREES

## 2025-05-04 ENCOUNTER — HOSPITAL ENCOUNTER (INPATIENT)
Age: 85
LOS: 5 days | Discharge: NURSING, REHAB OR LONG-TERM CARE | End: 2025-05-09
Attending: EMERGENCY MEDICINE | Admitting: STUDENT IN AN ORGANIZED HEALTH CARE EDUCATION/TRAINING PROGRAM
Payer: MEDICARE

## 2025-05-04 ENCOUNTER — APPOINTMENT (OUTPATIENT)
Dept: CT IMAGING | Age: 85
End: 2025-05-04
Payer: MEDICARE

## 2025-05-04 ENCOUNTER — APPOINTMENT (OUTPATIENT)
Dept: GENERAL RADIOLOGY | Age: 85
End: 2025-05-04
Payer: MEDICARE

## 2025-05-04 DIAGNOSIS — N18.9 CHRONIC KIDNEY DISEASE, UNSPECIFIED CKD STAGE: ICD-10-CM

## 2025-05-04 DIAGNOSIS — R74.8 ELEVATED LIVER ENZYMES: ICD-10-CM

## 2025-05-04 DIAGNOSIS — R79.89 ELEVATED TROPONIN: ICD-10-CM

## 2025-05-04 DIAGNOSIS — J96.01 ACUTE RESPIRATORY FAILURE WITH HYPOXIA AND HYPERCAPNIA (HCC): ICD-10-CM

## 2025-05-04 DIAGNOSIS — N30.00 ACUTE CYSTITIS WITHOUT HEMATURIA: ICD-10-CM

## 2025-05-04 DIAGNOSIS — E16.2 HYPOGLYCEMIA: ICD-10-CM

## 2025-05-04 DIAGNOSIS — J96.02 ACUTE RESPIRATORY FAILURE WITH HYPOXIA AND HYPERCAPNIA (HCC): ICD-10-CM

## 2025-05-04 DIAGNOSIS — R41.82 ALTERED MENTAL STATUS, UNSPECIFIED ALTERED MENTAL STATUS TYPE: Primary | ICD-10-CM

## 2025-05-04 PROBLEM — A41.9 SEPSIS (HCC): Status: ACTIVE | Noted: 2025-05-04

## 2025-05-04 PROBLEM — G93.40 ACUTE ENCEPHALOPATHY: Status: ACTIVE | Noted: 2025-05-04

## 2025-05-04 LAB
ALBUMIN SERPL-MCNC: 3 G/DL (ref 3.5–5.2)
ALLENS TEST: ABNORMAL
ALP SERPL-CCNC: 504 U/L (ref 40–129)
ALT SERPL-CCNC: 176 U/L (ref 10–50)
AMMONIA PLAS-SCNC: 12 UMOL/L (ref 16–60)
ANION GAP SERPL CALCULATED.3IONS-SCNC: 9 MMOL/L (ref 8–16)
APTT PPP: 32.7 SEC (ref 26–36.2)
AST SERPL-CCNC: 274 U/L (ref 10–50)
B PARAP IS1001 DNA NPH QL NAA+NON-PROBE: NOT DETECTED
B PERT.PT PRMT NPH QL NAA+NON-PROBE: NOT DETECTED
BACTERIA #/AREA URNS HPF: ABNORMAL /HPF
BASE EXCESS ARTERIAL: 3.3 MMOL/L (ref -2–2)
BASE EXCESS VENOUS: 2 MMOL/L
BASOPHILS # BLD: 0 K/UL (ref 0–0.2)
BASOPHILS NFR BLD: 0.3 % (ref 0–1)
BILIRUB SERPL-MCNC: 2.9 MG/DL (ref 0.2–1.2)
BILIRUB UR STRIP.AUTO-MCNC: ABNORMAL MG/DL
BNP BLD-MCNC: 5233 PG/ML (ref 0–449)
BUN SERPL-MCNC: 34 MG/DL (ref 8–23)
C PNEUM DNA NPH QL NAA+NON-PROBE: NOT DETECTED
CALCIUM SERPL-MCNC: 10.2 MG/DL (ref 8.8–10.2)
CARBOXYHEMOGLOBIN ARTERIAL: 2.5 % (ref 0–5)
CARBOXYHEMOGLOBIN: 2.8 %
CHLORIDE SERPL-SCNC: 99 MMOL/L (ref 98–107)
CHP ED QC CHECK: YES
CLARITY UR: ABNORMAL
CO2 SERPL-SCNC: 28 MMOL/L (ref 22–29)
COLOR UR: ABNORMAL
CREAT SERPL-MCNC: 2.6 MG/DL (ref 0.7–1.2)
CRYSTALS URNS MICRO: ABNORMAL /HPF
EOSINOPHIL # BLD: 0.1 K/UL (ref 0–0.6)
EOSINOPHIL NFR BLD: 0.5 % (ref 0–5)
ERYTHROCYTE [DISTWIDTH] IN BLOOD BY AUTOMATED COUNT: 15.9 % (ref 11.5–14.5)
FLUAV RNA NPH QL NAA+NON-PROBE: NOT DETECTED
FLUBV RNA NPH QL NAA+NON-PROBE: NOT DETECTED
GLUCOSE BLD-MCNC: 102 MG/DL (ref 70–99)
GLUCOSE BLD-MCNC: 102 MG/DL (ref 70–99)
GLUCOSE BLD-MCNC: 107 MG/DL (ref 70–99)
GLUCOSE BLD-MCNC: 113 MG/DL
GLUCOSE BLD-MCNC: 113 MG/DL (ref 70–99)
GLUCOSE BLD-MCNC: 129 MG/DL (ref 70–99)
GLUCOSE BLD-MCNC: 184 MG/DL (ref 70–99)
GLUCOSE BLD-MCNC: 76 MG/DL (ref 70–99)
GLUCOSE BLD-MCNC: 87 MG/DL (ref 70–99)
GLUCOSE BLD-MCNC: <25 MG/DL (ref 70–99)
GLUCOSE SERPL-MCNC: 16 MG/DL (ref 70–99)
GLUCOSE SERPL-MCNC: 57 MG/DL (ref 70–99)
GLUCOSE UR STRIP.AUTO-MCNC: 500 MG/DL
HADV DNA NPH QL NAA+NON-PROBE: NOT DETECTED
HCO3 ARTERIAL: 30.4 MMOL/L (ref 22–26)
HCO3 VENOUS: 31 MMOL/L (ref 23–29)
HCOV 229E RNA NPH QL NAA+NON-PROBE: NOT DETECTED
HCOV HKU1 RNA NPH QL NAA+NON-PROBE: NOT DETECTED
HCOV NL63 RNA NPH QL NAA+NON-PROBE: NOT DETECTED
HCOV OC43 RNA NPH QL NAA+NON-PROBE: NOT DETECTED
HCT VFR BLD AUTO: 30.2 % (ref 42–52)
HEMOGLOBIN, ART, EXTENDED: 10.3 G/DL (ref 14–18)
HGB BLD-MCNC: 10.2 G/DL (ref 14–18)
HGB UR STRIP.AUTO-MCNC: ABNORMAL MG/L
HMPV RNA NPH QL NAA+NON-PROBE: NOT DETECTED
HPIV1 RNA NPH QL NAA+NON-PROBE: NOT DETECTED
HPIV2 RNA NPH QL NAA+NON-PROBE: NOT DETECTED
HPIV3 RNA NPH QL NAA+NON-PROBE: NOT DETECTED
HPIV4 RNA NPH QL NAA+NON-PROBE: NOT DETECTED
IMM GRANULOCYTES # BLD: 0.1 K/UL
INR PPP: 1.13 (ref 0.88–1.18)
INR PPP: 1.14 (ref 0.88–1.18)
KETONES UR STRIP.AUTO-MCNC: ABNORMAL MG/DL
LACTATE BLDV-SCNC: 1.7 MG/DL (ref 0.5–1.9)
LACTATE BLDV-SCNC: 1.9 MG/DL (ref 0.5–1.9)
LEUKOCYTE ESTERASE UR QL STRIP.AUTO: ABNORMAL
LIPASE SERPL-CCNC: 1277 U/L (ref 13–60)
LYMPHOCYTES # BLD: 1.4 K/UL (ref 1.1–4.5)
LYMPHOCYTES NFR BLD: 9.1 % (ref 20–40)
M PNEUMO DNA NPH QL NAA+NON-PROBE: NOT DETECTED
MCH RBC QN AUTO: 33.2 PG (ref 27–31)
MCHC RBC AUTO-ENTMCNC: 33.8 G/DL (ref 33–37)
MCV RBC AUTO: 98.4 FL (ref 80–94)
METHEMOGLOBIN ARTERIAL: 0.9 %
METHEMOGLOBIN VENOUS: 0.8 %
MONOCYTES # BLD: 1.4 K/UL (ref 0–0.9)
MONOCYTES NFR BLD: 9.2 % (ref 0–10)
NEUTROPHILS # BLD: 12.3 K/UL (ref 1.5–7.5)
NEUTS SEG NFR BLD: 80.2 % (ref 50–65)
NITRITE UR QL STRIP.AUTO: POSITIVE
O2 CONTENT ARTERIAL: 13.3 ML/DL
O2 CONTENT, VEN: 11 ML/DL
O2 SAT, ARTERIAL: 91.6 %
O2 SAT, VEN: 72 %
O2 THERAPY: ABNORMAL
O2 THERAPY: ABNORMAL
PCO2 ARTERIAL: 59 MMHG (ref 35–45)
PCO2 VENOUS: 68 MMHG (ref 40–50)
PERFORMED ON: ABNORMAL
PERFORMED ON: NORMAL
PERFORMED ON: NORMAL
PH ARTERIAL: 7.32 (ref 7.35–7.45)
PH UR STRIP.AUTO: 6.5 [PH] (ref 5–8)
PH VENOUS: 7.26 (ref 7.35–7.45)
PLATELET # BLD AUTO: 291 K/UL (ref 130–400)
PMV BLD AUTO: 10.4 FL (ref 9.4–12.4)
PO2 ARTERIAL: 58 MMHG (ref 80–100)
PO2 VENOUS: 37 MMHG
POTASSIUM BLD-SCNC: 4.8 MMOL/L
POTASSIUM SERPL-SCNC: 5.1 MMOL/L (ref 3.5–5.1)
PROT SERPL-MCNC: 6.9 G/DL (ref 6.4–8.3)
PROT UR STRIP.AUTO-MCNC: >=300 MG/DL
PROTHROMBIN TIME: 14.5 SEC (ref 12–14.6)
PROTHROMBIN TIME: 14.6 SEC (ref 12–14.6)
RBC # BLD AUTO: 3.07 M/UL (ref 4.7–6.1)
RBC #/AREA URNS HPF: ABNORMAL /HPF (ref 0–2)
RSV RNA NPH QL NAA+NON-PROBE: NOT DETECTED
RV+EV RNA NPH QL NAA+NON-PROBE: NOT DETECTED
SAMPLE SOURCE: ABNORMAL
SARS-COV-2 RNA NPH QL NAA+NON-PROBE: NOT DETECTED
SODIUM SERPL-SCNC: 134 MMOL/L (ref 136–145)
SODIUM SERPL-SCNC: 136 MMOL/L (ref 136–145)
SP GR UR STRIP.AUTO: 1.02 (ref 1–1.03)
SQUAMOUS #/AREA URNS HPF: ABNORMAL /HPF
TROPONIN, HIGH SENSITIVITY: 853 NG/L (ref 0–22)
TSH SERPL DL<=0.005 MIU/L-ACNC: 0.11 UIU/ML (ref 0.27–4.2)
UROBILINOGEN UR STRIP.AUTO-MCNC: >=8 E.U./DL
WBC # BLD AUTO: 15.4 K/UL (ref 4.8–10.8)
WBC #/AREA URNS HPF: ABNORMAL /HPF (ref 0–5)
YEAST #/AREA URNS HPF: PRESENT /HPF

## 2025-05-04 PROCEDURE — 82803 BLOOD GASES ANY COMBINATION: CPT

## 2025-05-04 PROCEDURE — 36415 COLL VENOUS BLD VENIPUNCTURE: CPT

## 2025-05-04 PROCEDURE — 83880 ASSAY OF NATRIURETIC PEPTIDE: CPT

## 2025-05-04 PROCEDURE — 74176 CT ABD & PELVIS W/O CONTRAST: CPT

## 2025-05-04 PROCEDURE — 96375 TX/PRO/DX INJ NEW DRUG ADDON: CPT

## 2025-05-04 PROCEDURE — 84295 ASSAY OF SERUM SODIUM: CPT

## 2025-05-04 PROCEDURE — 93005 ELECTROCARDIOGRAM TRACING: CPT | Performed by: EMERGENCY MEDICINE

## 2025-05-04 PROCEDURE — 6360000002 HC RX W HCPCS: Performed by: EMERGENCY MEDICINE

## 2025-05-04 PROCEDURE — 5A09457 ASSISTANCE WITH RESPIRATORY VENTILATION, 24-96 CONSECUTIVE HOURS, CONTINUOUS POSITIVE AIRWAY PRESSURE: ICD-10-PCS | Performed by: STUDENT IN AN ORGANIZED HEALTH CARE EDUCATION/TRAINING PROGRAM

## 2025-05-04 PROCEDURE — 70450 CT HEAD/BRAIN W/O DYE: CPT

## 2025-05-04 PROCEDURE — 82140 ASSAY OF AMMONIA: CPT

## 2025-05-04 PROCEDURE — 2580000003 HC RX 258: Performed by: STUDENT IN AN ORGANIZED HEALTH CARE EDUCATION/TRAINING PROGRAM

## 2025-05-04 PROCEDURE — 85025 COMPLETE CBC W/AUTO DIFF WBC: CPT

## 2025-05-04 PROCEDURE — 51702 INSERT TEMP BLADDER CATH: CPT

## 2025-05-04 PROCEDURE — 80053 COMPREHEN METABOLIC PANEL: CPT

## 2025-05-04 PROCEDURE — 71045 X-RAY EXAM CHEST 1 VIEW: CPT

## 2025-05-04 PROCEDURE — 94660 CPAP INITIATION&MGMT: CPT

## 2025-05-04 PROCEDURE — 96365 THER/PROPH/DIAG IV INF INIT: CPT

## 2025-05-04 PROCEDURE — 87040 BLOOD CULTURE FOR BACTERIA: CPT

## 2025-05-04 PROCEDURE — 84484 ASSAY OF TROPONIN QUANT: CPT

## 2025-05-04 PROCEDURE — 0202U NFCT DS 22 TRGT SARS-COV-2: CPT

## 2025-05-04 PROCEDURE — 99285 EMERGENCY DEPT VISIT HI MDM: CPT

## 2025-05-04 PROCEDURE — 94640 AIRWAY INHALATION TREATMENT: CPT

## 2025-05-04 PROCEDURE — 6370000000 HC RX 637 (ALT 250 FOR IP): Performed by: EMERGENCY MEDICINE

## 2025-05-04 PROCEDURE — 87086 URINE CULTURE/COLONY COUNT: CPT

## 2025-05-04 PROCEDURE — 82800 BLOOD PH: CPT

## 2025-05-04 PROCEDURE — 83605 ASSAY OF LACTIC ACID: CPT

## 2025-05-04 PROCEDURE — 2580000003 HC RX 258: Performed by: EMERGENCY MEDICINE

## 2025-05-04 PROCEDURE — 2500000003 HC RX 250 WO HCPCS: Performed by: EMERGENCY MEDICINE

## 2025-05-04 PROCEDURE — 83690 ASSAY OF LIPASE: CPT

## 2025-05-04 PROCEDURE — 6360000002 HC RX W HCPCS

## 2025-05-04 PROCEDURE — 6370000000 HC RX 637 (ALT 250 FOR IP): Performed by: STUDENT IN AN ORGANIZED HEALTH CARE EDUCATION/TRAINING PROGRAM

## 2025-05-04 PROCEDURE — 85610 PROTHROMBIN TIME: CPT

## 2025-05-04 PROCEDURE — 2000000000 HC ICU R&B

## 2025-05-04 PROCEDURE — 6360000002 HC RX W HCPCS: Performed by: STUDENT IN AN ORGANIZED HEALTH CARE EDUCATION/TRAINING PROGRAM

## 2025-05-04 PROCEDURE — 82962 GLUCOSE BLOOD TEST: CPT

## 2025-05-04 PROCEDURE — 36600 WITHDRAWAL OF ARTERIAL BLOOD: CPT

## 2025-05-04 PROCEDURE — 84443 ASSAY THYROID STIM HORMONE: CPT

## 2025-05-04 PROCEDURE — 2700000000 HC OXYGEN THERAPY PER DAY

## 2025-05-04 PROCEDURE — 81001 URINALYSIS AUTO W/SCOPE: CPT

## 2025-05-04 PROCEDURE — 85730 THROMBOPLASTIN TIME PARTIAL: CPT

## 2025-05-04 PROCEDURE — 96368 THER/DIAG CONCURRENT INF: CPT

## 2025-05-04 PROCEDURE — 2500000003 HC RX 250 WO HCPCS: Performed by: STUDENT IN AN ORGANIZED HEALTH CARE EDUCATION/TRAINING PROGRAM

## 2025-05-04 PROCEDURE — 82947 ASSAY GLUCOSE BLOOD QUANT: CPT

## 2025-05-04 RX ORDER — ENOXAPARIN SODIUM 100 MG/ML
30 INJECTION SUBCUTANEOUS DAILY
Status: DISCONTINUED | OUTPATIENT
Start: 2025-05-04 | End: 2025-05-09 | Stop reason: HOSPADM

## 2025-05-04 RX ORDER — ACETAMINOPHEN 325 MG/1
650 TABLET ORAL EVERY 6 HOURS PRN
Status: DISCONTINUED | OUTPATIENT
Start: 2025-05-04 | End: 2025-05-05 | Stop reason: SDUPTHER

## 2025-05-04 RX ORDER — SODIUM CHLORIDE 0.9 % (FLUSH) 0.9 %
5-40 SYRINGE (ML) INJECTION PRN
Status: DISCONTINUED | OUTPATIENT
Start: 2025-05-04 | End: 2025-05-09 | Stop reason: HOSPADM

## 2025-05-04 RX ORDER — POLYETHYLENE GLYCOL 3350 17 G/17G
17 POWDER, FOR SOLUTION ORAL DAILY PRN
Status: DISCONTINUED | OUTPATIENT
Start: 2025-05-04 | End: 2025-05-09 | Stop reason: HOSPADM

## 2025-05-04 RX ORDER — MAGNESIUM SULFATE IN WATER 40 MG/ML
2000 INJECTION, SOLUTION INTRAVENOUS PRN
Status: DISCONTINUED | OUTPATIENT
Start: 2025-05-04 | End: 2025-05-09 | Stop reason: HOSPADM

## 2025-05-04 RX ORDER — ONDANSETRON 4 MG/1
4 TABLET, ORALLY DISINTEGRATING ORAL EVERY 8 HOURS PRN
Status: DISCONTINUED | OUTPATIENT
Start: 2025-05-04 | End: 2025-05-09 | Stop reason: HOSPADM

## 2025-05-04 RX ORDER — ASPIRIN 300 MG/1
300 SUPPOSITORY RECTAL ONCE
Status: COMPLETED | OUTPATIENT
Start: 2025-05-04 | End: 2025-05-04

## 2025-05-04 RX ORDER — GLUCAGON 1 MG/ML
1 KIT INJECTION PRN
Status: DISCONTINUED | OUTPATIENT
Start: 2025-05-04 | End: 2025-05-09 | Stop reason: HOSPADM

## 2025-05-04 RX ORDER — ONDANSETRON 4 MG/1
4 TABLET, ORALLY DISINTEGRATING ORAL EVERY 8 HOURS PRN
Status: DISCONTINUED | OUTPATIENT
Start: 2025-05-04 | End: 2025-05-04 | Stop reason: SDUPTHER

## 2025-05-04 RX ORDER — ACETAMINOPHEN 325 MG/1
650 TABLET ORAL EVERY 4 HOURS PRN
Status: DISCONTINUED | OUTPATIENT
Start: 2025-05-04 | End: 2025-05-09 | Stop reason: HOSPADM

## 2025-05-04 RX ORDER — DEXTROSE MONOHYDRATE 100 MG/ML
INJECTION, SOLUTION INTRAVENOUS CONTINUOUS
Status: DISCONTINUED | OUTPATIENT
Start: 2025-05-04 | End: 2025-05-05

## 2025-05-04 RX ORDER — SODIUM CHLORIDE 0.9 % (FLUSH) 0.9 %
5-40 SYRINGE (ML) INJECTION EVERY 12 HOURS SCHEDULED
Status: DISCONTINUED | OUTPATIENT
Start: 2025-05-04 | End: 2025-05-09 | Stop reason: HOSPADM

## 2025-05-04 RX ORDER — DEXTROSE MONOHYDRATE 100 MG/ML
INJECTION, SOLUTION INTRAVENOUS CONTINUOUS PRN
Status: DISCONTINUED | OUTPATIENT
Start: 2025-05-04 | End: 2025-05-09 | Stop reason: HOSPADM

## 2025-05-04 RX ORDER — HYDROMORPHONE HYDROCHLORIDE 1 MG/ML
0.5 INJECTION, SOLUTION INTRAMUSCULAR; INTRAVENOUS; SUBCUTANEOUS
Status: DISCONTINUED | OUTPATIENT
Start: 2025-05-04 | End: 2025-05-09 | Stop reason: HOSPADM

## 2025-05-04 RX ORDER — SODIUM CHLORIDE 0.9 % (FLUSH) 0.9 %
5-40 SYRINGE (ML) INJECTION PRN
Status: DISCONTINUED | OUTPATIENT
Start: 2025-05-04 | End: 2025-05-05 | Stop reason: SDUPTHER

## 2025-05-04 RX ORDER — ACETAMINOPHEN 650 MG/1
650 SUPPOSITORY RECTAL EVERY 6 HOURS PRN
Status: DISCONTINUED | OUTPATIENT
Start: 2025-05-04 | End: 2025-05-05 | Stop reason: SDUPTHER

## 2025-05-04 RX ORDER — ENOXAPARIN SODIUM 100 MG/ML
30 INJECTION SUBCUTANEOUS DAILY
Status: DISCONTINUED | OUTPATIENT
Start: 2025-05-04 | End: 2025-05-04 | Stop reason: SDUPTHER

## 2025-05-04 RX ORDER — IPRATROPIUM BROMIDE AND ALBUTEROL SULFATE 2.5; .5 MG/3ML; MG/3ML
1 SOLUTION RESPIRATORY (INHALATION)
Status: DISCONTINUED | OUTPATIENT
Start: 2025-05-04 | End: 2025-05-09 | Stop reason: HOSPADM

## 2025-05-04 RX ORDER — POTASSIUM CHLORIDE 1500 MG/1
40 TABLET, EXTENDED RELEASE ORAL PRN
Status: DISCONTINUED | OUTPATIENT
Start: 2025-05-04 | End: 2025-05-09 | Stop reason: HOSPADM

## 2025-05-04 RX ORDER — SODIUM CHLORIDE, SODIUM LACTATE, POTASSIUM CHLORIDE, AND CALCIUM CHLORIDE .6; .31; .03; .02 G/100ML; G/100ML; G/100ML; G/100ML
1000 INJECTION, SOLUTION INTRAVENOUS ONCE
Status: COMPLETED | OUTPATIENT
Start: 2025-05-04 | End: 2025-05-04

## 2025-05-04 RX ORDER — LEVOFLOXACIN 5 MG/ML
500 INJECTION, SOLUTION INTRAVENOUS ONCE
Status: DISCONTINUED | OUTPATIENT
Start: 2025-05-04 | End: 2025-05-04

## 2025-05-04 RX ORDER — ONDANSETRON 2 MG/ML
4 INJECTION INTRAMUSCULAR; INTRAVENOUS EVERY 6 HOURS PRN
Status: DISCONTINUED | OUTPATIENT
Start: 2025-05-04 | End: 2025-05-09 | Stop reason: HOSPADM

## 2025-05-04 RX ORDER — IPRATROPIUM BROMIDE AND ALBUTEROL SULFATE 2.5; .5 MG/3ML; MG/3ML
1 SOLUTION RESPIRATORY (INHALATION) ONCE
Status: COMPLETED | OUTPATIENT
Start: 2025-05-04 | End: 2025-05-04

## 2025-05-04 RX ORDER — ONDANSETRON 2 MG/ML
4 INJECTION INTRAMUSCULAR; INTRAVENOUS EVERY 6 HOURS PRN
Status: DISCONTINUED | OUTPATIENT
Start: 2025-05-04 | End: 2025-05-04 | Stop reason: SDUPTHER

## 2025-05-04 RX ORDER — SODIUM CHLORIDE 9 MG/ML
INJECTION, SOLUTION INTRAVENOUS PRN
Status: DISCONTINUED | OUTPATIENT
Start: 2025-05-04 | End: 2025-05-05 | Stop reason: SDUPTHER

## 2025-05-04 RX ORDER — POTASSIUM CHLORIDE 7.45 MG/ML
10 INJECTION INTRAVENOUS PRN
Status: DISCONTINUED | OUTPATIENT
Start: 2025-05-04 | End: 2025-05-09 | Stop reason: HOSPADM

## 2025-05-04 RX ORDER — SODIUM CHLORIDE 0.9 % (FLUSH) 0.9 %
5-40 SYRINGE (ML) INJECTION EVERY 12 HOURS SCHEDULED
Status: DISCONTINUED | OUTPATIENT
Start: 2025-05-04 | End: 2025-05-05 | Stop reason: SDUPTHER

## 2025-05-04 RX ORDER — SODIUM CHLORIDE 9 MG/ML
INJECTION, SOLUTION INTRAVENOUS PRN
Status: DISCONTINUED | OUTPATIENT
Start: 2025-05-04 | End: 2025-05-09 | Stop reason: HOSPADM

## 2025-05-04 RX ORDER — SODIUM CHLORIDE, SODIUM LACTATE, POTASSIUM CHLORIDE, CALCIUM CHLORIDE 600; 310; 30; 20 MG/100ML; MG/100ML; MG/100ML; MG/100ML
INJECTION, SOLUTION INTRAVENOUS CONTINUOUS
Status: DISCONTINUED | OUTPATIENT
Start: 2025-05-04 | End: 2025-05-04

## 2025-05-04 RX ORDER — NALOXONE HYDROCHLORIDE 0.4 MG/ML
INJECTION, SOLUTION INTRAMUSCULAR; INTRAVENOUS; SUBCUTANEOUS
Status: COMPLETED
Start: 2025-05-04 | End: 2025-05-04

## 2025-05-04 RX ADMIN — DEXTROSE: 10 SOLUTION INTRAVENOUS at 17:58

## 2025-05-04 RX ADMIN — METHYLPREDNISOLONE SODIUM SUCCINATE 125 MG: 125 INJECTION INTRAMUSCULAR; INTRAVENOUS at 14:03

## 2025-05-04 RX ADMIN — ENOXAPARIN SODIUM 30 MG: 100 INJECTION SUBCUTANEOUS at 17:58

## 2025-05-04 RX ADMIN — NALOXONE HYDROCHLORIDE: 0.4 INJECTION, SOLUTION INTRAMUSCULAR; INTRAVENOUS; SUBCUTANEOUS at 13:37

## 2025-05-04 RX ADMIN — SODIUM CHLORIDE, PRESERVATIVE FREE 10 ML: 5 INJECTION INTRAVENOUS at 19:37

## 2025-05-04 RX ADMIN — CEFEPIME 1000 MG: 1 INJECTION, POWDER, FOR SOLUTION INTRAMUSCULAR; INTRAVENOUS at 18:15

## 2025-05-04 RX ADMIN — SODIUM CHLORIDE, POTASSIUM CHLORIDE, SODIUM LACTATE AND CALCIUM CHLORIDE 1000 ML: 600; 310; 30; 20 INJECTION, SOLUTION INTRAVENOUS at 14:48

## 2025-05-04 RX ADMIN — LEVOFLOXACIN 500 MG: 500 INJECTION, SOLUTION INTRAVENOUS at 14:22

## 2025-05-04 RX ADMIN — IPRATROPIUM BROMIDE AND ALBUTEROL SULFATE 1 DOSE: 2.5; .5 SOLUTION RESPIRATORY (INHALATION) at 19:26

## 2025-05-04 RX ADMIN — DEXTROSE MONOHYDRATE 125 ML: 10 INJECTION, SOLUTION INTRAVENOUS at 15:53

## 2025-05-04 RX ADMIN — IPRATROPIUM BROMIDE AND ALBUTEROL SULFATE 1 DOSE: .5; 3 SOLUTION RESPIRATORY (INHALATION) at 14:11

## 2025-05-04 RX ADMIN — VANCOMYCIN HYDROCHLORIDE 750 MG: 750 INJECTION, POWDER, LYOPHILIZED, FOR SOLUTION INTRAVENOUS at 18:17

## 2025-05-04 RX ADMIN — ASPIRIN 300 MG: 300 SUPPOSITORY RECTAL at 14:54

## 2025-05-04 RX ADMIN — VANCOMYCIN HYDROCHLORIDE 1000 MG: 1 INJECTION, POWDER, LYOPHILIZED, FOR SOLUTION INTRAVENOUS at 15:13

## 2025-05-04 RX ADMIN — CEFEPIME 1000 MG: 1 INJECTION, POWDER, FOR SOLUTION INTRAMUSCULAR; INTRAVENOUS at 15:12

## 2025-05-04 NOTE — ED NOTES
ED TO INPATIENT SBAR HANDOFF    Patient Name: Atul Peoples   : 1940  85 y.o.   Family/Caregiver Present: Yes  Code Status Order: DNR    C-SSRS:    Sitter No  Restraints:         Situation  Chief Complaint:   Chief Complaint   Patient presents with    Altered Mental Status     Unresponsive snoring respiration      Patient Diagnosis: Acute encephalopathy [G93.40]  AMS (altered mental status) [R41.82]     Brief Description of Patient's Condition: Atul Peoples is a 85 y.o. male who presents to the emergency department for altered status.  Patient per report was at baseline this morning and given nebulizer treatment around 1130 and was then found minimally responsive shortly after this.  He arrives here unresponsive with sonorous breathing.  Patient is DNR/DNI.  EMS had difficulty with glucometer unable to get sugar prior to arrival.  Sugar checked here less than 25.  No history of diabetes listed.  No narcotics listed on medication list either.   Mental Status: disoriented  Arrived from: nursing home    Imaging:   XR CHEST PORTABLE   Final Result       1.  No acute findings in the chest.               ______________________________________    Electronically signed by: OSCAR FLYNN M.D.   Date:     2025   Time:    14:02       CT HEAD WO CONTRAST   Final Result   No acute intracranial abnormalities are seen.       Chronic small vessel ischemic changes seen within the supratentorial white matter.        All CT scans are performed using dose optimization techniques as appropriate to the performed exam and include    at least one of the following: Automated exposure control, adjustment of the mA and/or kV according to size, and the use of iterative reconstruction technique.        ______________________________________    Electronically signed by: FALGUNI TANG M.D.   Date:     2025   Time:    14:03       CT ABDOMEN PELVIS WO CONTRAST Additional Contrast? None    (Results Pending)     COVID-19 Results:

## 2025-05-04 NOTE — PROGRESS NOTES
Pharmacy Adjustment per [facility] protocol    Atul Peoples is a 85 y.o. male. Pharmacy has adjusted medications per [facility] protocol.    Recent Labs     05/04/25  1330   BUN 34*       Recent Labs     05/04/25  1330   CREATININE 2.6*       Estimated Creatinine Clearance: 20 mL/min (A) (based on SCr of 2.6 mg/dL (H)).    Height:   Ht Readings from Last 1 Encounters:   04/29/25 1.753 m (5' 9\")     Weight:  Wt Readings from Last 1 Encounters:   04/29/25 74.8 kg (165 lb)    BMI:  BMI Readings from Last 1 Encounters:   04/29/25 24.37 kg/m²         Plan: Adjust the following medications based on [facility] protocol:           Cefepime to 2000 mg IV once over 30 minutes followed by 1000 mg IV every 12 hours extended infusion over 240 minutes     Electronically signed by Kaiser Quintero RPH on 5/4/2025 at 4:12 PM

## 2025-05-04 NOTE — PROGRESS NOTES
[facility]   Pharmacy Pharmacokinetic Monitoring Service - Vancomycin     Atul Peoples is a 85 y.o. male starting on vancomycin therapy for sepsis. Pharmacy consulted by Dr. Garcia for monitoring and adjustment.    Target Concentration: Trough 15-20 mg/L, switch to -600 once renal function stable    Additional Antimicrobials: Cefepime    Pertinent Laboratory Values:   Wt Readings from Last 1 Encounters:   04/29/25 74.8 kg (165 lb)     Temp Readings from Last 1 Encounters:   05/04/25 (!) 94.1 °F (34.5 °C) (Rectal)     Estimated Creatinine Clearance: 20 mL/min (A) (based on SCr of 2.6 mg/dL (H)).  Recent Labs     05/04/25  1330   CREATININE 2.6*   BUN 34*   WBC 15.4*     Procalcitonin: N/A    Pertinent Cultures:  Culture Date Source Results   05/04/25 Blood x 2 collected   05/04/25 Urine collected   MRSA Nasal Swab: N/A. Non-respiratory infection.    Plan:  Concentration-guided dosing due to renal impairment/insufficiency  Start vancomycin pulse dosing  Renal labs as indicated   Vancomycin concentration ordered for 05/05/25 @ 0600   Pharmacy will continue to monitor patient and adjust therapy as indicated    Thank you for the consult,  Kaiser Quintero [facility]  5/4/2025 3:02 PM

## 2025-05-04 NOTE — H&P
History and Physical      CHIEF COMPLAINT:  AMS    Reason for Admission:  AMS, sepsis    History Obtained From: Staff, chart review    HISTORY OF PRESENT ILLNESS:      The patient is a 85 y.o. male with significant past medical history of CAD s/p CABG, COPD, HTN, HLD, and dementia who presents with acute onset of encephalopathy beginning this morning while at [facility].  He was essentially unresponsive so EMS was called.  There was reportedly a delay in obtaining a blood glucose due to equipment malfunction and it was undetectably low when it was finally able to be check when he arrived in the emergency department.      His ER workup was wildly abnormal with many abnormal vital signs and his mental status did improve a bit once his sugar was normalized, however it did go back down to 57.      Past Medical History:    Past Medical History:   Diagnosis Date    AAA (abdominal aortic aneurysm)     ASHD (arteriosclerotic heart disease)     S/P coronary intervention followed by CBG    CAD (coronary artery disease)     Severe triple-vessel    CAD (coronary artery disease) 02/04/2015    COPD (chronic obstructive pulmonary disease) (HCC)     With tobacco abuse    GERD (gastroesophageal reflux disease)     HH (hiatus hernia)     History of cerebrovascular disease     Hyperlipidemia     PCP, Dr. REZA Horner manages cholesterol.    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 June    S/P TKR (total knee replacement)     RIGHT    Thyroid disease        Past Surgical History:    Past Surgical History:   Procedure Laterality Date    ABDOMINAL AORTIC ANEURYSM REPAIR, OPEN      ADENOIDECTOMY      APPENDECTOMY      CARDIAC SURGERY      CARDIOVASCULAR STRESS TEST  12/07/09, [facility]    Stress ECho    CARDIOVASCULAR STRESS TEST  11/02/06, [facility]    Adenosine thallium treadmill    CHOLECYSTECTOMY       Disease Mother        REVIEW OF SYSTEMS:    Unable to obtain review of systems due to altered mental status      Physical Exam:  Constitutional: Confused, appears ill  HEENT: Grossly normal sitting up in bed  Head: Normocephalic and atraumatic.   Eyes: Pupils are equal, round, and reactive to light.  Extraocular muscles appear to be intact  Neck: Neck supple without masses or carotid bruit.  Cardiovascular: Regular rhythm and normal heart sounds.  No extra or lift rub or murmur appreciated  Pulmonary/Chest: Effort normal and breath sounds normal. CTAB, no appreciable rales or wheezes  Abdominal: Soft. + diffuse abdominal pain  Musculoskeletal: Normal range of motion. There is  no edema or tenderness.  No significant joint swelling  Neurological: Responds to vocal stimuli, localizes to pain, nonsensical conversation, easily falls asleep  Skin: Skin is warm and dry without significant rashes     Results Review:   I reviewed the patient's new imaging results and agree with the interpretation.    ASSESSMENT AND PLAN:      1.   Principal Problem:    Acute encephalopathy  Active Problems:    Sepsis (HCC)    Acute respiratory failure with hypoxia and hypercarbia (HCC)    Hypoglycemia    AMS (altered mental status)  Resolved Problems:    * No resolved hospital problems. *    Assessment:    Plan:    Acute encephalopathy  Multifactorial, hypoglycemia definitely the main cause but also contributing are sepsis and acute hypercarbic respiratory failure.  Plan detailed below.    Sepsis  Unclear source, urine certainly worrisome for infection but will image the abdomen.  CT pending, I have concern for choledocholithiasis and pancreatitis due to hyperbilirubinemia, elevated liver enzymes, elevated lipase, and abdominal tenderness.  Will continue broad spectrum antibiotics    Acute hypoxic and hypercarbic respiratory failure  Probably related to AMS, going to monitor and supplement as needed.  Was briefly on Bipap in the ED to treat

## 2025-05-04 NOTE — ED NOTES
Called Dr. Horner's office for Dr. Garcia.  Left message with answering service.  Dr. Smith is on call

## 2025-05-04 NOTE — ED PROVIDER NOTES
[facility] ICU  eMERGENCY dEPARTMENT eNCOUnter      Pt Name: Atul Peoples  MRN: [id number]  Birthdate 1940  Date of evaluation: 5/4/2025  Provider: ARIELLE MOORE MD    CHIEF COMPLAINT       Chief Complaint   Patient presents with    Altered Mental Status     Unresponsive snoring respiration          HISTORY OF PRESENT ILLNESS   (Location/Symptom, Timing/Onset,Context/Setting, Quality, Duration, Modifying Factors, Severity)  Note limiting factors.   Atul Peoples is a 85 y.o. male who presents to the emergency department for altered status.  Patient per report was at baseline this morning and given nebulizer treatment around 1130 and was then found minimally responsive shortly after this.  He arrives here unresponsive with sonorous breathing.  Patient is DNR/DNI.  EMS had difficulty with glucometer unable to get sugar prior to arrival.  Sugar checked here less than 25.  No history of diabetes listed.  No narcotics listed on medication list either.    HPI    NursingNotes were reviewed.    REVIEW OF SYSTEMS    (2-9 systems for level 4, 10 or more for level 5)     Review of Systems   Unable to perform ROS: Mental status change          PAST MEDICALHISTORY     Past Medical History:   Diagnosis Date    AAA (abdominal aortic aneurysm)     ASHD (arteriosclerotic heart disease)     S/P coronary intervention followed by CBG    CAD (coronary artery disease)     Severe triple-vessel    CAD (coronary artery disease) 02/04/2015    COPD (chronic obstructive pulmonary disease) (HCC)     With tobacco abuse    GERD (gastroesophageal reflux disease)     HH (hiatus hernia)     History of cerebrovascular disease     Hyperlipidemia     PCP, Dr. REZA Horner manages cholesterol.    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 June    S/P TKR (total knee replacement)     RIGHT    Thyroid disease          SURGICAL HISTORY

## 2025-05-05 LAB
ALBUMIN SERPL-MCNC: 2.4 G/DL (ref 3.5–5.2)
ALP SERPL-CCNC: 428 U/L (ref 40–129)
ALT SERPL-CCNC: 144 U/L (ref 10–50)
ANION GAP SERPL CALCULATED.3IONS-SCNC: 11 MMOL/L (ref 8–16)
ANION GAP SERPL CALCULATED.3IONS-SCNC: 11 MMOL/L (ref 8–16)
AST SERPL-CCNC: 210 U/L (ref 10–50)
BASOPHILS # BLD: 0 K/UL (ref 0–0.2)
BASOPHILS NFR BLD: 0.1 % (ref 0–1)
BILIRUB SERPL-MCNC: 2 MG/DL (ref 0.2–1.2)
BUN SERPL-MCNC: 36 MG/DL (ref 8–23)
BUN SERPL-MCNC: 41 MG/DL (ref 8–23)
CALCIUM SERPL-MCNC: 8.6 MG/DL (ref 8.8–10.2)
CALCIUM SERPL-MCNC: 8.6 MG/DL (ref 8.8–10.2)
CHLORIDE SERPL-SCNC: 101 MMOL/L (ref 98–107)
CHLORIDE SERPL-SCNC: 103 MMOL/L (ref 98–107)
CO2 SERPL-SCNC: 22 MMOL/L (ref 22–29)
CO2 SERPL-SCNC: 22 MMOL/L (ref 22–29)
CREAT SERPL-MCNC: 2.7 MG/DL (ref 0.7–1.2)
CREAT SERPL-MCNC: 2.7 MG/DL (ref 0.7–1.2)
EKG P AXIS: 56 DEGREES
EKG P AXIS: 66 DEGREES
EKG P-R INTERVAL: 126 MS
EKG P-R INTERVAL: 214 MS
EKG Q-T INTERVAL: 374 MS
EKG Q-T INTERVAL: 428 MS
EKG QRS DURATION: 132 MS
EKG QRS DURATION: 156 MS
EKG QTC CALCULATION (BAZETT): 442 MS
EKG QTC CALCULATION (BAZETT): 457 MS
EKG T AXIS: -1 DEGREES
EKG T AXIS: 36 DEGREES
EOSINOPHIL # BLD: 0 K/UL (ref 0–0.6)
EOSINOPHIL NFR BLD: 0 % (ref 0–5)
ERYTHROCYTE [DISTWIDTH] IN BLOOD BY AUTOMATED COUNT: 16.2 % (ref 11.5–14.5)
GLUCOSE BLD-MCNC: 102 MG/DL (ref 70–99)
GLUCOSE BLD-MCNC: 102 MG/DL (ref 70–99)
GLUCOSE BLD-MCNC: 106 MG/DL (ref 70–99)
GLUCOSE BLD-MCNC: 65 MG/DL (ref 70–99)
GLUCOSE BLD-MCNC: 65 MG/DL (ref 70–99)
GLUCOSE BLD-MCNC: 69 MG/DL (ref 70–99)
GLUCOSE BLD-MCNC: 72 MG/DL (ref 70–99)
GLUCOSE BLD-MCNC: 73 MG/DL (ref 70–99)
GLUCOSE BLD-MCNC: 81 MG/DL (ref 70–99)
GLUCOSE BLD-MCNC: 82 MG/DL (ref 70–99)
GLUCOSE BLD-MCNC: 86 MG/DL (ref 70–99)
GLUCOSE BLD-MCNC: 93 MG/DL (ref 70–99)
GLUCOSE BLD-MCNC: 94 MG/DL (ref 70–99)
GLUCOSE BLD-MCNC: 95 MG/DL (ref 70–99)
GLUCOSE SERPL-MCNC: 61 MG/DL (ref 70–99)
GLUCOSE SERPL-MCNC: 80 MG/DL (ref 70–99)
HCT VFR BLD AUTO: 28.6 % (ref 42–52)
HGB BLD-MCNC: 8.9 G/DL (ref 14–18)
IMM GRANULOCYTES # BLD: 0.1 K/UL
LYMPHOCYTES # BLD: 0.7 K/UL (ref 1.1–4.5)
LYMPHOCYTES NFR BLD: 2.9 % (ref 20–40)
MAGNESIUM SERPL-MCNC: 1.9 MG/DL (ref 1.6–2.4)
MCH RBC QN AUTO: 32.4 PG (ref 27–31)
MCHC RBC AUTO-ENTMCNC: 31.1 G/DL (ref 33–37)
MCV RBC AUTO: 104 FL (ref 80–94)
MONOCYTES # BLD: 0.8 K/UL (ref 0–0.9)
MONOCYTES NFR BLD: 3.2 % (ref 0–10)
NEUTROPHILS # BLD: 21.5 K/UL (ref 1.5–7.5)
NEUTS SEG NFR BLD: 93.2 % (ref 50–65)
PERFORMED ON: ABNORMAL
PERFORMED ON: NORMAL
PLATELET # BLD AUTO: 250 K/UL (ref 130–400)
PMV BLD AUTO: 10.7 FL (ref 9.4–12.4)
POTASSIUM SERPL-SCNC: 5.3 MMOL/L (ref 3.5–5)
POTASSIUM SERPL-SCNC: 5.6 MMOL/L (ref 3.5–5.1)
PROT SERPL-MCNC: 6.4 G/DL (ref 6.4–8.3)
RBC # BLD AUTO: 2.75 M/UL (ref 4.7–6.1)
SODIUM SERPL-SCNC: 134 MMOL/L (ref 136–145)
SODIUM SERPL-SCNC: 136 MMOL/L (ref 136–145)
T4 FREE SERPL-MCNC: 1.94 NG/DL (ref 0.93–1.7)
VANCOMYCIN SERPL-MCNC: 15.7 UG/ML
VANCOMYCIN TROUGH SERPL-MCNC: 18.4 UG/ML (ref 10–20)
WBC # BLD AUTO: 23.1 K/UL (ref 4.8–10.8)

## 2025-05-05 PROCEDURE — 2000000000 HC ICU R&B

## 2025-05-05 PROCEDURE — 2580000003 HC RX 258

## 2025-05-05 PROCEDURE — 84439 ASSAY OF FREE THYROXINE: CPT

## 2025-05-05 PROCEDURE — 2580000003 HC RX 258: Performed by: EMERGENCY MEDICINE

## 2025-05-05 PROCEDURE — 93005 ELECTROCARDIOGRAM TRACING: CPT | Performed by: STUDENT IN AN ORGANIZED HEALTH CARE EDUCATION/TRAINING PROGRAM

## 2025-05-05 PROCEDURE — 2500000003 HC RX 250 WO HCPCS: Performed by: STUDENT IN AN ORGANIZED HEALTH CARE EDUCATION/TRAINING PROGRAM

## 2025-05-05 PROCEDURE — 6360000002 HC RX W HCPCS: Performed by: EMERGENCY MEDICINE

## 2025-05-05 PROCEDURE — 82962 GLUCOSE BLOOD TEST: CPT

## 2025-05-05 PROCEDURE — 2700000000 HC OXYGEN THERAPY PER DAY

## 2025-05-05 PROCEDURE — 6360000002 HC RX W HCPCS: Performed by: STUDENT IN AN ORGANIZED HEALTH CARE EDUCATION/TRAINING PROGRAM

## 2025-05-05 PROCEDURE — 2580000003 HC RX 258: Performed by: STUDENT IN AN ORGANIZED HEALTH CARE EDUCATION/TRAINING PROGRAM

## 2025-05-05 PROCEDURE — 36415 COLL VENOUS BLD VENIPUNCTURE: CPT

## 2025-05-05 PROCEDURE — 94640 AIRWAY INHALATION TREATMENT: CPT

## 2025-05-05 PROCEDURE — 83735 ASSAY OF MAGNESIUM: CPT

## 2025-05-05 PROCEDURE — 94760 N-INVAS EAR/PLS OXIMETRY 1: CPT

## 2025-05-05 PROCEDURE — 93010 ELECTROCARDIOGRAM REPORT: CPT | Performed by: INTERNAL MEDICINE

## 2025-05-05 PROCEDURE — 94660 CPAP INITIATION&MGMT: CPT

## 2025-05-05 PROCEDURE — 80053 COMPREHEN METABOLIC PANEL: CPT

## 2025-05-05 PROCEDURE — 85025 COMPLETE CBC W/AUTO DIFF WBC: CPT

## 2025-05-05 PROCEDURE — 99223 1ST HOSP IP/OBS HIGH 75: CPT | Performed by: PHYSICIAN ASSISTANT

## 2025-05-05 PROCEDURE — 80202 ASSAY OF VANCOMYCIN: CPT

## 2025-05-05 PROCEDURE — 6370000000 HC RX 637 (ALT 250 FOR IP): Performed by: STUDENT IN AN ORGANIZED HEALTH CARE EDUCATION/TRAINING PROGRAM

## 2025-05-05 RX ORDER — SODIUM CHLORIDE, SODIUM LACTATE, POTASSIUM CHLORIDE, CALCIUM CHLORIDE 600; 310; 30; 20 MG/100ML; MG/100ML; MG/100ML; MG/100ML
INJECTION, SOLUTION INTRAVENOUS CONTINUOUS
Status: DISCONTINUED | OUTPATIENT
Start: 2025-05-05 | End: 2025-05-05

## 2025-05-05 RX ORDER — FAMOTIDINE 20 MG/1
10 TABLET, FILM COATED ORAL DAILY
Status: DISCONTINUED | OUTPATIENT
Start: 2025-05-05 | End: 2025-05-09 | Stop reason: HOSPADM

## 2025-05-05 RX ORDER — LEVOTHYROXINE SODIUM 75 UG/1
150 TABLET ORAL DAILY
Status: DISCONTINUED | OUTPATIENT
Start: 2025-05-05 | End: 2025-05-09 | Stop reason: HOSPADM

## 2025-05-05 RX ORDER — TAMSULOSIN HYDROCHLORIDE 0.4 MG/1
0.4 CAPSULE ORAL DAILY
Status: DISCONTINUED | OUTPATIENT
Start: 2025-05-05 | End: 2025-05-09 | Stop reason: HOSPADM

## 2025-05-05 RX ORDER — CLOPIDOGREL BISULFATE 75 MG/1
75 TABLET ORAL DAILY
Status: DISCONTINUED | OUTPATIENT
Start: 2025-05-05 | End: 2025-05-09 | Stop reason: HOSPADM

## 2025-05-05 RX ORDER — ATORVASTATIN CALCIUM 10 MG/1
10 TABLET, FILM COATED ORAL DAILY
Status: DISCONTINUED | OUTPATIENT
Start: 2025-05-05 | End: 2025-05-09 | Stop reason: HOSPADM

## 2025-05-05 RX ORDER — ASPIRIN 81 MG/1
81 TABLET ORAL DAILY
Status: DISCONTINUED | OUTPATIENT
Start: 2025-05-05 | End: 2025-05-09 | Stop reason: HOSPADM

## 2025-05-05 RX ORDER — DEXTROSE MONOHYDRATE 50 MG/ML
INJECTION, SOLUTION INTRAVENOUS CONTINUOUS
Status: DISCONTINUED | OUTPATIENT
Start: 2025-05-05 | End: 2025-05-06

## 2025-05-05 RX ADMIN — DEXTROSE MONOHYDRATE: 50 INJECTION, SOLUTION INTRAVENOUS at 15:01

## 2025-05-05 RX ADMIN — CEFEPIME 1000 MG: 1 INJECTION, POWDER, FOR SOLUTION INTRAMUSCULAR; INTRAVENOUS at 04:48

## 2025-05-05 RX ADMIN — IPRATROPIUM BROMIDE AND ALBUTEROL SULFATE 1 DOSE: 2.5; .5 SOLUTION RESPIRATORY (INHALATION) at 18:42

## 2025-05-05 RX ADMIN — CEFEPIME 1000 MG: 1 INJECTION, POWDER, FOR SOLUTION INTRAMUSCULAR; INTRAVENOUS at 17:15

## 2025-05-05 RX ADMIN — SODIUM CHLORIDE, SODIUM LACTATE, POTASSIUM CHLORIDE, AND CALCIUM CHLORIDE: .6; .31; .03; .02 INJECTION, SOLUTION INTRAVENOUS at 09:10

## 2025-05-05 RX ADMIN — IPRATROPIUM BROMIDE AND ALBUTEROL SULFATE 1 DOSE: 2.5; .5 SOLUTION RESPIRATORY (INHALATION) at 10:17

## 2025-05-05 RX ADMIN — ENOXAPARIN SODIUM 30 MG: 100 INJECTION SUBCUTANEOUS at 17:14

## 2025-05-05 RX ADMIN — DEXTROSE MONOHYDRATE 125 ML: 10 INJECTION, SOLUTION INTRAVENOUS at 14:41

## 2025-05-05 RX ADMIN — DEXTROSE MONOHYDRATE 125 ML: 10 INJECTION, SOLUTION INTRAVENOUS at 22:22

## 2025-05-05 RX ADMIN — VANCOMYCIN HYDROCHLORIDE 750 MG: 750 INJECTION, POWDER, LYOPHILIZED, FOR SOLUTION INTRAVENOUS at 03:46

## 2025-05-05 RX ADMIN — IPRATROPIUM BROMIDE AND ALBUTEROL SULFATE 1 DOSE: 2.5; .5 SOLUTION RESPIRATORY (INHALATION) at 14:27

## 2025-05-05 RX ADMIN — IPRATROPIUM BROMIDE AND ALBUTEROL SULFATE 1 DOSE: 2.5; .5 SOLUTION RESPIRATORY (INHALATION) at 06:19

## 2025-05-05 RX ADMIN — DEXTROSE: 10 SOLUTION INTRAVENOUS at 06:33

## 2025-05-05 RX ADMIN — VANCOMYCIN HYDROCHLORIDE 750 MG: 750 INJECTION, POWDER, LYOPHILIZED, FOR SOLUTION INTRAVENOUS at 17:22

## 2025-05-05 RX ADMIN — SODIUM CHLORIDE, PRESERVATIVE FREE 10 ML: 5 INJECTION INTRAVENOUS at 07:48

## 2025-05-05 NOTE — PROGRESS NOTES
4 Eyes Skin Assessment     NAME:  Atul Peoples  YOB: 1940  MEDICAL RECORD NUMBER:  [id number]    The patient is being assessed for  Admission    I agree that at least one RN has performed a thorough Head to Toe Skin Assessment on the patient. ALL assessment sites listed below have been assessed.      Areas assessed by both nurses:    Head, Face, Ears, Shoulders, Back, Chest, Arms, Elbows, Hands, Sacrum. Buttock, Coccyx, Ischium, Legs. Feet and Heels, and Under Medical Devices         Does the Patient have a Wound? Yes wound(s) were present on assessment. LDA wound assessment was Initiated and completed by RN       Juan Diego Prevention initiated by RN: Yes  Wound Care Orders initiated by RN: No    Pressure Injury (Stage 3,4, Unstageable, DTI, NWPT, and Complex wounds) if present, place Wound referral order by RN under : Yes    New Ostomies, if present place, Ostomy referral order under : No     Nurse 1 eSignature: Electronically signed by Nallely Thornton RN on 5/5/25 at 9:23 AM CDT    **SHARE this note so that the co-signing nurse can place an eSignature**    Nurse 2 eSignature: Electronically signed by Araceli Garrett RN on 5/5/2025 at 9:44 AM

## 2025-05-05 NOTE — PROGRESS NOTES
[facility]   Pharmacy Pharmacokinetic Monitoring Service - Vancomycin    Consulting Provider: Dr. Garcia  Indication: Sepsis  Therapeutic Target: Trough 15-20 mg/L, switch to -600 once renal function stable  Day of Therapy: 2  Additional Antimicrobials: Cefepime    Pertinent Laboratory Values:   Wt Readings from Last 1 Encounters:   04/29/25 74.8 kg (165 lb)     Temp Readings from Last 1 Encounters:   05/05/25 98.3 °F (36.8 °C) (Temporal)     Estimated Creatinine Clearance: 19 mL/min (A) (based on SCr of 2.7 mg/dL (H)).  Recent Labs     05/04/25  1330 05/05/25  0145   CREATININE 2.6* 2.7*   BUN 34* 36*   WBC 15.4* 23.1*     Procalcitonin: N/A     Pertinent Cultures:  Culture Date Source Results   05/04/25 Blood x 2 Sent   05/04/25 Urine Sent   05/04/25 Respiratory panel Not detected   MRSA Nasal Swab: N/A. Non-respiratory infection.    Recent vancomycin administrations                     vancomycin (VANCOCIN) 750 mg in sodium chloride 0.9 % 250 mL IVPB ([id number]) (mg) 750 mg New Bag 05/04/25 1817    vancomycin (VANCOCIN) 1,000 mg in sodium chloride 0.9 % 250 mL IVPB ([id number]) (mg) 1,000 mg New Bag 05/04/25 1513               Assessment:  Date/Time Current Dose Concentration Timing of Concentration (h)   5/5/25 Pulse dosing  15.7 7 hr 27 min   Note: Serum concentrations collected for AUC dosing may appear elevated if collected in close proximity to the dose administered, this is not necessarily an indication of toxicity    Plan:  Give vancomycin 750 mg IV once  Repeat vancomycin concentration ordered for 5/6 @ 1600   Pharmacy will continue to monitor patient and adjust therapy as indicated    Thank you for the consult,  OREN CARVAJAL RPH  5/5/2025 2:49 AM

## 2025-05-05 NOTE — CONSULTS
Palliative Care Consult Note    5/5/2025 8:50 AM  Subjective:  Admit Date: 5/4/2025  PCP: Isrrael Horner MD    Date of Service: 5/5/2025    Reason for Consultation:  Goals of Care, Code Status, Family Support     History Obtained From: EMR/Patient and their Family    History Of Present Illness:   The patient is a 85 y.o. male with PMH CAD s/p CABG, CKD 3B, infrarenal abdominal aortic aneurysm w/o rupture, superior mesenteric artery stenosis, COPD dependent on 3 L nasal cannula O2, GERD, HTN, HLD, hypothyroidism with history of myxedema, right chronic patellar fracture who presented to [facility] ED on 5/4/2025 with concern for altered mental status.  He was reported as at baseline around 1130 that day and was found minimally responsive shortly after receiving a nebulizer treatment.  He was found to be hypoglycemic with concern for acute cystitis and acute respiratory failure with hypoxia and was admitted to his PCP service.  pH 7.3, pCO2 59, pO2 58, WBC 15.4, hemoglobin 10.2, creatinine 2.6, albumin 3.0, bilirubin 2.9, alk phos 504, ,  high sensitive troponin 853.  Lipase 1277. He was admitted to his PCP service w/ concern for acute encephalopathy, sepsis 2/2 UTI w/ chronic magdaleno suspected causing acute liver injury, hypoglycemia, demand ischemia. He is receiving Cefepime, Vancomycin. Was initially placed on BiPAP but has since transitioned to NC O2. Palliative care has been consulted w concern for AMS, multisystem organ failure.    Past Medical History:        Diagnosis Date    AAA (abdominal aortic aneurysm)     ASHD (arteriosclerotic heart disease)     S/P coronary intervention followed by CBG    CAD (coronary artery disease)     Severe triple-vessel    CAD (coronary artery disease) 02/04/2015    COPD (chronic obstructive pulmonary disease) (HCC)     With tobacco abuse    GERD (gastroesophageal reflux disease)     HH (hiatus hernia)     History of cerebrovascular disease     Hyperlipidemia     PCP,

## 2025-05-05 NOTE — CARE COORDINATION
Case Management Assessment  Initial Evaluation    Date/Time of Evaluation: 5/5/2025 1:31 PM  Assessment Completed by: EMILE KINGSTON    If patient is discharged prior to next notation, then this note serves as note for discharge by case management.    Patient Name: Atul Peoples                   YOB: 1940  Diagnosis: Hypoglycemia [E16.2]  Elevated troponin [R79.89]  Elevated liver enzymes [R74.8]  Acute cystitis without hematuria [N30.00]  Acute encephalopathy [G93.40]  Acute respiratory failure with hypoxia and hypercapnia (HCC) [J96.01, J96.02]  Altered mental status, unspecified altered mental status type [R41.82]  Chronic kidney disease, unspecified CKD stage [N18.9]  AMS (altered mental status) [R41.82]                   Date / Time: 5/4/2025  1:25 PM    Patient Admission Status: Inpatient   Readmission Risk (Low < 19, Mod (19-27), High > 27): Readmission Risk Score: 23.8    Current PCP: Isrrael Horner MD  PCP verified by CM? (P) Yes    Chart Reviewed: Yes      History Provided by: (P) Patient, Child/Family  Patient Orientation: (P) Other (see comment), Alert and Oriented ([facility])    Patient Cognition: (P) Dementia / Early Alzheimer's    Hospitalization in the last 30 days (Readmission):  No    If yes, Readmission Assessment in  Navigator will be completed.    Advance Directives:      Code Status: DNR   Patient's Primary Decision Maker is: (P) Legal Next of Kin    Primary Decision Maker: ROSEMARIE Cassidy [facility] - [phone]    Secondary Decision Maker: Tootie Adame - Child - [phone]    Secondary Decision Maker: Barry Peoples - Child - [phone]    Discharge Planning:    Patient lives with: (P) Children, Family Members Type of Home: (P) House  Primary Care Giver: (P) Other (Comment) ([facility])  Patient Support Systems include: (P) Family Members, Children   Current Financial resources: (P) Medicare  Current community resources: (P) None  Current

## 2025-05-05 NOTE — PROGRESS NOTES
[facility]   Pharmacy Pharmacokinetic Monitoring Service - Vancomycin    Consulting Provider: Dr. Garcia  Indication: Sepsis  Therapeutic Target: Trough 15-20 mg/L, switch to -600 once renal function stable  Day of Therapy: 2  Additional Antimicrobials: cefepime    Pertinent Laboratory Values:   Wt Readings from Last 1 Encounters:   04/29/25 74.8 kg (165 lb)     Temp Readings from Last 1 Encounters:   05/05/25 97.7 °F (36.5 °C) (Temporal)     Estimated Creatinine Clearance: 19 mL/min (A) (based on SCr of 2.7 mg/dL (H)).  Recent Labs     05/04/25  1330 05/05/25  0145 05/05/25  1341   CREATININE 2.6* 2.7* 2.7*   BUN 34* 36* 41*   WBC 15.4* 23.1*  --        Pertinent Cultures:  Culture Date Source Results   5/4/25 Respiratory, molecular  Nothing detected   5/4/25 Urine No growth to date   5/4/25 Blood x2 No growth to date   MRSA Nasal Swab: N/A. Non-respiratory infection.    Recent vancomycin administrations                     vancomycin (VANCOCIN) 750 mg in sodium chloride 0.9 % 250 mL IVPB ([id number]) (mg) 750 mg New Bag 05/05/25 0346    vancomycin (VANCOCIN) 750 mg in sodium chloride 0.9 % 250 mL IVPB ([id number]) (mg) 750 mg New Bag 05/04/25 1817    vancomycin (VANCOCIN) 1,000 mg in sodium chloride 0.9 % 250 mL IVPB ([id number]) (mg) 1,000 mg New Bag 05/04/25 1513                    Assessment:  Date/Time Current Dose Concentration Timing of Concentration (h)   5/5/25 1655 Pulse, 750mg IV 18.4 12 h 4 min   Note: Serum concentrations collected for AUC dosing may appear elevated if collected in close proximity to the dose administered, this is not necessarily an indication of toxicity    Plan:  Current dosing regimen is therapeutic  Give vancomycin 750mg IV x1  Repeat vancomycin concentration ordered for 5/6 @ 0500 (~ 12 hour level)   Pharmacy will continue to monitor patient and adjust therapy as indicated    Thank you for the consult,  Ruth Arndt RPH  5/5/2025 4:52 PM

## 2025-05-05 NOTE — PROGRESS NOTES
Patient blood glucose dropped to 61 this afternoon. D10 bolus started per prn orders. Notified JENNY Levine. See MAR for orders.     Electronically signed by Nallely Thornton RN on 5/5/2025 at 2:53 PM    Repeat blood glucose 102.     Electronically signed by Nallely Thornton RN on 5/5/2025 at 3:03 PM

## 2025-05-05 NOTE — PROGRESS NOTES
[facility]  Nurse  Consult Note       NAME:  Atul Peoples  MEDICAL RECORD NUMBER:  [id number]  AGE: 85 y.o.   GENDER: male  : 1940  TODAY'S DATE:  2025    Subjective   Reason for Wound Nurse Evaluation and Assessment: stage 3 pressure injuries to coccyx and right buttock      Atul Peoples is a 85 y.o. male referred by:   [x] Physician  [] Nursing  [] Other:     Wound Identification:  Wound Type: pressure  Contributing Factors: chronic pressure and decreased mobility    Wound History: Patient presented to the hospital with stage 3 pressure injuries present to his right buttock and coccyx. The right buttock wound is slough covered with a scant amount of serosanguinous drainage noted. The coccyx wound is pink/red with a scant amount of serosanguinous drainage noted.       Current Wound Care Treatment:        COCCYX/RIGHT BUTTOCK: Cleanse wounds with soap and water. Apply Aquacel Extra cut to fit the wound bed. Cover with silicone border. Change daily and PRN      Turn patient at least every 2 hours  Kalee Air + pump when transferred to floor  NO briefs  HOB 30 degrees or less unless eating/drinking  Offload heels at all times while in bed      Patient Goal of Care:  [x] Wound Healing  [] Odor Control  [] Palliative Care  [] Pain Control   [] Other:         PAST MEDICAL HISTORY        Diagnosis Date    AAA (abdominal aortic aneurysm)     ASHD (arteriosclerotic heart disease)     S/P coronary intervention followed by CBG    CAD (coronary artery disease)     Severe triple-vessel    CAD (coronary artery disease) 2015    COPD (chronic obstructive pulmonary disease) (HCC)     With tobacco abuse    GERD (gastroesophageal reflux disease)     HH (hiatus hernia)     History of cerebrovascular disease     Hyperlipidemia     PCP, Dr. REZA Horner manages cholesterol.    Hypertension     Obesity     Osteoarthritis     Palliative care patient 2022    Renal insufficiency     S/P CABG x 3     S/P CABG x 4

## 2025-05-05 NOTE — PROGRESS NOTES
Physical Therapy  Name: Autl Peoples  MRN:  [id number]  Date of service:  5/5/2025    Pt's RN, Nallely, states pt is not alert enough for PT today. Will f/u at a later time.    Electronically signed by Selene Mera PT on 5/5/2025 at 10:15 AM

## 2025-05-05 NOTE — PROGRESS NOTES
Daily Progress Note  Atul Peoples  MRN: [id number] LOS: 1    Admit Date: 2025 7:50 AM    Subjective:         Interval History:    Reviewed overnight events and nursing notes.     Still altered, mild improvement from yesterday.      Review of Systems   Unable to perform ROS: Mental status change   Constitutional:  Negative for fever.       DIET:  Diet NPO    Medications:      dextrose      sodium chloride      sodium chloride        vancomycin (VANCOCIN) intermittent dosing (placeholder)   Other RX Placeholder    sodium chloride flush  5-40 mL IntraVENous 2 times per day    sodium chloride flush  5-40 mL IntraVENous 2 times per day    enoxaparin  30 mg SubCUTAneous Daily    cefepime  1,000 mg IntraVENous Q12H    ipratropium 0.5 mg-albuterol 2.5 mg  1 Dose Inhalation Q4H WA RT         Objective:     Vitals: BP (!) 116/53   Pulse 95   Temp 98.6 °F (37 °C) (Temporal)   Resp 14   SpO2 93%    Intake/Output Summary (Last 24 hours) at 2025 0750  Last data filed at 2025 0600  Gross per 24 hour   Intake 1231.28 ml   Output 235 ml   Net 996.28 ml    Temp (24hrs), Av.3 °F (36.3 °C), Min:94.1 °F (34.5 °C), Max:100.1 °F (37.8 °C)    Glucose:  Lab Results   Component Value Date    POCGLU 72 2025    POCGLU 102 (H) 2025    POCGLU 106 (H) 2025    POCGLU 94 2025     Physical Examination:   Objective:  General Appearance:  Comfortable, ill-appearing and in no acute distress.    Vital signs: (most recent): Blood pressure (!) 116/53, pulse 95, temperature 98.6 °F (37 °C), temperature source Temporal, resp. rate 14, SpO2 93%.  No fever.    Lungs:  Normal effort and normal respiratory rate.  Breath sounds clear to auscultation.  He is not in respiratory distress.    Heart: Normal rate.  Regular rhythm.  No murmur.   Abdomen: Abdomen is soft and non-distended.  Bowel sounds are normal.   There is no abdominal tenderness.          Labs:  No results found for: \"CHEMIS\", \"CBC\"  nursing home resident who p/w altered mental status and hypoglycemia.     Plan:     Acute encephalopathy  Multifactorial, hypoglycemia definitely the main cause but also contributing are sepsis and acute hypercarbic respiratory failure.  Plan detailed below.     Sepsis  Seems that source is urinary.  Will continue broad spectrum antibiotics while we await culture data.     Type 2 diabetes c/b hypoglycemia  Unclear about his med list at the moment but I don't believe he is on any oral hypoglycemics making sepsis the most likely cause of his hypoglycemia.  Will hold D10 and monitor sodium     Acute liver injury, hyperbilirubinemia  Probably related to sepsis, enzymes normalizing     Chronic kidney disease  Baseline creatinine appears to be about 2.0-2.5, within acceptable range     NSTEMI, type 2  Coronary artery disease  Demand ischemia, will continue antiplatelets     Heart failure with preserved ejection fraction  Last echo 7/2024 sowing diastolic dysfunction. Euvolemic, received volume during sepsis fluid resuscitation in the ED, monitor carefully    Reviewed treatment plans with the patient and/or family.     Code Status: DNR    Electronically signed by Wilfredo Smith MD on 5/5/2025 at 7:50 AM

## 2025-05-06 LAB
ACANTHOCYTES BLD QL SMEAR: ABNORMAL
ALBUMIN SERPL-MCNC: 2.4 G/DL (ref 3.5–5.2)
ALP SERPL-CCNC: 354 U/L (ref 40–129)
ALT SERPL-CCNC: 114 U/L (ref 10–50)
ANION GAP SERPL CALCULATED.3IONS-SCNC: 10 MMOL/L (ref 8–16)
ANISOCYTOSIS BLD QL SMEAR: ABNORMAL
AST SERPL-CCNC: 136 U/L (ref 10–50)
BACTERIA UR CULT: NORMAL
BASO STIPL BLD QL SMEAR: ABNORMAL
BASOPHILS # BLD: 0 K/UL (ref 0–0.2)
BASOPHILS NFR BLD: 0 % (ref 0–1)
BILIRUB SERPL-MCNC: 0.8 MG/DL (ref 0.2–1.2)
BUN SERPL-MCNC: 41 MG/DL (ref 8–23)
BURR CELLS BLD QL SMEAR: ABNORMAL
CALCIUM SERPL-MCNC: 8.6 MG/DL (ref 8.8–10.2)
CHLORIDE SERPL-SCNC: 99 MMOL/L (ref 98–107)
CO2 SERPL-SCNC: 22 MMOL/L (ref 22–29)
CREAT SERPL-MCNC: 2.5 MG/DL (ref 0.7–1.2)
EOSINOPHIL # BLD: 0.28 K/UL (ref 0–0.6)
EOSINOPHIL NFR BLD: 2 % (ref 0–5)
ERYTHROCYTE [DISTWIDTH] IN BLOOD BY AUTOMATED COUNT: 16.2 % (ref 11.5–14.5)
GLUCOSE BLD-MCNC: 107 MG/DL (ref 70–99)
GLUCOSE BLD-MCNC: 109 MG/DL (ref 70–99)
GLUCOSE BLD-MCNC: 111 MG/DL (ref 70–99)
GLUCOSE BLD-MCNC: 46 MG/DL (ref 70–99)
GLUCOSE BLD-MCNC: 49 MG/DL (ref 70–99)
GLUCOSE BLD-MCNC: 57 MG/DL (ref 70–99)
GLUCOSE BLD-MCNC: 58 MG/DL (ref 70–99)
GLUCOSE BLD-MCNC: 65 MG/DL (ref 70–99)
GLUCOSE BLD-MCNC: 71 MG/DL (ref 70–99)
GLUCOSE BLD-MCNC: 73 MG/DL (ref 70–99)
GLUCOSE BLD-MCNC: 77 MG/DL (ref 70–99)
GLUCOSE BLD-MCNC: 77 MG/DL (ref 70–99)
GLUCOSE BLD-MCNC: 80 MG/DL (ref 70–99)
GLUCOSE BLD-MCNC: 92 MG/DL (ref 70–99)
GLUCOSE BLD-MCNC: 93 MG/DL (ref 70–99)
GLUCOSE SERPL-MCNC: 79 MG/DL (ref 70–99)
HCT VFR BLD AUTO: 26.6 % (ref 42–52)
HGB BLD-MCNC: 8.4 G/DL (ref 14–18)
IMM GRANULOCYTES # BLD: 0.1 K/UL
LYMPHOCYTES # BLD: 1.5 K/UL (ref 1.1–4.5)
LYMPHOCYTES NFR BLD: 11 % (ref 20–40)
MACROCYTES BLD QL SMEAR: ABNORMAL
MCH RBC QN AUTO: 31.8 PG (ref 27–31)
MCHC RBC AUTO-ENTMCNC: 31.6 G/DL (ref 33–37)
MCV RBC AUTO: 100.8 FL (ref 80–94)
MONOCYTES # BLD: 0.7 K/UL (ref 0–0.9)
MONOCYTES NFR BLD: 5 % (ref 0–10)
NEUTROPHILS # BLD: 11.3 K/UL (ref 1.5–7.5)
NEUTS BAND NFR BLD MANUAL: 2 % (ref 0–5)
NEUTS SEG NFR BLD: 80 % (ref 50–65)
OVALOCYTES BLD QL SMEAR: ABNORMAL
PERFORMED ON: ABNORMAL
PERFORMED ON: NORMAL
PLATELET # BLD AUTO: 260 K/UL (ref 130–400)
PLATELET SLIDE REVIEW: ADEQUATE
PMV BLD AUTO: 10.7 FL (ref 9.4–12.4)
POIKILOCYTOSIS BLD QL SMEAR: ABNORMAL
POLYCHROMASIA BLD QL SMEAR: ABNORMAL
POTASSIUM SERPL-SCNC: 4.7 MMOL/L (ref 3.5–5.1)
PROT SERPL-MCNC: 5.9 G/DL (ref 6.4–8.3)
RBC # BLD AUTO: 2.64 M/UL (ref 4.7–6.1)
SCHISTOCYTES BLD QL SMEAR: ABNORMAL
SODIUM SERPL-SCNC: 131 MMOL/L (ref 136–145)
TARGETS BLD QL SMEAR: ABNORMAL
VANCOMYCIN SERPL-MCNC: 22.2 UG/ML
WBC # BLD AUTO: 13.8 K/UL (ref 4.8–10.8)

## 2025-05-06 PROCEDURE — 94640 AIRWAY INHALATION TREATMENT: CPT

## 2025-05-06 PROCEDURE — 6360000002 HC RX W HCPCS: Performed by: STUDENT IN AN ORGANIZED HEALTH CARE EDUCATION/TRAINING PROGRAM

## 2025-05-06 PROCEDURE — 94760 N-INVAS EAR/PLS OXIMETRY 1: CPT

## 2025-05-06 PROCEDURE — 2580000003 HC RX 258: Performed by: STUDENT IN AN ORGANIZED HEALTH CARE EDUCATION/TRAINING PROGRAM

## 2025-05-06 PROCEDURE — 85025 COMPLETE CBC W/AUTO DIFF WBC: CPT

## 2025-05-06 PROCEDURE — 2000000000 HC ICU R&B

## 2025-05-06 PROCEDURE — 92523 SPEECH SOUND LANG COMPREHEN: CPT

## 2025-05-06 PROCEDURE — 80053 COMPREHEN METABOLIC PANEL: CPT

## 2025-05-06 PROCEDURE — 82962 GLUCOSE BLOOD TEST: CPT

## 2025-05-06 PROCEDURE — 6360000002 HC RX W HCPCS

## 2025-05-06 PROCEDURE — 94660 CPAP INITIATION&MGMT: CPT

## 2025-05-06 PROCEDURE — 99233 SBSQ HOSP IP/OBS HIGH 50: CPT | Performed by: PHYSICIAN ASSISTANT

## 2025-05-06 PROCEDURE — 2580000003 HC RX 258: Performed by: FAMILY MEDICINE

## 2025-05-06 PROCEDURE — 92610 EVALUATE SWALLOWING FUNCTION: CPT

## 2025-05-06 PROCEDURE — 80202 ASSAY OF VANCOMYCIN: CPT

## 2025-05-06 PROCEDURE — 2700000000 HC OXYGEN THERAPY PER DAY

## 2025-05-06 PROCEDURE — 36415 COLL VENOUS BLD VENIPUNCTURE: CPT

## 2025-05-06 PROCEDURE — 2580000003 HC RX 258

## 2025-05-06 PROCEDURE — 6370000000 HC RX 637 (ALT 250 FOR IP): Performed by: STUDENT IN AN ORGANIZED HEALTH CARE EDUCATION/TRAINING PROGRAM

## 2025-05-06 RX ORDER — DEXTROSE MONOHYDRATE 100 MG/ML
INJECTION, SOLUTION INTRAVENOUS CONTINUOUS
Status: DISCONTINUED | OUTPATIENT
Start: 2025-05-06 | End: 2025-05-06 | Stop reason: SDUPTHER

## 2025-05-06 RX ORDER — DEXTROSE MONOHYDRATE 100 MG/ML
INJECTION, SOLUTION INTRAVENOUS CONTINUOUS
Status: DISCONTINUED | OUTPATIENT
Start: 2025-05-06 | End: 2025-05-07

## 2025-05-06 RX ORDER — GLYCOPYRROLATE 0.2 MG/ML
0.1 INJECTION INTRAMUSCULAR; INTRAVENOUS 3 TIMES DAILY PRN
Status: DISCONTINUED | OUTPATIENT
Start: 2025-05-06 | End: 2025-05-09 | Stop reason: HOSPADM

## 2025-05-06 RX ADMIN — IPRATROPIUM BROMIDE AND ALBUTEROL SULFATE 1 DOSE: 2.5; .5 SOLUTION RESPIRATORY (INHALATION) at 14:09

## 2025-05-06 RX ADMIN — IPRATROPIUM BROMIDE AND ALBUTEROL SULFATE 1 DOSE: 2.5; .5 SOLUTION RESPIRATORY (INHALATION) at 10:26

## 2025-05-06 RX ADMIN — DEXTROSE MONOHYDRATE: 10 INJECTION, SOLUTION INTRAVENOUS at 02:31

## 2025-05-06 RX ADMIN — GLYCOPYRROLATE 0.1 MG: 0.2 INJECTION INTRAMUSCULAR; INTRAVENOUS at 21:16

## 2025-05-06 RX ADMIN — ENOXAPARIN SODIUM 30 MG: 100 INJECTION SUBCUTANEOUS at 16:31

## 2025-05-06 RX ADMIN — GLYCOPYRROLATE 0.1 MG: 0.2 INJECTION INTRAMUSCULAR; INTRAVENOUS at 11:58

## 2025-05-06 RX ADMIN — DEXTROSE MONOHYDRATE: 50 INJECTION, SOLUTION INTRAVENOUS at 01:21

## 2025-05-06 RX ADMIN — DEXTROSE MONOHYDRATE 125 ML: 10 INJECTION, SOLUTION INTRAVENOUS at 00:31

## 2025-05-06 RX ADMIN — DEXTROSE MONOHYDRATE 125 ML: 10 INJECTION, SOLUTION INTRAVENOUS at 08:28

## 2025-05-06 RX ADMIN — DEXTROSE: 10 SOLUTION INTRAVENOUS at 11:49

## 2025-05-06 RX ADMIN — CEFEPIME 1000 MG: 1 INJECTION, POWDER, FOR SOLUTION INTRAMUSCULAR; INTRAVENOUS at 04:56

## 2025-05-06 RX ADMIN — DEXTROSE: 10 SOLUTION INTRAVENOUS at 21:56

## 2025-05-06 RX ADMIN — CEFEPIME 1000 MG: 1 INJECTION, POWDER, FOR SOLUTION INTRAMUSCULAR; INTRAVENOUS at 16:33

## 2025-05-06 RX ADMIN — HYDROMORPHONE HYDROCHLORIDE 0.5 MG: 1 INJECTION, SOLUTION INTRAMUSCULAR; INTRAVENOUS; SUBCUTANEOUS at 22:29

## 2025-05-06 RX ADMIN — DEXTROSE MONOHYDRATE 125 ML: 10 INJECTION, SOLUTION INTRAVENOUS at 07:55

## 2025-05-06 RX ADMIN — IPRATROPIUM BROMIDE AND ALBUTEROL SULFATE 1 DOSE: 2.5; .5 SOLUTION RESPIRATORY (INHALATION) at 07:27

## 2025-05-06 RX ADMIN — DEXTROSE MONOHYDRATE: 10 INJECTION, SOLUTION INTRAVENOUS at 08:26

## 2025-05-06 RX ADMIN — IPRATROPIUM BROMIDE AND ALBUTEROL SULFATE 1 DOSE: 2.5; .5 SOLUTION RESPIRATORY (INHALATION) at 19:02

## 2025-05-06 ASSESSMENT — PAIN SCALES - WONG BAKER: WONGBAKER_NUMERICALRESPONSE: HURTS A LITTLE BIT

## 2025-05-06 NOTE — PROGRESS NOTES
Physical Therapy  Name: Atul Peoples  MRN:  [id number]  Date of service:  5/6/2025    Pt. not appropriate for PT today due to too lethargic. Will attempt at a later time.    Electronically signed by Selene Mera PT on 5/6/2025 at 1:49 PM

## 2025-05-06 NOTE — PROGRESS NOTES
Palliative Care Progress Note  5/6/2025 9:32 AM    Patient:  Atul Peoples  YOB: 1940  Primary Care Physician: Isrrael Horner MD  Advance Directive: DNR  Admit Date: 5/4/2025       Hospital Day: 2  Portions of this note have been copied forward, however, changed to reflect the most current clinical status of this patient.    CHIEF COMPLAINT/REASON FOR CONSULTATION Goals of care, family support, Code status, symptom management     SUBJECTIVE:  Mr. Peoples appears about the same. Mental status w/o improvement overnight. He is hypoglycemic and requiring D10%    HPI:  The patient is a 85 y.o. male with PMH CAD s/p CABG, CKD 3B, infrarenal abdominal aortic aneurysm w/o rupture, superior mesenteric artery stenosis, COPD dependent on 3 L nasal cannula O2, GERD, HTN, HLD, hypothyroidism with history of myxedema, right chronic patellar fracture who presented to [facility] ED on 5/4/2025 with concern for altered mental status.  He was reported as at baseline around 1130 that day and was found minimally responsive shortly after receiving a nebulizer treatment.  He was found to be hypoglycemic with concern for acute cystitis and acute respiratory failure with hypoxia and was admitted to his PCP service.  pH 7.3, pCO2 59, pO2 58, WBC 15.4, hemoglobin 10.2, creatinine 2.6, albumin 3.0, bilirubin 2.9, alk phos 504, ,  high sensitive troponin 853.  Lipase 1277. He was admitted to his PCP service w/ concern for acute encephalopathy, sepsis 2/2 UTI w/ chronic magdaleno suspected causing acute liver injury, hypoglycemia, demand ischemia. He is receiving Cefepime, Vancomycin. Was initially placed on BiPAP but has since transitioned to NC O2. Palliative care has been consulted w concern for AMS, multisystem organ failure.     Review of Systems:   14 point review of systems is negative except as specifically addressed above.    Objective:   VITALS:  BP (!) 138/59   Pulse 84   Temp 97.6 °F (36.4 °C) (Temporal)    I was able to speak with the patient's POA and we reviewed current clinical concerns. We reviewed his MOST form as well. The patient has check marked that he would only want hospitalization if needed to provide comfort, however, he also has check marked that he would want IVFs and antibiotics if indicated. In regards to a feeding tube it notes that it would be up to family. The patient's POA is reaching out to the patient's daughter to involve the children in this decision. We did also discuss a transition to hospice care if they feel it would be appropriate to make the transition to comfort at this point. His POA voices he will call back once he and the patient's daughter have discussed how they want to move forward.     I did receive a call back from the patient's POA. He has been talking w/ the patient's daughter and had a few more questions regarding ability to remove Dobhoff if the patient is not responding to TFs. We discussed that it could be removed at any point if they feel Dobhoff placement aligns with the patient's wishes to continue life prolonging intervention. We also again reviewed transition to comfort focused care and not pursing TFs if they feel Dobhoff placement would not align with his wishes especially in the setting of suspect advanced dementia and change in quality of life. The patient's POA voiced he is leaning toward Dobhoff placement at this time but will discuss again w/ the patient's daughter.     Palliative team will follow as needed.    Recommendations:   Palliative [facility] patient's POA and daughter continue to discuss whether or not to pursue temporary means of nutritional support. MOST form reviewed w/ POA Code status: DNR  Sepsis suspected 2/2 UTI w/ chronic indwelling magdaleno w/ liver injury and acute hypercarbic respiratory failure-recurrent per HCS, urine culture negative, blood cultures negative  Acute multifactorial encephalopathy in the setting of moderate to advanced

## 2025-05-06 NOTE — PROGRESS NOTES
Phone call rec'd from Dileep Welch reported back to notify that after speaking with patient's children, they have decided to approve proceeding with a feeding tube for the duration of three days and then reasssess at that time if necessary.    Electronically signed by Zulema Rae RN on 5/6/2025 at 5:17 PM

## 2025-05-06 NOTE — PROGRESS NOTES
Comprehensive Nutrition Assessment    Type and Reason for Visit:  Initial, Positive nutrition screen    Nutrition Recommendations/Plan:   Follow for alternate source of nutrition or advancing diet     Malnutrition Assessment:  Malnutrition Status:  At risk for malnutrition (05/06/25 1009)    Context:  Acute Illness     Findings of the 6 clinical characteristics of malnutrition:  Energy Intake:  Mild decrease in energy intake  Weight Loss:  Mild weight loss     Body Fat Loss:  No body fat loss     Muscle Mass Loss:  No muscle mass loss    Fluid Accumulation:  No fluid accumulation     Strength:  Not Performed    Nutrition Assessment:    +NS for wounds.  At present time patient is NPO--unresponsive.  Weight has decreased approx 13# or 7.4% from UBW since 12/2/2024    Nutrition Related Findings:    no edema noted.  BUN 41, Creat 2.5, GFR 24 Wound Type: Multiple, Stage III       Current Nutrition Intake & Therapies:    Average Meal Intake: NPO  Average Supplements Intake: NPO  Diet NPO    Anthropometric Measures:  Height: 175.3 cm (5' 9.02\")  Ideal Body Weight (IBW): 160 lbs (73 kg)    Admission Body Weight: 74.8 kg (164 lb 14.5 oz)  Current Body Weight: 74.8 kg (164 lb 14.5 oz), 103.1 % IBW.    Current BMI (kg/m2): 24.3  Usual Body Weight: 80.7 kg (178 lb) (12/2/2024)  % Weight Change (Calculated): -7.4  Weight Adjustment For: No Adjustment  BMI Categories: Normal Weight (BMI 22.0 to 24.9) age over 65    Estimated Daily Nutrient Needs:  Energy Requirements Based On: Kcal/kg  Weight Used for Energy Requirements: Current  Energy (kcal/day): 4812-9234 kcals (20-25 kcals/kg)  Weight Used for Protein Requirements: Current  Protein (g/day): 112g  Method Used for Fluid Requirements: 1 ml/kcal  Fluid (ml/day): 3739-8746 ml    Nutrition Diagnosis:   Inadequate oral intake related to cognitive or neurological impairment, acute injury/trauma as evidenced by NPO or clear liquid status due to medical condition    Nutrition

## 2025-05-06 NOTE — PROGRESS NOTES
Facility/Department: [facility] ICU   CLINICAL BEDSIDE SWALLOW EVALUATION  LANGUAGE EVALUATION     NAME: Atul Peoples  : 1940  MRN: [id number]    ADMISSION DATE: 2025  ADMITTING DIAGNOSIS: has ASHD (arteriosclerotic heart disease); Hypertension; Hyperlipidemia; COPD (chronic obstructive pulmonary disease) ([facility]); History of cerebrovascular disease; AAA (abdominal aortic aneurysm); S/P TKR (total knee replacement); Diabetes mellitus ([facility]); GERD (gastroesophageal reflux disease); Primary osteoarthritis of knee; HH (hiatus hernia); Renal insufficiency; S/P CABG x 4; Status post coronary artery stent placement; Bronchitis; Lateral subluxation of right patella; Patellofemoral disorder of right knee; Patellofemoral disorder; PVD (peripheral vascular disease); Varicose veins of both lower extremities; Pneumonia due to COVID-19 virus; COVID-19; Palliative care patient; Acute kidney injury (nontraumatic); GI bleed; Altered mental status; Melena; Acute anemia; Occult GI bleeding; Macrocytic anemia; Elevated AST (SGOT); High risk medication use; Decubitus ulcer of coccygeal region, stage 3 ([facility]); Scalp hematoma, initial encounter; Bradycardia; Moderate malnutrition; Myxedema coma ([facility]); Fall from chair, initial encounter; Rhabdomyolysis; Acute on chronic respiratory failure ([facility]); CKD stage 3a, GFR 45-59 ml/min ([facility]); Acute encephalopathy; Sepsis ([facility]); Acute respiratory failure with hypoxia and hypercarbia ([facility]); Hypoglycemia; and AMS (altered mental status) on their problem list.    Date of Eval: 2025  Evaluating Therapist: MIKE Hollingsworth    Current Diet level:  NPO    Pain:  Pain Assessment: Critical Care Pain Observation Tool (CPOT)    Reason for Referral  Atul Peoples was referred for a bedside swallow evaluation to assess the efficiency of his swallow function, identify signs and symptoms of aspiration and make recommendations regarding safe dietary consistencies, effective compensatory strategies, and safe  and with provision of max cues/prompts. Patient was 0% repeating single vowels and single consonants at independent level. Patient was 0% imitating mouth postures for speech production independently. Patient was 0% following simple 1 step auditory directions, without tactile cues, at independent level and with provision of models. Patient was 0% answering simple yes/no questions regarding immediate environment and current state of being independently. During utterances, patient exhibited very low volume of speech, decreased labial movements, and slow, decreased lingual movements with low tongue posture observed.  Wet vocal quality was noted during verbalizations; suspect decreased management of secretions.)  Dentition: Poor   Patient Positioning: Upright in bed  Consistencies Administered: Thin      Assessed patient's swallowing function with the following observations noted:     Oral Phase  Suspected Premature Bolus Loss: Thin (Patient exhibited slow oral transit of small, controlled trials thin H2O squeezed in the cheek via toothette by SLP.)  Oral Phase - Comment: Completed oral care prior to presentation of thin H2O for comfort.      Pharyngeal Phase  Laryngeal Elevation: (Patient exhibited absent reflexive swallows with no laryngeal elevation observed and just laryngeal rocking noted.)  Pharyngeal Phase - Comment: Patient exhibited S/S penetration/aspiration with increased wet vocal quality observed following probable penetration/aspiration of PO trials (small, controlled trials thin H2O squeezed in the cheek via toothette by SLP).     At this time, would recommend continuation NPO status. Recommend frequent oral care as patient with decreased management of secretions; patient may benefit from scopolamine. If patient receives mouth care prior to intake, okay for swabs water for comfort. Will continue to follow.     Electronically signed by MIKE Hollingsworth on 5/6/2025 at 10:55 AM

## 2025-05-06 NOTE — PROGRESS NOTES
[facility]   Pharmacy Pharmacokinetic Monitoring Service - Vancomycin    Consulting Provider: Dr. Garcia  Indication: Sepsis  Therapeutic Target: Trough 15-20 mg/L, switch to -600 once renal function stable  Day of Therapy: 3  Additional Antimicrobials: cefepime    Pertinent Laboratory Values:   Wt Readings from Last 1 Encounters:   04/29/25 74.8 kg (165 lb)     Temp Readings from Last 1 Encounters:   05/06/25 97.7 °F (36.5 °C) (Temporal)     Estimated Creatinine Clearance: 21 mL/min (A) (based on SCr of 2.5 mg/dL (H)).  Recent Labs     05/05/25  0145 05/05/25  1341 05/06/25  0542   CREATININE 2.7* 2.7* 2.5*   BUN 36* 41* 41*   WBC 23.1*  --  13.8*       Pertinent Cultures:  Culture Date Source Results   5/4/25 Respiratory, molecular  Nothing detected   5/4/25 Urine No growth to date   5/4/25 Blood x2 No growth to date   MRSA Nasal Swab: N/A. Non-respiratory infection.    Recent vancomycin administrations                     vancomycin (VANCOCIN) 750 mg in sodium chloride 0.9 % 250 mL IVPB ([id number]) (mg) 750 mg New Bag 05/05/25 1722    vancomycin (VANCOCIN) 750 mg in sodium chloride 0.9 % 250 mL IVPB ([id number]) (mg) 750 mg New Bag 05/05/25 0346    vancomycin (VANCOCIN) 750 mg in sodium chloride 0.9 % 250 mL IVPB ([id number]) (mg) 750 mg New Bag 05/04/25 1817    vancomycin (VANCOCIN) 1,000 mg in sodium chloride 0.9 % 250 mL IVPB ([id number]) (mg) 1,000 mg New Bag 05/04/25 1513                    Assessment:  Date/Time Current Dose Concentration Timing of Concentration (h)   5/6/25 0700 Pulse, 750mg 22.2 12 h 19 min   Note: Serum concentrations collected for AUC dosing may appear elevated if collected in close proximity to the dose administered, this is not necessarily an indication of toxicity    Plan:  Current dosing regimen is supra-therapeutic  Hold dose for now  Repeat vancomycin concentration ordered for 5/6 @ 1800 (~ 18 hour level)   Pharmacy will continue to monitor patient and adjust therapy

## 2025-05-06 NOTE — PROGRESS NOTES
Daily Progress Note  Atul Peoples  MRN: [id number] LOS: 2    Admit Date: 2025 11:45 AM    Subjective:         Interval History:    Reviewed overnight events and nursing notes.     Continues to be altered    Review of Systems   Unable to perform ROS: Mental status change       DIET:  Diet NPO    Medications:      dextrose      dextrose 100 mL/hr at 25 0826    sodium chloride        aspirin  81 mg Oral Daily    atorvastatin  10 mg Oral Daily    clopidogrel  75 mg Oral Daily    famotidine  10 mg Oral Daily    levothyroxine  150 mcg Oral Daily    tamsulosin  0.4 mg Oral Daily    sodium chloride flush  5-40 mL IntraVENous 2 times per day    enoxaparin  30 mg SubCUTAneous Daily    cefepime  1,000 mg IntraVENous Q12H    ipratropium 0.5 mg-albuterol 2.5 mg  1 Dose Inhalation Q4H WA RT         Objective:     Vitals: BP (!) 139/57   Pulse 98   Temp 97.6 °F (36.4 °C) (Temporal)   Resp 16   Ht 1.753 m (5' 9.02\")   SpO2 92%   BMI 24.35 kg/m²    Intake/Output Summary (Last 24 hours) at 2025 1145  Last data filed at 2025 1000  Gross per 24 hour   Intake 3383.7 ml   Output 1215 ml   Net 2168.7 ml    Temp (24hrs), Av.8 °F (36.6 °C), Min:97.6 °F (36.4 °C), Max:98.2 °F (36.8 °C)    Glucose:  Lab Results   Component Value Date    POCGLU 77 2025    POCGLU 93 2025    POCGLU 57 (LL) 2025    POCGLU 49 (LL) 2025     Physical Examination:   Objective:  General Appearance:  Uncomfortable, ill-appearing and in no acute distress.    Vital signs: (most recent): Blood pressure (!) 126/46, pulse 92, temperature 97.6 °F (36.4 °C), temperature source Temporal, resp. rate 13, height 1.753 m (5' 9.02\"), SpO2 100%.    Lungs:  Normal effort and normal respiratory rate.  Breath sounds clear to auscultation.  He is not in respiratory distress.    Heart: Normal rate.  Regular rhythm.  No murmur.   Abdomen: Abdomen is soft and non-distended.    Extremities: There is no dependent edema.      *        Assessment:  86 yo chronically ill nursing home resident who p/w altered mental status and hypoglycemia.     Plan:     Acute encephalopathy  Multifactorial, hypoglycemia definitely the main cause but also contributing are sepsis and chronic debility/cognitive impairment.  Slowly improving if any.       Sepsis  Seems that source is urinary.  Will continue with gram negative coverage and stop vancomycin today.     Type 2 diabetes c/b hypoglycemia  Hypoglycemia is probably related to shock liver and lack of oral intake due to dysphagia from encephalopathy     Acute liver injury, hyperbilirubinemia  Improving, supportive care     Chronic kidney disease  Baseline creatinine appears to be about 2.0-2.5, within acceptable range     NSTEMI, type 2  Coronary artery disease  Demand ischemia, will continue antiplatelets     Heart failure with preserved ejection fraction  Last echo 7/2024 sowing diastolic dysfunction. Euvolemic, received volume during sepsis fluid resuscitation in the ED, monitor carefully    Spoke with POA today who is agreeable with a short term course of a dobhoff tube.  I hope this can get the patient out of the ICU and possibly improve mental status, will try to get him to the floor tomorrow.  Will [facility] if not improved.      Reviewed treatment plans with the patient and/or family.     Code Status: DNR    Electronically signed by Wilfredo Smith MD on 5/6/2025 at 11:45 AM

## 2025-05-06 NOTE — PROGRESS NOTES
As discussed with Dr Smith during AM rounding, followed up with Nallely ALVARADO regarding potential need for consideration of dobhoff nutrition tomorrow to help maintain glucose as well as further discuss possibility of hospice transition of care in relation to patients wishes.    Electronically signed by Zulema Rae RN on 5/6/2025 at 9:38 AM

## 2025-05-07 ENCOUNTER — APPOINTMENT (OUTPATIENT)
Dept: GENERAL RADIOLOGY | Age: 85
End: 2025-05-07
Payer: MEDICARE

## 2025-05-07 LAB
ALBUMIN SERPL-MCNC: 2.4 G/DL (ref 3.5–5.2)
ALP SERPL-CCNC: 320 U/L (ref 40–129)
ALT SERPL-CCNC: 89 U/L (ref 10–50)
ANION GAP SERPL CALCULATED.3IONS-SCNC: 9 MMOL/L (ref 8–16)
AST SERPL-CCNC: 92 U/L (ref 10–50)
BASOPHILS # BLD: 0 K/UL (ref 0–0.2)
BASOPHILS NFR BLD: 0.2 % (ref 0–1)
BILIRUB SERPL-MCNC: 0.6 MG/DL (ref 0.2–1.2)
BUN SERPL-MCNC: 37 MG/DL (ref 8–23)
CALCIUM SERPL-MCNC: 8.6 MG/DL (ref 8.8–10.2)
CHLORIDE SERPL-SCNC: 98 MMOL/L (ref 98–107)
CO2 SERPL-SCNC: 21 MMOL/L (ref 22–29)
CREAT SERPL-MCNC: 2.3 MG/DL (ref 0.7–1.2)
EOSINOPHIL # BLD: 0.3 K/UL (ref 0–0.6)
EOSINOPHIL NFR BLD: 2.7 % (ref 0–5)
ERYTHROCYTE [DISTWIDTH] IN BLOOD BY AUTOMATED COUNT: 16 % (ref 11.5–14.5)
GLUCOSE BLD-MCNC: 100 MG/DL (ref 70–99)
GLUCOSE BLD-MCNC: 103 MG/DL (ref 70–99)
GLUCOSE BLD-MCNC: 108 MG/DL (ref 70–99)
GLUCOSE BLD-MCNC: 111 MG/DL (ref 70–99)
GLUCOSE BLD-MCNC: 63 MG/DL (ref 70–99)
GLUCOSE BLD-MCNC: 70 MG/DL (ref 70–99)
GLUCOSE BLD-MCNC: 70 MG/DL (ref 70–99)
GLUCOSE BLD-MCNC: 86 MG/DL (ref 70–99)
GLUCOSE BLD-MCNC: 90 MG/DL (ref 70–99)
GLUCOSE BLD-MCNC: 95 MG/DL (ref 70–99)
GLUCOSE SERPL-MCNC: 95 MG/DL (ref 70–99)
HCT VFR BLD AUTO: 25.6 % (ref 42–52)
HGB BLD-MCNC: 8.5 G/DL (ref 14–18)
IMM GRANULOCYTES # BLD: 0.1 K/UL
LYMPHOCYTES # BLD: 1.5 K/UL (ref 1.1–4.5)
LYMPHOCYTES NFR BLD: 12 % (ref 20–40)
MCH RBC QN AUTO: 32.4 PG (ref 27–31)
MCHC RBC AUTO-ENTMCNC: 33.2 G/DL (ref 33–37)
MCV RBC AUTO: 97.7 FL (ref 80–94)
MONOCYTES # BLD: 1.5 K/UL (ref 0–0.9)
MONOCYTES NFR BLD: 11.7 % (ref 0–10)
NEUTROPHILS # BLD: 9 K/UL (ref 1.5–7.5)
NEUTS SEG NFR BLD: 72.8 % (ref 50–65)
PERFORMED ON: ABNORMAL
PERFORMED ON: NORMAL
PLATELET # BLD AUTO: 268 K/UL (ref 130–400)
PMV BLD AUTO: 10.8 FL (ref 9.4–12.4)
POTASSIUM SERPL-SCNC: 4.6 MMOL/L (ref 3.5–5.1)
PROT SERPL-MCNC: 5.9 G/DL (ref 6.4–8.3)
RBC # BLD AUTO: 2.62 M/UL (ref 4.7–6.1)
SODIUM SERPL-SCNC: 128 MMOL/L (ref 136–145)
WBC # BLD AUTO: 12.4 K/UL (ref 4.8–10.8)

## 2025-05-07 PROCEDURE — 2580000003 HC RX 258: Performed by: STUDENT IN AN ORGANIZED HEALTH CARE EDUCATION/TRAINING PROGRAM

## 2025-05-07 PROCEDURE — 92507 TX SP LANG VOICE COMM INDIV: CPT

## 2025-05-07 PROCEDURE — 85025 COMPLETE CBC W/AUTO DIFF WBC: CPT

## 2025-05-07 PROCEDURE — 99232 SBSQ HOSP IP/OBS MODERATE 35: CPT | Performed by: PHYSICIAN ASSISTANT

## 2025-05-07 PROCEDURE — 2700000000 HC OXYGEN THERAPY PER DAY

## 2025-05-07 PROCEDURE — 94760 N-INVAS EAR/PLS OXIMETRY 1: CPT

## 2025-05-07 PROCEDURE — 2580000003 HC RX 258: Performed by: FAMILY MEDICINE

## 2025-05-07 PROCEDURE — 6360000002 HC RX W HCPCS: Performed by: STUDENT IN AN ORGANIZED HEALTH CARE EDUCATION/TRAINING PROGRAM

## 2025-05-07 PROCEDURE — 94640 AIRWAY INHALATION TREATMENT: CPT

## 2025-05-07 PROCEDURE — 6370000000 HC RX 637 (ALT 250 FOR IP): Performed by: STUDENT IN AN ORGANIZED HEALTH CARE EDUCATION/TRAINING PROGRAM

## 2025-05-07 PROCEDURE — 6360000002 HC RX W HCPCS

## 2025-05-07 PROCEDURE — 82962 GLUCOSE BLOOD TEST: CPT

## 2025-05-07 PROCEDURE — 92526 ORAL FUNCTION THERAPY: CPT

## 2025-05-07 PROCEDURE — 1200000000 HC SEMI PRIVATE

## 2025-05-07 PROCEDURE — 71045 X-RAY EXAM CHEST 1 VIEW: CPT

## 2025-05-07 PROCEDURE — 80053 COMPREHEN METABOLIC PANEL: CPT

## 2025-05-07 PROCEDURE — 2500000003 HC RX 250 WO HCPCS: Performed by: STUDENT IN AN ORGANIZED HEALTH CARE EDUCATION/TRAINING PROGRAM

## 2025-05-07 PROCEDURE — 36415 COLL VENOUS BLD VENIPUNCTURE: CPT

## 2025-05-07 RX ORDER — SODIUM CHLORIDE, SODIUM LACTATE, POTASSIUM CHLORIDE, CALCIUM CHLORIDE 600; 310; 30; 20 MG/100ML; MG/100ML; MG/100ML; MG/100ML
INJECTION, SOLUTION INTRAVENOUS CONTINUOUS
Status: DISCONTINUED | OUTPATIENT
Start: 2025-05-07 | End: 2025-05-09 | Stop reason: HOSPADM

## 2025-05-07 RX ADMIN — IPRATROPIUM BROMIDE AND ALBUTEROL SULFATE 1 DOSE: 2.5; .5 SOLUTION RESPIRATORY (INHALATION) at 10:20

## 2025-05-07 RX ADMIN — DEXTROSE MONOHYDRATE 125 ML: 10 INJECTION, SOLUTION INTRAVENOUS at 16:58

## 2025-05-07 RX ADMIN — CEFEPIME 1000 MG: 1 INJECTION, POWDER, FOR SOLUTION INTRAMUSCULAR; INTRAVENOUS at 16:24

## 2025-05-07 RX ADMIN — SODIUM CHLORIDE, PRESERVATIVE FREE 10 ML: 5 INJECTION INTRAVENOUS at 08:04

## 2025-05-07 RX ADMIN — IPRATROPIUM BROMIDE AND ALBUTEROL SULFATE 1 DOSE: 2.5; .5 SOLUTION RESPIRATORY (INHALATION) at 14:11

## 2025-05-07 RX ADMIN — SODIUM CHLORIDE, SODIUM LACTATE, POTASSIUM CHLORIDE, AND CALCIUM CHLORIDE: .6; .31; .03; .02 INJECTION, SOLUTION INTRAVENOUS at 08:26

## 2025-05-07 RX ADMIN — GLYCOPYRROLATE 0.1 MG: 0.2 INJECTION INTRAMUSCULAR; INTRAVENOUS at 07:55

## 2025-05-07 RX ADMIN — ENOXAPARIN SODIUM 30 MG: 100 INJECTION SUBCUTANEOUS at 16:25

## 2025-05-07 RX ADMIN — IPRATROPIUM BROMIDE AND ALBUTEROL SULFATE 1 DOSE: 2.5; .5 SOLUTION RESPIRATORY (INHALATION) at 06:35

## 2025-05-07 RX ADMIN — HYDROMORPHONE HYDROCHLORIDE 0.5 MG: 1 INJECTION, SOLUTION INTRAMUSCULAR; INTRAVENOUS; SUBCUTANEOUS at 07:54

## 2025-05-07 RX ADMIN — IPRATROPIUM BROMIDE AND ALBUTEROL SULFATE 1 DOSE: 2.5; .5 SOLUTION RESPIRATORY (INHALATION) at 19:05

## 2025-05-07 RX ADMIN — DEXTROSE MONOHYDRATE 125 ML: 10 INJECTION, SOLUTION INTRAVENOUS at 11:32

## 2025-05-07 RX ADMIN — CEFEPIME 1000 MG: 1 INJECTION, POWDER, FOR SOLUTION INTRAMUSCULAR; INTRAVENOUS at 04:41

## 2025-05-07 RX ADMIN — DEXTROSE MONOHYDRATE 125 ML: 10 INJECTION, SOLUTION INTRAVENOUS at 12:15

## 2025-05-07 NOTE — PROGRESS NOTES
Palliative [facility] Progress Note      PATIENT:               MACK GUERRERO  CSN #:                  [id number]  :                       1940  ADMIT DATE:       2025 2:25 PM  DISCH DATE:  RESPONDING  PROVIDER #:        MASON KUO PA-C          QUERY TEXT:    UTI is documented in the medical record in the  IM PN.  Please clarify the   relationship, if any, with the chronic magdaleno catheter:    The clinical indicators include:  - 85 y.o. male with PMH of dementia, T2DM, and CKD presented with acute onset   of encephalopathy. Admitted with multifactorial encephalopathy, sepsis of   unclear source, acute hypoxic and hypercarbic respiratory failure. (H&P, )  - Sepsis suspected 2/2 UTI w/ chronic indwelling magdaleno. (IM PN, )  - UA with positive nitrites, moderate leukocyte esterase, 4+ bacteria (   labs)  - Levaquin, Vancomycin, Cefepime ([facility])  Options provided:  -- UTI related to chronic indwelling urinary catheter  -- UTI not related to indwelling urinary catheter  -- Other - I will add my own diagnosis  -- Disagree - Not applicable / Not valid  -- Disagree - Clinically unable to determine / Unknown  -- Refer to Clinical Documentation Reviewer    PROVIDER RESPONSE TEXT:    Suspect UTI is related to the chronic indwelling urinary catheter-if Attending Physician Dr. Smith is in agreement     Query created by: Lindsay Madden on 2025 4:15 PM      Electronically signed by:  MASON KUO PA-C 2025 3:35 PM, inpatient palliative care provider

## 2025-05-07 NOTE — PROGRESS NOTES
Atul Peoples arrived to room # 313.   Presented with: Acute encephalopathy  Mental Status: Patient is alert.   Vitals:    05/07/25 1200   BP: (!) 153/63   Pulse: 97   Resp: 16   Temp:    SpO2: 93%     Patient safety contract and falls prevention contract reviewed with patient Yes.  Oriented Patient to room.  Call light within reach. Yes.  Needs, issues or concerns expressed at this time: no.      Electronically signed by Ananya Amado RN on 5/7/2025 at 1:37 PM

## 2025-05-07 NOTE — PLAN OF CARE
Problem: Nutrition Deficit:  Goal: Optimize nutritional status  Recent Flowsheet Documentation  Taken 5/7/2025 0856 by Aarti Laura, MS, RD, LD  Nutrient intake appropriate for improving, restoring, or maintaining nutritional needs: Assess nutritional status and recommend course of action  5/7/2025 0016 by Henny Ley, RN  Outcome: Not Progressing

## 2025-05-07 NOTE — PROGRESS NOTES
Occupational Therapy      Name: Atul Peoples  MRN:  [id number]  Date of service:  5/7/2025     Pt. not appropriate for OT evaluation at this time per CHALROTTE Hernandez.  Will continue to attempt OT evaluation as able.    Electronically signed by Nallely Nguyen OT on 5/7/2025 at 9:10 AM

## 2025-05-07 NOTE — PROGRESS NOTES
Daily Progress Note  Atul Peoples  MRN: [id number] LOS: 3    Admit Date: 2025 8:11 AM    Subjective:         Interval History:    Reviewed overnight events and nursing notes.     Essentially no change    Review of Systems   Unable to perform ROS: Mental status change       DIET:  Diet NPO    Medications:      dextrose Stopped (25 5639)    sodium chloride        aspirin  81 mg Oral Daily    atorvastatin  10 mg Oral Daily    clopidogrel  75 mg Oral Daily    famotidine  10 mg Oral Daily    levothyroxine  150 mcg Oral Daily    tamsulosin  0.4 mg Oral Daily    sodium chloride flush  5-40 mL IntraVENous 2 times per day    enoxaparin  30 mg SubCUTAneous Daily    cefepime  1,000 mg IntraVENous Q12H    ipratropium 0.5 mg-albuterol 2.5 mg  1 Dose Inhalation Q4H WA RT         Objective:     Vitals: BP (!) 157/57   Pulse 91   Temp 98.1 °F (36.7 °C) (Temporal)   Resp 15   Ht 1.753 m (5' 9.02\")   Wt 63.9 kg (140 lb 14.4 oz)   SpO2 95%   BMI 20.80 kg/m²    Intake/Output Summary (Last 24 hours) at 2025 0811  Last data filed at 2025 0430  Gross per 24 hour   Intake 1206.93 ml   Output 2620 ml   Net -1413.07 ml    Temp (24hrs), Av.2 °F (36.8 °C), Min:97.6 °F (36.4 °C), Max:98.8 °F (37.1 °C)    Glucose:  Lab Results   Component Value Date    POCGLU 111 (H) 2025    POCGLU 103 (H) 2025    POCGLU 95 2025    POCGLU 100 (H) 2025     Physical Examination:   Objective:  General Appearance:  Uncomfortable, ill-appearing and in no acute distress.    Vital signs: (most recent): Blood pressure (!) 157/57, pulse 91, temperature 98.1 °F (36.7 °C), temperature source Temporal, resp. rate 15, height 1.753 m (5' 9.02\"), weight 63.9 kg (140 lb 14.4 oz), SpO2 95%.    Lungs:  Normal effort and normal respiratory rate.  Breath sounds clear to auscultation.  He is not in respiratory distress.    Heart: Normal rate.  Regular rhythm.  No murmur.   Abdomen: Abdomen is soft and non-distended.   coronal and sagittal images were obtained.     Comparison CT scan of the head without contrast dated 04/04/2025.     FINDINGS:  The lateral ventricles and cortical sulci are mildly prominent secondary to atrophy.  Low density changes are seen within the supratentorial white matter.  No acute hemorrhages are seen.  There is no mass effect.  There are no extraaxial   collections. The paranasal sinuses and mastoid air cells are clear.  The calvarium appears normal.     Impression: No acute intracranial abnormalities are seen.     Chronic small vessel ischemic changes seen within the supratentorial white matter.      All CT scans are performed using dose optimization techniques as appropriate to the performed exam and include   at least one of the following: Automated exposure control, adjustment of the mA and/or kV according to size, and the use of iterative reconstruction technique.      ______________________________________   Electronically signed by: FALGUNI TANG M.D.  Date:     05/04/2025  Time:    14:03   XR CHEST PORTABLE  Narrative: EXAM: CHEST RADIOGRAPH     TECHNIQUE: Single frontal chest radiograph.     HISTORY: Altered mental status.     COMPARISON: 04/29/2025     FINDINGS:     Sternotomy, again noted. EKG leads project over the chest.  No pulmonary infiltrate is identified.  No pleural effusion or pneumothorax is seen.  Borderline cardiomegaly. Tortuosity of the thoracic aorta, stable.  No acute displaced rib fractures are identified.        Impression:    1.  No acute findings in the chest.           ______________________________________   Electronically signed by: OSCAR FLYNN M.D.  Date:     05/04/2025  Time:    14:02            Assessment and Plan:     Primary Problem:  Acute encephalopathy    Hospital Problem list:  Principal Problem:    Acute encephalopathy  Active Problems:    Palliative care patient    Sepsis (HCC)    Acute respiratory failure with hypoxia and hypercarbia (HCC)    Hypoglycemia

## 2025-05-07 NOTE — PROGRESS NOTES
Palliative Care Progress Note  5/7/2025 7:50 AM    Patient:  Atul Peoples  YOB: 1940  Primary Care Physician: Isrrael Horner MD  Advance Directive: DNR  Admit Date: 5/4/2025       Hospital Day: 3  Portions of this note have been copied forward, however, changed to reflect the most current clinical status of this patient.    CHIEF COMPLAINT/REASON FOR CONSULTATION Goals of care, family support, Code status, symptom management     SUBJECTIVE:  Received phone call overnight from POA that he and family have agreed on Dobhoff placement to initiate TFs for 3 days and further discuss goals/comfort based on response at that point. No acute events noted from overnight on chart review.    HPI:  The patient is a 85 y.o. male with PMH CAD s/p CABG, CKD 3B, infrarenal abdominal aortic aneurysm w/o rupture, superior mesenteric artery stenosis, COPD dependent on 3 L nasal cannula O2, GERD, HTN, HLD, hypothyroidism with history of myxedema, right chronic patellar fracture who presented to [facility] ED on 5/4/2025 with concern for altered mental status.  He was reported as at baseline around 1130 that day and was found minimally responsive shortly after receiving a nebulizer treatment.  He was found to be hypoglycemic with concern for acute cystitis and acute respiratory failure with hypoxia and was admitted to his PCP service.  pH 7.3, pCO2 59, pO2 58, WBC 15.4, hemoglobin 10.2, creatinine 2.6, albumin 3.0, bilirubin 2.9, alk phos 504, ,  high sensitive troponin 853.  Lipase 1277. He was admitted to his PCP service w/ concern for acute encephalopathy, sepsis 2/2 UTI w/ chronic magdaleno suspected causing acute liver injury, hypoglycemia, demand ischemia. He is receiving Cefepime, Vancomycin. Was initially placed on BiPAP but has since transitioned to NC O2. Palliative care has been consulted w concern for AMS, multisystem organ failure.     Review of Systems:   14 point review of systems is negative

## 2025-05-07 NOTE — PROGRESS NOTES
Physical Therapy  Name: Atul Peoples  MRN:  [id number]  Date of service:  5/7/2025    Pt. not appropriate for PT eval at this time per Mary PORTER. Will f/u at a later time.    Electronically signed by Selene Mera PT on 5/7/2025 at 8:43 AM

## 2025-05-07 NOTE — PROGRESS NOTES
Comprehensive Nutrition Assessment    Type and Reason for Visit:  Reassess    Nutrition Recommendations/Plan:   Follow for SLP recommendations, change in LOC     Malnutrition Assessment:  Malnutrition Status:  Moderate malnutrition (05/07/25 0902)    Context:  Acute Illness     Findings of the 6 clinical characteristics of malnutrition:  Energy Intake:  Mild decrease in energy intake  Weight Loss:  Greater than 2% over 1 week     Body Fat Loss:  Mild body fat loss Orbital   Muscle Mass Loss:  Mild muscle mass loss Temples (temporalis)  Fluid Accumulation:  No fluid accumulation     Strength:  Not Performed    Nutrition Assessment:    Patient remains NPO.  Aware of SLP recommendations to continue NPO status.  Placement of feeding tube has been not recommended.  If condition doesnot modify, aware of possible discussion re: comfort measures.  Continuous D10 IV has been discontinued    Nutrition Related Findings:    no edema noted.  BUN 37, Creat 2.3,  Accuchek's  Wound Type: Multiple, Stage III       Current Nutrition Intake & Therapies:    Average Meal Intake: NPO  Average Supplements Intake: NPO  Diet NPO    Anthropometric Measures:  Height: 175.3 cm (5' 9.02\")  Ideal Body Weight (IBW): 160 lbs (73 kg)    Admission Body Weight: 74.8 kg (164 lb 14.5 oz)  Current Body Weight: 63.9 kg (140 lb 14 oz), 88 % IBW. Weight Source: Bed scale  Current BMI (kg/m2): 20.8  Usual Body Weight: 80.7 kg (178 lb) (12/2/2024)  % Weight Change (Calculated): -20.9  Weight Adjustment For: No Adjustment  BMI Categories: Underweight (BMI less than 22) age over 65    Estimated Daily Nutrient Needs:  Energy Requirements Based On: Kcal/kg  Weight Used for Energy Requirements: Current  Energy (kcal/day): 4525-0435 kcals (25-30 kcals/kg)  Weight Used for Protein Requirements: Current  Protein (g/day): 109g  Method Used for Fluid Requirements: 1 ml/kcal  Fluid (ml/day): 1692-5357 ml    Nutrition Diagnosis:   Inadequate oral intake

## 2025-05-07 NOTE — PROGRESS NOTES
Atul Peoples received from ICU to room # 313 .  Mental Status: Patient is disoriented.   Vitals:    05/07/25 1628   BP: (!) 162/64   Pulse: 88   Resp: 19   Temp: 97.7 °F (36.5 °C)   SpO2: 94%     Placed on cardiac monitor: No.  Belongings: None with patient at bedside .  Family at bedside No.  Oriented Patient unable to orient to room.  Call light within reach. Yes.  Transfer was: Well tolerated by patient..    Electronically signed by Azalea Kruger RN on 5/7/2025 at 4:36 PM

## 2025-05-07 NOTE — PROGRESS NOTES
Facility/Department: [facility]/VAS/MED  SWALLOW THERAPY  LANGUAGE THERAPY     NAME: Atul Peoples  : 1940  MRN: [id number]    ADMISSION DATE: 2025  ADMITTING DIAGNOSIS: has ASHD (arteriosclerotic heart disease); Hypertension; Hyperlipidemia; COPD (chronic obstructive pulmonary disease) ([facility]); History of cerebrovascular disease; AAA (abdominal aortic aneurysm); S/P TKR (total knee replacement); Diabetes mellitus ([facility]); GERD (gastroesophageal reflux disease); Primary osteoarthritis of knee; HH (hiatus hernia); Renal insufficiency; S/P CABG x 4; Status post coronary artery stent placement; Bronchitis; Lateral subluxation of right patella; Patellofemoral disorder of right knee; Patellofemoral disorder; PVD (peripheral vascular disease); Varicose veins of both lower extremities; Pneumonia due to COVID-19 virus; COVID-19; Palliative care patient; Acute kidney injury (nontraumatic); GI bleed; Altered mental status; Melena; Acute anemia; Occult GI bleeding; Macrocytic anemia; Elevated AST (SGOT); High risk medication use; Decubitus ulcer of coccygeal region, stage 3 ([facility]); Scalp hematoma, initial encounter; Bradycardia; Moderate malnutrition; Myxedema coma ([facility]); Fall from chair, initial encounter; Rhabdomyolysis; Acute on chronic respiratory failure ([facility]); CKD stage 3a, GFR 45-59 ml/min ([facility]); Acute encephalopathy; Sepsis ([facility]); Acute respiratory failure with hypoxia and hypercarbia ([facility]); Hypoglycemia; and AMS (altered mental status) on their problem list.    Date of Treat: 2025  Evaluating Therapist: MIKE Hollingsworth    Current Diet level:  NPO    Pain:  Pain Assessment  Pain Assessment: Adult Nonverbal Pain Scale (NPVS)  Klein-Baker Pain Rating: Hurts even more  Response to Pain Intervention: Pain score improved, Patient satisfied    Reason for Referral  Atul Peoples was referred for a bedside swallow evaluation to assess the efficiency of his swallow function, identify signs and symptoms of aspiration  Patient was 0% automatic speech tasks independently and with provision of max cues/prompts. Patient was 0% repeating single vowels and single consonants at independent level. Patient was 0% imitating mouth postures for speech production independently. Patient was 0% following simple 1 step auditory directions, without tactile cues, at independent level and with provision of models. Patient was 0% answering simple yes/no questions regarding immediate environment and current state of being independently. Wet vocal quality was noted during vocalizations; suspect decreased management of secretions.)  Dentition: Poor   Patient Positioning: Upright in bed  Consistencies Administered: Thin      Re-assessed patient's swallowing function with the following observations noted:     Oral Phase  Suspected Premature Bolus Loss: Thin (Patient exhibited slow oral transit of small, controlled trials thin H2O squeezed in the cheek via toothette by SLP.)  Oral Phase - Comment: Completed oral care prior to presentation of thin H2O for comfort.      Pharyngeal Phase  Laryngeal Elevation: (Patient exhibited absent reflexive swallows with no laryngeal elevation observed and just laryngeal rocking noted.)  Delayed Cough: Thin   Pharyngeal Phase - Comment: Patient exhibited degree of airway detection with delayed coughs observed following probable penetration/aspiration of PO trials (small, controlled trials thin H2O squeezed in the cheek via toothette by SLP).     At this time, would recommend continuation NPO status. Recommend frequent oral care as patient with decreased management of secretions. If patient receives mouth care prior to intake, okay for swabs water for comfort. Will continue to follow.    Electronically signed by MIKE Hollingsworth on 5/7/2025 at 1:01 PM

## 2025-05-07 NOTE — PLAN OF CARE
Problem: Skin/Tissue Integrity  Goal: Skin integrity remains intact  Description: 1.  Monitor for areas of redness and/or skin breakdown2.  Assess vascular access sites hourly3.  Every 4-6 hours minimum:  Change oxygen saturation probe site4.  Every 4-6 hours:  If on nasal continuous positive airway pressure, respiratory therapy assess nares and determine need for appliance change or resting period  Outcome: Progressing     Problem: Pain  Goal: Verbalizes/displays adequate comfort level or baseline comfort level  Outcome: Progressing     Problem: Chronic Conditions and Co-morbidities  Goal: Patient's chronic conditions and co-morbidity symptoms are monitored and maintained or improved  Outcome: Progressing     Problem: Nutrition Deficit:  Goal: Optimize nutritional status  Outcome: Not Progressing

## 2025-05-07 NOTE — PROGRESS NOTES
DIS Nurse Note  SUBJECTIVE: Patient assessed secondary to elevated Deterioration Index Score.      Deterioration Index Score:  Predictive Model Details          79 (Warning)  Factor Value    Calculated 5/7/2025 18:05 29% Rosalba coma scale 9    Deterioration Index Model 16% Age 85 years old     14% Supplemental oxygen Nasal cannula     11% Neurological exam X     6% Hematocrit abnormal (25.6 %)     5% Sodium abnormal (128 mmol/L)     5% Respiratory rate 19     4% Systolic 162     4% Potassium 4.6 mmol/L     4% WBC count abnormal (12.4 K/uL)     1% BUN abnormal (37 mg/dL)     1% Pulse 88     0% Pulse oximetry 94 %     0% Temperature 97.7 °F (36.5 °C)        Vital Signs:  Vitals:    05/07/25 1200 05/07/25 1410 05/07/25 1411 05/07/25 1628   BP: (!) 153/63   (!) 162/64   Pulse: 97   88   Resp: 16   19   Temp:    97.7 °F (36.5 °C)   TempSrc:       SpO2: 93% (!) 89% 94% 94%   Weight:       Height:            Provider Notified: Reviewed patient status  & DIS score with Provider Luis    ASSESSMENT:  Just transferred from ICU    Plan of Care: dobhoff placed and tube feedings    Electronically signed by Azalea Kruger RN

## 2025-05-08 ENCOUNTER — APPOINTMENT (OUTPATIENT)
Dept: GENERAL RADIOLOGY | Age: 85
End: 2025-05-08
Payer: MEDICARE

## 2025-05-08 LAB
ALBUMIN SERPL-MCNC: 2.5 G/DL (ref 3.5–5.2)
ALP SERPL-CCNC: 323 U/L (ref 40–129)
ALT SERPL-CCNC: 77 U/L (ref 10–50)
ANION GAP SERPL CALCULATED.3IONS-SCNC: 10 MMOL/L (ref 8–16)
AST SERPL-CCNC: 62 U/L (ref 10–50)
BASOPHILS # BLD: 0 K/UL (ref 0–0.2)
BASOPHILS NFR BLD: 0.2 % (ref 0–1)
BILIRUB SERPL-MCNC: 0.7 MG/DL (ref 0.2–1.2)
BUN SERPL-MCNC: 32 MG/DL (ref 8–23)
CALCIUM SERPL-MCNC: 9 MG/DL (ref 8.8–10.2)
CHLORIDE SERPL-SCNC: 99 MMOL/L (ref 98–107)
CO2 SERPL-SCNC: 22 MMOL/L (ref 22–29)
CREAT SERPL-MCNC: 2 MG/DL (ref 0.7–1.2)
EOSINOPHIL # BLD: 0.2 K/UL (ref 0–0.6)
EOSINOPHIL NFR BLD: 1.2 % (ref 0–5)
ERYTHROCYTE [DISTWIDTH] IN BLOOD BY AUTOMATED COUNT: 15.8 % (ref 11.5–14.5)
GLUCOSE BLD-MCNC: 101 MG/DL (ref 70–99)
GLUCOSE BLD-MCNC: 85 MG/DL (ref 70–99)
GLUCOSE BLD-MCNC: 91 MG/DL (ref 70–99)
GLUCOSE BLD-MCNC: 93 MG/DL (ref 70–99)
GLUCOSE SERPL-MCNC: 90 MG/DL (ref 70–99)
HCT VFR BLD AUTO: 28.5 % (ref 42–52)
HGB BLD-MCNC: 9.5 G/DL (ref 14–18)
IMM GRANULOCYTES # BLD: 0.1 K/UL
LYMPHOCYTES # BLD: 2.1 K/UL (ref 1.1–4.5)
LYMPHOCYTES NFR BLD: 15.9 % (ref 20–40)
MCH RBC QN AUTO: 32.1 PG (ref 27–31)
MCHC RBC AUTO-ENTMCNC: 33.3 G/DL (ref 33–37)
MCV RBC AUTO: 96.3 FL (ref 80–94)
MONOCYTES # BLD: 1.6 K/UL (ref 0–0.9)
MONOCYTES NFR BLD: 11.8 % (ref 0–10)
NEUTROPHILS # BLD: 9.2 K/UL (ref 1.5–7.5)
NEUTS SEG NFR BLD: 69.8 % (ref 50–65)
PERFORMED ON: ABNORMAL
PERFORMED ON: NORMAL
PLATELET # BLD AUTO: 302 K/UL (ref 130–400)
PMV BLD AUTO: 11 FL (ref 9.4–12.4)
POTASSIUM SERPL-SCNC: 4.5 MMOL/L (ref 3.5–5.1)
PROT SERPL-MCNC: 6.7 G/DL (ref 6.4–8.3)
RBC # BLD AUTO: 2.96 M/UL (ref 4.7–6.1)
SODIUM SERPL-SCNC: 131 MMOL/L (ref 136–145)
WBC # BLD AUTO: 13.2 K/UL (ref 4.8–10.8)

## 2025-05-08 PROCEDURE — 2700000000 HC OXYGEN THERAPY PER DAY

## 2025-05-08 PROCEDURE — 51702 INSERT TEMP BLADDER CATH: CPT

## 2025-05-08 PROCEDURE — 6370000000 HC RX 637 (ALT 250 FOR IP): Performed by: STUDENT IN AN ORGANIZED HEALTH CARE EDUCATION/TRAINING PROGRAM

## 2025-05-08 PROCEDURE — 2580000003 HC RX 258: Performed by: STUDENT IN AN ORGANIZED HEALTH CARE EDUCATION/TRAINING PROGRAM

## 2025-05-08 PROCEDURE — 36415 COLL VENOUS BLD VENIPUNCTURE: CPT

## 2025-05-08 PROCEDURE — 99233 SBSQ HOSP IP/OBS HIGH 50: CPT | Performed by: PHYSICIAN ASSISTANT

## 2025-05-08 PROCEDURE — 85025 COMPLETE CBC W/AUTO DIFF WBC: CPT

## 2025-05-08 PROCEDURE — 94760 N-INVAS EAR/PLS OXIMETRY 1: CPT

## 2025-05-08 PROCEDURE — 1200000000 HC SEMI PRIVATE

## 2025-05-08 PROCEDURE — 82962 GLUCOSE BLOOD TEST: CPT

## 2025-05-08 PROCEDURE — 92526 ORAL FUNCTION THERAPY: CPT

## 2025-05-08 PROCEDURE — 71045 X-RAY EXAM CHEST 1 VIEW: CPT

## 2025-05-08 PROCEDURE — 6360000002 HC RX W HCPCS: Performed by: STUDENT IN AN ORGANIZED HEALTH CARE EDUCATION/TRAINING PROGRAM

## 2025-05-08 PROCEDURE — 80053 COMPREHEN METABOLIC PANEL: CPT

## 2025-05-08 PROCEDURE — 94640 AIRWAY INHALATION TREATMENT: CPT

## 2025-05-08 PROCEDURE — 6360000002 HC RX W HCPCS

## 2025-05-08 RX ADMIN — IPRATROPIUM BROMIDE AND ALBUTEROL SULFATE 1 DOSE: 2.5; .5 SOLUTION RESPIRATORY (INHALATION) at 06:53

## 2025-05-08 RX ADMIN — CEFEPIME 1000 MG: 1 INJECTION, POWDER, FOR SOLUTION INTRAMUSCULAR; INTRAVENOUS at 16:19

## 2025-05-08 RX ADMIN — HYDROMORPHONE HYDROCHLORIDE 0.5 MG: 1 INJECTION, SOLUTION INTRAMUSCULAR; INTRAVENOUS; SUBCUTANEOUS at 22:23

## 2025-05-08 RX ADMIN — HYDROMORPHONE HYDROCHLORIDE 0.5 MG: 1 INJECTION, SOLUTION INTRAMUSCULAR; INTRAVENOUS; SUBCUTANEOUS at 17:17

## 2025-05-08 RX ADMIN — IPRATROPIUM BROMIDE AND ALBUTEROL SULFATE 1 DOSE: 2.5; .5 SOLUTION RESPIRATORY (INHALATION) at 14:17

## 2025-05-08 RX ADMIN — IPRATROPIUM BROMIDE AND ALBUTEROL SULFATE 1 DOSE: 2.5; .5 SOLUTION RESPIRATORY (INHALATION) at 02:13

## 2025-05-08 RX ADMIN — IPRATROPIUM BROMIDE AND ALBUTEROL SULFATE 1 DOSE: 2.5; .5 SOLUTION RESPIRATORY (INHALATION) at 18:15

## 2025-05-08 RX ADMIN — IPRATROPIUM BROMIDE AND ALBUTEROL SULFATE 1 DOSE: 2.5; .5 SOLUTION RESPIRATORY (INHALATION) at 10:22

## 2025-05-08 RX ADMIN — CEFEPIME 1000 MG: 1 INJECTION, POWDER, FOR SOLUTION INTRAMUSCULAR; INTRAVENOUS at 05:03

## 2025-05-08 RX ADMIN — ENOXAPARIN SODIUM 30 MG: 100 INJECTION SUBCUTANEOUS at 16:15

## 2025-05-08 RX ADMIN — GLYCOPYRROLATE 0.1 MG: 0.2 INJECTION INTRAMUSCULAR; INTRAVENOUS at 15:16

## 2025-05-08 NOTE — PROGRESS NOTES
Physician Progress Note      PATIENT:               MACK GUERRERO  CSN #:                  [id number]  :                       1940  ADMIT DATE:       2025 2:25 PM  DISCH DATE:  RESPONDING  PROVIDER #:        Wilfredo Smith MD          QUERY TEXT:    UTI is documented in the medical record in the  IM PN.  Please clarify the   relationship, if any, with the chronic magdaleno catheter:    The clinical indicators include:  - 85 y.o. male with PMH of dementia, T2DM, and CKD presented with acute onset   of encephalopathy. Admitted with multifactorial encephalopathy, sepsis of   unclear source, acute hypoxic and hypercarbic respiratory failure. (H&P, )  - Sepsis suspected 2/2 UTI w/ chronic indwelling magdaleno. (IM PN, )  - UA with positive nitrites, moderate leukocyte esterase, 4+ bacteria (   labs)  - Levaquin, Vancomycin, Cefepime (MAR)  Options provided:  -- UTI related to chronic indwelling urinary catheter  -- UTI not related to indwelling urinary catheter  -- Other - I will add my own diagnosis  -- Disagree - Not applicable / Not valid  -- Disagree - Clinically unable to determine / Unknown  -- Refer to Clinical Documentation Reviewer    PROVIDER RESPONSE TEXT:    UTI is not related to the indwelling urinary catheter.    Query created by: Lindsay Madden on 2025 11:49 AM      QUERY TEXT:    Shock liver is documented in the medical record in the - IM progress   notes. Please provide additional clinical indicators supportive of your   documentation. Or please document if the diagnosis of shock liver has been   ruled out after study.    The clinical indicators include:  - 85 y.o. male pt with PMH of CAD, HTN, HLD, and dementia presented with acute   onset of encephalopathy. Admitted with acute encephalopathy, hypoglycemia,   sepsis, and acute hypercarbic respiratory failure. Concern for   choledocholithiasis and pancreatitis due to hyperbilirubinemia, elevated liver   enzymes, elevated  lipase, and abdominal tenderness. (H&P, 5/4)  - Hypoglycemia is probably related to shock liver and lack of oral intake due   to dysphagia from encephalopathy. (IM PN, 5/6)  - INR 1.14, 1.13 (5/4 labs)  - LR, D10, D5 (MAR)  Options provided:  -- Shock liver present as evidenced by, Please document evidence.  -- Shock liver was ruled out  -- Other - I will add my own diagnosis  -- Disagree - Not applicable / Not valid  -- Disagree - Clinically unable to determine / Unknown  -- Refer to Clinical Documentation Reviewer    PROVIDER RESPONSE TEXT:    Shock liver is present as evidenced by hypoglycemia and elevated liver enzymes    Query created by: Lindsay Madden on 5/8/2025 11:58 AM      QUERY TEXT:    Please clarify the patient?s nutritional status:    The clinical indicators include:  - 85 y.o. male pt with PMH of CAD, COPD, HTN, HLD, and dementia presented with   acute onset of encephalopathy. Admitted with acute encephalopathy,   hypoglycemia, sepsis, and acute hypercarbic respiratory failure. (H&P, 5/4)  - Cachectic 84 yo male (Palliative care PN, 5/7)  - Malnutrition Assessment:  Malnutrition Status:  Moderate malnutrition (05/07/25 0902)  Context:  Acute Illness  Findings of the 6 clinical characteristics of malnutrition:  Energy Intake:  Mild decrease in energy intake  Weight Loss:  Greater than 2% over 1 week  Body Fat Loss:  Mild body fat loss Orbital  Muscle Mass Loss:  Mild muscle mass loss Temples (temporalis)  Nutrition Diagnosis:  -         Inadequate oral intake related to cognitive or neurological   impairment, acute injury/trauma, swallowing difficulty, increase demand for   energy/nutrients as evidenced by NPO or clear liquid status due to medical   condition, wounds, swallow study results (Nutrition assessment, 5/7)  - Tube feeds, consult to dietitian (physician orders)  Options provided:  -- Protein calorie malnutrition moderate  -- Cachexia  -- Abnormal weight loss  -- Other - I will add my own

## 2025-05-08 NOTE — CARE COORDINATION
Update: Per DON at [facility], they cannot accept pt back on 10L of oxygen even with hospice; SW notified Shaka with Palliative Care.  Electronically signed by KALEB Weaver on 5/8/2025 at 2:39 PM      Pt is from [facility]; he can return there with hospice to follow per facility representatives.; referral sent to [facility].   [facility]   [phone] P    F   Electronically signed by KALEB Weaver on 5/8/2025 at 12:17 PM         05/08/25 1215  Inpatient consult to Hospice  ONE TIME     Complete  Discontinue     Provider: (Not yet assigned)   Question Answer Comment   Reason for Consult? Severe Sepsis    Reason for Consult? Dementia    Reason for Consult? Multi-System Organ Failure

## 2025-05-08 NOTE — PLAN OF CARE
Problem: Safety - Adult  Goal: Free from fall injury  5/8/2025 0222 by Lon Causey RN  Outcome: Not Progressing     Problem: Chronic Conditions and Co-morbidities  Goal: Patient's chronic conditions and co-morbidity symptoms are monitored and maintained or improved  5/8/2025 0222 by Lon Causey RN  Outcome: Not Progressing     Problem: Discharge Planning  Goal: Discharge to home or other facility with appropriate resources  5/8/2025 0222 by Lon Causey RN  Outcome: Not Progressing     Problem: Pain  Goal: Verbalizes/displays adequate comfort level or baseline comfort level  5/8/2025 0222 by Lon Causey RN  Outcome: Not Progressing     Problem: Skin/Tissue Integrity  Goal: Skin integrity remains intact  Description: 1.  Monitor for areas of redness and/or skin breakdown2.  Assess vascular access sites hourly3.  Every 4-6 hours minimum:  Change oxygen saturation probe site4.  Every 4-6 hours:  If on nasal continuous positive airway pressure, respiratory therapy assess nares and determine need for appliance change or resting period  5/8/2025 0222 by Lon Causey RN  Outcome: Not Progressing     Problem: Nutrition Deficit:  Goal: Optimize nutritional status  5/8/2025 1040 by Aarti Laura, MS, RD, LD  Outcome: Not Progressing  Flowsheets (Taken 5/8/2025 1034)  Nutrient intake appropriate for improving, restoring, or maintaining nutritional needs: Assess nutritional status and recommend course of action  5/8/2025 0222 by Lon Causey RN  Outcome: Not Progressing

## 2025-05-08 NOTE — CARE COORDINATION
LISSA received notification from Shaka that per Cydney with hospice, Dr. Feldman wants an attempt at decreasing Pt's oxygen requirements to see if he can be managed at hospital, [facility], or facility.     LISSA notified Katiuska of conversation so she can obtain her own orders from provider.    Electronically signed by KALEB Weaver on 5/8/2025 at 4:30 PM

## 2025-05-08 NOTE — PROGRESS NOTES
Physical Therapy  Name: Atul Peoples  MRN:  [id number]  Date of service:  5/8/2025    Pt. not appropriate for PT due to lethargy. Noted plans to go to SNF with hospice. Will remove pt from PT list.    Electronically signed by Selene Mera PT on 5/8/2025 at 12:56 PM

## 2025-05-08 NOTE — PROGRESS NOTES
DIS Nurse Note  SUBJECTIVE: Patient assessed secondary to elevated Deterioration Index Score.      Deterioration Index Score:  Predictive Model Details          82 (Warning)  Factor Value    Calculated 5/8/2025 10:21 31% Rosalba coma scale 8    Deterioration Index Model 16% Age 85 years old     13% Supplemental oxygen Oxygen     11% Neurological exam X     6% Systolic 173     5% Hematocrit abnormal (28.5 %)     4% Respiratory rate 14     4% Pulse oximetry 90 %     4% WBC count abnormal (13.2 K/uL)     3% Sodium abnormal (131 mmol/L)     3% Potassium 4.5 mmol/L     1% BUN abnormal (32 mg/dL)     1% Pulse 92     0% Temperature 98.1 °F (36.7 °C)        Vital Signs:  Vitals:    05/07/25 2025 05/08/25 0256 05/08/25 0422 05/08/25 0653   BP: (!) 171/71  (!) 173/83    Pulse: 90  92    Resp: 16  14    Temp: 98.2 °F (36.8 °C)  98.1 °F (36.7 °C)    TempSrc: Axillary  Temporal    SpO2: 94% 90% 93% 90%   Weight:       Height:            Provider Notified: Not Indicated    ASSESSMENT:   Pt minimally responsive, only withdraws from pain. Pt remains on venti mask for oxygenation. Respirations are shallow and tachypneic. See this nurse's full assessment in the flowsheets. Pt does not display any physical s/s of pain at this time. Dobhoff remains in place to the R nare. It is clamped. Tube feeds are off at this time per the physician's orders.      Plan of Care: Dr. Smith is aware of the patient's status at this time.  Discussions are being made with the family in regards to transitioning to comfort care or possible hospice care. TEO Browning with palliative care reviewing the patient's case and will be contacting the patient's POA. No new orders at this time. Continue current plan of care.    Electronically signed by Lynn Pennington RN

## 2025-05-08 NOTE — PROGRESS NOTES
Palliative Care Progress Note  5/8/2025 9:42 AM    Patient:  Atul Peoples  YOB: 1940  Primary Care Physician: Isrrael Horner MD  Advance Directive: DNR  Admit Date: 5/4/2025       Hospital Day: 4  Portions of this note have been copied forward, however, changed to reflect the most current clinical status of this patient.    CHIEF COMPLAINT/REASON FOR CONSULTATION Goals of care, family support, Code status, symptom management     SUBJECTIVE:  Mr. Peoples has declined overnight and now requiring 10L venturi-mask. Concerns for aspiration. He is lethargic but appears comfortable     HPI:  The patient is a 85 y.o. male with PMH CAD s/p CABG, CKD 3B, infrarenal abdominal aortic aneurysm w/o rupture, superior mesenteric artery stenosis, COPD dependent on 3 L nasal cannula O2, GERD, HTN, HLD, hypothyroidism with history of myxedema, right chronic patellar fracture who presented to [facility] ED on 5/4/2025 with concern for altered mental status.  He was reported as at baseline around 1130 that day and was found minimally responsive shortly after receiving a nebulizer treatment.  He was found to be hypoglycemic with concern for acute cystitis and acute respiratory failure with hypoxia and was admitted to his PCP service.  pH 7.3, pCO2 59, pO2 58, WBC 15.4, hemoglobin 10.2, creatinine 2.6, albumin 3.0, bilirubin 2.9, alk phos 504, ,  high sensitive troponin 853.  Lipase 1277. He was admitted to his PCP service w/ concern for acute encephalopathy, sepsis 2/2 UTI w/ chronic magdaleno suspected causing acute liver injury, hypoglycemia, demand ischemia. He is receiving Cefepime, Vancomycin. Was initially placed on BiPAP but has since transitioned to NC O2. Palliative care has been consulted w concern for AMS, multisystem organ failure.     Review of Systems:   14 point review of systems is negative except as specifically addressed above.    Objective:   VITALS:  BP (!) 173/83   Pulse 92   Temp 98.1 °F

## 2025-05-08 NOTE — PROGRESS NOTES
Nutrition Assessment     Type and Reason for Visit: Reassess    Nutrition Recommendations/Plan:   Follow for possible admission to Hospice or alternate source of nutrition (PN or EN via PEG)     Malnutrition Assessment:  Malnutrition Status: Moderate malnutrition    Nutrition Assessment:  Aware tf was ordered last night and started at 15ml/hr.  However had vomiting with possible aspiration, residual 125ml.  TF has been stopped.  Aware of discussion regarding possible Hospice.  New weight not available.    Estimated Daily Nutrient Needs:  Energy (kcal):  9594-8505 kcals (25-30 kcals/kg) Weight Used for Energy Requirements: Current     Protein (g):  109g Weight Used for Protein Requirements: Current        Fluid (ml/day):  3268-3335 ml Method Used for Fluid Requirements: 1 ml/kcal    Nutrition Related Findings:   no edema noted.  Na+ 131, BUN 32, Creat 2 Wound Type: Multiple, Stage III    Current Nutrition Therapies:    Diet NPO  ADULT TUBE FEEDING; Nasogastric; Standard with Fiber; Continuous; 15; Yes; 5; Q 4 hours; 50; 20; Q 1 hour    Anthropometric Measures:  Height: 175.3 cm (5' 9.02\")  Current Body Wt: 63.9 kg (140 lb 14 oz)   BMI: 20.8        Nutrition Diagnosis:   Inadequate oral intake related to cognitive or neurological impairment, swallowing difficulty, increase demand for energy/nutrients as evidenced by NPO or clear liquid status due to medical condition, wounds, swallow study results    Nutrition Interventions:   Food and/or Nutrient Delivery: Continue NPO, Discontinue Tube Feeding  Nutrition Education/Counseling: No recommendation at this time  Coordination of Nutrition Care: No recommendation at this time  Plan of Care discussed with: nurssing    Goals:  Goals: Maintain adequate nutrition status  Type of Goal: Continue current goal  Previous Goal Met: No Progress toward Goal(s)    Nutrition Monitoring and Evaluation:   Behavioral-Environmental Outcomes: None Identified  Food/Nutrient Intake Outcomes:

## 2025-05-08 NOTE — PLAN OF CARE
Problem: Safety - Adult  Goal: Free from fall injury  Outcome: Not Progressing     Problem: Chronic Conditions and Co-morbidities  Goal: Patient's chronic conditions and co-morbidity symptoms are monitored and maintained or improved  Outcome: Not Progressing     Problem: Discharge Planning  Goal: Discharge to home or other facility with appropriate resources  Outcome: Not Progressing     Problem: Pain  Goal: Verbalizes/displays adequate comfort level or baseline comfort level  Outcome: Not Progressing     Problem: Skin/Tissue Integrity  Goal: Skin integrity remains intact  Description: 1.  Monitor for areas of redness and/or skin breakdown2.  Assess vascular access sites hourly3.  Every 4-6 hours minimum:  Change oxygen saturation probe site4.  Every 4-6 hours:  If on nasal continuous positive airway pressure, respiratory therapy assess nares and determine need for appliance change or resting period  Outcome: Not Progressing     Problem: Nutrition Deficit:  Goal: Optimize nutritional status  Outcome: Not Progressing     Problem: Safety - Adult  Goal: Free from fall injury  Outcome: Not Progressing     Problem: Chronic Conditions and Co-morbidities  Goal: Patient's chronic conditions and co-morbidity symptoms are monitored and maintained or improved  Outcome: Not Progressing     Problem: Discharge Planning  Goal: Discharge to home or other facility with appropriate resources  Outcome: Not Progressing     Problem: Pain  Goal: Verbalizes/displays adequate comfort level or baseline comfort level  Outcome: Not Progressing     Problem: Skin/Tissue Integrity  Goal: Skin integrity remains intact  Description: 1.  Monitor for areas of redness and/or skin breakdown2.  Assess vascular access sites hourly3.  Every 4-6 hours minimum:  Change oxygen saturation probe site4.  Every 4-6 hours:  If on nasal continuous positive airway pressure, respiratory therapy assess nares and determine need for appliance change or resting

## 2025-05-08 NOTE — PROGRESS NOTES
Occupational Therapy  Per chart review, plans are for patient to return to [facility] with Hospice care. Will sign off. Electronically signed by Patt Wise OT on 5/8/2025 at 1:09 PM

## 2025-05-08 NOTE — PROGRESS NOTES
Daily Progress Note  Atul Peoples  MRN: [id number] LOS: 4    Admit Date: 2025 7:32 AM    Subjective:         Interval History:    Reviewed overnight events and nursing notes.     Had some aspiration overnight.    Review of Systems   Unable to perform ROS: Mental status change   Constitutional:  Negative for fever.       DIET:  Diet NPO  ADULT TUBE FEEDING; Nasogastric; Standard with Fiber; Continuous; 15; Yes; 5; Q 4 hours; 50; 20; Q 1 hour    Medications:      lactated ringers 50 mL/hr at 25 0826    dextrose Stopped (25 1149)    sodium chloride        aspirin  81 mg Oral Daily    atorvastatin  10 mg Oral Daily    clopidogrel  75 mg Oral Daily    famotidine  10 mg Oral Daily    levothyroxine  150 mcg Oral Daily    tamsulosin  0.4 mg Oral Daily    sodium chloride flush  5-40 mL IntraVENous 2 times per day    enoxaparin  30 mg SubCUTAneous Daily    cefepime  1,000 mg IntraVENous Q12H    ipratropium 0.5 mg-albuterol 2.5 mg  1 Dose Inhalation Q4H WA RT         Objective:     Vitals: BP (!) 173/83   Pulse 92   Temp 98.1 °F (36.7 °C) (Temporal)   Resp 14   Ht 1.753 m (5' 9.02\")   Wt 63.9 kg (140 lb 14.4 oz)   SpO2 90%   BMI 20.80 kg/m²    Intake/Output Summary (Last 24 hours) at 2025 0732  Last data filed at 2025 0220  Gross per 24 hour   Intake 125 ml   Output 2335 ml   Net -2210 ml    Temp (24hrs), Av °F (36.7 °C), Min:97.7 °F (36.5 °C), Max:98.2 °F (36.8 °C)    Glucose:  Lab Results   Component Value Date    POCGLU 90 2025    POCGLU 70 2025    POCGLU 70 2025    POCGLU 108 (H) 2025     Physical Examination:   Objective:  General Appearance:  Ill-appearing, uncomfortable, in no acute distress and not in pain.    Vital signs: (most recent): Blood pressure (!) 173/83, pulse 92, temperature 98.1 °F (36.7 °C), temperature source Temporal, resp. rate 14, height 1.753 m (5' 9.02\"), weight 63.9 kg (140 lb 14.4 oz), SpO2 90%.  No fever.    Lungs:  Normal effort

## 2025-05-08 NOTE — PROGRESS NOTES
Facility/Department: [facility] RICHAR/VAS/MED  SWALLOW THERAPY    NAME: Atul Peoples  : 1940  MRN: [id number]    ADMISSION DATE: 2025  ADMITTING DIAGNOSIS: has ASHD (arteriosclerotic heart disease); Hypertension; Hyperlipidemia; COPD (chronic obstructive pulmonary disease) ([facility]); History of cerebrovascular disease; AAA (abdominal aortic aneurysm); S/P TKR (total knee replacement); Diabetes mellitus ([facility]); GERD (gastroesophageal reflux disease); Primary osteoarthritis of knee; HH (hiatus hernia); Renal insufficiency; S/P CABG x 4; Status post coronary artery stent placement; Bronchitis; Lateral subluxation of right patella; Patellofemoral disorder of right knee; Patellofemoral disorder; PVD (peripheral vascular disease); Varicose veins of both lower extremities; Pneumonia due to COVID-19 virus; COVID-19; Palliative care patient; Acute kidney injury (nontraumatic); GI bleed; Altered mental status; Melena; Acute anemia; Occult GI bleeding; Macrocytic anemia; Elevated AST (SGOT); High risk medication use; Decubitus ulcer of coccygeal region, stage 3 (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); Fall from chair, initial encounter; Rhabdomyolysis; Acute on chronic respiratory failure ([facility]); CKD stage 3a, GFR 45-59 ml/min ([facility]); Acute encephalopathy; Sepsis ([facility]); Acute respiratory failure with hypoxia and hypercarbia ([facility]); Hypoglycemia; and AMS (altered mental status) on their problem list.    Date of Treat: 2025  Evaluating Therapist: MIKE Hollingsworth    Current Diet level:  NPO    Pain:  Pain Assessment: Adult Nonverbal Pain Scale (NPVS)  Klein-Baker Pain Rating: Hurts even more  Response to Pain Intervention: Pain score improved, Patient satisfied    Reason for Referral  Atul Peoples was referred for a bedside swallow evaluation to assess the efficiency of his swallow function, identify signs and symptoms of aspiration and make recommendations regarding

## 2025-05-09 VITALS
WEIGHT: 140.9 LBS | HEART RATE: 97 BPM | RESPIRATION RATE: 16 BRPM | SYSTOLIC BLOOD PRESSURE: 170 MMHG | DIASTOLIC BLOOD PRESSURE: 79 MMHG | BODY MASS INDEX: 20.87 KG/M2 | OXYGEN SATURATION: 95 % | HEIGHT: 69 IN | TEMPERATURE: 97.5 F

## 2025-05-09 LAB
ACANTHOCYTES BLD QL SMEAR: ABNORMAL
ALBUMIN SERPL-MCNC: 2.3 G/DL (ref 3.5–5.2)
ALP SERPL-CCNC: 267 U/L (ref 40–129)
ALT SERPL-CCNC: 52 U/L (ref 10–50)
ANION GAP SERPL CALCULATED.3IONS-SCNC: 10 MMOL/L (ref 8–16)
AST SERPL-CCNC: 37 U/L (ref 10–50)
BACTERIA BLD CULT ORG #2: NORMAL
BACTERIA BLD CULT: NORMAL
BASOPHILS # BLD: 0 K/UL (ref 0–0.2)
BASOPHILS NFR BLD: 0 % (ref 0–1)
BILIRUB SERPL-MCNC: 0.6 MG/DL (ref 0.2–1.2)
BUN SERPL-MCNC: 34 MG/DL (ref 8–23)
BURR CELLS BLD QL SMEAR: ABNORMAL
CALCIUM SERPL-MCNC: 9.1 MG/DL (ref 8.8–10.2)
CHLORIDE SERPL-SCNC: 102 MMOL/L (ref 98–107)
CO2 SERPL-SCNC: 23 MMOL/L (ref 22–29)
CREAT SERPL-MCNC: 2 MG/DL (ref 0.7–1.2)
EOSINOPHIL # BLD: 0.39 K/UL (ref 0–0.6)
EOSINOPHIL NFR BLD: 2 % (ref 0–5)
ERYTHROCYTE [DISTWIDTH] IN BLOOD BY AUTOMATED COUNT: 15.8 % (ref 11.5–14.5)
GLUCOSE BLD-MCNC: 114 MG/DL (ref 70–99)
GLUCOSE BLD-MCNC: 62 MG/DL (ref 70–99)
GLUCOSE BLD-MCNC: 70 MG/DL (ref 70–99)
GLUCOSE BLD-MCNC: 75 MG/DL (ref 70–99)
GLUCOSE SERPL-MCNC: 65 MG/DL (ref 70–99)
HCT VFR BLD AUTO: 26.3 % (ref 42–52)
HGB BLD-MCNC: 8.8 G/DL (ref 14–18)
IMM GRANULOCYTES # BLD: 0.3 K/UL
LYMPHOCYTES # BLD: 1.4 K/UL (ref 1.1–4.5)
LYMPHOCYTES NFR BLD: 7 % (ref 20–40)
MCH RBC QN AUTO: 32.2 PG (ref 27–31)
MCHC RBC AUTO-ENTMCNC: 33.5 G/DL (ref 33–37)
MCV RBC AUTO: 96.3 FL (ref 80–94)
MONOCYTES # BLD: 1 K/UL (ref 0–0.9)
MONOCYTES NFR BLD: 5 % (ref 0–10)
NEUTROPHILS # BLD: 16.6 K/UL (ref 1.5–7.5)
NEUTS SEG NFR BLD: 86 % (ref 50–65)
OVALOCYTES BLD QL SMEAR: ABNORMAL
PERFORMED ON: ABNORMAL
PERFORMED ON: ABNORMAL
PERFORMED ON: NORMAL
PERFORMED ON: NORMAL
PLATELET # BLD AUTO: 281 K/UL (ref 130–400)
PLATELET SLIDE REVIEW: ADEQUATE
PMV BLD AUTO: 11 FL (ref 9.4–12.4)
POIKILOCYTOSIS BLD QL SMEAR: ABNORMAL
POTASSIUM SERPL-SCNC: 4.3 MMOL/L (ref 3.5–5.1)
PROT SERPL-MCNC: 6.3 G/DL (ref 6.4–8.3)
RBC # BLD AUTO: 2.73 M/UL (ref 4.7–6.1)
SODIUM SERPL-SCNC: 135 MMOL/L (ref 136–145)
TARGETS BLD QL SMEAR: ABNORMAL
WBC # BLD AUTO: 19.3 K/UL (ref 4.8–10.8)

## 2025-05-09 PROCEDURE — 82962 GLUCOSE BLOOD TEST: CPT

## 2025-05-09 PROCEDURE — 2500000003 HC RX 250 WO HCPCS: Performed by: STUDENT IN AN ORGANIZED HEALTH CARE EDUCATION/TRAINING PROGRAM

## 2025-05-09 PROCEDURE — 6360000002 HC RX W HCPCS: Performed by: STUDENT IN AN ORGANIZED HEALTH CARE EDUCATION/TRAINING PROGRAM

## 2025-05-09 PROCEDURE — 80053 COMPREHEN METABOLIC PANEL: CPT

## 2025-05-09 PROCEDURE — 99232 SBSQ HOSP IP/OBS MODERATE 35: CPT | Performed by: PHYSICIAN ASSISTANT

## 2025-05-09 PROCEDURE — 94640 AIRWAY INHALATION TREATMENT: CPT

## 2025-05-09 PROCEDURE — 2580000003 HC RX 258: Performed by: STUDENT IN AN ORGANIZED HEALTH CARE EDUCATION/TRAINING PROGRAM

## 2025-05-09 PROCEDURE — 85025 COMPLETE CBC W/AUTO DIFF WBC: CPT

## 2025-05-09 PROCEDURE — 36415 COLL VENOUS BLD VENIPUNCTURE: CPT

## 2025-05-09 PROCEDURE — 94760 N-INVAS EAR/PLS OXIMETRY 1: CPT

## 2025-05-09 PROCEDURE — 6370000000 HC RX 637 (ALT 250 FOR IP): Performed by: STUDENT IN AN ORGANIZED HEALTH CARE EDUCATION/TRAINING PROGRAM

## 2025-05-09 RX ORDER — MORPHINE SULFATE 20 MG/ML
10 SOLUTION ORAL
Refills: 0 | Status: DISCONTINUED | OUTPATIENT
Start: 2025-05-09 | End: 2025-05-09 | Stop reason: HOSPADM

## 2025-05-09 RX ORDER — LORAZEPAM 2 MG/ML
1 CONCENTRATE ORAL
Status: DISCONTINUED | OUTPATIENT
Start: 2025-05-09 | End: 2025-05-09 | Stop reason: HOSPADM

## 2025-05-09 RX ORDER — MORPHINE SULFATE 20 MG/ML
5 SOLUTION ORAL
Qty: 5 ML | Refills: 0 | Status: SHIPPED | OUTPATIENT
Start: 2025-05-09 | End: 2025-05-12

## 2025-05-09 RX ORDER — LORAZEPAM 2 MG/ML
1 CONCENTRATE ORAL
Qty: 20 ML | Refills: 0 | Status: SHIPPED | OUTPATIENT
Start: 2025-05-09 | End: 2025-05-23

## 2025-05-09 RX ADMIN — HYDROMORPHONE HYDROCHLORIDE 0.5 MG: 1 INJECTION, SOLUTION INTRAMUSCULAR; INTRAVENOUS; SUBCUTANEOUS at 10:03

## 2025-05-09 RX ADMIN — IPRATROPIUM BROMIDE AND ALBUTEROL SULFATE 1 DOSE: 2.5; .5 SOLUTION RESPIRATORY (INHALATION) at 14:05

## 2025-05-09 RX ADMIN — SODIUM CHLORIDE, SODIUM LACTATE, POTASSIUM CHLORIDE, AND CALCIUM CHLORIDE: .6; .31; .03; .02 INJECTION, SOLUTION INTRAVENOUS at 01:41

## 2025-05-09 RX ADMIN — IPRATROPIUM BROMIDE AND ALBUTEROL SULFATE 1 DOSE: 2.5; .5 SOLUTION RESPIRATORY (INHALATION) at 06:09

## 2025-05-09 RX ADMIN — SODIUM CHLORIDE, PRESERVATIVE FREE 10 ML: 5 INJECTION INTRAVENOUS at 11:07

## 2025-05-09 RX ADMIN — HYDROMORPHONE HYDROCHLORIDE 0.5 MG: 1 INJECTION, SOLUTION INTRAMUSCULAR; INTRAVENOUS; SUBCUTANEOUS at 16:26

## 2025-05-09 RX ADMIN — DEXTROSE MONOHYDRATE 125 ML: 10 INJECTION, SOLUTION INTRAVENOUS at 11:03

## 2025-05-09 RX ADMIN — DEXTROSE MONOHYDRATE 125 ML: 10 INJECTION, SOLUTION INTRAVENOUS at 16:24

## 2025-05-09 RX ADMIN — HYDROMORPHONE HYDROCHLORIDE 0.5 MG: 1 INJECTION, SOLUTION INTRAMUSCULAR; INTRAVENOUS; SUBCUTANEOUS at 04:14

## 2025-05-09 RX ADMIN — CEFEPIME 1000 MG: 1 INJECTION, POWDER, FOR SOLUTION INTRAMUSCULAR; INTRAVENOUS at 04:19

## 2025-05-09 RX ADMIN — IPRATROPIUM BROMIDE AND ALBUTEROL SULFATE 1 DOSE: 2.5; .5 SOLUTION RESPIRATORY (INHALATION) at 10:05

## 2025-05-09 RX ADMIN — ENOXAPARIN SODIUM 30 MG: 100 INJECTION SUBCUTANEOUS at 16:26

## 2025-05-09 NOTE — PLAN OF CARE
Problem: Safety - Adult  Goal: Free from fall injury  Outcome: Progressing     Problem: Chronic Conditions and Co-morbidities  Goal: Patient's chronic conditions and co-morbidity symptoms are monitored and maintained or improved  Outcome: Progressing     Problem: Discharge Planning  Goal: Discharge to home or other facility with appropriate resources  Outcome: Progressing     Problem: Pain  Goal: Verbalizes/displays adequate comfort level or baseline comfort level  Outcome: Progressing     Problem: Skin/Tissue Integrity  Goal: Skin integrity remains intact  Description: 1.  Monitor for areas of redness and/or skin breakdown2.  Assess vascular access sites hourly3.  Every 4-6 hours minimum:  Change oxygen saturation probe site4.  Every 4-6 hours:  If on nasal continuous positive airway pressure, respiratory therapy assess nares and determine need for appliance change or resting period  Outcome: Progressing     Problem: Nutrition Deficit:  Goal: Optimize nutritional status  Outcome: Progressing

## 2025-05-09 NOTE — PROGRESS NOTES
Palliative Care Progress Note  5/9/2025 12:42 PM    Patient:  Atul Peoples  YOB: 1940  Primary Care Physician: Isrrael Horner MD  Advance Directive: DNR  Admit Date: 5/4/2025       Hospital Day: 5  Portions of this note have been copied forward, however, changed to reflect the most current clinical status of this patient.    CHIEF COMPLAINT/REASON FOR CONSULTATION Goals of care, family support, Code status, symptom management     SUBJECTIVE:  Mr. Peoples is tolerating 5L NC O2. Mental status appears about the same. He did appear comfortable at the time of my visit.    HPI:  The patient is a 85 y.o. male with PMH CAD s/p CABG, CKD 3B, infrarenal abdominal aortic aneurysm w/o rupture, superior mesenteric artery stenosis, COPD dependent on 3 L nasal cannula O2, GERD, HTN, HLD, hypothyroidism with history of myxedema, right chronic patellar fracture who presented to [facility] ED on 5/4/2025 with concern for altered mental status.  He was reported as at baseline around 1130 that day and was found minimally responsive shortly after receiving a nebulizer treatment.  He was found to be hypoglycemic with concern for acute cystitis and acute respiratory failure with hypoxia and was admitted to his PCP service.  pH 7.3, pCO2 59, pO2 58, WBC 15.4, hemoglobin 10.2, creatinine 2.6, albumin 3.0, bilirubin 2.9, alk phos 504, ,  high sensitive troponin 853.  Lipase 1277. He was admitted to his PCP service w/ concern for acute encephalopathy, sepsis 2/2 UTI w/ chronic magdaleno suspected causing acute liver injury, hypoglycemia, demand ischemia. He is receiving Cefepime, Vancomycin. Was initially placed on BiPAP but has since transitioned to NC O2. Palliative care has been consulted w concern for AMS, multisystem organ failure.     Review of Systems:   14 point review of systems is negative except as specifically addressed above.    Objective:   VITALS:  BP (!) 153/58   Pulse 76   Temp 97.5 °F (36.4 °C)

## 2025-05-09 NOTE — PROGRESS NOTES
Daily Progress Note  Atul Peoples  MRN: [id number] LOS: 5    Admit Date: 2025 7:49 AM    Subjective:         Interval History:    Reviewed overnight events and nursing notes.  Currently seen resting in bed and lethargic on 5 L nasal cannula.    Unable to perform ROS: Mental status change     DIET:  Diet NPO    Medications:      lactated ringers 50 mL/hr at 25 0141    dextrose Stopped (25 1149)    sodium chloride        aspirin  81 mg Oral Daily    atorvastatin  10 mg Oral Daily    clopidogrel  75 mg Oral Daily    famotidine  10 mg Oral Daily    levothyroxine  150 mcg Oral Daily    tamsulosin  0.4 mg Oral Daily    sodium chloride flush  5-40 mL IntraVENous 2 times per day    enoxaparin  30 mg SubCUTAneous Daily    cefepime  1,000 mg IntraVENous Q12H    ipratropium 0.5 mg-albuterol 2.5 mg  1 Dose Inhalation Q4H WA RT         Objective:     Vitals: BP (!) 164/66   Pulse (!) 46   Temp 97.5 °F (36.4 °C)   Resp 16   Ht 1.753 m (5' 9.02\")   Wt 63.9 kg (140 lb 14.4 oz)   SpO2 95%   BMI 20.80 kg/m²    Intake/Output Summary (Last 24 hours) at 2025 0749  Last data filed at 2025 0032  Gross per 24 hour   Intake --   Output 900 ml   Net -900 ml    Temp (24hrs), Av.8 °F (36.6 °C), Min:97.5 °F (36.4 °C), Max:98.1 °F (36.7 °C)    Glucose:  Lab Results   Component Value Date    POCGLU 101 (H) 2025    POCGLU 93 2025    POCGLU 91 2025    POCGLU 85 2025     Physical Examination:   Objective:  Vital signs: (most recent): Blood pressure (!) 164/66, pulse (!) 46, temperature 97.5 °F (36.4 °C), resp. rate 16, height 1.753 m (5' 9.02\"), weight 63.9 kg (140 lb 14.4 oz), SpO2 95%.    General Appearance:  Ill-appearing, comfortable, in no acute distress and not in pain.   Lungs:  Normal effort and normal respiratory rate.  Breath sounds clear to auscultation.  He is not in respiratory distress.    Heart: Normal rate.  Regular rhythm.  No murmur.   Abdomen: Abdomen is soft and

## 2025-05-09 NOTE — PLAN OF CARE
Problem: Safety - Adult  Goal: Free from fall injury  5/9/2025 1639 by Rachael Cummings RN  Outcome: Completed  5/9/2025 1111 by Rachael Cummings RN  Outcome: Progressing     Problem: Chronic Conditions and Co-morbidities  Goal: Patient's chronic conditions and co-morbidity symptoms are monitored and maintained or improved  5/9/2025 1639 by Rachael Cummings RN  Outcome: Completed  5/9/2025 1111 by Rachael Cumminsg RN  Outcome: Not Progressing     Problem: Discharge Planning  Goal: Discharge to home or other facility with appropriate resources  5/9/2025 1639 by Rachael Cummings RN  Outcome: Completed  5/9/2025 1111 by Rachael Cummings RN  Outcome: Progressing     Problem: Pain  Goal: Verbalizes/displays adequate comfort level or baseline comfort level  5/9/2025 1639 by Rachael Cummings RN  Outcome: Completed  5/9/2025 1111 by Rachael Cummings RN  Outcome: Progressing     Problem: Skin/Tissue Integrity  Goal: Skin integrity remains intact  Description: 1.  Monitor for areas of redness and/or skin breakdown2.  Assess vascular access sites hourly3.  Every 4-6 hours minimum:  Change oxygen saturation probe site4.  Every 4-6 hours:  If on nasal continuous positive airway pressure, respiratory therapy assess nares and determine need for appliance change or resting period  5/9/2025 1639 by Rachael Cummings RN  Outcome: Completed  5/9/2025 1111 by Rachael Cummings RN  Outcome: Progressing     Problem: Nutrition Deficit:  Goal: Optimize nutritional status  5/9/2025 1639 by Rachael Cummings RN  Outcome: Completed  5/9/2025 1122 by Aarti Laura, MS, RD, LD  Outcome: Not Progressing  Flowsheets (Taken 5/9/2025 1118)  Nutrient intake appropriate for improving, restoring, or maintaining nutritional needs: Assess nutritional status and recommend course of action  5/9/2025 1111 by Rachael Cummings RN  Outcome: Not Progressing     Problem: Chronic Conditions and Co-morbidities  Goal: Patient's chronic conditions and co-morbidity symptoms are monitored and

## 2025-05-09 NOTE — CARE COORDINATION
[facility] re: [facility]; LISSA notified of the below but that we didn't have a final answer from hospice-Dr. Feldman yet on [facility], but he likely would want SNF if possible; LISSA also contacted RN-[place] with hospice; she noted she will verify with Dr. Feldman, but if Pt was down below 7L he would likely want him to return to SNF.  [facility]   [phone] P   F  Electronically signed by KALEB Weaver on 5/9/2025 at 11:12 AM      LISSA notified [place] that Pt is now down to 6L NC  Electronically signed by KALEB Weaver on 5/9/2025 at 10:52 AM

## 2025-05-09 NOTE — PLAN OF CARE
Problem: Safety - Adult  Goal: Free from fall injury  5/9/2025 1111 by Rachael Cummings RN  Outcome: Progressing  5/9/2025 0222 by Lon Causey RN  Outcome: Progressing     Problem: Discharge Planning  Goal: Discharge to home or other facility with appropriate resources  5/9/2025 1111 by Rachael Cummings RN  Outcome: Progressing  5/9/2025 0222 by Lon Causey RN  Outcome: Progressing     Problem: Pain  Goal: Verbalizes/displays adequate comfort level or baseline comfort level  5/9/2025 1111 by Rachael Cummings RN  Outcome: Progressing  5/9/2025 0222 by Lon Causey RN  Outcome: Progressing     Problem: Skin/Tissue Integrity  Goal: Skin integrity remains intact  Description: 1.  Monitor for areas of redness and/or skin breakdown2.  Assess vascular access sites hourly3.  Every 4-6 hours minimum:  Change oxygen saturation probe site4.  Every 4-6 hours:  If on nasal continuous positive airway pressure, respiratory therapy assess nares and determine need for appliance change or resting period  5/9/2025 1111 by Rachael Cummings RN  Outcome: Progressing  5/9/2025 0222 by Lon Causey RN  Outcome: Progressing     Problem: Chronic Conditions and Co-morbidities  Goal: Patient's chronic conditions and co-morbidity symptoms are monitored and maintained or improved  5/9/2025 1111 by Rachael Cummings RN  Outcome: Not Progressing  5/9/2025 0222 by Lon Causey RN  Outcome: Progressing     Problem: Nutrition Deficit:  Goal: Optimize nutritional status  5/9/2025 1111 by Rachael Cummings RN  Outcome: Not Progressing  5/9/2025 0222 by Lon Causey RN  Outcome: Progressing

## 2025-05-09 NOTE — CARE COORDINATION
SW received notification from [facility] admissions-Elizabeth-that Pt is good to return today.  Electronically signed by KALEB Weaver on 5/9/2025 at 3:11 PM      SW received f/u call from [facility] Pt admissions rep, Elizabeth, indicating that Pt actually doesn't have a payer source for hospice b/c they are missing documentation from the POA to get his Medicaid approved, thus he is technically Medicaid pending; and she stated she has contacted the POA and the Pt cannot pay privately. Elizabeth asked for Pt to not be d/c'd until this can be resolved; LISSA passed along to RN-Rachael, Palliative, and hospice.    SW awaiting further information.     (Yesterday at 1210 SW was notified by [facility] Pt admissions that Pt was \"SHARON, so he can return with hospice\")    Electronically signed by KALEB Weaver on 5/9/2025 at 3:07 PM

## 2025-05-09 NOTE — PLAN OF CARE
Problem: Chronic Conditions and Co-morbidities  Goal: Patient's chronic conditions and co-morbidity symptoms are monitored and maintained or improved  5/9/2025 1111 by Rachael Cummings RN  Outcome: Not Progressing  5/9/2025 0222 by Lon Causey RN  Outcome: Progressing     Problem: Nutrition Deficit:  Goal: Optimize nutritional status  5/9/2025 1122 by Aarti Laura, MS, RD, LD  Outcome: Not Progressing  Flowsheets (Taken 5/9/2025 1118)  Nutrient intake appropriate for improving, restoring, or maintaining nutritional needs: Assess nutritional status and recommend course of action  5/9/2025 1111 by Rachael Cummings RN  Outcome: Not Progressing  5/9/2025 0222 by Lon Causey RN  Outcome: Progressing

## 2025-05-09 NOTE — PROGRESS NOTES
Nutrition Assessment     Type and Reason for Visit: Reassess    Nutrition Recommendations/Plan:   Follow as needed. Comfort measures     Malnutrition Assessment:  Malnutrition Status: Moderate malnutrition    Nutrition Assessment:  TF has been discontinued.  Aware patient to be discharged to [facility] with Hospice.  New weight not available.    Estimated Daily Nutrient Needs:  Energy (kcal):  1876-1982 kcals (25-30 kcals/kg) Weight Used for Energy Requirements: Current     Protein (g):  109g Weight Used for Protein Requirements: Current        Fluid (ml/day):  3545-2343 ml Method Used for Fluid Requirements: 1 ml/kcal    Nutrition Related Findings:   Na edema noted.  Na+ 135, BUN 34 Wound Type: Multiple, Stage III    Current Nutrition Therapies:    Diet NPO    Anthropometric Measures:  Height: 175.3 cm (5' 9.02\")  Current Body Wt: 63.9 kg (140 lb 14 oz)   BMI: 20.8        Nutrition Diagnosis:   Inadequate oral intake related to cognitive or neurological impairment, swallowing difficulty, increase demand for energy/nutrients as evidenced by NPO or clear liquid status due to medical condition, wounds, swallow study results    Nutrition Interventions:   Food and/or Nutrient Delivery: Continue NPO  Nutrition Education/Counseling: No recommendation at this time  Coordination of Nutrition Care: Continue to monitor while inpatient  Plan of Care discussed with: nursing    Goals:  Goals: Other  Type of Goal: New goal  Previous Goal Met: Progress towards Goal(s) Declining    Nutrition Monitoring and Evaluation:   Behavioral-Environmental Outcomes: None Identified  Food/Nutrient Intake Outcomes: None Identified  Physical Signs/Symptoms Outcomes: None Identified    Discharge Planning:    No discharge needs at this time     Aarti Laura, MS, RD, LD  Contact: [phone]

## 2025-05-09 NOTE — PROGRESS NOTES
Daily Progress Note  Atul Peoples  MRN: [id number] LOS: 5    Admit Date: 2025 7:52 AM    Subjective:         Interval History:    Reviewed overnight events and nursing notes.     No new issues.    Review of Systems   Unable to perform ROS: Mental status change       DIET:  Diet NPO  ADULT TUBE FEEDING; Nasogastric; Standard with Fiber; Continuous; 15; Yes; 5; Q 4 hours; 50; 20; Q 1 hour    Medications:      lactated ringers 50 mL/hr at 25 0141    dextrose Stopped (25 1149)    sodium chloride        aspirin  81 mg Oral Daily    atorvastatin  10 mg Oral Daily    clopidogrel  75 mg Oral Daily    famotidine  10 mg Oral Daily    levothyroxine  150 mcg Oral Daily    tamsulosin  0.4 mg Oral Daily    sodium chloride flush  5-40 mL IntraVENous 2 times per day    enoxaparin  30 mg SubCUTAneous Daily    cefepime  1,000 mg IntraVENous Q12H    ipratropium 0.5 mg-albuterol 2.5 mg  1 Dose Inhalation Q4H WA RT         Objective:     Vitals: BP (!) 164/66   Pulse (!) 46   Temp 97.5 °F (36.4 °C)   Resp 16   Ht 1.753 m (5' 9.02\")   Wt 63.9 kg (140 lb 14.4 oz)   SpO2 95%   BMI 20.80 kg/m²    Intake/Output Summary (Last 24 hours) at 2025 0752  Last data filed at 2025 0032  Gross per 24 hour   Intake --   Output 900 ml   Net -900 ml    Temp (24hrs), Av.8 °F (36.6 °C), Min:97.5 °F (36.4 °C), Max:98.1 °F (36.7 °C)    Glucose:  Lab Results   Component Value Date    POCGLU 101 (H) 2025    POCGLU 93 2025    POCGLU 91 2025    POCGLU 85 2025     Physical Examination:   Objective:  General Appearance:  Uncomfortable, ill-appearing and in no acute distress.    Vital signs: (most recent): Blood pressure (!) 164/66, pulse (!) 46, temperature 97.5 °F (36.4 °C), resp. rate 16, height 1.753 m (5' 9.02\"), weight 63.9 kg (140 lb 14.4 oz), SpO2 95%.         Labs:  No results found for: \"CHEMIS\", \"CBC\"     Imaging:  XR CHEST PORTABLE  Narrative: EXAM: CHEST, SINGLE-VIEW     HISTORY:

## 2025-05-09 NOTE — PROGRESS NOTES
[facility] notified that patient was leaving with EMS to go to [facility].

## 2025-05-09 NOTE — CARE COORDINATION
SW notified POA, Hospice, Palliative, and [facility] of Pt's plan to transfer back to SNF today with hospice to follow; Clark talked to Dr. Smith who plans to d/c Pt today  [facility]   [phone] P   F  Electronically signed by KALEB Weaver on 5/9/2025 at 1:41 PM      Per Cydney with [facility], Dr. Feldman  thinks that Pt can be managed at [facility] LOC with hospice to follow.  [facility]  [phone]  [phone]  Electronically signed by KALEB Weaver on 5/9/2025 at 1:16 PM

## 2025-05-09 NOTE — DISCHARGE SUMMARY
Hospital Discharge Summary    Atul Peoples  :  1940  MRN:  [id number]    Admit date:  2025  Discharge date:      Admitting Physician:    Isrrael Horner MD    Discharge Diagnoses:    Principal Problem:    Acute encephalopathy  Active Problems:    Palliative care patient    Moderate malnutrition    Sepsis (HCC)    Acute respiratory failure with hypoxia and hypercarbia (HCC)    Hypoglycemia    AMS (altered mental status)  Resolved Problems:    * No resolved hospital problems. *      Hospital Course:      86 yo M with dementia who presented with hypoglycemia and severe sepsis.  He was initially treated aggressively without clinical improvement and was transitioned to comfort care which will be completed at SNF    Discharge Medications:         Medication List        START taking these medications      LORazepam 2 MG/ML concentrated solution  Commonly known as: ATIVAN  Take 0.5 mLs by mouth every 2 hours as needed for Anxiety or Shortness of Breath for up to 14 days. Max Daily Amount: 12 mg     morphine 20MG/ML Soln concentrated solution  Take 0.25 mLs by mouth every 2 hours as needed (For patient comfort, including pain, air hunger, or agitation) for up to 3 days. Max Daily Amount: 60 mg            CONTINUE taking these medications      acetaminophen 500 MG tablet  Commonly known as: TYLENOL     ondansetron 4 MG disintegrating tablet  Commonly known as: ZOFRAN-ODT  Take 1 tablet by mouth every 8 hours as needed for Nausea or Vomiting            STOP taking these medications      amLODIPine 5 MG tablet  Commonly known as: NORVASC     aspirin 81 MG EC tablet     atorvastatin 10 MG tablet  Commonly known as: LIPITOR     bumetanide 1 MG tablet  Commonly known as: BUMEX     calcitRIOL 0.25 MCG capsule  Commonly known as: ROCALTROL     cefdinir 300 MG capsule  Commonly known as: OMNICEF     cloNIDine 0.1 MG tablet  Commonly known as: CATAPRES     clopidogrel 75 MG tablet  Commonly known as: Plavix

## 2025-05-09 NOTE — PROGRESS NOTES
This  attempted to visit with pt to follow up and provide spiritual care. Pt was sleeping but his  was present. This  spoke with the pt's  about offering a prayer and they agreed. This  provided sustaining presence, support, and prayer. Pt was unable to respond but pt's  expressed gratitude for spiritual care. Pt's  also mentioned pt was moaning and asked if he was in pain. This  left a message for pt's nurse Rachael to follow up.       Electronically signed by Mary Behrens on 5/9/2025 at 10:41 AM

## (undated) DEVICE — LARGE BONE HALL BLADE, RECIPROCATOR, 12.5 X 76 X 1.27 MM: Brand: HALL

## (undated) DEVICE — DRESSING FOAM W4XL12IN SIL RECT ADH WTRPRF FLM BK W/ BORD

## (undated) DEVICE — MCLASS OSCILLATING SAW BLADE 19 X 1.27 (0.050") X 90 MM: Brand: MCLASS

## (undated) DEVICE — PK TURNOVER RM ADV

## (undated) DEVICE — ANTIBACTERIAL UNDYED BRAIDED (POLYGLACTIN 910), SYNTHETIC ABSORBABLE SUTURE: Brand: COATED VICRYL

## (undated) DEVICE — TOWEL,OR,DSP,ST,BLUE,DLX,4/PK,20PK/CS: Brand: MEDLINE

## (undated) DEVICE — ZIMMER® STERILE DISPOSABLE TOURNIQUET CUFF WITH PLC, DUAL PORT, SINGLE BLADDER, 34 IN. (86 CM)

## (undated) DEVICE — PAD GRND REM POLYHESIVE A/ DISP

## (undated) DEVICE — ENDO KIT,LOURDES HOSPITAL: Brand: MEDLINE INDUSTRIES, INC.

## (undated) DEVICE — Device

## (undated) DEVICE — Z INACTIVE USE 2660664 SOLUTION IRRIG 3000ML 0.9% SOD CHL USP UROMATIC PLAS CONT

## (undated) DEVICE — ABDOMINAL PAD: Brand: DERMACEA

## (undated) DEVICE — CHLORAPREP 26ML ORANGE

## (undated) DEVICE — PAD MAJOR: Brand: MEDLINE INDUSTRIES, INC.

## (undated) DEVICE — SUTURE ETHLN SZ 3-0 L18IN NONABSORBABLE BLK FS-1 L24MM 3/8 663H

## (undated) DEVICE — ARM BOARD PAD: Brand: DEVON

## (undated) DEVICE — SUT SILK 2/0 SUTUPAK TIES 24IN SA75H

## (undated) DEVICE — ADHS LIQ MASTISOL 2/3ML

## (undated) DEVICE — 3M™ IOBAN™ 2 ANTIMICROBIAL INCISE DRAPE 6650EZ: Brand: IOBAN™ 2

## (undated) DEVICE — GLV SURG SIGN GRIP LTX PF SZ7.5 STRL

## (undated) DEVICE — SUTURE FIBERWIRE SZ 2 L38IN NONABSORBABLE BLU L36.6MM 1/2 AR7202

## (undated) DEVICE — 3M™ STERI-STRIP™ REINFORCED ADHESIVE SKIN CLOSURES, R1546, 1/4 IN X 4 IN (6 MM X 100 MM), 10 STRIPS/ENVELOPE: Brand: 3M™ STERI-STRIP™

## (undated) DEVICE — DRSNG SURESITE123 4X10IN

## (undated) DEVICE — Z CONVERTED USE 2271386 BANDAGE COMPR W6INXL11YD WVN COTTON/ELASTIC CLP CLSR DBL

## (undated) DEVICE — GLOVE SURG SZ 75 L12IN FNGR THK13MIL BRN LTX SYN POLYMER W

## (undated) DEVICE — 5.5 CM ACROMIOBLASTER STRAIGHT                                    BURRS, POWER/EP-1, BRICK RED, 8000                                    MAXIMUM RPM, PACKAGED 6 PER BOX, STERILE

## (undated) DEVICE — 90-S MAX, SUCTION PROBE, NON-BENDABLE, MAX CUT LEVEL 11: Brand: SERFAS ENERGY

## (undated) DEVICE — GLOVE SURG SZ 8 CRM LTX FREE POLYISOPRENE POLYMER BEAD ANTI

## (undated) DEVICE — Device: Brand: POWER-FLO®

## (undated) DEVICE — SURGICAL PROCEDURE PACK KNEE TOT DBD CDS LOURDES HOSP LF

## (undated) DEVICE — SOLUTION IV IRRIG POUR BRL 0.9% SODIUM CHL 2F7124

## (undated) DEVICE — 4.5 MM INCISOR PLUS STRAIGHT                                    BLADES, POWER/EP-1, VIOLET, PACKAGED                                    6 PER BOX, STERILE

## (undated) DEVICE — SURGICAL PROCEDURE PACK LOWER EXTREMITY LOURDES HOSP

## (undated) DEVICE — INTEGRATED CASSETTE TUBING, DO NOT USE IF PACKAGE IS DAMAGED: Brand: CROSSFLOW

## (undated) DEVICE — THREE QUARTER SHEET: Brand: CONVERTORS

## (undated) DEVICE — SUTURE VCRL SZ 2-0 L36IN ABSRB UD L36MM CT-1 1/2 CIR J945H

## (undated) DEVICE — APPL CHLORAPREP W/TINT 26ML ORNG

## (undated) DEVICE — SUT SILK 2/0 SH CR8 18IN CR8 C012D